# Patient Record
Sex: MALE | Race: WHITE | NOT HISPANIC OR LATINO | Employment: OTHER | URBAN - METROPOLITAN AREA
[De-identification: names, ages, dates, MRNs, and addresses within clinical notes are randomized per-mention and may not be internally consistent; named-entity substitution may affect disease eponyms.]

---

## 2017-01-13 ENCOUNTER — OFFICE VISIT (OUTPATIENT)
Dept: PULMONOLOGY | Facility: HOSPICE | Age: 72
End: 2017-01-13
Payer: MEDICARE

## 2017-01-13 VITALS
OXYGEN SATURATION: 93 % | SYSTOLIC BLOOD PRESSURE: 138 MMHG | BODY MASS INDEX: 37.77 KG/M2 | DIASTOLIC BLOOD PRESSURE: 72 MMHG | RESPIRATION RATE: 16 BRPM | TEMPERATURE: 97.3 F | WEIGHT: 235 LBS | HEIGHT: 66 IN | HEART RATE: 68 BPM

## 2017-01-13 DIAGNOSIS — R06.00 DYSPNEA, UNSPECIFIED TYPE: ICD-10-CM

## 2017-01-13 DIAGNOSIS — G47.33 OBSTRUCTIVE SLEEP APNEA: ICD-10-CM

## 2017-01-13 PROCEDURE — 99204 OFFICE O/P NEW MOD 45 MIN: CPT | Mod: 25 | Performed by: INTERNAL MEDICINE

## 2017-01-13 ASSESSMENT — PULMONARY FUNCTION TESTS
FVC_PERCENT_PREDICTED: 59
FVC: 2.33
FVC: 2.17
FEV1/FVC_PERCENT_PREDICTED: 73
FEV1/FVC_PERCENT_PREDICTED: 116
FEV1_PERCENT_CHANGE: -6
FVC_PERCENT_PREDICTED: 55
FEV1_PERCENT_PREDICTED: 64
FEV1: 1.95
FEV1/FVC: 84
FEV1_PREDICTED: 2.85
FEV1/FVC_PERCENT_CHANGE: 100
FEV1/FVC_PERCENT_PREDICTED: 115
FEV1/FVC: 84.33
FEV1: 1.83
FVC_PREDICTED: 3.92
FEV1_PERCENT_CHANGE: -6
FEV1_PERCENT_PREDICTED: 68

## 2017-01-13 NOTE — PROCEDURES
Technician: Kim Reyes, RRT/CPFT  Technician Comments:  Good patient effort & cooperation.  ATS/ERS standards for acceptability & reproducibility were not met for Spirometry  because of variable efforts / glottic noise. First FVC was patients best, every effort after that was variable expiratory flows. The results of this test meet the ATS/ERS standards for acceptability and repeatability.  Test was performed on the [employer] Body Plethysmograph- Elite DX system.  The DLCO was uncorrected for Hgb.  A bronchodilator of Ventolin HFA- 2puffs via spacer were administered.    Spirometry shows moderate lung restriction and there was no evidence for airflow obstruction. Lung volumes are consistent with mild lung restriction and there was evidence of mild air trapping. The diffusion capacity test was normal and the increased D/VA indicates that the lung restriction is due to non-lung parenchyma issues.

## 2017-01-13 NOTE — MR AVS SNAPSHOT
"        Shamar Morgan   2017 9:00 AM   Office Visit   MRN: [id number]    Department:  [facility]   Dept Phone:  [phone]    Description:  Male : 1945   Provider:  Santana Webb M.D.           Reason for Visit     Establish Care     Shortness of Breath           Allergies as of 2017     Allergen Noted Reactions    Lipidil [Fenofibrate] 2011       Myalgia    Penicillins 2008       Sulfa Drugs 2008         You were diagnosed with     Obstructive sleep apnea   [972323]         Vital Signs     Blood Pressure Pulse Temperature Respirations Height Weight    138/72 mmHg 68 36.3 °C (97.3 °F) 16 1.689 m (5' 6.5\") 106.595 kg (235 lb)    Body Mass Index Oxygen Saturation Smoking Status             37.37 kg/m2 93% Former Smoker         Basic Information     Date Of Birth Sex Race Ethnicity Preferred Language    1945 Male White Non- English      Your appointments     2017 11:20 AM   Established Patient with Aldo Lemus M.D.   [facility] [address]    [address]  [address]  [facility] [address]   [phone]           You will be receiving a confirmation call a few days before your appointment from our automated call confirmation system.              Problem List              ICD-10-CM Priority Class Noted - Resolved    Hyperlipemia (Chronic) E78.5 High  2012 - Present    HTN (hypertension) (Chronic) I10 High  2012 - Present    Vitamin d deficiency    2012 - Present    Obesity E66.9   6/10/2016 - Present    IGT (impaired glucose tolerance) R73.02   2016 - Present      Health Maintenance        Date Due Completion Dates    IMM DTaP/Tdap/Td Vaccine (1 - Tdap) 1963 ---    IMM PNEUMOCOCCAL 65+ (ADULT) LOW/MEDIUM RISK SERIES (2 of 2 - PPSV23) 2017    COLONOSCOPY 10/17/2019 10/17/2014, 2014 (Done)    Override on 2014: Done            Current Immunizations     13-VALENT PCV PREVNAR 2016   " Influenza TIV (IM) 11/5/2014    Influenza Vaccine Adult HD 10/16/2016, 10/26/2015  9:25 AM    SHINGLES VACCINE 6/10/2013      Below and/or attached are the medications your provider expects you to take. Review all of your home medications and newly ordered medications with your provider and/or pharmacist. Follow medication instructions as directed by your provider and/or pharmacist. Please keep your medication list with you and share with your provider. Update the information when medications are discontinued, doses are changed, or new medications (including over-the-counter products) are added; and carry medication information at all times in the event of emergency situations     Allergies:  LIPIDIL - (reactions not documented)     PENICILLINS - (reactions not documented)     SULFA DRUGS - (reactions not documented)               Medications  Valid as of: January 13, 2017 -  9:41 AM    Generic Name Brand Name Tablet Size Instructions for use    Cholecalciferol (Tab) cholecalciferol 1000 UNIT Take 1,000 Units by mouth every day.        Gabapentin (Cap) NEURONTIN 300 MG Take 1 Cap by mouth 3 times a day.        Losartan Potassium (Tab) COZAAR 100 MG Take 1 Tab by mouth every day.        Metoprolol Succinate (TABLET SR 24 HR) TOPROL XL 25 MG Take 1 Tab by mouth every day.        Naproxen (Tab) NAPROSYN 500 MG Take 1 Tab by mouth 2 times a day, with meals.        Omeprazole (CAPSULE DELAYED RELEASE) PRILOSEC 20 MG Take 1 Cap by mouth every day.        Pravastatin Sodium (Tab) PRAVACHOL 80 MG Take 1 Tab by mouth every bedtime.        .                 Medicines prescribed today were sent to:     [facility] [place] - [address] [facility] [address]     [facility] [facility] [address] [place] [address]    Phone: [phone] Fax: [phone]    Open 24 Hours?: No      Medication refill instructions:       If your prescription bottle indicates you have medication refills left, it is not necessary to call your provider’s office. Please  contact your pharmacy and they will refill your medication.    If your prescription bottle indicates you do not have any refills left, you may request refills at any time through one of the following ways: The online [employer] system (except Urgent Care), by calling your provider’s office, or by asking your pharmacy to contact your provider’s office with a refill request. Medication refills are processed only during regular business hours and may not be available until the next business day. Your provider may request additional information or to have a follow-up visit with you prior to refilling your medication.   *Please Note: Medication refills are assigned a new Rx number when refilled electronically. Your pharmacy may indicate that no refills were authorized even though a new prescription for the same medication is available at the pharmacy. Please request the medicine by name with the pharmacy before contacting your provider for a refill.        Your To Do List     Future Labs/Procedures Complete By Expires    POLYSOMNOGRAPHY, 4 OR MORE  As directed 1/13/2018    Comments:    Please do split study if meets diagnostic criteria      Instructions    1. We have scheduled a sleep study to evaluate for obstructive sleep apnea. We will also plan to do a CPAP titration during the sleep study  2. Recommend exercise and weight loss  3. Recommend avoiding alcohol and sedatives and to not operate a motor vehicle while drowsy  4. Recommend follow-up after the sleep study          [employer] Access Code: Activation code not generated  Current [employer] Status: Active

## 2017-01-13 NOTE — PATIENT INSTRUCTIONS
1. We have scheduled a sleep study to evaluate for obstructive sleep apnea. We will also plan to do a CPAP titration during the sleep study  2. Recommend exercise and weight loss  3. Recommend avoiding alcohol and sedatives and to not operate a motor vehicle while drowsy  4. Recommend follow-up after the sleep study

## 2017-01-13 NOTE — Clinical Note
Santana Webb M.D.  [facility]   [address] [place] [address] [place] [address]  Phone: [phone] - Fax: [phone]           Encounter Date: 1/13/2017  Provider: Santana Webb M.D.  Location of Care: [facility]      Patient:   Shamar Morgan   MR Number: [id number]   YOB: 1945     PROGRESS NOTE:  Shamar Morgan is a 71 y.o. male here for evaluation of shortness of breath.  Patient was referred by Dr. Aldo Lemus.    History of Present Illness:    Patient is a 71-year-old male comes in concerned about shortness of breath with exertion. He is a remote smoker quitting in 1982. He smoked fairly heavily up to 2-3 packs a day for a short period of time. He has no history of COPD or asthma. He denies a history of wheezing. He has a chronic dry cough that occurs mostly at nighttime. He also has loud snoring. He wakes up with episodes of sore throat and a dry throat. He denies nocturnal gasping or choking. He denies witnessed apneas. He's been fatigued and sleepy during the daytime. We did pulmonary function testing today and it shows evidence of lung restriction related to body habitus. There was no evidence for airflow obstruction or COPD. He also underwent a CT scan of the chest which showed clear lung fields without evidence of any interstitial disease or emphysema. There was no nodules or masses or adenopathy. He denies any history of heart problems. In fact he says he had a recent cardiac evaluation which was all unremarkable. He has no history of pulmonary hypertension. He does admit to having some leg edema. He attributed this to his obesity. He currently weighs 235 pounds with a body mass index of 36.    Constitutional:  Negative for fever, chills, sweats, and fatigue.  Eyes:  Negative for eye pain and visual changes.  HENT:  Negative for tinnitus and hoarse voice.  Cardiovascular:  Negative for chest pain, leg swelling,  syncope and orthopnea.  Respiratory:  See HPI for pertinent negatives  Sleep: Positive for somnolence, loud snoring, sleep disturbance due to breathing, insomnia.  Gastrointestinal:  Negative for dysphagia, nausea and abdominal pain.  Heme/lymph:  Denies easy bruising, blood clots.  Musculoskeletal:  Negative for arthralgias, sore muscles and back pain.  Skin:  Negative for rash and color change.  Neurological:  Negative for headaches, lightheadedness and weakness.  Psychiatric:  Denies depression.    Current Outpatient Prescriptions   Medication Sig Dispense Refill   • gabapentin (NEURONTIN) 300 MG Cap Take 1 Cap by mouth 3 times a day. 90 Cap 3   • omeprazole (PRILOSEC) 20 MG delayed-release capsule Take 1 Cap by mouth every day. 30 Cap 11   • losartan (COZAAR) 100 MG Tab Take 1 Tab by mouth every day. 90 Each 3   • metoprolol SR (TOPROL XL) 25 MG TABLET SR 24 HR Take 1 Tab by mouth every day. 90 Tab 3   • pravastatin (PRAVACHOL) 80 MG tablet Take 1 Tab by mouth every bedtime. 90 Tab 3   • vitamin D (CHOLECALCIFEROL) 1000 UNIT TABS Take 1,000 Units by mouth every day.     • naproxen (NAPROSYN) 500 MG Tab Take 1 Tab by mouth 2 times a day, with meals. 60 Tab 1     No current facility-administered medications for this visit.       Social History   Substance Use Topics   • Smoking status: Former Smoker -- 3.00 packs/day for 16 years     Types: Cigarettes     Quit date: 01/01/1980   • Smokeless tobacco: Never Used   • Alcohol Use: 0.0 oz/week     0 Standard drinks or equivalent per week      Comment: wine or whiskey 2-3 a day       Past Medical History   Diagnosis Date   • CAD (coronary artery disease) 4/16/2012   • Hyperglycemia 4/16/2012   • Hyperlipemia 4/16/2012   • HTN (hypertension) 4/16/2012   • Tachycardia 4/16/2012   • Tonsillitis    • Hypertension    • Obesity        Past Surgical History   Procedure Laterality Date   • Carpal tunnel release         Allergies:  Lipidil; Penicillins; and Sulfa drugs    Family  "History   Problem Relation Age of Onset   • Diabetes Mother    • Heart Disease Mother    • Kidney Disease Mother        Physical Examination    Filed Vitals:    01/13/17 0841   Height: 1.689 m (5' 6.5\")   Weight: 106.595 kg (235 lb)   Weight % change since last entry.: 0 %   BP: 138/72   Pulse: 68   BMI (Calculated): 37.37   Resp: 16   Temp: 36.3 °C (97.3 °F)       Physical Exam:  Constitutional:  Well developed and well nourished.  Head:  Normocephalic and atraumatic.  Nose:  Nose normal.  Mouth/Throat:  Oropharynx is clear and moist, no lesions.  Mallampati class IV  Eyes:  Conjunctivae and EOM are normal.  Pupils are equal, round, and reactive to light.  Neck:  Normal range of motion.  Supple.  No JVD. No tracheal deviation.  No thyromegally  Cardiovascular:  Normal rate, regular rhythm, normal heart sounds and intact distal pulses.  Pulmonary/Chest:  No accessory muscle use.  No wheezing, rales or rhonchi.  No dullness to percussion, tenderness or deformity.  Abdominal:  Soft.  No ascites.  No Hepatosplenomegally.  Non tender.  Musculoskeletal.  Normal range of motion.  No muscular atrophy.  Lymphadenopathy:  No cervical or supraclavicular adenopathy  Neurological:  Alert and oriented.  Cranial nerves intact.  No focal deficits  Skin:  No rashes or ulcers.  Psyciatric:  Normal mood and affect.      Assessment and Plan:  1. Obstructive sleep apnea  The patient has clinical symptoms are consistent with obstructive sleep apnea. He has loud snoring, morning dry throat and sore throat, daytime somnolence. He has a very crowded oropharynx and a thick neck. He has comorbid conditions include obesity, hypertension and hyperlipidemia as well as gastroesophageal reflux symptoms. He had an apnea link which indicated a strong likelihood of obstructive sleep apnea. I scheduled him for a sleep study and we will plan to do a CPAP titration during the second half of the night. I discussed with the patient the likelihood of " obstructive sleep apnea and he seems willing to try CPAP therapy at this time. In regards to his dyspnea on exertion I believe this is related to his obesity. There is no evidence for any significant pulmonary parenchymal disease. I have encouraged weight loss and exercise. I also believe that treating his sleep apnea will also improve his daytime breathing. He was advised to avoid alcohol and sedatives and to not operate a motor vehicle and drowsy. We also discussed the fact that untreated sleep apnea is associated with an increased risk for cardiovascular disease. Plan to see him back after the sleep study.  - POLYSOMNOGRAPHY, 4 OR MORE; Future          Followup Return for follow up visit with APRN.      Electronically signed by Santana Webb M.D.  on 01/13/2017    Aldo Lemus M.D.  [address] [place] [id number]  VIA In Basket

## 2017-01-13 NOTE — MR AVS SNAPSHOT
Shamar Morgan   2017 8:00 AM   Office Visit   MRN: [id number]    Department:  [facility]   Dept Phone:  [phone]    Description:  Male : 1945   Provider:  [id number]           Allergies as of 2017     Allergen Noted Reactions    Lipidil [Fenofibrate] 2011       Myalgia    Penicillins 2008       Sulfa Drugs 2008         You were diagnosed with     Dyspnea, unspecified type   [[id number]]         Vital Signs     Smoking Status                   Former Smoker           Basic Information     Date Of Birth Sex Race Ethnicity Preferred Language    1945 Male White Non- English      Your appointments     2017 11:20 AM   Established Patient with Aldo Lemus M.D.   [facility] [address]    [address]  [address]  [place] [id number] [phone]           You will be receiving a confirmation call a few days before your appointment from our automated call confirmation system.              Problem List              ICD-10-CM Priority Class Noted - Resolved    Hyperlipemia (Chronic) E78.5 High  2012 - Present    HTN (hypertension) (Chronic) I10 High  2012 - Present    Vitamin d deficiency    2012 - Present    Obesity E66.9   6/10/2016 - Present    IGT (impaired glucose tolerance) R73.02   2016 - Present      Health Maintenance        Date Due Completion Dates    IMM DTaP/Tdap/Td Vaccine (1 - Tdap) 1963 ---    IMM PNEUMOCOCCAL 65+ (ADULT) LOW/MEDIUM RISK SERIES (2 of 2 - PPSV23) 2017    COLONOSCOPY 10/17/2019 10/17/2014, 2014 (Done)    Override on 2014: Done            Current Immunizations     13-VALENT PCV PREVNAR 2016    Influenza TIV (IM) 2014    Influenza Vaccine Adult HD 10/16/2016, 10/26/2015  9:25 AM    SHINGLES VACCINE 6/10/2013      Below and/or attached are the medications your provider expects you to take. Review all of your home medications and newly ordered  medications with your provider and/or pharmacist. Follow medication instructions as directed by your provider and/or pharmacist. Please keep your medication list with you and share with your provider. Update the information when medications are discontinued, doses are changed, or new medications (including over-the-counter products) are added; and carry medication information at all times in the event of emergency situations     Allergies:  LIPIDIL - (reactions not documented)     PENICILLINS - (reactions not documented)     SULFA DRUGS - (reactions not documented)               Medications  Valid as of: January 13, 2017 -  9:01 AM    Generic Name Brand Name Tablet Size Instructions for use    Cholecalciferol (Tab) cholecalciferol 1000 UNIT Take 1,000 Units by mouth every day.        Gabapentin (Cap) NEURONTIN 300 MG Take 1 Cap by mouth 3 times a day.        Losartan Potassium (Tab) COZAAR 100 MG Take 1 Tab by mouth every day.        Metoprolol Succinate (TABLET SR 24 HR) TOPROL XL 25 MG Take 1 Tab by mouth every day.        Naproxen (Tab) NAPROSYN 500 MG Take 1 Tab by mouth 2 times a day, with meals.        Omeprazole (CAPSULE DELAYED RELEASE) PRILOSEC 20 MG Take 1 Cap by mouth every day.        Pravastatin Sodium (Tab) PRAVACHOL 80 MG Take 1 Tab by mouth every bedtime.        .                 Medicines prescribed today were sent to:     [facility] [place] - [address] [address] [facility] [place] [address]    Phone: [phone] Fax: [phone]    Open 24 Hours?: No      Medication refill instructions:       If your prescription bottle indicates you have medication refills left, it is not necessary to call your provider’s office. Please contact your pharmacy and they will refill your medication.    If your prescription bottle indicates you do not have any refills left, you may request refills at any time through one of the following ways: The online [employer] system (except Urgent Care), by  calling your provider’s office, or by asking your pharmacy to contact your provider’s office with a refill request. Medication refills are processed only during regular business hours and may not be available until the next business day. Your provider may request additional information or to have a follow-up visit with you prior to refilling your medication.   *Please Note: Medication refills are assigned a new Rx number when refilled electronically. Your pharmacy may indicate that no refills were authorized even though a new prescription for the same medication is available at the pharmacy. Please request the medicine by name with the pharmacy before contacting your provider for a refill.           [employer] Access Code: Activation code not generated  Current [employer] Status: Active

## 2017-01-13 NOTE — PROGRESS NOTES
Shamar Morgan is a 71 y.o. male here for evaluation of shortness of breath.  Patient was referred by Dr. Aldo Lemus.    History of Present Illness:    Patient is a 71-year-old male comes in concerned about shortness of breath with exertion. He is a remote smoker quitting in 1982. He smoked fairly heavily up to 2-3 packs a day for a short period of time. He has no history of COPD or asthma. He denies a history of wheezing. He has a chronic dry cough that occurs mostly at nighttime. He also has loud snoring. He wakes up with episodes of sore throat and a dry throat. He denies nocturnal gasping or choking. He denies witnessed apneas. He's been fatigued and sleepy during the daytime. We did pulmonary function testing today and it shows evidence of lung restriction related to body habitus. There was no evidence for airflow obstruction or COPD. He also underwent a CT scan of the chest which showed clear lung fields without evidence of any interstitial disease or emphysema. There was no nodules or masses or adenopathy. He denies any history of heart problems. In fact he says he had a recent cardiac evaluation which was all unremarkable. He has no history of pulmonary hypertension. He does admit to having some leg edema. He attributed this to his obesity. He currently weighs 235 pounds with a body mass index of 36.    Constitutional:  Negative for fever, chills, sweats, and fatigue.  Eyes:  Negative for eye pain and visual changes.  HENT:  Negative for tinnitus and hoarse voice.  Cardiovascular:  Negative for chest pain, leg swelling, syncope and orthopnea.  Respiratory:  See HPI for pertinent negatives  Sleep: Positive for somnolence, loud snoring, sleep disturbance due to breathing, insomnia.  Gastrointestinal:  Negative for dysphagia, nausea and abdominal pain.  Heme/lymph:  Denies easy bruising, blood clots.  Musculoskeletal:  Negative for arthralgias, sore muscles and back pain.  Skin:  Negative for rash and color  "change.  Neurological:  Negative for headaches, lightheadedness and weakness.  Psychiatric:  Denies depression.    Current Outpatient Prescriptions   Medication Sig Dispense Refill   • gabapentin (NEURONTIN) 300 MG Cap Take 1 Cap by mouth 3 times a day. 90 Cap 3   • omeprazole (PRILOSEC) 20 MG delayed-release capsule Take 1 Cap by mouth every day. 30 Cap 11   • losartan (COZAAR) 100 MG Tab Take 1 Tab by mouth every day. 90 Each 3   • metoprolol SR (TOPROL XL) 25 MG TABLET SR 24 HR Take 1 Tab by mouth every day. 90 Tab 3   • pravastatin (PRAVACHOL) 80 MG tablet Take 1 Tab by mouth every bedtime. 90 Tab 3   • vitamin D (CHOLECALCIFEROL) 1000 UNIT TABS Take 1,000 Units by mouth every day.     • naproxen (NAPROSYN) 500 MG Tab Take 1 Tab by mouth 2 times a day, with meals. 60 Tab 1     No current facility-administered medications for this visit.       Social History   Substance Use Topics   • Smoking status: Former Smoker -- 3.00 packs/day for 16 years     Types: Cigarettes     Quit date: 01/01/1980   • Smokeless tobacco: Never Used   • Alcohol Use: 0.0 oz/week     0 Standard drinks or equivalent per week      Comment: wine or whiskey 2-3 a day       Past Medical History   Diagnosis Date   • CAD (coronary artery disease) 4/16/2012   • Hyperglycemia 4/16/2012   • Hyperlipemia 4/16/2012   • HTN (hypertension) 4/16/2012   • Tachycardia 4/16/2012   • Tonsillitis    • Hypertension    • Obesity        Past Surgical History   Procedure Laterality Date   • Carpal tunnel release         Allergies:  Lipidil; Penicillins; and Sulfa drugs    Family History   Problem Relation Age of Onset   • Diabetes Mother    • Heart Disease Mother    • Kidney Disease Mother        Physical Examination    Filed Vitals:    01/13/17 0841   Height: 1.689 m (5' 6.5\")   Weight: 106.595 kg (235 lb)   Weight % change since last entry.: 0 %   BP: 138/72   Pulse: 68   BMI (Calculated): 37.37   Resp: 16   Temp: 36.3 °C (97.3 °F)       Physical " Exam:  Constitutional:  Well developed and well nourished.  Head:  Normocephalic and atraumatic.  Nose:  Nose normal.  Mouth/Throat:  Oropharynx is clear and moist, no lesions.  Mallampati class IV  Eyes:  Conjunctivae and EOM are normal.  Pupils are equal, round, and reactive to light.  Neck:  Normal range of motion.  Supple.  No JVD. No tracheal deviation.  No thyromegally  Cardiovascular:  Normal rate, regular rhythm, normal heart sounds and intact distal pulses.  Pulmonary/Chest:  No accessory muscle use.  No wheezing, rales or rhonchi.  No dullness to percussion, tenderness or deformity.  Abdominal:  Soft.  No ascites.  No Hepatosplenomegally.  Non tender.  Musculoskeletal.  Normal range of motion.  No muscular atrophy.  Lymphadenopathy:  No cervical or supraclavicular adenopathy  Neurological:  Alert and oriented.  Cranial nerves intact.  No focal deficits  Skin:  No rashes or ulcers.  Psyciatric:  Normal mood and affect.      Assessment and Plan:  1. Obstructive sleep apnea  The patient has clinical symptoms are consistent with obstructive sleep apnea. He has loud snoring, morning dry throat and sore throat, daytime somnolence. He has a very crowded oropharynx and a thick neck. He has comorbid conditions include obesity, hypertension and hyperlipidemia as well as gastroesophageal reflux symptoms. He had an apnea link which indicated a strong likelihood of obstructive sleep apnea. I scheduled him for a sleep study and we will plan to do a CPAP titration during the second half of the night. I discussed with the patient the likelihood of obstructive sleep apnea and he seems willing to try CPAP therapy at this time. In regards to his dyspnea on exertion I believe this is related to his obesity. There is no evidence for any significant pulmonary parenchymal disease. I have encouraged weight loss and exercise. I also believe that treating his sleep apnea will also improve his daytime breathing. He was advised to  avoid alcohol and sedatives and to not operate a motor vehicle and drowsy. We also discussed the fact that untreated sleep apnea is associated with an increased risk for cardiovascular disease. Plan to see him back after the sleep study.  - POLYSOMNOGRAPHY, 4 OR MORE; Future          Followup Return for follow up visit with ISELA.

## 2017-01-16 ENCOUNTER — SLEEP STUDY (OUTPATIENT)
Dept: SLEEP MEDICINE | Facility: MEDICAL CENTER | Age: 72
End: 2017-01-16
Attending: INTERNAL MEDICINE
Payer: MEDICARE

## 2017-01-16 DIAGNOSIS — G47.33 OBSTRUCTIVE SLEEP APNEA: ICD-10-CM

## 2017-01-16 PROCEDURE — 95811 POLYSOM 6/>YRS CPAP 4/> PARM: CPT | Performed by: INTERNAL MEDICINE

## 2017-01-17 NOTE — PROCEDURES
CLINICAL COMMENTS:  The patient underwent a split night polysomnogram with a CPAP titration using the standard montage for measurement of parameters of sleep, respiratory events, movement abnormalities, heart rate and rhythm. A microphone was used to monitor snoring.    INTERPRETATION: The diagnostic recording time was 168.0 minutes with a sleep period of 152.7 minutes.  Total sleep time was 135.5 minutes with a sleep efficiency of 80.6%.  The sleep latency was 15.3 minutes, and REM latency was 68.0 minutes.  The patient had 47 arousals in total, for an arousal index of 20.8.        RESPIRATORY: The patient had 74 apneas in total.  Of these, 62 were obstructive apneas, and 8 were central apneas.  This resulted in an apnea index (AI) of 32.8.  The patient had 91 hypopneas in total, which resulted in a hypopnea index of 40.3.  The overall AHI was 73.1, while the AHI during REM was 88.8.  The supine AHI = N/A.    OXIMETRY: Oxygen saturation monitoring showed a mean SpO2 of 87.8% for the diagnostic part of the study, with a minimum oxygen saturation of 67.0%.  Oxygen saturations were below 89% for 85.5% of sleep time.    CARDIAC: The highest heart rate for the first part of the study was 139.0 beats per minute.  The average heart rate during sleep was 79.8 bpm, while the highest heart rate was 139.0 bpm.    LIMB MOVEMENTS: There were a total of 0 periodic limb movements during sleep, of which 0 were PLMS arousals.  This resulted in a PLMS index of 0.0 and a PLMS arousal index of 0.0.    TREATMENT    Treatment recording time was 279.8 minutes with a total sleep time of 251.4min.  The patient had an arousal index of 11.0.      RESPIRATORY: The patient had 34 obstructive apneas, 89 central apneas, and 91 hypopneas for an overall AHI was 51.1.    OXIMETRY: The mean SpO2 during treatment was 90.5%, with a minimum oxygen saturation of 74.0%.      CPAP was tried from 5cm to [id number]. Bilevel was tried from 14/10cm H2O to  19/13cm H2O.     Sleep study was accomplished on January 16, 2017 with a diagnostic sleep time of 2.3 hours. No slow-wave sleep was observed. No limb movements were evident. Apnea-hypopnea index was very high at 73.1, REM index higher at 89. Patient was predominantly left side. Oxygen desaturation to 67% was evident, below 90% almost 80% of the time monitored with very severe sleep apnea and desaturation, CPAP titration was initiated. A complex titration of CPAP up to 15 cm and then bilevel up to 17/13 produced variable results. It appears that bilevel 16/12 made the greatest impact on reducing the apnea-hypopnea index, REM sleep was achieved and saturations improved initiation of treatment in that range with follow-up of downloading compliance data may help further adjust the settings. Alternatively the patient could return for dedicated titration study

## 2017-01-19 ENCOUNTER — OFFICE VISIT (OUTPATIENT)
Dept: MEDICAL GROUP | Facility: MEDICAL CENTER | Age: 72
End: 2017-01-19
Payer: MEDICARE

## 2017-01-19 VITALS
WEIGHT: 234 LBS | HEIGHT: 67 IN | SYSTOLIC BLOOD PRESSURE: 128 MMHG | BODY MASS INDEX: 36.73 KG/M2 | TEMPERATURE: 97.3 F | DIASTOLIC BLOOD PRESSURE: 66 MMHG | HEART RATE: 85 BPM | RESPIRATION RATE: 16 BRPM | OXYGEN SATURATION: 94 %

## 2017-01-19 DIAGNOSIS — E66.09 NON MORBID OBESITY DUE TO EXCESS CALORIES: ICD-10-CM

## 2017-01-19 DIAGNOSIS — I10 ESSENTIAL HYPERTENSION: ICD-10-CM

## 2017-01-19 DIAGNOSIS — G47.33 OSA (OBSTRUCTIVE SLEEP APNEA): ICD-10-CM

## 2017-01-19 DIAGNOSIS — R73.02 IGT (IMPAIRED GLUCOSE TOLERANCE): ICD-10-CM

## 2017-01-19 DIAGNOSIS — E78.5 DYSLIPIDEMIA: ICD-10-CM

## 2017-01-19 PROCEDURE — 99214 OFFICE O/P EST MOD 30 MIN: CPT | Performed by: INTERNAL MEDICINE

## 2017-01-19 ASSESSMENT — PATIENT HEALTH QUESTIONNAIRE - PHQ9: CLINICAL INTERPRETATION OF PHQ2 SCORE: 0

## 2017-01-19 NOTE — MR AVS SNAPSHOT
"        Shamar Morgan   2017 11:20 AM   Office Visit   MRN: [id number]    Department:  [address] [place] [address]   Dept Phone:  [phone]    Description:  Male : 1945   Provider:  Aldo Lemus M.D.           Reason for Visit     Follow-Up Labs      Allergies as of 2017     Allergen Noted Reactions    Lipidil [Fenofibrate] 2011       Myalgia    Penicillins 2008       Sulfa Drugs 2008         You were diagnosed with     Non morbid obesity due to excess calories   [3401872]       Dyslipidemia   [040020]       Essential hypertension   [3473127]       IGT (impaired glucose tolerance)   [111087]       RUPAL (obstructive sleep apnea)   [426907]         Vital Signs     Blood Pressure Pulse Temperature Respirations Height Weight    128/66 mmHg 85 36.3 °C (97.3 °F) 16 1.689 m (5' 6.5\") 106.142 kg (234 lb)    Body Mass Index Oxygen Saturation Smoking Status             37.21 kg/m2 94% Former Smoker         Basic Information     Date Of Birth Sex Race Ethnicity Preferred Language    1945 Male White Non- English      Your appointments     2017 12:40 PM   Follow UP with Julianna Escobedo M.D.   [facility] (--)    [address] [facility] [address]  [place] [id number] [phone]            2017 11:20 AM   Established Patient with Aldo Lemus M.D.   [facility] [address]    [address]  [place] [address] [phone]           You will be receiving a confirmation call a few days before your appointment from our automated call confirmation system.              Problem List              ICD-10-CM Priority Class Noted - Resolved    Dyslipidemia E78.5 High  2012 - Present    Essential hypertension I10 High  2012 - Present    Vitamin d deficiency    2012 - Present    IGT (impaired glucose tolerance) R73.02   2016 - Present    Non morbid obesity due to excess calories E66.09   2017 - " Present    RUPAL (obstructive sleep apnea) G47.33   1/19/2017 - Present      Health Maintenance        Date Due Completion Dates    IMM DTaP/Tdap/Td Vaccine (1 - Tdap) 7/2/1963 ---    IMM PNEUMOCOCCAL 65+ (ADULT) LOW/MEDIUM RISK SERIES (2 of 2 - PPSV23) 7/8/2017 7/8/2016    COLONOSCOPY 10/17/2019 10/17/2014, 5/28/2014 (Done)    Override on 5/28/2014: Done            Current Immunizations     13-VALENT PCV PREVNAR 7/8/2016    Influenza TIV (IM) 11/5/2014    Influenza Vaccine Adult HD 10/16/2016, 10/26/2015  9:25 AM    SHINGLES VACCINE 6/10/2013    Tdap Vaccine 1/19/2013      Below and/or attached are the medications your provider expects you to take. Review all of your home medications and newly ordered medications with your provider and/or pharmacist. Follow medication instructions as directed by your provider and/or pharmacist. Please keep your medication list with you and share with your provider. Update the information when medications are discontinued, doses are changed, or new medications (including over-the-counter products) are added; and carry medication information at all times in the event of emergency situations     Allergies:  LIPIDIL - (reactions not documented)     PENICILLINS - (reactions not documented)     SULFA DRUGS - (reactions not documented)               Medications  Valid as of: January 19, 2017 - 11:31 AM    Generic Name Brand Name Tablet Size Instructions for use    Cholecalciferol (Tab) cholecalciferol 1000 UNIT Take 1,000 Units by mouth every day.        Gabapentin (Cap) NEURONTIN 300 MG Take 1 Cap by mouth 3 times a day.        Losartan Potassium (Tab) COZAAR 100 MG Take 1 Tab by mouth every day.        Metoprolol Succinate (TABLET SR 24 HR) TOPROL XL 25 MG Take 1 Tab by mouth every day.        Naproxen (Tab) NAPROSYN 500 MG Take 1 Tab by mouth 2 times a day, with meals.        Omeprazole (CAPSULE DELAYED RELEASE) PRILOSEC 20 MG Take 1 Cap by mouth every day.        Pravastatin Sodium  (Tab) PRAVACHOL 80 MG Take 1 Tab by mouth every bedtime.        .                 Medicines prescribed today were sent to:     [facility] [place], [place] - [address]    [facility] [place] [address]    Phone: [phone] Fax: [phone]    Open 24 Hours?: No      Medication refill instructions:       If your prescription bottle indicates you have medication refills left, it is not necessary to call your provider’s office. Please contact your pharmacy and they will refill your medication.    If your prescription bottle indicates you do not have any refills left, you may request refills at any time through one of the following ways: The online [employer] system (except Urgent Care), by calling your provider’s office, or by asking your pharmacy to contact your provider’s office with a refill request. Medication refills are processed only during regular business hours and may not be available until the next business day. Your provider may request additional information or to have a follow-up visit with you prior to refilling your medication.   *Please Note: Medication refills are assigned a new Rx number when refilled electronically. Your pharmacy may indicate that no refills were authorized even though a new prescription for the same medication is available at the pharmacy. Please request the medicine by name with the pharmacy before contacting your provider for a refill.        Your To Do List     Future Labs/Procedures Complete By Expires    COMP METABOLIC PANEL  As directed 1/20/2018    HEMOGLOBIN A1C  As directed 1/20/2018    LIPID PROFILE  As directed 1/20/2018    MICROALBUMIN CREAT RATIO URINE  As directed 1/20/2018         [employer] Access Code: Activation code not generated  Current [employer]orPool Status: Active

## 2017-01-19 NOTE — PROGRESS NOTES
CC: Follow-up multiple issues    HPI:   Shamar presents today with the following.    1. Non morbid obesity due to excess calories  Presents not having lost weight in fact has gone up over the last several months. He is not currently focusing on diet.    2. Dyslipidemia  Cholesterol currently controlled but coming due for recheck. He is maintained on statin without myalgias.    3. Essential hypertension  Blood pressure adequately controlled on current medication denying any chest pain or shortness of breath.    4. IGT (impaired glucose tolerance)  Fasting blood sugars have been elevated with a slightly increased A1c. No true history of diabetes but reports family members with diabetes.    5. RUPAL (obstructive sleep apnea)  Recently seen by pulmonology testing positive for sleep apnea but lungs are normal otherwise.      Patient Active Problem List    Diagnosis Date Noted   • Dyslipidemia 04/16/2012     Priority: High   • Essential hypertension 04/16/2012     Priority: High   • Non morbid obesity due to excess calories 01/19/2017   • RUPAL (obstructive sleep apnea) 01/19/2017   • IGT (impaired glucose tolerance) 07/08/2016   • Vitamin d deficiency 11/16/2012       Current Outpatient Prescriptions   Medication Sig Dispense Refill   • gabapentin (NEURONTIN) 300 MG Cap Take 1 Cap by mouth 3 times a day. 90 Cap 3   • omeprazole (PRILOSEC) 20 MG delayed-release capsule Take 1 Cap by mouth every day. 30 Cap 11   • naproxen (NAPROSYN) 500 MG Tab Take 1 Tab by mouth 2 times a day, with meals. 60 Tab 1   • losartan (COZAAR) 100 MG Tab Take 1 Tab by mouth every day. 90 Each 3   • metoprolol SR (TOPROL XL) 25 MG TABLET SR 24 HR Take 1 Tab by mouth every day. 90 Tab 3   • pravastatin (PRAVACHOL) 80 MG tablet Take 1 Tab by mouth every bedtime. 90 Tab 3   • vitamin D (CHOLECALCIFEROL) 1000 UNIT TABS Take 1,000 Units by mouth every day.       No current facility-administered medications for this visit.         Allergies as of 01/19/2017  "- Ellis as Reviewed 01/19/2017   Allergen Reaction Noted   • Lipidil [fenofibrate]  07/29/2011   • Penicillins  07/31/2008   • Sulfa drugs  07/31/2008        ROS: As per HPI.    /66 mmHg  Pulse 85  Temp(Src) 36.3 °C (97.3 °F)  Resp 16  Ht 1.689 m (5' 6.5\")  Wt 106.142 kg (234 lb)  BMI 37.21 kg/m2  SpO2 94%    Physical Exam:  Gen:         Alert and oriented, No apparent distress.  Neck:        No Lymphadenopathy or Bruits.  Lungs:     Clear to auscultation bilaterally  CV:          Regular rate and rhythm. No murmurs, rubs or gallops.               Ext:          No clubbing, cyanosis, edema.      Assessment and Plan.   71 y.o. male with the following issues.    1. Non morbid obesity due to excess calories  Discussed diet exercise and weight loss strategies. Have set a goal for 15 pounds in 3 months and will followup at that time.  - Patient identified as having weight management issue.  Appropriate orders and counseling given.    2. Dyslipidemia  Again discussion about diet recheck 3 months.  - COMP METABOLIC PANEL; Future  - LIPID PROFILE; Future    3. Essential hypertension  Currently well controlled, Discuss diet, exercise and salt restriction.    4. IGT (impaired glucose tolerance)  Focus on diet recheck 3 months.  - HEMOGLOBIN A1C; Future  - MICROALBUMIN CREAT RATIO URINE; Future    5. RUPAL (obstructive sleep apnea)  Have discussed the importance of treating sleep apnea he has a fitting next week.        "

## 2017-01-25 ENCOUNTER — SLEEP CENTER VISIT (OUTPATIENT)
Dept: SLEEP MEDICINE | Facility: MEDICAL CENTER | Age: 72
End: 2017-01-25
Payer: MEDICARE

## 2017-01-25 VITALS
SYSTOLIC BLOOD PRESSURE: 108 MMHG | RESPIRATION RATE: 16 BRPM | HEART RATE: 69 BPM | HEIGHT: 67 IN | WEIGHT: 227 LBS | DIASTOLIC BLOOD PRESSURE: 68 MMHG | BODY MASS INDEX: 35.63 KG/M2

## 2017-01-25 DIAGNOSIS — G47.33 OSA (OBSTRUCTIVE SLEEP APNEA): ICD-10-CM

## 2017-01-25 DIAGNOSIS — E66.09 NON MORBID OBESITY DUE TO EXCESS CALORIES: ICD-10-CM

## 2017-01-25 PROCEDURE — 99213 OFFICE O/P EST LOW 20 MIN: CPT | Performed by: INTERNAL MEDICINE

## 2017-01-25 NOTE — MR AVS SNAPSHOT
"        Shamar Morgan   2017 12:40 PM   Sleep Center Visit   MRN: [id number]    Department:  [facility]   Dept Phone:  [phone]    Description:  Male : 1945   Provider:  Julianna Escobedo M.D.           Reason for Visit     Follow-Up Sleep study, PFT results      Allergies as of 2017     Allergen Noted Reactions    Lipidil [Fenofibrate] 2011       Myalgia    Penicillins 2008       Sulfa Drugs 2008         You were diagnosed with     RUPAL (obstructive sleep apnea)   [133274]       Non morbid obesity due to excess calories   [6487396]         Vital Signs     Blood Pressure Pulse Respirations Height Weight Body Mass Index    108/68 mmHg 69 16 1.689 m (5' 6.5\") 102.967 kg (227 lb) 36.09 kg/m2    Smoking Status                   Former Smoker           Basic Information     Date Of Birth Sex Race Ethnicity Preferred Language    1945 Male White Non- English      Your appointments     Mar 17, 2017  9:00 AM   Follow UP with Julianna Escobedo M.D.   [facility] (--)    [address] [facility]  [place] [address] [phone]            2017 11:20 AM   Established Patient with Aldo Lemus M.D.   [facility] [address]    [address]  [address]  [place] [id number] [phone]           You will be receiving a confirmation call a few days before your appointment from our automated call confirmation system.              Problem List              ICD-10-CM Priority Class Noted - Resolved    Dyslipidemia E78.5 High  2012 - Present    Essential hypertension I10 High  2012 - Present    Vitamin d deficiency    2012 - Present    IGT (impaired glucose tolerance) R73.02   2016 - Present    Non morbid obesity due to excess calories E66.09   2017 - Present    RUPAL (obstructive sleep apnea) G47.33   2017 - Present      Health Maintenance        Date Due Completion Dates    IMM " PNEUMOCOCCAL 65+ (ADULT) LOW/MEDIUM RISK SERIES (2 of 2 - PPSV23) 7/8/2017 7/8/2016    COLONOSCOPY 10/17/2019 10/17/2014, 5/28/2014 (Done)    Override on 5/28/2014: Done    IMM DTaP/Tdap/Td Vaccine (2 - Td) 1/19/2023 1/19/2013            Current Immunizations     13-VALENT PCV PREVNAR 7/8/2016    Influenza TIV (IM) 11/5/2014    Influenza Vaccine Adult HD 10/16/2016, 10/26/2015  9:25 AM    SHINGLES VACCINE 6/10/2013    Tdap Vaccine 1/19/2013      Below and/or attached are the medications your provider expects you to take. Review all of your home medications and newly ordered medications with your provider and/or pharmacist. Follow medication instructions as directed by your provider and/or pharmacist. Please keep your medication list with you and share with your provider. Update the information when medications are discontinued, doses are changed, or new medications (including over-the-counter products) are added; and carry medication information at all times in the event of emergency situations     Allergies:  LIPIDIL - (reactions not documented)     PENICILLINS - (reactions not documented)     SULFA DRUGS - (reactions not documented)               Medications  Valid as of: January 25, 2017 -  1:02 PM    Generic Name Brand Name Tablet Size Instructions for use    Cholecalciferol (Tab) cholecalciferol 1000 UNIT Take 1,000 Units by mouth every day.        Gabapentin (Cap) NEURONTIN 300 MG Take 1 Cap by mouth 3 times a day.        Losartan Potassium (Tab) COZAAR 100 MG Take 1 Tab by mouth every day.        Metoprolol Succinate (TABLET SR 24 HR) TOPROL XL 25 MG Take 1 Tab by mouth every day.        Naproxen (Tab) NAPROSYN 500 MG Take 1 Tab by mouth 2 times a day, with meals.        Omeprazole (CAPSULE DELAYED RELEASE) PRILOSEC 20 MG Take 1 Cap by mouth every day.        Pravastatin Sodium (Tab) PRAVACHOL 80 MG Take 1 Tab by mouth every bedtime.        .                 Medicines prescribed today were sent to:     [facility]  [address] [place], [place] - [address]    [facility] [place] [address]    Phone: [phone] Fax: [phone]    Open 24 Hours?: No      Medication refill instructions:       If your prescription bottle indicates you have medication refills left, it is not necessary to call your provider’s office. Please contact your pharmacy and they will refill your medication.    If your prescription bottle indicates you do not have any refills left, you may request refills at any time through one of the following ways: The online [employer] system (except Urgent Care), by calling your provider’s office, or by asking your pharmacy to contact your provider’s office with a refill request. Medication refills are processed only during regular business hours and may not be available until the next business day. Your provider may request additional information or to have a follow-up visit with you prior to refilling your medication.   *Please Note: Medication refills are assigned a new Rx number when refilled electronically. Your pharmacy may indicate that no refills were authorized even though a new prescription for the same medication is available at the pharmacy. Please request the medicine by name with the pharmacy before contacting your provider for a refill.           Vserv Access Code: Activation code not generated  Current Vserv Status: Active

## 2017-01-25 NOTE — PROGRESS NOTES
Chief Complaint   Patient presents with   • Follow-Up     Sleep study, PFT results       HPI: This patient is a 71 y.o. Male returns for follow-up of sleep study results. He has had loud snoring and nocturnal resuscitative snorts as well as nightmares. Polysomnogram confirmed severe obstructive sleep apnea syndrome with AHI 73 events per hour, associated with desaturations to 67%. He was titrated on CPAP to 15 cm of water followed by BiPAP therapy with a difficult titration, however optimal pressures seen at 16/12 cm of water with resultant RDI 13. He has a history of coronary artery disease and medically significant excessive weight.  The patient slept very well on BiPAP therapy during the night sleep study, and is eager to start treatment.      Past Medical History   Diagnosis Date   • CAD (coronary artery disease) 4/16/2012   • Hyperglycemia 4/16/2012   • Hyperlipemia 4/16/2012   • HTN (hypertension) 4/16/2012   • Tachycardia 4/16/2012   • Tonsillitis    • Hypertension    • Obesity        Social History     Social History   • Marital Status:      Spouse Name: N/A   • Number of Children: N/A   • Years of Education: N/A     Occupational History   • Not on file.     Social History Main Topics   • Smoking status: Former Smoker -- 3.00 packs/day for 16 years     Types: Cigarettes     Quit date: 01/01/1980   • Smokeless tobacco: Never Used   • Alcohol Use: 0.0 oz/week     0 Standard drinks or equivalent per week      Comment: wine or whiskey 2-3 a day   • Drug Use: No   • Sexual Activity:     Partners: Female     Other Topics Concern   • Not on file     Social History Narrative       Family History   Problem Relation Age of Onset   • Diabetes Mother    • Heart Disease Mother    • Kidney Disease Mother        Current Outpatient Prescriptions on File Prior to Visit   Medication Sig Dispense Refill   • gabapentin (NEURONTIN) 300 MG Cap Take 1 Cap by mouth 3 times a day. 90 Cap 3   • omeprazole (PRILOSEC) 20 MG  "delayed-release capsule Take 1 Cap by mouth every day. 30 Cap 11   • naproxen (NAPROSYN) 500 MG Tab Take 1 Tab by mouth 2 times a day, with meals. 60 Tab 1   • losartan (COZAAR) 100 MG Tab Take 1 Tab by mouth every day. 90 Each 3   • metoprolol SR (TOPROL XL) 25 MG TABLET SR 24 HR Take 1 Tab by mouth every day. 90 Tab 3   • pravastatin (PRAVACHOL) 80 MG tablet Take 1 Tab by mouth every bedtime. 90 Tab 3   • vitamin D (CHOLECALCIFEROL) 1000 UNIT TABS Take 1,000 Units by mouth every day.       No current facility-administered medications on file prior to visit.       Allergies: Lipidil; Penicillins; and Sulfa drugs    ROS:   Constitutional: Denies fevers, chills, night sweats, fatigue or weight loss  Eyes: Denies vision loss, pain, drainage, double vision  Ears, Nose, Throat: Denies earache, difficulty hearing, tinnitus, nasal congestion, hoarseness  Cardiovascular: Denies chest pain, tightness, palpitations, orthopnea or edema  Respiratory: Denies shortness of breath, cough, wheezing, hemoptysis  Sleep: As in history of present illness  GI: Denies heartburn, dysphagia, nausea, abdominal pain, diarrhea or constipation  : Denies frequent urination, hematuria, discharge or painful urination  Musculoskeletal: Denies back pain, painful joints, sore muscles  Neurological: Denies weakness or headaches  Skin: No rashes    Blood pressure 108/68, pulse 69, resp. rate 16, height 1.689 m (5' 6.5\"), weight 102.967 kg (227 lb).  Multi-Ox Readings  Multi Ox #1     O2 sat % at rest     O2 sat % on exertion     O2 sat average on exertion     Multi Ox #2     O2 sat % at rest     O2 sat % on exertion     O2 sat average on exertion       Oxygen Use     Oxygen Frequency     Duration of need     Is the patient mobile within the home?     CPAP Use?     BIPAP Use?     Servo Titration         Physical Exam:  Appearance: Well-nourished, well-developed, in no acute distress  HEENT: Normocephalic, atraumatic, white sclera, PERRLA, oropharynx " clear, Mallampati 4  Neck: No adenopathy or masses  Respiratory: no intercostal retractions or accessory muscle use  Lungs auscultation: Clear to auscultation bilaterally  Cardiovascular: Regular rate rhythm. No murmurs, rubs or gallops.  No LE edema  Abdomen: soft, nondistended  Gait: Normal  Digits: No clubbing, cyanosis  Motor: No focal deficits  Orientation: Oriented to time, person and place    Diagnosis:  1. RUPAL (obstructive sleep apnea)     2. Non morbid obesity due to excess calories         Plan:  The patient has severe obstructive sleep apnea, warranting airway pressurization. We discussed the benefits of BiPAP therapy including cardiovascular benefits, improving alertness and weight loss.   We will start BiPAP at 16/12 cm of water and allow acclimation for a period of 6 weeks prior to downloading his compliance card. Ultimately he may require a formal re-titration if he continues to manifest obstructive respiratory events..  Return in about 6 weeks (around 3/8/2017).

## 2017-03-31 ENCOUNTER — SLEEP CENTER VISIT (OUTPATIENT)
Dept: SLEEP MEDICINE | Facility: MEDICAL CENTER | Age: 72
End: 2017-03-31
Payer: MEDICARE

## 2017-03-31 VITALS
WEIGHT: 227 LBS | HEIGHT: 67 IN | BODY MASS INDEX: 35.63 KG/M2 | SYSTOLIC BLOOD PRESSURE: 104 MMHG | OXYGEN SATURATION: 93 % | HEART RATE: 77 BPM | DIASTOLIC BLOOD PRESSURE: 68 MMHG | RESPIRATION RATE: 16 BRPM

## 2017-03-31 DIAGNOSIS — E66.09 NON MORBID OBESITY DUE TO EXCESS CALORIES: ICD-10-CM

## 2017-03-31 DIAGNOSIS — G47.33 OSA (OBSTRUCTIVE SLEEP APNEA): ICD-10-CM

## 2017-03-31 PROCEDURE — 99213 OFFICE O/P EST LOW 20 MIN: CPT | Performed by: INTERNAL MEDICINE

## 2017-03-31 NOTE — MR AVS SNAPSHOT
"        Shamar Morgan   3/31/2017 11:20 AM   Sleep Center Visit   MRN: [id number]    Department:  [facility]   Dept Phone:  [phone]    Description:  Male : 1945   Provider:  Julianna Escobedo M.D.           Reason for Visit     Follow-Up Compliance      Allergies as of 3/31/2017     Allergen Noted Reactions    Lipidil [Fenofibrate] 2011       Myalgia    Penicillins 2008       Sulfa Drugs 2008         You were diagnosed with     RUPAL (obstructive sleep apnea)   [606209]       Non morbid obesity due to excess calories   [6524284]         Vital Signs     Blood Pressure Pulse Respirations Height Weight Body Mass Index    104/68 mmHg 77 16 1.689 m (5' 6.5\") 102.967 kg (227 lb) 36.09 kg/m2    Oxygen Saturation Smoking Status                93% Former Smoker          Basic Information     Date Of Birth Sex Race Ethnicity Preferred Language    1945 Male White Non- English      Your appointments     2017 11:20 AM   Established Patient with Aldo Lemus M.D.   [facility] [address]    [address]  [place] [address] [phone]           You will be receiving a confirmation call a few days before your appointment from our automated call confirmation system.            2017  9:40 AM   Follow UP with Julianna Escobedo M.D.   [facility] Sleep [facility] (--)    [address] [facility] [address]  [place] [id number] [phone]              Problem List              ICD-10-CM Priority Class Noted - Resolved    Dyslipidemia E78.5 High  2012 - Present    Essential hypertension I10 High  2012 - Present    Vitamin d deficiency    2012 - Present    IGT (impaired glucose tolerance) R73.02   2016 - Present    Non morbid obesity due to excess calories E66.09   2017 - Present    RUPAL (obstructive sleep apnea) G47.33   2017 - Present      Health Maintenance        Date Due Completion Dates    IMM " PNEUMOCOCCAL 65+ (ADULT) LOW/MEDIUM RISK SERIES (2 of 2 - PPSV23) 7/8/2017 7/8/2016    COLONOSCOPY 10/17/2019 10/17/2014, 5/28/2014 (Done)    Override on 5/28/2014: Done    IMM DTaP/Tdap/Td Vaccine (2 - Td) 1/19/2023 1/19/2013            Current Immunizations     13-VALENT PCV PREVNAR 7/8/2016    Influenza TIV (IM) 11/5/2014    Influenza Vaccine Adult HD 10/16/2016, 10/26/2015  9:25 AM    SHINGLES VACCINE 6/10/2013    Tdap Vaccine 1/19/2013      Below and/or attached are the medications your provider expects you to take. Review all of your home medications and newly ordered medications with your provider and/or pharmacist. Follow medication instructions as directed by your provider and/or pharmacist. Please keep your medication list with you and share with your provider. Update the information when medications are discontinued, doses are changed, or new medications (including over-the-counter products) are added; and carry medication information at all times in the event of emergency situations     Allergies:  LIPIDIL - (reactions not documented)     PENICILLINS - (reactions not documented)     SULFA DRUGS - (reactions not documented)               Medications  Valid as of: March 31, 2017 - 11:57 AM    Generic Name Brand Name Tablet Size Instructions for use    Cholecalciferol (Tab) cholecalciferol 1000 UNIT Take 1,000 Units by mouth every day.        Gabapentin (Cap) NEURONTIN 300 MG Take 1 Cap by mouth 3 times a day.        Losartan Potassium (Tab) COZAAR 100 MG Take 1 Tab by mouth every day.        Metoprolol Succinate (TABLET SR 24 HR) TOPROL XL 25 MG Take 1 Tab by mouth every day.        Naproxen (Tab) NAPROSYN 500 MG Take 1 Tab by mouth 2 times a day, with meals.        Omeprazole (CAPSULE DELAYED RELEASE) PRILOSEC 20 MG Take 1 Cap by mouth every day.        Pravastatin Sodium (Tab) PRAVACHOL 80 MG Take 1 Tab by mouth every bedtime.        .                 Medicines prescribed today were sent to:     [facility]  [address] [place], [place] [address]    [facility] [place] [address]    Phone: [phone] Fax: [phone]    Open 24 Hours?: No      Medication refill instructions:       If your prescription bottle indicates you have medication refills left, it is not necessary to call your provider’s office. Please contact your pharmacy and they will refill your medication.    If your prescription bottle indicates you do not have any refills left, you may request refills at any time through one of the following ways: The online Transactis system (except Urgent Care), by calling your provider’s office, or by asking your pharmacy to contact your provider’s office with a refill request. Medication refills are processed only during regular business hours and may not be available until the next business day. Your provider may request additional information or to have a follow-up visit with you prior to refilling your medication.   *Please Note: Medication refills are assigned a new Rx number when refilled electronically. Your pharmacy may indicate that no refills were authorized even though a new prescription for the same medication is available at the pharmacy. Please request the medicine by name with the pharmacy before contacting your provider for a refill.           Transactis Access Code: Activation code not generated  Current Transactis Status: Active

## 2017-03-31 NOTE — PROGRESS NOTES
Chief Complaint   Patient presents with   • Follow-Up     Compliance       HPI: This patient is a 71 y.o. Male returns for RUPAL compliance check. Polysomnogram confirmed severe obstructive sleep apnea syndrome with AHI 73 events per hour, associated with desaturations to 67%. He was started on BiPAP 16/12 cm of water. Compliance card shows 100% usage for 8 hours 40 minutes nightly, and AHI reduced to 8. His disturbing dreams and nocturia have resolved completely. He had difficulty with his headgear and mask leakage, and requests a mask refit. He notes significant improvement in sleep quality and is awakening feeling rested. He admits he does not think he could sleep without his BiPAP now.    Past Medical History   Diagnosis Date   • CAD (coronary artery disease) 4/16/2012   • Hyperglycemia 4/16/2012   • Hyperlipemia 4/16/2012   • HTN (hypertension) 4/16/2012   • Tachycardia 4/16/2012   • Tonsillitis    • Hypertension    • Obesity        Social History     Social History   • Marital Status:      Spouse Name: N/A   • Number of Children: N/A   • Years of Education: N/A     Occupational History   • Not on file.     Social History Main Topics   • Smoking status: Former Smoker -- 3.00 packs/day for 16 years     Types: Cigarettes     Quit date: 01/01/1980   • Smokeless tobacco: Never Used   • Alcohol Use: 0.0 oz/week     0 Standard drinks or equivalent per week      Comment: wine or whiskey 2-3 a day   • Drug Use: No   • Sexual Activity:     Partners: Female     Other Topics Concern   • Not on file     Social History Narrative       Family History   Problem Relation Age of Onset   • Diabetes Mother    • Heart Disease Mother    • Kidney Disease Mother        Current Outpatient Prescriptions on File Prior to Visit   Medication Sig Dispense Refill   • gabapentin (NEURONTIN) 300 MG Cap Take 1 Cap by mouth 3 times a day. 90 Cap 3   • omeprazole (PRILOSEC) 20 MG delayed-release capsule Take 1 Cap by mouth every day. 30 Cap  "11   • naproxen (NAPROSYN) 500 MG Tab Take 1 Tab by mouth 2 times a day, with meals. 60 Tab 1   • losartan (COZAAR) 100 MG Tab Take 1 Tab by mouth every day. 90 Each 3   • metoprolol SR (TOPROL XL) 25 MG TABLET SR 24 HR Take 1 Tab by mouth every day. 90 Tab 3   • pravastatin (PRAVACHOL) 80 MG tablet Take 1 Tab by mouth every bedtime. 90 Tab 3   • vitamin D (CHOLECALCIFEROL) 1000 UNIT TABS Take 1,000 Units by mouth every day.       No current facility-administered medications on file prior to visit.       Allergies: Lipidil; Penicillins; and Sulfa drugs    ROS:   Constitutional: Denies fevers, chills, night sweats, fatigue or weight loss  Eyes: Denies vision loss, pain, drainage, double vision  Ears, Nose, Throat: Denies earache, difficulty hearing, tinnitus, nasal congestion, hoarseness  Cardiovascular: Denies chest pain, tightness, palpitations, orthopnea or edema  Respiratory: Denies shortness of breath, cough, wheezing, hemoptysis  Sleep: Denies daytime sleepiness, snoring, apneas, insomnia, morning headaches  GI: Denies heartburn, dysphagia, nausea, abdominal pain, diarrhea or constipation  : Denies frequent urination, hematuria, discharge or painful urination  Musculoskeletal: Denies back pain, painful joints, sore muscles  Neurological: Denies weakness or headaches  Skin: No rashes    Blood pressure 104/68, pulse 77, resp. rate 16, height 1.689 m (5' 6.5\"), weight 102.967 kg (227 lb), SpO2 93 %.  Multi-Ox Readings  Multi Ox #1     O2 sat % at rest     O2 sat % on exertion     O2 sat average on exertion     Multi Ox #2     O2 sat % at rest     O2 sat % on exertion     O2 sat average on exertion       Oxygen Use     Oxygen Frequency     Duration of need     Is the patient mobile within the home?     CPAP Use?     BIPAP Use?     Servo Titration         Physical Exam:  Appearance: Well-nourished, well-developed, in no acute distress  HEENT: Normocephalic, atraumatic, white sclera, PERRLA, oropharynx clear  Neck: " No adenopathy or masses  Respiratory: no intercostal retractions or accessory muscle use  Lungs auscultation: Clear to auscultation bilaterally  Cardiovascular: Regular rate rhythm. No murmurs, rubs or gallops.  No LE edema  Abdomen: soft, nondistended  Gait: Normal  Digits: No clubbing, cyanosis  Motor: No focal deficits  Orientation: Oriented to time, person and place    Diagnosis:  1. RUPAL (obstructive sleep apnea)     2. Non morbid obesity due to excess calories         Plan:  The patient shows excellent compliance and response to BiPAP therapy. He will continue BiPAP 16/12 cm of water. We had his mask adjusted at our sleep center to optimize fit and minimize leakage.  Return in about 3 months (around 6/30/2017).

## 2017-04-04 ENCOUNTER — TELEPHONE (OUTPATIENT)
Dept: SLEEP MEDICINE | Facility: MEDICAL CENTER | Age: 72
End: 2017-04-04

## 2017-04-04 DIAGNOSIS — G47.33 OSA (OBSTRUCTIVE SLEEP APNEA): ICD-10-CM

## 2017-04-04 NOTE — TELEPHONE ENCOUNTER
Pt was fit with a nasal mask in office at last apt. An order was supposed to be sent to DME: [facility] P:[id number]/[phone] but there are no notes regarding the mask type or size, please sign generic mask order and we will have key re-fit him.

## 2017-04-28 ENCOUNTER — OFFICE VISIT (OUTPATIENT)
Dept: MEDICAL GROUP | Facility: MEDICAL CENTER | Age: 72
End: 2017-04-28
Payer: MEDICARE

## 2017-04-28 VITALS
HEART RATE: 60 BPM | SYSTOLIC BLOOD PRESSURE: 122 MMHG | WEIGHT: 228 LBS | HEIGHT: 66 IN | OXYGEN SATURATION: 95 % | DIASTOLIC BLOOD PRESSURE: 76 MMHG | TEMPERATURE: 97.2 F | RESPIRATION RATE: 16 BRPM | BODY MASS INDEX: 36.64 KG/M2

## 2017-04-28 DIAGNOSIS — F51.01 PRIMARY INSOMNIA: ICD-10-CM

## 2017-04-28 DIAGNOSIS — I10 ESSENTIAL HYPERTENSION: ICD-10-CM

## 2017-04-28 DIAGNOSIS — G47.33 OSA (OBSTRUCTIVE SLEEP APNEA): ICD-10-CM

## 2017-04-28 DIAGNOSIS — R73.02 IGT (IMPAIRED GLUCOSE TOLERANCE): ICD-10-CM

## 2017-04-28 LAB
HBA1C MFR BLD: 5.6 % (ref ?–5.8)
INT CON NEG: NORMAL
INT CON POS: NORMAL

## 2017-04-28 PROCEDURE — 1101F PT FALLS ASSESS-DOCD LE1/YR: CPT | Performed by: INTERNAL MEDICINE

## 2017-04-28 PROCEDURE — 4040F PNEUMOC VAC/ADMIN/RCVD: CPT | Performed by: INTERNAL MEDICINE

## 2017-04-28 PROCEDURE — G8432 DEP SCR NOT DOC, RNG: HCPCS | Performed by: INTERNAL MEDICINE

## 2017-04-28 PROCEDURE — 99214 OFFICE O/P EST MOD 30 MIN: CPT | Performed by: INTERNAL MEDICINE

## 2017-04-28 PROCEDURE — 1036F TOBACCO NON-USER: CPT | Performed by: INTERNAL MEDICINE

## 2017-04-28 PROCEDURE — G8419 CALC BMI OUT NRM PARAM NOF/U: HCPCS | Performed by: INTERNAL MEDICINE

## 2017-04-28 PROCEDURE — 83036 HEMOGLOBIN GLYCOSYLATED A1C: CPT | Performed by: INTERNAL MEDICINE

## 2017-04-28 PROCEDURE — 3017F COLORECTAL CA SCREEN DOC REV: CPT | Performed by: INTERNAL MEDICINE

## 2017-04-28 NOTE — MR AVS SNAPSHOT
"        Shamar Morgan   2017 11:20 AM   Office Visit   MRN: [id number]    Department:  [address] [place] [address]   Dept Phone:  [phone]    Description:  Male : 1945   Provider:  Aldo Lemus M.D.           Reason for Visit     Follow-Up forgot labs      Allergies as of 2017     Allergen Noted Reactions    Lipidil [Fenofibrate] 2011       Myalgia    Penicillins 2008       Sulfa Drugs 2008         You were diagnosed with     RUPAL (obstructive sleep apnea)   [256634]       IGT (impaired glucose tolerance)   [854336]       Essential hypertension   [0631377]       Primary insomnia   [894900]         Vital Signs     Blood Pressure Pulse Temperature Respirations Height Weight    122/76 mmHg 60 36.2 °C (97.2 °F) 16 1.676 m (5' 6\") 103.42 kg (228 lb)    Body Mass Index Oxygen Saturation Smoking Status             36.82 kg/m2 95% Former Smoker         Basic Information     Date Of Birth Sex Race Ethnicity Preferred Language    1945 Male White Non- English      Your appointments     2017  9:40 AM   Follow UP with Julianna Escobedo M.D.   [facility] Medicine (--)    [address]  [place] [id number] [phone]            Oct 30, 2017 11:20 AM   Established Patient with Aldo Lemus M.D.   [facility] [address]    [address]  [address]  [place] [address] [phone]           You will be receiving a confirmation call a few days before your appointment from our automated call confirmation system.              Problem List              ICD-10-CM Priority Class Noted - Resolved    Dyslipidemia E78.5 High  2012 - Present    Essential hypertension I10 High  2012 - Present    Vitamin d deficiency    2012 - Present    IGT (impaired glucose tolerance) R73.02   2016 - Present    Non morbid obesity due to excess calories E66.09   2017 - Present    RUPAL (obstructive sleep apnea) G47.33   " 1/19/2017 - Present      Health Maintenance        Date Due Completion Dates    IMM PNEUMOCOCCAL 65+ (ADULT) LOW/MEDIUM RISK SERIES (2 of 2 - PPSV23) 7/8/2017 7/8/2016    COLONOSCOPY 10/17/2019 10/17/2014, 5/28/2014 (Done)    Override on 5/28/2014: Done    IMM DTaP/Tdap/Td Vaccine (2 - Td) 1/19/2023 1/19/2013            Current Immunizations     13-VALENT PCV PREVNAR 7/8/2016    Influenza TIV (IM) 11/5/2014    Influenza Vaccine Adult HD 10/16/2016, 10/26/2015  9:25 AM    SHINGLES VACCINE 6/10/2013    Tdap Vaccine 1/19/2013      Below and/or attached are the medications your provider expects you to take. Review all of your home medications and newly ordered medications with your provider and/or pharmacist. Follow medication instructions as directed by your provider and/or pharmacist. Please keep your medication list with you and share with your provider. Update the information when medications are discontinued, doses are changed, or new medications (including over-the-counter products) are added; and carry medication information at all times in the event of emergency situations     Allergies:  LIPIDIL - (reactions not documented)     PENICILLINS - (reactions not documented)     SULFA DRUGS - (reactions not documented)               Medications  Valid as of: April 28, 2017 - 11:34 AM    Generic Name Brand Name Tablet Size Instructions for use    Cholecalciferol (Tab) cholecalciferol 1000 UNIT Take 1,000 Units by mouth every day.        Gabapentin (Cap) NEURONTIN 300 MG Take 1 Cap by mouth 3 times a day.        Losartan Potassium (Tab) COZAAR 100 MG Take 1 Tab by mouth every day.        Metoprolol Succinate (TABLET SR 24 HR) TOPROL XL 25 MG Take 1 Tab by mouth every day.        Naproxen (Tab) NAPROSYN 500 MG Take 1 Tab by mouth 2 times a day, with meals.        Omeprazole (CAPSULE DELAYED RELEASE) PRILOSEC 20 MG Take 1 Cap by mouth every day.        Pravastatin Sodium (Tab) PRAVACHOL 80 MG Take 1 Tab by mouth every  bedtime.        .                 Medicines prescribed today were sent to:     [facility]  [place], [place] - [address]    [facility] [place] [address]    Phone: [phone] Fax: [phone]    Open 24 Hours?: No      Medication refill instructions:       If your prescription bottle indicates you have medication refills left, it is not necessary to call your provider’s office. Please contact your pharmacy and they will refill your medication.    If your prescription bottle indicates you do not have any refills left, you may request refills at any time through one of the following ways: The online Henable system (except Urgent Care), by calling your provider’s office, or by asking your pharmacy to contact your provider’s office with a refill request. Medication refills are processed only during regular business hours and may not be available until the next business day. Your provider may request additional information or to have a follow-up visit with you prior to refilling your medication.   *Please Note: Medication refills are assigned a new Rx number when refilled electronically. Your pharmacy may indicate that no refills were authorized even though a new prescription for the same medication is available at the pharmacy. Please request the medicine by name with the pharmacy before contacting your provider for a refill.           Henable Access Code: Activation code not generated  Current Henable Status: Active

## 2017-04-28 NOTE — PROGRESS NOTES
CC: Follow-up multiple issues    HPI:   Shamar presents today with the following.    1. RUPAL (obstructive sleep apnea)  Patient now on CPAP reports he is having much more energy during the day and breathing better.    2. IGT (impaired glucose tolerance)  Forgot to get blood work done today but has lost significant weight and A1c is gone down to 5.6 after checking office.    3. Essential hypertension  Blood pressure well controlled on current medications denying any chest pain or shortness of breath no edema.    4. Primary insomnia  He is taking over-the-counter sleeping aid at 50 mg. He does report a.m. hangover and cannot sleep without it. He is much more rested during the day however.      Patient Active Problem List    Diagnosis Date Noted   • Dyslipidemia 04/16/2012     Priority: High   • Essential hypertension 04/16/2012     Priority: High   • Non morbid obesity due to excess calories 01/19/2017   • RUPAL (obstructive sleep apnea) 01/19/2017   • IGT (impaired glucose tolerance) 07/08/2016   • Vitamin d deficiency 11/16/2012       Current Outpatient Prescriptions   Medication Sig Dispense Refill   • gabapentin (NEURONTIN) 300 MG Cap Take 1 Cap by mouth 3 times a day. 90 Cap 3   • omeprazole (PRILOSEC) 20 MG delayed-release capsule Take 1 Cap by mouth every day. 30 Cap 11   • naproxen (NAPROSYN) 500 MG Tab Take 1 Tab by mouth 2 times a day, with meals. 60 Tab 1   • losartan (COZAAR) 100 MG Tab Take 1 Tab by mouth every day. 90 Each 3   • metoprolol SR (TOPROL XL) 25 MG TABLET SR 24 HR Take 1 Tab by mouth every day. 90 Tab 3   • pravastatin (PRAVACHOL) 80 MG tablet Take 1 Tab by mouth every bedtime. 90 Tab 3   • vitamin D (CHOLECALCIFEROL) 1000 UNIT TABS Take 1,000 Units by mouth every day.       No current facility-administered medications for this visit.         Allergies as of 04/28/2017 - [facility] as Reviewed 04/28/2017   Allergen Reaction Noted   • Lipidil [fenofibrate]  07/29/2011   • Penicillins  07/31/2008   •  "Sulfa drugs  07/31/2008        ROS: As per HPI.    /76 mmHg  Pulse 60  Temp(Src) 36.2 °C (97.2 °F)  Resp 16  Ht 1.676 m (5' 6\")  Wt 103.42 kg (228 lb)  BMI 36.82 kg/m2  SpO2 95%    Physical Exam:  Gen:         Alert and oriented, No apparent distress.  Neck:        No Lymphadenopathy or Bruits.  Lungs:     Clear to auscultation bilaterally  CV:          Regular rate and rhythm. No murmurs, rubs or gallops.               Ext:          No clubbing, cyanosis, edema.      Assessment and Plan.   71 y.o. male with the following issues.    1. RUPAL (obstructive sleep apnea)  Encouraged him to continue his weight loss continue CPAP.    2. IGT (impaired glucose tolerance)  Blood sugars improving with weight loss recheck 6 months.    3. Essential hypertension  Currently well controlled, Discuss diet, exercise and salt restriction.    4. Primary insomnia  Recommended type medication in half.        "

## 2017-05-02 ENCOUNTER — HOSPITAL ENCOUNTER (OUTPATIENT)
Dept: LAB | Facility: MEDICAL CENTER | Age: 72
End: 2017-05-02
Attending: INTERNAL MEDICINE
Payer: MEDICARE

## 2017-05-02 DIAGNOSIS — E78.5 DYSLIPIDEMIA: ICD-10-CM

## 2017-05-02 DIAGNOSIS — R73.02 IGT (IMPAIRED GLUCOSE TOLERANCE): ICD-10-CM

## 2017-05-02 LAB
ALBUMIN SERPL BCP-MCNC: 4.3 G/DL (ref 3.2–4.9)
ALBUMIN/GLOB SERPL: 1.3 G/DL
ALP SERPL-CCNC: 48 U/L (ref 30–99)
ALT SERPL-CCNC: 35 U/L (ref 2–50)
ANION GAP SERPL CALC-SCNC: 6 MMOL/L (ref 0–11.9)
AST SERPL-CCNC: 23 U/L (ref 12–45)
BILIRUB SERPL-MCNC: 0.8 MG/DL (ref 0.1–1.5)
BUN SERPL-MCNC: 19 MG/DL (ref 8–22)
CALCIUM SERPL-MCNC: 9.9 MG/DL (ref 8.5–10.5)
CHLORIDE SERPL-SCNC: 107 MMOL/L (ref 96–112)
CHOLEST SERPL-MCNC: 119 MG/DL (ref 100–199)
CO2 SERPL-SCNC: 28 MMOL/L (ref 20–33)
CREAT SERPL-MCNC: 0.93 MG/DL (ref 0.5–1.4)
CREAT UR-MCNC: 182.7 MG/DL
EST. AVERAGE GLUCOSE BLD GHB EST-MCNC: 117 MG/DL
GFR SERPL CREATININE-BSD FRML MDRD: >60 ML/MIN/1.73 M 2
GLOBULIN SER CALC-MCNC: 3.2 G/DL (ref 1.9–3.5)
GLUCOSE SERPL-MCNC: 104 MG/DL (ref 65–99)
HBA1C MFR BLD: 5.7 % (ref 0–5.6)
HDLC SERPL-MCNC: 43 MG/DL
LDLC SERPL CALC-MCNC: 64 MG/DL
MICROALBUMIN UR-MCNC: <0.7 MG/DL
MICROALBUMIN/CREAT UR: NORMAL MG/G (ref 0–30)
POTASSIUM SERPL-SCNC: 4.8 MMOL/L (ref 3.6–5.5)
PROT SERPL-MCNC: 7.5 G/DL (ref 6–8.2)
SODIUM SERPL-SCNC: 141 MMOL/L (ref 135–145)
TRIGL SERPL-MCNC: 58 MG/DL (ref 0–149)

## 2017-05-02 PROCEDURE — 36415 COLL VENOUS BLD VENIPUNCTURE: CPT

## 2017-05-02 PROCEDURE — 80061 LIPID PANEL: CPT

## 2017-05-02 PROCEDURE — 82570 ASSAY OF URINE CREATININE: CPT

## 2017-05-02 PROCEDURE — 82043 UR ALBUMIN QUANTITATIVE: CPT

## 2017-05-02 PROCEDURE — 80053 COMPREHEN METABOLIC PANEL: CPT

## 2017-05-02 PROCEDURE — 83036 HEMOGLOBIN GLYCOSYLATED A1C: CPT

## 2017-06-29 ENCOUNTER — PATIENT OUTREACH (OUTPATIENT)
Dept: HEALTH INFORMATION MANAGEMENT | Facility: OTHER | Age: 72
End: 2017-06-29

## 2017-06-29 NOTE — PROGRESS NOTES
Attempt #:1    WebIZ Checked & Epic Updated: no  HealthConnect Verified: yes  Verify PCP: yes    Communication Preference Obtained: yes     Review Care Team: yes    Annual Wellness Visit Scheduling  1. Scheduling Status:Scheduled           Care Gap Scheduling (Attempt to Schedule EACH Overdue Care Gap!)     There are no preventive care reminders to display for this patient.      BroadHop Activation: already active  BroadHop Lilo: no  Virtual Visits: no  Opt In to Text Messages: no

## 2017-07-05 ENCOUNTER — TELEPHONE (OUTPATIENT)
Dept: MEDICAL GROUP | Facility: MEDICAL CENTER | Age: 72
End: 2017-07-05

## 2017-07-05 NOTE — TELEPHONE ENCOUNTER
Future Appointments       Provider Department Center    7/7/2017 9:40 AM Julianna Escobedo M.D. [facility]     7/10/2017 8:40 AM Aldo Lemus M.D. [facility] [address]    10/30/2017 11:20 AM Aldo Lemus M.D. [facility] [address]        ANNUAL WELLNESS VISIT PRE-VISIT PLANNING     1.  Reviewed note from last office visit with PCP: YES    2.  If any orders were placed at last visit, do we have Results/Consult Notes?        •  Labs - Labs ordered, completed and results are in chart.       •  Imaging - Imaging was not ordered at last office visit.       •  Referrals - No referrals were ordered at last office visit.    3.  Immunizations were updated in [email or web address] using WebIZ?: Yes       •  WebIZ Recommendations: HEPATITIS A  and TD       •  Is patient due for Tdap? NO       •  Is patient due for Shingles? NO     4.  Patient is due for the following Health Maintenance Topics:   There are no preventive care reminders to display for this patient.    - Patient is up-to-date on all Health Maintenance topics. No records have been requested at this time.    5.  Reviewed/Updated the following with patient:       •   Preferred Pharmacy? YES       •   Preferred Lab? YES       •   Medications? YES. Was Abstract Encounter opened and chart updated? YES       •   Social History? YES. Was Abstract Encounter opened and chart updated? YES       •   Family History? YES. Was Abstract Encounter opened and chart updated? YES    6.  Care Team Updated:       •   DME Company (gait device, O2, CPAP, etc.): YES       •   Other Specialists (eye doctor, derm, GYN, cardiology, endo, etc): YES    7.  Patient has the following Care Path diagnoses on Problem List:  NONE    8.  Specialty Comments was updated with diagnosis information provided by [facility]: NO    9.  Patient was advised: “This is a free wellness visit. The provider will screen for medical conditions to help you stay healthy.  If you have other concerns to address you may be asked to discuss these at a separate visit or there may be an additional fee.”     10.  Patient was informed to arrive 15 min prior to their scheduled appointment and bring in their medication bottles?  YES

## 2017-07-07 ENCOUNTER — SLEEP CENTER VISIT (OUTPATIENT)
Dept: SLEEP MEDICINE | Facility: MEDICAL CENTER | Age: 72
End: 2017-07-07
Payer: MEDICARE

## 2017-07-07 ENCOUNTER — TELEPHONE (OUTPATIENT)
Dept: SLEEP MEDICINE | Facility: MEDICAL CENTER | Age: 72
End: 2017-07-07

## 2017-07-07 VITALS
DIASTOLIC BLOOD PRESSURE: 70 MMHG | OXYGEN SATURATION: 93 % | WEIGHT: 228 LBS | RESPIRATION RATE: 16 BRPM | HEART RATE: 62 BPM | BODY MASS INDEX: 36.64 KG/M2 | HEIGHT: 66 IN | SYSTOLIC BLOOD PRESSURE: 116 MMHG

## 2017-07-07 DIAGNOSIS — I10 ESSENTIAL HYPERTENSION: ICD-10-CM

## 2017-07-07 DIAGNOSIS — G47.33 OSA (OBSTRUCTIVE SLEEP APNEA): ICD-10-CM

## 2017-07-07 PROCEDURE — 99213 OFFICE O/P EST LOW 20 MIN: CPT | Performed by: INTERNAL MEDICINE

## 2017-07-07 NOTE — PROGRESS NOTES
Chief Complaint   Patient presents with   • Follow-Up     RUPAL     HPI: This patient is a 72 y.o. male who returns for RUPAL compliance check. Polysomnogram confirmed severe obstructive sleep apnea syndrome with AHI 73 events per hour, associated with desaturations to 67%. He was started on BiPAP 16/12 cm of water. Compliance card shows 100% usage for 8 hours 15 minutes nightly, and AHI normal at 3. His disturbing dreams and nocturia have resolved. He is using a nasal mask with no further leakage. He feels he has had improved sleep quality with BiPAP however continues to have some degree of morning fatigue. He denies sleep attacks. Weight has been stable.    Past Medical History   Diagnosis Date   • CAD (coronary artery disease) 4/16/2012   • Hyperglycemia 4/16/2012   • Hyperlipemia 4/16/2012   • HTN (hypertension) 4/16/2012   • Tachycardia 4/16/2012   • Tonsillitis    • Hypertension    • Obesity        Social History     Social History   • Marital Status:      Spouse Name: N/A   • Number of Children: N/A   • Years of Education: N/A     Occupational History   • Not on file.     Social History Main Topics   • Smoking status: Former Smoker -- 3.00 packs/day for 16 years     Types: Cigarettes     Quit date: 01/01/1980   • Smokeless tobacco: Never Used   • Alcohol Use: 0.0 oz/week     0 Standard drinks or equivalent per week      Comment: wine or whiskey 2-3 a day   • Drug Use: No   • Sexual Activity:     Partners: Female     Other Topics Concern   • Not on file     Social History Narrative       Family History   Problem Relation Age of Onset   • Diabetes Mother    • Heart Disease Mother    • Kidney Disease Mother    • Diabetes Sister        Current Outpatient Prescriptions on File Prior to Visit   Medication Sig Dispense Refill   • omeprazole (PRILOSEC) 20 MG delayed-release capsule Take 1 Cap by mouth every day. 30 Cap 11   • losartan (COZAAR) 100 MG Tab Take 1 Tab by mouth every day. 90 Each 3   • metoprolol SR  "(TOPROL XL) 25 MG TABLET SR 24 HR Take 1 Tab by mouth every day. 90 Tab 3   • pravastatin (PRAVACHOL) 80 MG tablet Take 1 Tab by mouth every bedtime. 90 Tab 3   • vitamin D (CHOLECALCIFEROL) 1000 UNIT TABS Take 1,000 Units by mouth every day.     • gabapentin (NEURONTIN) 300 MG Cap Take 1 Cap by mouth 3 times a day. 90 Cap 3   • naproxen (NAPROSYN) 500 MG Tab Take 1 Tab by mouth 2 times a day, with meals. 60 Tab 1     No current facility-administered medications on file prior to visit.       Allergies: Lipidil; Penicillins; and Sulfa drugs    ROS:   Constitutional: Denies fevers, chills, night sweats, +fatigue, denies weight loss  Eyes: Denies vision loss, pain, drainage, double vision  Ears, Nose, Throat: Denies earache, difficulty hearing, tinnitus, nasal congestion, hoarseness  Cardiovascular: Denies chest pain, tightness, palpitations, orthopnea or edema  Respiratory: Denies shortness of breath, cough, wheezing, hemoptysis  Sleep: Denies daytime sleepiness, snoring, apneas, insomnia, morning headaches  GI: Denies heartburn, dysphagia, nausea, abdominal pain, diarrhea or constipation  : Denies frequent urination, hematuria, discharge or painful urination  Musculoskeletal: Denies back pain, painful joints, sore muscles  Neurological: Denies weakness or headaches  Skin: No rashes    Blood pressure 116/70, pulse 62, resp. rate 16, height 1.676 m (5' 6\"), weight 103.42 kg (228 lb), SpO2 93 %.    Physical Exam:  Appearance: Well-nourished, well-developed, in no acute distress  HEENT: Normocephalic, atraumatic, white sclera, PERRLA, Mallampati 3  Neck: No adenopathy or masses  Respiratory: no intercostal retractions or accessory muscle use  Lungs auscultation: Clear to auscultation bilaterally  Cardiovascular: Regular rate rhythm. No murmurs, rubs or gallops.  No LE edema  Abdomen: soft, nondistended  Gait: Normal  Digits: No clubbing, cyanosis  Motor: No focal deficits  Orientation: Oriented to time, person and " place    Diagnosis:  1. RUPAL (obstructive sleep apnea)     2. Essential hypertension     3. BMI 36.0-36.9,adult         Plan:  The patient shows excellent compliance on BiPAP therapy with normal apnea index. His nocturia has resolved and he is sleeping over 8 hours nightly. We discussed the possibility of his beta blocker therapy contributing to fatigue. He will reduce to half dose of metoprolol SR and monitor his blood pressure daily. He has a pending follow-up with Dr. Lemus to assess his blood pressure.  Good sleep hygiene encouraged.  We also discussed that he could be a candidate for Provigil/Nuvigil therapy if he continues to feel fatigued.  RTC 6 months.  No Follow-up on file.

## 2017-07-07 NOTE — MR AVS SNAPSHOT
"        Shamar Morgan   2017 9:40 AM   Sleep Center Visit   MRN: [id number]    Department:  [facility]   Dept Phone:  [phone]    Description:  Male : 1945   Provider:  Julianna Escobedo M.D.           Reason for Visit     Follow-Up RUPAL      Allergies as of 2017     Allergen Noted Reactions    Lipidil [Fenofibrate] 2011       Myalgia    Penicillins 2008       Sulfa Drugs 2008         You were diagnosed with     RUPAL (obstructive sleep apnea)   [866586]       Essential hypertension   [6532762]       BMI 36.0-36.9,adult   [558383]         Vital Signs     Blood Pressure Pulse Respirations Height Weight Body Mass Index    116/70 mmHg 62 16 1.676 m (5' 6\") 103.42 kg (228 lb) 36.82 kg/m2    Oxygen Saturation Smoking Status                93% Former Smoker          Basic Information     Date Of Birth Sex Race Ethnicity Preferred Language    1945 Male White Non- English      Your appointments     Jul 10, 2017  8:40 AM   ANNUAL EXAM PREVENTATIVE with Aldo Lemus M.D.   [facility] [address]    [address]  [place] [address] [phone]            Oct 30, 2017 11:20 AM   Established Patient with Aldo Lemus M.D.   [facility] [address]    [address]  [place] [address] [phone]           You will be receiving a confirmation call a few days before your appointment from our automated call confirmation system.            2018 12:40 PM   Follow UP with Julianna Escobedo M.D.   [facility] (--)    [address]  [place] [address] [phone]              Problem List              ICD-10-CM Priority Class Noted - Resolved    Dyslipidemia E78.5 High  2012 - Present    Essential hypertension I10 High  2012 - Present    Vitamin d deficiency    2012 - Present    IGT (impaired glucose tolerance) R73.02   2016 - Present    Non " morbid obesity due to excess calories E66.09   1/19/2017 - Present    RUPAL (obstructive sleep apnea) G47.33   1/19/2017 - Present      Health Maintenance        Date Due Completion Dates    IMM PNEUMOCOCCAL 65+ (ADULT) LOW/MEDIUM RISK SERIES (2 of 2 - PPSV23) 7/8/2017 7/8/2016    IMM INFLUENZA (1) 9/1/2017 10/16/2016, 10/26/2015, 11/5/2014    COLONOSCOPY 10/17/2019 10/17/2014, 5/28/2014 (Done)    Override on 5/28/2014: Done    IMM DTaP/Tdap/Td Vaccine (2 - Td) 1/19/2023 1/19/2013            Current Immunizations     13-VALENT PCV PREVNAR 7/8/2016    Influenza TIV (IM) 11/5/2014    Influenza Vaccine Adult HD 10/16/2016, 10/26/2015  9:25 AM    SHINGLES VACCINE 6/10/2013    Tdap Vaccine 1/19/2013      Below and/or attached are the medications your provider expects you to take. Review all of your home medications and newly ordered medications with your provider and/or pharmacist. Follow medication instructions as directed by your provider and/or pharmacist. Please keep your medication list with you and share with your provider. Update the information when medications are discontinued, doses are changed, or new medications (including over-the-counter products) are added; and carry medication information at all times in the event of emergency situations     Allergies:  LIPIDIL - (reactions not documented)     PENICILLINS - (reactions not documented)     SULFA DRUGS - (reactions not documented)               Medications  Valid as of: July 07, 2017 - 10:00 AM    Generic Name Brand Name Tablet Size Instructions for use    Cholecalciferol (Tab) cholecalciferol 1000 UNIT Take 1,000 Units by mouth every day.        Gabapentin (Cap) NEURONTIN 300 MG Take 1 Cap by mouth 3 times a day.        Losartan Potassium (Tab) COZAAR 100 MG Take 1 Tab by mouth every day.        Metoprolol Succinate (TABLET SR 24 HR) TOPROL XL 25 MG Take 1 Tab by mouth every day.        Naproxen (Tab) NAPROSYN 500 MG Take 1 Tab by mouth 2 times a day, with  meals.        Omeprazole (CAPSULE DELAYED RELEASE) PRILOSEC 20 MG Take 1 Cap by mouth every day.        Pravastatin Sodium (Tab) PRAVACHOL 80 MG Take 1 Tab by mouth every bedtime.        .                 Medicines prescribed today were sent to:     [facility] [place]  [place], [place] - [address]    [facility] [place] [address]    Phone: [phone] Fax: [phone]    Open 24 Hours?: No      Medication refill instructions:       If your prescription bottle indicates you have medication refills left, it is not necessary to call your provider’s office. Please contact your pharmacy and they will refill your medication.    If your prescription bottle indicates you do not have any refills left, you may request refills at any time through one of the following ways: The online [employer] Places system (except Urgent Care), by calling your provider’s office, or by asking your pharmacy to contact your provider’s office with a refill request. Medication refills are processed only during regular business hours and may not be available until the next business day. Your provider may request additional information or to have a follow-up visit with you prior to refilling your medication.   *Please Note: Medication refills are assigned a new Rx number when refilled electronically. Your pharmacy may indicate that no refills were authorized even though a new prescription for the same medication is available at the pharmacy. Please request the medicine by name with the pharmacy before contacting your provider for a refill.           Open Places Access Code: Activation code not generated  Current Open Places Status: Active

## 2017-07-10 ENCOUNTER — OFFICE VISIT (OUTPATIENT)
Dept: MEDICAL GROUP | Facility: MEDICAL CENTER | Age: 72
End: 2017-07-10
Payer: MEDICARE

## 2017-07-10 VITALS
OXYGEN SATURATION: 96 % | RESPIRATION RATE: 16 BRPM | HEIGHT: 66 IN | SYSTOLIC BLOOD PRESSURE: 126 MMHG | TEMPERATURE: 98.7 F | BODY MASS INDEX: 36.42 KG/M2 | HEART RATE: 59 BPM | DIASTOLIC BLOOD PRESSURE: 62 MMHG | WEIGHT: 226.6 LBS

## 2017-07-10 DIAGNOSIS — R73.02 IGT (IMPAIRED GLUCOSE TOLERANCE): ICD-10-CM

## 2017-07-10 DIAGNOSIS — Z00.00 MEDICARE ANNUAL WELLNESS VISIT, SUBSEQUENT: ICD-10-CM

## 2017-07-10 DIAGNOSIS — E78.5 DYSLIPIDEMIA: ICD-10-CM

## 2017-07-10 DIAGNOSIS — Z23 NEED FOR VACCINATION FOR PNEUMOCOCCUS: ICD-10-CM

## 2017-07-10 DIAGNOSIS — E66.09 NON MORBID OBESITY DUE TO EXCESS CALORIES: ICD-10-CM

## 2017-07-10 DIAGNOSIS — G47.33 OSA (OBSTRUCTIVE SLEEP APNEA): ICD-10-CM

## 2017-07-10 DIAGNOSIS — I10 ESSENTIAL HYPERTENSION: ICD-10-CM

## 2017-07-10 PROCEDURE — G0439 PPPS, SUBSEQ VISIT: HCPCS | Mod: 25 | Performed by: INTERNAL MEDICINE

## 2017-07-10 PROCEDURE — 90732 PPSV23 VACC 2 YRS+ SUBQ/IM: CPT | Performed by: INTERNAL MEDICINE

## 2017-07-10 PROCEDURE — G0009 ADMIN PNEUMOCOCCAL VACCINE: HCPCS | Performed by: INTERNAL MEDICINE

## 2017-07-10 ASSESSMENT — PATIENT HEALTH QUESTIONNAIRE - PHQ9: CLINICAL INTERPRETATION OF PHQ2 SCORE: 0

## 2017-07-10 NOTE — MR AVS SNAPSHOT
"        Shamar Morgan   7/10/2017 8:40 AM   Office Visit   MRN: [id number]    Department:  [address] [place] [address]   Dept Phone:  [phone]    Description:  Male : 1945   Provider:  Aldo Lemus M.D.           Reason for Visit     Annual Exam           Allergies as of 7/10/2017     Allergen Noted Reactions    Lipidil [Fenofibrate] 2011       Myalgia    Penicillins 2008       Sulfa Drugs 2008         You were diagnosed with     Medicare annual wellness visit, subsequent   [678749]       Dyslipidemia   [238419]       Essential hypertension   [7893894]       IGT (impaired glucose tolerance)   [525620]       Non morbid obesity due to excess calories   [2693890]       RUPAL (obstructive sleep apnea)   [296930]       Need for vaccination for pneumococcus   [246751]         Vital Signs     Blood Pressure Pulse Temperature Respirations Height Weight    126/62 mmHg 59 37.1 °C (98.7 °F) 16 1.676 m (5' 5.98\") 102.785 kg (226 lb 9.6 oz)    Body Mass Index Oxygen Saturation Smoking Status             36.59 kg/m2 96% Former Smoker         Basic Information     Date Of Birth Sex Race Ethnicity Preferred Language    1945 Male White Non- English      Your appointments     Yobany 10, 2018  9:20 AM   Established Patient with Aldo Lemus M.D.   [facility] [address])    [address]  [address]  [place] [address] [phone]           You will be receiving a confirmation call a few days before your appointment from our automated call confirmation system.            2018 12:40 PM   Follow UP with Julianna Escobedo M.D.   [facility] (--)    [address]  [place] [id number] [phone]              Problem List              ICD-10-CM Priority Class Noted - Resolved    Dyslipidemia E78.5 High  2012 - Present    Essential hypertension I10 High  2012 - Present    Vitamin d deficiency    2012 - Present    IGT " (impaired glucose tolerance) R73.02   7/8/2016 - Present    Non morbid obesity due to excess calories E66.09   1/19/2017 - Present    RUPAL (obstructive sleep apnea) G47.33   1/19/2017 - Present      Health Maintenance        Date Due Completion Dates    IMM PNEUMOCOCCAL 65+ (ADULT) LOW/MEDIUM RISK SERIES (2 of 2 - PPSV23) 7/8/2017 7/8/2016    IMM INFLUENZA (1) 9/1/2017 10/16/2016, 10/26/2015, 11/5/2014    COLONOSCOPY 10/17/2019 10/17/2014, 5/28/2014 (Done)    Override on 5/28/2014: Done    IMM DTaP/Tdap/Td Vaccine (2 - Td) 1/19/2023 1/19/2013            Current Immunizations     13-VALENT PCV PREVNAR 7/8/2016    Influenza TIV (IM) 11/5/2014    Influenza Vaccine Adult HD 10/16/2016, 10/26/2015  9:25 AM    Pneumococcal polysaccharide vaccine (PPSV-23)  Incomplete    SHINGLES VACCINE 6/10/2013    Tdap Vaccine 1/19/2013      Below and/or attached are the medications your provider expects you to take. Review all of your home medications and newly ordered medications with your provider and/or pharmacist. Follow medication instructions as directed by your provider and/or pharmacist. Please keep your medication list with you and share with your provider. Update the information when medications are discontinued, doses are changed, or new medications (including over-the-counter products) are added; and carry medication information at all times in the event of emergency situations     Allergies:  LIPIDIL - (reactions not documented)     PENICILLINS - (reactions not documented)     SULFA DRUGS - (reactions not documented)               Medications  Valid as of: July 10, 2017 -  9:03 AM    Generic Name Brand Name Tablet Size Instructions for use    Cholecalciferol (Tab) cholecalciferol 1000 UNIT Take 1,000 Units by mouth every day.        Gabapentin (Cap) NEURONTIN 300 MG Take 1 Cap by mouth 3 times a day.        Losartan Potassium (Tab) COZAAR 100 MG Take 1 Tab by mouth every day.        Naproxen (Tab) NAPROSYN 500 MG Take 1 Tab  by mouth 2 times a day, with meals.        Omeprazole (CAPSULE DELAYED RELEASE) PRILOSEC 20 MG Take 1 Cap by mouth every day.        Pravastatin Sodium (Tab) PRAVACHOL 80 MG Take 1 Tab by mouth every bedtime.        .                 Medicines prescribed today were sent to:     [facility] [place], [place] - [address]    [facility] [address] [place] [address]    Phone: [phone] Fax: [phone]    Open 24 Hours?: No      Medication refill instructions:       If your prescription bottle indicates you have medication refills left, it is not necessary to call your provider’s office. Please contact your pharmacy and they will refill your medication.    If your prescription bottle indicates you do not have any refills left, you may request refills at any time through one of the following ways: The online [employer] system (except Urgent Care), by calling your provider’s office, or by asking your pharmacy to contact your provider’s office with a refill request. Medication refills are processed only during regular business hours and may not be available until the next business day. Your provider may request additional information or to have a follow-up visit with you prior to refilling your medication.   *Please Note: Medication refills are assigned a new Rx number when refilled electronically. Your pharmacy may indicate that no refills were authorized even though a new prescription for the same medication is available at the pharmacy. Please request the medicine by name with the pharmacy before contacting your provider for a refill.           [employer] Access Code: Activation code not generated  Current Pa-Go Mobile Status: Active

## 2017-07-10 NOTE — PROGRESS NOTES
CC: Wellness examination    HPI:   Shamar presents today with the following.    1. Medicare annual wellness visit, subsequent  Excretion performed below    2. Dyslipidemia  Maintain on statin without myalgias good control of cholesterol    3. Essential hypertension  Blood pressure well controlled no chest pain shortness of breath edema.    4. IGT (impaired glucose tolerance)  Slightly elevated blood sugars not actively trying to lose weight. He does drink 4-5 drinks per night with soda.    5. Non morbid obesity due to excess calories  Again not actively trying to lose.    6. RUPAL (obstructive sleep apnea)  He is now maintained on CPAP reporting improved breathing symptoms followed by pulmonology.    7. Need for vaccination for pneumococcus        Depression Screening    Little interest or pleasure in doing things?  0 - not at all  Feeling down, depressed , or hopeless? 0 - not at all  Trouble falling or staying asleep, or sleeping too much?     Feeling tired or having little energy?     Poor appetite or overeating?     Feeling bad about yourself - or that you are a failure or have let yourself or your family down?    Trouble concentrating on things, such as reading the newspaper or watching television?    Moving or speaking so slowly that other people could have noticed.  Or the opposite - being so fidgety or restless that you have been moving around a lot more than usual?     Thoughts that you would be better off dead, or of hurting yourself?     Patient Health Questionnaire Score:    If depressive symptoms identified deferred to follow up visit unless specifically addressed in assessment and plan.    Interpretation of PHQ-9 Total Score   Score Severity   1-4 Minimal Depression   5-9 Mild Depression   10-14 Moderate Depression   15-19 Moderately Severe Depression   20-27 Severe Depression    Screening for Cognitive Impairment    Three Minute Recall (banana, sunrise, fence)  3/3    Draw clock face with all 12 numbers  set to the hand to show 10 minures past 11 o'clock  1 5/5  If cognitive concerns identified deferred to follow up visit unless specifically addressed in assessment and plan.    Fall Risk Assessment    Has the patient had two or more falls in the last year or any fall with injury in the last year?  No  If Fall Risk identified deferred to follow up visit unless specifically addressed in assessment and plan.    Safety Assessment    Throw rugs on floor.  Yes  Cautioned about securing or removing.  Handrails on all stairs.  No  Good lighting in all hallways.  Yes  Difficulty hearing.  No  Patient counseled about all safety risks that were identified.    Functional Assessment ADLs    Are there any barriers preventing you from cooking for yourself or meeting nutritional needs?  No.    Are there any barriers preventing you from driving safely or obtaining transportation?  No.    Are there any barriers preventing you from using a telephone or calling for help?  No.    Are there any barriers preventing you from shopping?  No.    Are there any barriers preventing you from taking care of your own finances?  No.    Are there any barriers preventing you from managing your medications?  No.    Are currently engaing any exercise or physical activity?  Yes.  walking    Health Maintenance Summary                IMM PNEUMOCOCCAL 65+ (ADULT) LOW/MEDIUM RISK SERIES Overdue 7/8/2017      Done 7/8/2016 Imm Admin: Pneumococcal Conjugate Vaccine (Prevnar/PCV-13)    IMM INFLUENZA Next Due 9/1/2017      Done 10/16/2016 Imm Admin: Influenza Vaccine Adult HD     Patient has more history with this topic...    Annual Wellness Visit Next Due 7/11/2018      Done 7/10/2017 Visit Dx: Medicare annual wellness visit, subsequent     Patient has more history with this topic...    COLONOSCOPY Next Due 10/17/2019      Done 10/17/2014 REFERRAL TO GI FOR COLONOSCOPY     Patient has more history with this topic...    IMM DTaP/Tdap/Td Vaccine Next Due  "1/19/2023      Done 1/19/2013 Imm Admin: Tdap Vaccine          Patient Care Team:  Aldo Lemus M.D. as PCP - General (Internal Medicine)  Prosper Alcala M.D. as Consulting Physician (Gastroenterology)  Julianna Escobedo M.D. as Consulting Physician (Pulmonary Medicine)      Patient Active Problem List    Diagnosis Date Noted   • Dyslipidemia 04/16/2012     Priority: High   • Essential hypertension 04/16/2012     Priority: High   • Non morbid obesity due to excess calories 01/19/2017   • RUPAL (obstructive sleep apnea) 01/19/2017   • IGT (impaired glucose tolerance) 07/08/2016   • Vitamin d deficiency 11/16/2012       Current Outpatient Prescriptions   Medication Sig Dispense Refill   • omeprazole (PRILOSEC) 20 MG delayed-release capsule Take 1 Cap by mouth every day. 30 Cap 11   • losartan (COZAAR) 100 MG Tab Take 1 Tab by mouth every day. 90 Each 3   • pravastatin (PRAVACHOL) 80 MG tablet Take 1 Tab by mouth every bedtime. 90 Tab 3   • vitamin D (CHOLECALCIFEROL) 1000 UNIT TABS Take 1,000 Units by mouth every day.     • gabapentin (NEURONTIN) 300 MG Cap Take 1 Cap by mouth 3 times a day. (Patient not taking: Reported on 7/10/2017) 90 Cap 3   • naproxen (NAPROSYN) 500 MG Tab Take 1 Tab by mouth 2 times a day, with meals. (Patient not taking: Reported on 7/10/2017) 60 Tab 1     No current facility-administered medications for this visit.         Allergies as of 07/10/2017 - Ellis as Reviewed 07/10/2017   Allergen Reaction Noted   • Lipidil [fenofibrate]  07/29/2011   • Penicillins  07/31/2008   • Sulfa drugs  07/31/2008        ROS: As per HPI.    /62 mmHg  Pulse 59  Temp(Src) 37.1 °C (98.7 °F)  Resp 16  Ht 1.676 m (5' 5.98\")  Wt 102.785 kg (226 lb 9.6 oz)  BMI 36.59 kg/m2  SpO2 96%    Physical Exam:  Gen:         Alert and oriented, No apparent distress.  Neck:        No Lymphadenopathy or Bruits.  Lungs:     Clear to auscultation bilaterally  CV:          Regular rate and rhythm. No murmurs, rubs or " gallops.  Abd:         Soft non tender, non distended. Normal active bowel sounds.  No  Hepatosplenomegaly, No pulsatile masses.                   Ext:          No clubbing, cyanosis, edema.      Assessment and Plan.   72 y.o. male with the following issues.    1. Medicare annual wellness visit, subsequent  Discussed healthy lifestyle habits as well as screening regimens.  - Annual Wellness Visit - Includes PPPS Subsequent ()    2. Dyslipidemia  Lipids currently well controlled. Discussed continued diet and exercise recheck 6 months to 1 year.  - Annual Wellness Visit - Includes PPPS Subsequent ()    3. Essential hypertension  Currently well controlled, Discuss diet, exercise and salt restriction.  - Annual Wellness Visit - Includes PPPS Subsequent ()    4. IGT (impaired glucose tolerance)  Again discussed diet in terms of preventing diabetes.  - Annual Wellness Visit - Includes PPPS Subsequent ()    5. Non morbid obesity due to excess calories  Discussed finding motivation to lose weight including health benefits  - Annual Wellness Visit - Includes PPPS Subsequent ()  - Patient identified as having weight management issue.  Appropriate orders and counseling given.    6. RUPAL (obstructive sleep apnea)  Continue CPAP  - Annual Wellness Visit - Includes PPPS Subsequent ()    7. Need for vaccination for pneumococcus    - PNEUMOCOCCAL POLYSACCHARIDE VACCINE 23-VALENT =>1YO SQ/IM

## 2017-09-03 ENCOUNTER — HOSPITAL ENCOUNTER (INPATIENT)
Facility: MEDICAL CENTER | Age: 72
LOS: 3 days | DRG: 247 | End: 2017-09-06
Attending: EMERGENCY MEDICINE | Admitting: INTERNAL MEDICINE
Payer: MEDICARE

## 2017-09-03 ENCOUNTER — APPOINTMENT (OUTPATIENT)
Dept: RADIOLOGY | Facility: MEDICAL CENTER | Age: 72
DRG: 247 | End: 2017-09-03
Payer: MEDICARE

## 2017-09-03 ENCOUNTER — RESOLUTE PROFESSIONAL BILLING HOSPITAL PROF FEE (OUTPATIENT)
Dept: HOSPITALIST | Facility: MEDICAL CENTER | Age: 72
End: 2017-09-03
Payer: MEDICARE

## 2017-09-03 ENCOUNTER — APPOINTMENT (OUTPATIENT)
Dept: RADIOLOGY | Facility: MEDICAL CENTER | Age: 72
DRG: 247 | End: 2017-09-03
Attending: EMERGENCY MEDICINE
Payer: MEDICARE

## 2017-09-03 DIAGNOSIS — I21.19 ST ELEVATION MYOCARDIAL INFARCTION (STEMI) OF INFERIOR WALL (HCC): ICD-10-CM

## 2017-09-03 PROBLEM — I21.3 STEMI (ST ELEVATION MYOCARDIAL INFARCTION) (HCC): Status: ACTIVE | Noted: 2017-09-03

## 2017-09-03 PROBLEM — I21.9 ACUTE MI (HCC): Status: ACTIVE | Noted: 2017-09-03

## 2017-09-03 PROBLEM — I25.5 ISCHEMIC CARDIOMYOPATHY: Status: ACTIVE | Noted: 2017-09-03

## 2017-09-03 PROBLEM — I21.9 ACUTE MI (HCC): Status: RESOLVED | Noted: 2017-09-03 | Resolved: 2017-09-03

## 2017-09-03 LAB
ALBUMIN SERPL BCP-MCNC: 3.9 G/DL (ref 3.2–4.9)
ALBUMIN/GLOB SERPL: 1.3 G/DL
ALP SERPL-CCNC: 50 U/L (ref 30–99)
ALT SERPL-CCNC: 38 U/L (ref 2–50)
ANION GAP SERPL CALC-SCNC: 11 MMOL/L (ref 0–11.9)
APTT PPP: 25.6 SEC (ref 24.7–36)
AST SERPL-CCNC: 33 U/L (ref 12–45)
BASOPHILS # BLD AUTO: 0.5 % (ref 0–1.8)
BASOPHILS # BLD: 0.04 K/UL (ref 0–0.12)
BILIRUB SERPL-MCNC: 0.9 MG/DL (ref 0.1–1.5)
BNP SERPL-MCNC: 39 PG/ML (ref 0–100)
BUN SERPL-MCNC: 20 MG/DL (ref 8–22)
CALCIUM SERPL-MCNC: 9.1 MG/DL (ref 8.5–10.5)
CHLORIDE SERPL-SCNC: 109 MMOL/L (ref 96–112)
CO2 SERPL-SCNC: 18 MMOL/L (ref 20–33)
CREAT SERPL-MCNC: 1.13 MG/DL (ref 0.5–1.4)
EKG IMPRESSION: NORMAL
EOSINOPHIL # BLD AUTO: 0.17 K/UL (ref 0–0.51)
EOSINOPHIL NFR BLD: 2.1 % (ref 0–6.9)
ERYTHROCYTE [DISTWIDTH] IN BLOOD BY AUTOMATED COUNT: 44.3 FL (ref 35.9–50)
GFR SERPL CREATININE-BSD FRML MDRD: >60 ML/MIN/1.73 M 2
GLOBULIN SER CALC-MCNC: 2.9 G/DL (ref 1.9–3.5)
GLUCOSE SERPL-MCNC: 202 MG/DL (ref 65–99)
HCT VFR BLD AUTO: 42.3 % (ref 42–52)
HGB BLD-MCNC: 14.6 G/DL (ref 14–18)
IMM GRANULOCYTES # BLD AUTO: 0.02 K/UL (ref 0–0.11)
IMM GRANULOCYTES NFR BLD AUTO: 0.2 % (ref 0–0.9)
INR PPP: 1.09 (ref 0.87–1.13)
LIPASE SERPL-CCNC: 11 U/L (ref 11–82)
LYMPHOCYTES # BLD AUTO: 3.98 K/UL (ref 1–4.8)
LYMPHOCYTES NFR BLD: 48.7 % (ref 22–41)
MCH RBC QN AUTO: 31.2 PG (ref 27–33)
MCHC RBC AUTO-ENTMCNC: 34.5 G/DL (ref 33.7–35.3)
MCV RBC AUTO: 90.4 FL (ref 81.4–97.8)
MONOCYTES # BLD AUTO: 0.73 K/UL (ref 0–0.85)
MONOCYTES NFR BLD AUTO: 8.9 % (ref 0–13.4)
NEUTROPHILS # BLD AUTO: 3.23 K/UL (ref 1.82–7.42)
NEUTROPHILS NFR BLD: 39.6 % (ref 44–72)
NRBC # BLD AUTO: 0 K/UL
NRBC BLD AUTO-RTO: 0 /100 WBC
PLATELET # BLD AUTO: 261 K/UL (ref 164–446)
PMV BLD AUTO: 9.6 FL (ref 9–12.9)
POTASSIUM SERPL-SCNC: 3.4 MMOL/L (ref 3.6–5.5)
PROT SERPL-MCNC: 6.8 G/DL (ref 6–8.2)
PROTHROMBIN TIME: 14.5 SEC (ref 12–14.6)
RBC # BLD AUTO: 4.68 M/UL (ref 4.7–6.1)
SODIUM SERPL-SCNC: 138 MMOL/L (ref 135–145)
TROPONIN I SERPL-MCNC: <0.01 NG/ML (ref 0–0.04)
WBC # BLD AUTO: 8.2 K/UL (ref 4.8–10.8)

## 2017-09-03 PROCEDURE — 85610 PROTHROMBIN TIME: CPT

## 2017-09-03 PROCEDURE — C1769 GUIDE WIRE: HCPCS

## 2017-09-03 PROCEDURE — 93005 ELECTROCARDIOGRAM TRACING: CPT | Performed by: INTERNAL MEDICINE

## 2017-09-03 PROCEDURE — C1894 INTRO/SHEATH, NON-LASER: HCPCS

## 2017-09-03 PROCEDURE — 85730 THROMBOPLASTIN TIME PARTIAL: CPT

## 2017-09-03 PROCEDURE — B2111ZZ FLUOROSCOPY OF MULTIPLE CORONARY ARTERIES USING LOW OSMOLAR CONTRAST: ICD-10-PCS | Performed by: INTERNAL MEDICINE

## 2017-09-03 PROCEDURE — 99153 MOD SED SAME PHYS/QHP EA: CPT

## 2017-09-03 PROCEDURE — 71010 DX-CHEST-LIMITED (1 VIEW): CPT

## 2017-09-03 PROCEDURE — 700101 HCHG RX REV CODE 250

## 2017-09-03 PROCEDURE — 700105 HCHG RX REV CODE 258: Performed by: INTERNAL MEDICINE

## 2017-09-03 PROCEDURE — 307093 HCHG TR BAND RADIAL

## 2017-09-03 PROCEDURE — 99152 MOD SED SAME PHYS/QHP 5/>YRS: CPT

## 2017-09-03 PROCEDURE — 85025 COMPLETE CBC W/AUTO DIFF WBC: CPT

## 2017-09-03 PROCEDURE — 93458 L HRT ARTERY/VENTRICLE ANGIO: CPT

## 2017-09-03 PROCEDURE — 700111 HCHG RX REV CODE 636 W/ 250 OVERRIDE (IP)

## 2017-09-03 PROCEDURE — 770022 HCHG ROOM/CARE - ICU (200)

## 2017-09-03 PROCEDURE — B2151ZZ FLUOROSCOPY OF LEFT HEART USING LOW OSMOLAR CONTRAST: ICD-10-PCS | Performed by: INTERNAL MEDICINE

## 2017-09-03 PROCEDURE — 83880 ASSAY OF NATRIURETIC PEPTIDE: CPT

## 2017-09-03 PROCEDURE — 84484 ASSAY OF TROPONIN QUANT: CPT

## 2017-09-03 PROCEDURE — C9606 PERC D-E COR REVASC W AMI S: HCPCS | Mod: RC

## 2017-09-03 PROCEDURE — C1874 STENT, COATED/COV W/DEL SYS: HCPCS

## 2017-09-03 PROCEDURE — A9270 NON-COVERED ITEM OR SERVICE: HCPCS | Performed by: INTERNAL MEDICINE

## 2017-09-03 PROCEDURE — 700111 HCHG RX REV CODE 636 W/ 250 OVERRIDE (IP): Performed by: INTERNAL MEDICINE

## 2017-09-03 PROCEDURE — 93005 ELECTROCARDIOGRAM TRACING: CPT

## 2017-09-03 PROCEDURE — 80053 COMPREHEN METABOLIC PANEL: CPT

## 2017-09-03 PROCEDURE — 700105 HCHG RX REV CODE 258

## 2017-09-03 PROCEDURE — 027034Z DILATION OF CORONARY ARTERY, ONE ARTERY WITH DRUG-ELUTING INTRALUMINAL DEVICE, PERCUTANEOUS APPROACH: ICD-10-PCS | Performed by: INTERNAL MEDICINE

## 2017-09-03 PROCEDURE — 02C03ZZ EXTIRPATION OF MATTER FROM CORONARY ARTERY, ONE ARTERY, PERCUTANEOUS APPROACH: ICD-10-PCS | Performed by: INTERNAL MEDICINE

## 2017-09-03 PROCEDURE — 93010 ELECTROCARDIOGRAM REPORT: CPT | Performed by: INTERNAL MEDICINE

## 2017-09-03 PROCEDURE — 304952 HCHG R 2 PADS

## 2017-09-03 PROCEDURE — A9270 NON-COVERED ITEM OR SERVICE: HCPCS

## 2017-09-03 PROCEDURE — 83690 ASSAY OF LIPASE: CPT

## 2017-09-03 PROCEDURE — 700102 HCHG RX REV CODE 250 W/ 637 OVERRIDE(OP)

## 2017-09-03 PROCEDURE — 99291 CRITICAL CARE FIRST HOUR: CPT

## 2017-09-03 PROCEDURE — 700102 HCHG RX REV CODE 250 W/ 637 OVERRIDE(OP): Performed by: INTERNAL MEDICINE

## 2017-09-03 PROCEDURE — C1725 CATH, TRANSLUMIN NON-LASER: HCPCS

## 2017-09-03 PROCEDURE — C1887 CATHETER, GUIDING: HCPCS

## 2017-09-03 PROCEDURE — 99223 1ST HOSP IP/OBS HIGH 75: CPT | Performed by: HOSPITALIST

## 2017-09-03 PROCEDURE — C1757 CATH, THROMBECTOMY/EMBOLECT: HCPCS

## 2017-09-03 PROCEDURE — 4A023N7 MEASUREMENT OF CARDIAC SAMPLING AND PRESSURE, LEFT HEART, PERCUTANEOUS APPROACH: ICD-10-PCS | Performed by: INTERNAL MEDICINE

## 2017-09-03 PROCEDURE — 360979 HCHG DIAGNOSTIC CATH

## 2017-09-03 RX ORDER — MIDAZOLAM HYDROCHLORIDE 1 MG/ML
INJECTION INTRAMUSCULAR; INTRAVENOUS
Status: COMPLETED
Start: 2017-09-03 | End: 2017-09-03

## 2017-09-03 RX ORDER — HEPARIN SODIUM,PORCINE 1000/ML
VIAL (ML) INJECTION
Status: COMPLETED
Start: 2017-09-03 | End: 2017-09-03

## 2017-09-03 RX ORDER — ATORVASTATIN CALCIUM 40 MG/1
40 TABLET, FILM COATED ORAL
Status: DISCONTINUED | OUTPATIENT
Start: 2017-09-03 | End: 2017-09-06 | Stop reason: HOSPADM

## 2017-09-03 RX ORDER — BIVALIRUDIN 250 MG/5ML
INJECTION, POWDER, LYOPHILIZED, FOR SOLUTION INTRAVENOUS
Status: COMPLETED
Start: 2017-09-03 | End: 2017-09-03

## 2017-09-03 RX ORDER — DIPHENHYDRAMINE HYDROCHLORIDE 50 MG/ML
25 INJECTION INTRAMUSCULAR; INTRAVENOUS EVERY 6 HOURS PRN
Status: DISCONTINUED | OUTPATIENT
Start: 2017-09-03 | End: 2017-09-06 | Stop reason: HOSPADM

## 2017-09-03 RX ORDER — SPIRONOLACTONE 25 MG/1
12.5 TABLET ORAL
Status: DISCONTINUED | OUTPATIENT
Start: 2017-09-03 | End: 2017-09-04

## 2017-09-03 RX ORDER — ONDANSETRON 2 MG/ML
4 INJECTION INTRAMUSCULAR; INTRAVENOUS EVERY 4 HOURS PRN
Status: DISCONTINUED | OUTPATIENT
Start: 2017-09-03 | End: 2017-09-06 | Stop reason: HOSPADM

## 2017-09-03 RX ORDER — POLYETHYLENE GLYCOL 3350 17 G/17G
1 POWDER, FOR SOLUTION ORAL
Status: DISCONTINUED | OUTPATIENT
Start: 2017-09-03 | End: 2017-09-06 | Stop reason: HOSPADM

## 2017-09-03 RX ORDER — LIDOCAINE HYDROCHLORIDE 20 MG/ML
INJECTION, SOLUTION INFILTRATION; PERINEURAL
Status: COMPLETED
Start: 2017-09-03 | End: 2017-09-03

## 2017-09-03 RX ORDER — BISACODYL 10 MG
10 SUPPOSITORY, RECTAL RECTAL
Status: DISCONTINUED | OUTPATIENT
Start: 2017-09-03 | End: 2017-09-06 | Stop reason: HOSPADM

## 2017-09-03 RX ORDER — VERAPAMIL HYDROCHLORIDE 2.5 MG/ML
INJECTION, SOLUTION INTRAVENOUS
Status: COMPLETED
Start: 2017-09-03 | End: 2017-09-03

## 2017-09-03 RX ORDER — HALOPERIDOL 5 MG/ML
1 INJECTION INTRAMUSCULAR EVERY 6 HOURS PRN
Status: DISCONTINUED | OUTPATIENT
Start: 2017-09-03 | End: 2017-09-06 | Stop reason: HOSPADM

## 2017-09-03 RX ORDER — SODIUM CHLORIDE 9 MG/ML
INJECTION, SOLUTION INTRAVENOUS CONTINUOUS
Status: DISPENSED | OUTPATIENT
Start: 2017-09-03 | End: 2017-09-03

## 2017-09-03 RX ORDER — ONDANSETRON 4 MG/1
4 TABLET, ORALLY DISINTEGRATING ORAL EVERY 4 HOURS PRN
Status: DISCONTINUED | OUTPATIENT
Start: 2017-09-03 | End: 2017-09-06 | Stop reason: HOSPADM

## 2017-09-03 RX ORDER — ONDANSETRON 2 MG/ML
4 INJECTION INTRAMUSCULAR; INTRAVENOUS EVERY 4 HOURS PRN
Status: DISCONTINUED | OUTPATIENT
Start: 2017-09-03 | End: 2017-09-03

## 2017-09-03 RX ORDER — SCOLOPAMINE TRANSDERMAL SYSTEM 1 MG/1
1 PATCH, EXTENDED RELEASE TRANSDERMAL
Status: DISCONTINUED | OUTPATIENT
Start: 2017-09-03 | End: 2017-09-06 | Stop reason: HOSPADM

## 2017-09-03 RX ORDER — ACETAMINOPHEN 325 MG/1
650 TABLET ORAL EVERY 6 HOURS PRN
Status: DISCONTINUED | OUTPATIENT
Start: 2017-09-03 | End: 2017-09-06 | Stop reason: HOSPADM

## 2017-09-03 RX ORDER — DEXAMETHASONE SODIUM PHOSPHATE 4 MG/ML
4 INJECTION, SOLUTION INTRA-ARTICULAR; INTRALESIONAL; INTRAMUSCULAR; INTRAVENOUS; SOFT TISSUE
Status: DISCONTINUED | OUTPATIENT
Start: 2017-09-03 | End: 2017-09-06 | Stop reason: HOSPADM

## 2017-09-03 RX ORDER — AMOXICILLIN 250 MG
2 CAPSULE ORAL 2 TIMES DAILY
Status: DISCONTINUED | OUTPATIENT
Start: 2017-09-03 | End: 2017-09-06 | Stop reason: HOSPADM

## 2017-09-03 RX ORDER — ONDANSETRON 2 MG/ML
INJECTION INTRAMUSCULAR; INTRAVENOUS
Status: COMPLETED
Start: 2017-09-03 | End: 2017-09-03

## 2017-09-03 RX ADMIN — HEPARIN SODIUM: 1000 INJECTION, SOLUTION INTRAVENOUS; SUBCUTANEOUS at 18:50

## 2017-09-03 RX ADMIN — BIVALIRUDIN 0.2 MG/KG/HR: 250 INJECTION, POWDER, LYOPHILIZED, FOR SOLUTION INTRAVENOUS at 20:00

## 2017-09-03 RX ADMIN — VERAPAMIL HYDROCHLORIDE 5 MG: 2.5 INJECTION, SOLUTION INTRAVENOUS at 18:50

## 2017-09-03 RX ADMIN — ATORVASTATIN CALCIUM 40 MG: 40 TABLET, FILM COATED ORAL at 20:23

## 2017-09-03 RX ADMIN — NITROGLYCERIN 10 ML: 20 INJECTION INTRAVENOUS at 18:51

## 2017-09-03 RX ADMIN — BIVALIRUDIN 1.75 MG/KG/HR: 250 INJECTION, POWDER, LYOPHILIZED, FOR SOLUTION INTRAVENOUS at 18:45

## 2017-09-03 RX ADMIN — FENTANYL CITRATE 50 MCG: 50 INJECTION, SOLUTION INTRAMUSCULAR; INTRAVENOUS at 19:14

## 2017-09-03 RX ADMIN — ONDANSETRON 4 MG: 2 INJECTION INTRAMUSCULAR; INTRAVENOUS at 18:50

## 2017-09-03 RX ADMIN — ASPIRIN 81 MG: 81 TABLET ORAL at 20:24

## 2017-09-03 RX ADMIN — LIDOCAINE HYDROCHLORIDE: 20 INJECTION, SOLUTION INFILTRATION; PERINEURAL at 18:50

## 2017-09-03 RX ADMIN — SPIRONOLACTONE 12.5 MG: 25 TABLET, FILM COATED ORAL at 20:24

## 2017-09-03 RX ADMIN — SODIUM CHLORIDE: 9 INJECTION, SOLUTION INTRAVENOUS at 20:26

## 2017-09-03 RX ADMIN — HEPARIN SODIUM 2000 UNITS: 200 INJECTION, SOLUTION INTRAVENOUS at 18:51

## 2017-09-03 RX ADMIN — MIDAZOLAM 1.5 MG: 1 INJECTION INTRAMUSCULAR; INTRAVENOUS at 19:10

## 2017-09-03 RX ADMIN — TICAGRELOR 180 MG: 90 TABLET ORAL at 19:14

## 2017-09-03 RX ADMIN — BIVALIRUDIN: 250 INJECTION, POWDER, LYOPHILIZED, FOR SOLUTION INTRAVENOUS at 18:30

## 2017-09-03 ASSESSMENT — PAIN SCALES - GENERAL
PAINLEVEL_OUTOF10: 0
PAINLEVEL_OUTOF10: 0

## 2017-09-03 ASSESSMENT — LIFESTYLE VARIABLES
DO YOU DRINK ALCOHOL: YES
CONSUMPTION TOTAL: INCOMPLETE
HAVE YOU EVER FELT YOU SHOULD CUT DOWN ON YOUR DRINKING: NO
TOTAL SCORE: 0
EVER FELT BAD OR GUILTY ABOUT YOUR DRINKING: NO
TOTAL SCORE: 0
EVER HAD A DRINK FIRST THING IN THE MORNING TO STEADY YOUR NERVES TO GET RID OF A HANGOVER: NO
TOTAL SCORE: 0
HAVE PEOPLE ANNOYED YOU BY CRITICIZING YOUR DRINKING: NO

## 2017-09-03 ASSESSMENT — PATIENT HEALTH QUESTIONNAIRE - PHQ9
1. LITTLE INTEREST OR PLEASURE IN DOING THINGS: NOT AT ALL
SUM OF ALL RESPONSES TO PHQ9 QUESTIONS 1 AND 2: 0
2. FEELING DOWN, DEPRESSED, IRRITABLE, OR HOPELESS: NOT AT ALL
SUM OF ALL RESPONSES TO PHQ QUESTIONS 1-9: 0

## 2017-09-04 PROBLEM — I21.9 ACUTE MI (HCC): Status: ACTIVE | Noted: 2017-09-04

## 2017-09-04 LAB
ANION GAP SERPL CALC-SCNC: 7 MMOL/L (ref 0–11.9)
BUN SERPL-MCNC: 18 MG/DL (ref 8–22)
CALCIUM SERPL-MCNC: 8.7 MG/DL (ref 8.5–10.5)
CHLORIDE SERPL-SCNC: 109 MMOL/L (ref 96–112)
CHOLEST SERPL-MCNC: 107 MG/DL (ref 100–199)
CO2 SERPL-SCNC: 23 MMOL/L (ref 20–33)
CREAT SERPL-MCNC: 0.91 MG/DL (ref 0.5–1.4)
EKG IMPRESSION: NORMAL
EKG IMPRESSION: NORMAL
ERYTHROCYTE [DISTWIDTH] IN BLOOD BY AUTOMATED COUNT: 46.3 FL (ref 35.9–50)
GFR SERPL CREATININE-BSD FRML MDRD: >60 ML/MIN/1.73 M 2
GLUCOSE SERPL-MCNC: 121 MG/DL (ref 65–99)
HCT VFR BLD AUTO: 40.6 % (ref 42–52)
HDLC SERPL-MCNC: 40 MG/DL
HGB BLD-MCNC: 13.7 G/DL (ref 14–18)
LDLC SERPL CALC-MCNC: 49 MG/DL
LV EJECT FRACT MOD 2C 99903: 50.69
LV EJECT FRACT MOD 4C 99902: 46.36
LV EJECT FRACT MOD BP 99901: 55.48
MCH RBC QN AUTO: 30.7 PG (ref 27–33)
MCHC RBC AUTO-ENTMCNC: 33.7 G/DL (ref 33.7–35.3)
MCV RBC AUTO: 91 FL (ref 81.4–97.8)
PLATELET # BLD AUTO: 207 K/UL (ref 164–446)
PMV BLD AUTO: 9.8 FL (ref 9–12.9)
POTASSIUM SERPL-SCNC: 3.8 MMOL/L (ref 3.6–5.5)
RBC # BLD AUTO: 4.46 M/UL (ref 4.7–6.1)
SODIUM SERPL-SCNC: 139 MMOL/L (ref 135–145)
TRIGL SERPL-MCNC: 88 MG/DL (ref 0–149)
TROPONIN I SERPL-MCNC: 82.72 NG/ML (ref 0–0.04)
WBC # BLD AUTO: 8.1 K/UL (ref 4.8–10.8)

## 2017-09-04 PROCEDURE — 94660 CPAP INITIATION&MGMT: CPT

## 2017-09-04 PROCEDURE — 700111 HCHG RX REV CODE 636 W/ 250 OVERRIDE (IP): Performed by: INTERNAL MEDICINE

## 2017-09-04 PROCEDURE — 700102 HCHG RX REV CODE 250 W/ 637 OVERRIDE(OP): Performed by: INTERNAL MEDICINE

## 2017-09-04 PROCEDURE — 93005 ELECTROCARDIOGRAM TRACING: CPT | Performed by: INTERNAL MEDICINE

## 2017-09-04 PROCEDURE — 93306 TTE W/DOPPLER COMPLETE: CPT

## 2017-09-04 PROCEDURE — 700102 HCHG RX REV CODE 250 W/ 637 OVERRIDE(OP): Performed by: HOSPITALIST

## 2017-09-04 PROCEDURE — 80048 BASIC METABOLIC PNL TOTAL CA: CPT

## 2017-09-04 PROCEDURE — 84484 ASSAY OF TROPONIN QUANT: CPT

## 2017-09-04 PROCEDURE — A9270 NON-COVERED ITEM OR SERVICE: HCPCS | Performed by: INTERNAL MEDICINE

## 2017-09-04 PROCEDURE — 93306 TTE W/DOPPLER COMPLETE: CPT | Mod: 26 | Performed by: INTERNAL MEDICINE

## 2017-09-04 PROCEDURE — 99232 SBSQ HOSP IP/OBS MODERATE 35: CPT | Performed by: HOSPITALIST

## 2017-09-04 PROCEDURE — 93010 ELECTROCARDIOGRAM REPORT: CPT | Performed by: INTERNAL MEDICINE

## 2017-09-04 PROCEDURE — 85027 COMPLETE CBC AUTOMATED: CPT

## 2017-09-04 PROCEDURE — 770020 HCHG ROOM/CARE - TELE (206)

## 2017-09-04 PROCEDURE — A9270 NON-COVERED ITEM OR SERVICE: HCPCS | Performed by: HOSPITALIST

## 2017-09-04 PROCEDURE — 80061 LIPID PANEL: CPT

## 2017-09-04 RX ORDER — CARVEDILOL 3.12 MG/1
3.12 TABLET ORAL 2 TIMES DAILY WITH MEALS
Status: DISCONTINUED | OUTPATIENT
Start: 2017-09-04 | End: 2017-09-06

## 2017-09-04 RX ORDER — SPIRONOLACTONE 25 MG/1
25 TABLET ORAL
Status: DISCONTINUED | OUTPATIENT
Start: 2017-09-04 | End: 2017-09-06

## 2017-09-04 RX ORDER — FUROSEMIDE 10 MG/ML
20 INJECTION INTRAMUSCULAR; INTRAVENOUS ONCE
Status: COMPLETED | OUTPATIENT
Start: 2017-09-04 | End: 2017-09-04

## 2017-09-04 RX ADMIN — STANDARDIZED SENNA CONCENTRATE AND DOCUSATE SODIUM 2 TABLET: 8.6; 5 TABLET, FILM COATED ORAL at 08:31

## 2017-09-04 RX ADMIN — CARVEDILOL 3.12 MG: 3.12 TABLET, FILM COATED ORAL at 16:41

## 2017-09-04 RX ADMIN — CARVEDILOL 3.12 MG: 3.12 TABLET, FILM COATED ORAL at 08:30

## 2017-09-04 RX ADMIN — SPIRONOLACTONE 25 MG: 25 TABLET, FILM COATED ORAL at 08:29

## 2017-09-04 RX ADMIN — ATORVASTATIN CALCIUM 40 MG: 40 TABLET, FILM COATED ORAL at 21:24

## 2017-09-04 RX ADMIN — STANDARDIZED SENNA CONCENTRATE AND DOCUSATE SODIUM 2 TABLET: 8.6; 5 TABLET, FILM COATED ORAL at 21:24

## 2017-09-04 RX ADMIN — ASPIRIN 81 MG: 81 TABLET ORAL at 08:30

## 2017-09-04 RX ADMIN — TICAGRELOR 90 MG: 90 TABLET ORAL at 08:32

## 2017-09-04 RX ADMIN — FUROSEMIDE 20 MG: 10 INJECTION, SOLUTION INTRAMUSCULAR; INTRAVENOUS at 08:29

## 2017-09-04 RX ADMIN — TICAGRELOR 90 MG: 90 TABLET ORAL at 21:28

## 2017-09-04 ASSESSMENT — LIFESTYLE VARIABLES
TOTAL SCORE: 0
EVER HAD A DRINK FIRST THING IN THE MORNING TO STEADY YOUR NERVES TO GET RID OF A HANGOVER: NO
HAVE PEOPLE ANNOYED YOU BY CRITICIZING YOUR DRINKING: NO
TOTAL SCORE: 0
EVER FELT BAD OR GUILTY ABOUT YOUR DRINKING: NO
DO YOU DRINK ALCOHOL: YES
HAVE YOU EVER FELT YOU SHOULD CUT DOWN ON YOUR DRINKING: NO
TOTAL SCORE: 0
CONSUMPTION TOTAL: INCOMPLETE

## 2017-09-04 ASSESSMENT — ENCOUNTER SYMPTOMS
SHORTNESS OF BREATH: 1
HEADACHES: 0
ABDOMINAL PAIN: 0
NAUSEA: 0
LOSS OF CONSCIOUSNESS: 0
FEVER: 0
COUGH: 0
DIZZINESS: 0
CHILLS: 0
NEUROLOGICAL NEGATIVE: 1
VOMITING: 0
BRUISES/BLEEDS EASILY: 0
DIARRHEA: 0
BACK PAIN: 0

## 2017-09-04 ASSESSMENT — PAIN SCALES - GENERAL
PAINLEVEL_OUTOF10: 0
PAINLEVEL_OUTOF10: 0
PAINLEVEL_OUTOF10: 1
PAINLEVEL_OUTOF10: 0

## 2017-09-04 ASSESSMENT — PATIENT HEALTH QUESTIONNAIRE - PHQ9
2. FEELING DOWN, DEPRESSED, IRRITABLE, OR HOPELESS: NOT AT ALL
1. LITTLE INTEREST OR PLEASURE IN DOING THINGS: NOT AT ALL
SUM OF ALL RESPONSES TO PHQ QUESTIONS 1-9: 0
SUM OF ALL RESPONSES TO PHQ9 QUESTIONS 1 AND 2: 0

## 2017-09-04 NOTE — CARE PLAN
Problem: Safety  Goal: Will remain free from falls  Outcome: PROGRESSING AS EXPECTED  No falls since admission

## 2017-09-04 NOTE — CARE PLAN
Problem: Safety  Goal: Will remain free from injury  Outcome: PROGRESSING AS EXPECTED  No injuries since admission

## 2017-09-04 NOTE — PROGRESS NOTES
Cardiology Progress Note               Author: Rodrigue Mcallister Date & Time created: 2017  7:51 AM     Interval History:  Admitted yesterday with IMI.  RCA stented. EF severely reduced by angio, ehco is pending.  Feeling mildly dyspneic.  Non smoker prehospital    Review of Systems   Constitutional: Negative for fever.   Respiratory: Positive for shortness of breath.    Cardiovascular: Negative for chest pain.   Neurological: Negative.    Endo/Heme/Allergies: Does not bruise/bleed easily.       Physical Exam   Constitutional: He is oriented to person, place, and time. No distress.   HENT:   Head: Normocephalic and atraumatic.   Eyes: No scleral icterus.   Neck: No JVD present.   Cardiovascular: Normal rate and regular rhythm.  Exam reveals no friction rub.    No murmur heard.  Musculoskeletal: He exhibits no edema.   Neurological: He is alert and oriented to person, place, and time. No cranial nerve deficit.   Skin: Skin is warm and dry. No erythema.       Hemodynamics:  Temp (24hrs), Av.4 °C (97.6 °F), Min:36.1 °C (96.9 °F), Max:36.9 °C (98.5 °F)  Temperature: 36.9 °C (98.5 °F)  Pulse  Av.3  Min: 56  Max: 85Heart Rate (Monitored): (!) 58  NIBP: 122/64     Respiratory:    Respiration: (!) 22, Pulse Oximetry: 96 %           Fluids:     Weight: 102.1 kg (225 lb 1.4 oz)  GI/Nutrition:  Orders Placed This Encounter   Procedures   • Diet Order     Standing Status:   Standing     Number of Occurrences:   1     Order Specific Question:   Diet:     Answer:   Cardiac [6]     Lab Results:  Recent Labs      17   1805  17   0423   WBC  8.2  8.1   RBC  4.68*  4.46*   HEMOGLOBIN  14.6  13.7*   HEMATOCRIT  42.3  40.6*   MCV  90.4  91.0   MCH  31.2  30.7   MCHC  34.5  33.7   RDW  44.3  46.3   PLATELETCT  261  207   MPV  9.6  9.8     Recent Labs      17   18017   0423   SODIUM  138  139   POTASSIUM  3.4*  3.8   CHLORIDE  109  109   CO2  18*  23   GLUCOSE  202*  121*   BUN  20  18   CREATININE   1.13  0.91   CALCIUM  9.1  8.7     Recent Labs      09/03/17   1805   APTT  25.6   INR  1.09     Recent Labs      09/03/17   1805   BNPBTYPENAT  39     Recent Labs      09/03/17   1805  09/04/17   0423   TROPONINI  <0.01  82.72*   BNPBTYPENAT  39   --      Recent Labs      09/04/17   0423   TRIGLYCERIDE  88   HDL  40   LDL  49         Medical Decision Making, by Problem:  Active Hospital Problems    Diagnosis   • STEMI (ST elevation myocardial infarction) (CMS-HCC) [I21.3]   • Ischemic cardiomyopathy [I25.5]   • Essential hypertension [I10]   • Dyslipidemia [E78.5]   • Acute MI (CMS-HCC) [I21.3]   • RUPAL (obstructive sleep apnea) [G47.33]   • IGT (impaired glucose tolerance) [R73.02]       Plan:  S/P IMI. rales on exam. EF reduced by angio.  No VT overnight.  Add B blocker.  Diuretic today.  To tele, Anticipate discharge on Weds.    Quality-Core Measures

## 2017-09-04 NOTE — CARE PLAN
Problem: Infection  Goal: Will remain free from infection  Outcome: PROGRESSING AS EXPECTED  Foam in and foam out.

## 2017-09-04 NOTE — ED PROVIDER NOTES
CHIEF COMPLAINT  No chief complaint on file.  Chest pain    HPI  Shamar Morgan is a 72 y.o. male who presents with crushing chest pain starting approximately 25 minutes prior to arrival while he was in the garage. Denies any vomiting however had diaphoresis. No prior significant cardiac history. Reports negative stress tests in the past. Prior smoking history however quit many years ago. Denies prior known cardiac history. No shortness of breath. No diabetes. Has a history of hypertension and hyperlipidemia.    Patient received 324 mg aspirin prior to arrival.    REVIEW OF SYSTEMS  See HPI for further details. All other systems are negative.     PAST MEDICAL HISTORY   has a past medical history of CAD (coronary artery disease) (4/16/2012); HTN (hypertension) (4/16/2012); Hyperglycemia (4/16/2012); Hyperlipemia (4/16/2012); Hypertension; Obesity; Tachycardia (4/16/2012); and Tonsillitis.    SOCIAL HISTORY  Social History     Social History Main Topics   • Smoking status: Former Smoker     Packs/day: 3.00     Years: 16.00     Types: Cigarettes     Quit date: 1/1/1980   • Smokeless tobacco: Never Used   • Alcohol use 0.0 oz/week      Comment: wine or whiskey 2-3 a day   • Drug use: No   • Sexual activity: Yes     Partners: Female       SURGICAL HISTORY   has a past surgical history that includes carpal tunnel release.    CURRENT MEDICATIONS  Home Medications    **Home medications have not yet been reviewed for this encounter**         ALLERGIES  Allergies   Allergen Reactions   • Lipidil [Fenofibrate]      Myalgia   • Penicillins    • Sulfa Drugs        PHYSICAL EXAM  VITAL SIGNS: There were no vitals taken for this visit.  Pulse ox interpretation: I interpret this pulse ox as normal.  Constitutional: Alert, diaphoresis  HENT: No signs of trauma, Bilateral external ears normal, Nose normal.   Eyes: Pupils are equal and reactive, Conjunctiva normal, Non-icteric.   Neck: Normal range of motion, No tenderness, Supple,  No stridor.   Cardiovascular: Bradycardic rate and regular rhythm. Equal pulses bilaterally  Thorax & Lungs: Normal breath sounds, No respiratory distress, No wheezing, No chest tenderness.   Abdomen: Bowel sounds normal, Soft, No tenderness, No masses, No pulsatile masses. No peritoneal signs.   Skin: Diaphoretic  Extremities: Intact distal pulses, No edema, No tenderness, No cyanosis  Neurologic: Alert , Normal motor function and gait, Normal sensory function, No focal deficits noted.       DIAGNOSTIC STUDIES / PROCEDURES    EKG  Inferior ST elevations concerning for ST elevation MI.    LABS  Labs Reviewed   CBC WITH DIFFERENTIAL - Abnormal; Notable for the following:        Result Value    RBC 4.68 (*)     Neutrophils-Polys 39.60 (*)     Lymphocytes 48.70 (*)     All other components within normal limits    Narrative:     Indicate which anticoagulants the patient is on:->UNKNOWN   COMP METABOLIC PANEL - Abnormal; Notable for the following:     Potassium 3.4 (*)     Co2 18 (*)     Glucose 202 (*)     All other components within normal limits    Narrative:     Indicate which anticoagulants the patient is on:->UNKNOWN   TROPONIN    Narrative:     Indicate which anticoagulants the patient is on:->UNKNOWN   BTYPE NATRIURETIC PEPTIDE    Narrative:     Indicate which anticoagulants the patient is on:->UNKNOWN   PROTHROMBIN TIME    Narrative:     Indicate which anticoagulants the patient is on:->UNKNOWN   APTT    Narrative:     Indicate which anticoagulants the patient is on:->UNKNOWN   LIPASE    Narrative:     Indicate which anticoagulants the patient is on:->UNKNOWN   ESTIMATED GFR    Narrative:     Indicate which anticoagulants the patient is on:->UNKNOWN       RADIOLOGY  DX-CHEST-LIMITED (1 VIEW)   Final Result      1.  Multichamber cardiac enlargement, increased from prior exam.   2.  No pneumonia or pulmonary edema.          COURSE & MEDICAL DECISION MAKING  Pertinent Labs & Imaging studies reviewed. (See chart for  details)  72-year-old male presenting with chest pain and diaphoresis with STEMI pattern on prehospital EKG. Came in as a STEMI alert. Was given aspirin prior to arrival. Hemodynamically stable upon arrival with a blood pressure 120 systolic with a heart rate in the 50s. Patient is diaphoretic with a 7 out of 10 chest pain currently. Reports that it is improved compared to his onset chest pain. Does not appear to be in any obvious distress at this time there was rather diaphoretic. Metastases by cardiology upon arrival. Agreed with STEMI diagnosis and will transport immediately to the Cath Lab for further intervention.    Spoke with Dr. Arreaga from hospitalist service regarding admission. Agrees to the admission after Cath Lab intervention.      FINAL IMPRESSION  1. ST elevation myocardial infarction (STEMI) of inferior wall (CMS-[facility])            Electronically signed by: Luis E Rodriguez, 9/3/2017 6:12 PM

## 2017-09-04 NOTE — H&P
" Hospital Medicine History and Physical    Date of Service  9/3/2017    Chief Complaint  No chief complaint on file.      History of Presenting Illness  72 y.o. male who presented 9/3/2017 with chest pain. Mr. Morgan has a history of hypertension and dyslipidemia that is in his usual state of health until today while working in the garage started sweating profusely \"I thought I was having a heat stroke\" this is associated with severe jaw pain the point where he took out his lower dentures lay down and developed severe chest pain was constant and therefore 911 was called and he was brought to the emergency room where EKG reveals ST elevation. He went to the cardiac Cath Lab and had stenting to the right coronary artery will be admitted to the cardiac intensive care unit in guarded condition.   Primary Care Physician  Aldo Lemus M.D.    Consultants  Cardiology    Code Status  full    Review of Systems  Review of Systems   Constitutional:        Mr. Morgan in his usual state of health chest pain, shortness of breath, no dizziness prior to today's events.   All other systems reviewed and are negative.       Past Medical History  Past Medical History:   Diagnosis Date   • CAD (coronary artery disease) 4/16/2012   • Hyperglycemia 4/16/2012   • Hyperlipemia 4/16/2012   • HTN (hypertension) 4/16/2012   • Tachycardia 4/16/2012   • Hypertension    • Obesity    • Tonsillitis    Obstructive sleep apnea on CPAP at night    Surgical History  Past Surgical History:   Procedure Laterality Date   • CARPAL TUNNEL RELEASE         Medications  No current facility-administered medications on file prior to encounter.      Current Outpatient Prescriptions on File Prior to Encounter   Medication Sig Dispense Refill   • losartan (COZAAR) 100 MG Tab TAKE ONE TABLET BY MOUTH ONCE DAILY 90 Each 3   • pravastatin (PRAVACHOL) 80 MG tablet TAKE ONE TABLET BY MOUTH EVERY BEDTIME 90 Tab 3   • metoprolol SR (TOPROL XL) 25 MG TABLET SR 24 HR TAKE ONE " TABLET BY MOUTH ONCE DAILY 90 Tab 3   • gabapentin (NEURONTIN) 300 MG Cap Take 1 Cap by mouth 3 times a day. (Patient not taking: Reported on 7/10/2017) 90 Cap 3   • omeprazole (PRILOSEC) 20 MG delayed-release capsule Take 1 Cap by mouth every day. 30 Cap 11   • naproxen (NAPROSYN) 500 MG Tab Take 1 Tab by mouth 2 times a day, with meals. (Patient not taking: Reported on 7/10/2017) 60 Tab 1   • vitamin D (CHOLECALCIFEROL) 1000 UNIT TABS Take 1,000 Units by mouth every day.         Family History  Family History   Problem Relation Age of Onset   • Diabetes Mother    • Heart Disease Mother    • Kidney Disease Mother    • Diabetes Sister        Social History  Social History   Substance Use Topics   • Smoking status: Former Smoker     Packs/day: 3.00     Years: 16.00     Types: Cigarettes     Quit date: 1980   • Smokeless tobacco: Never Used   • Alcohol use 0.0 oz/week      Comment: wine or whiskey 2-3 a day   He lives with his wife he drinks 2 glasses of alcohol daily   Allergies  Allergies   Allergen Reactions   • Lipidil [Fenofibrate]      Myalgia   • Penicillins    • Sulfa Drugs         Physical Exam  Laboratory   Hemodynamics  Temp (24hrs), Av.4 °C (97.5 °F), Min:36.4 °C (97.5 °F), Max:36.4 °C (97.5 °F)   Temperature: 36.4 °C (97.5 °F)  Pulse  Av.5  Min: 56  Max: 69 Heart Rate (Monitored): (!) 56  NIBP: 112/80      Respiratory      Respiration: 20, Pulse Oximetry: 92 %             Physical Exam   Constitutional: He is oriented to person, place, and time. No distress.   HENT:   Head: Normocephalic and atraumatic.   Neck: Neck supple.   No bruits   Cardiovascular: Regular rhythm.    Bradycardia   Musculoskeletal: Normal range of motion. He exhibits no edema.   The right wrist catheter site is not having any bleeding and no hematoma   Neurological: He is alert and oriented to person, place, and time.   Skin: Skin is warm and dry. He is not diaphoretic.   Psychiatric: He has a normal mood and affect. His  behavior is normal.       Recent Labs      09/03/17   1805   WBC  8.2   RBC  4.68*   HEMOGLOBIN  14.6   HEMATOCRIT  42.3   MCV  90.4   MCH  31.2   MCHC  34.5   RDW  44.3   PLATELETCT  261   MPV  9.6     Recent Labs      09/03/17   1805   SODIUM  138   POTASSIUM  3.4*   CHLORIDE  109   CO2  18*   GLUCOSE  202*   BUN  20   CREATININE  1.13   CALCIUM  9.1     Recent Labs      09/03/17   1805   ALTSGPT  38   ASTSGOT  33   ALKPHOSPHAT  50   TBILIRUBIN  0.9   LIPASE  11   GLUCOSE  202*     Recent Labs      09/03/17   1805   APTT  25.6   INR  1.09     Recent Labs      09/03/17   1805   BNPBTYPENAT  39         Lab Results   Component Value Date    TROPONINI <0.01 09/03/2017     Urinalysis:    Lab Results  Component Value Date/Time   SPECGRAVITY 1.020 11/11/2016 1329   GLUCOSEUR Negative 11/11/2016 1329   KETONES Negative 11/11/2016 1329   NITRITE Negative 11/11/2016 1329        Imaging  DX-CHEST-LIMITED (1 VIEW)   Final Result      1.  Multichamber cardiac enlargement, increased from prior exam.   2.  No pneumonia or pulmonary edema.      Echocardiogram Comp W/O Cont    (Results Pending)     EKG reveals a sinus rate of 54 inferior ST elevation    Assessment/Plan     I anticipate this patient will require at least two midnights for appropriate medical management, necessitating inpatient admission.    Ischemic cardiomyopathy- (present on admission)   Assessment & Plan    Ejection fraction noted to be 30% by heart catheterization. He has been started on Aldactone and likely will be restarted back on his beta blocker since his bradycardia improves. An echocardiogram is ordered.        STEMI (ST elevation myocardial infarction) (CMS-[facility])- (present on admission)   Assessment & Plan    With stenting the right coronary artery by Dr. Garner on admission. He'll be treated with antiplatelet therapy, a statin, and restart metoprolol once his pulse and blood pressure can tolerate it        Essential hypertension- (present on admission)    Assessment & Plan    On cozaar 100 mg daily and Toprol XL 25 mg daily at home.         Dyslipidemia- (present on admission)   Assessment & Plan    He had been on Pravachol 80 mg outpatient. Lipid panel ordered. Lipitor 40 mg ordered inpatient.         RUPAL (obstructive sleep apnea)- (present on admission)   Assessment & Plan    Nocturnal CPAP ordered.         IGT (impaired glucose tolerance)- (present on admission)   Assessment & Plan    His last hemoglobin A1c is 5. 7 May 2017. His glucose on admission was 202.            VTE prophylaxis: Start Lovenox on 9/4/2017 after he is off Angiomax .

## 2017-09-04 NOTE — PROGRESS NOTES
[facility] Hospitalist Progress Note    Date of Service: 2017    Chief Complaint  72 y.o. male admitted 9/3/2017 with ***    Interval Problem Update  ***    Consultants/Specialty  ***    Disposition  ***        ROS   Physical Exam  Laboratory/Imaging   Hemodynamics  Temp (24hrs), Av.4 °C (97.6 °F), Min:36.1 °C (96.9 °F), Max:36.9 °C (98.5 °F)   Temperature: 36.9 °C (98.5 °F)  Pulse  Av.3  Min: 56  Max: 85 Heart Rate (Monitored): (!) 58  NIBP: 122/64      Respiratory      Respiration: (!) 22, Pulse Oximetry: 96 %             Fluids    Intake/Output Summary (Last 24 hours) at 17 0547  Last data filed at 17 2100   Gross per 24 hour   Intake                0 ml   Output              200 ml   Net             -200 ml       Nutrition  Orders Placed This Encounter   Procedures   • Diet Order     Standing Status:   Standing     Number of Occurrences:   1     Order Specific Question:   Diet:     Answer:   Cardiac [6]     Physical Exam    Recent Labs      17   1805  17   0423   WBC  8.2  8.1   RBC  4.68*  4.46*   HEMOGLOBIN  14.6  13.7*   HEMATOCRIT  42.3  40.6*   MCV  90.4  91.0   MCH  31.2  30.7   MCHC  34.5  33.7   RDW  44.3  46.3   PLATELETCT  261  207   MPV  9.6  9.8     Recent Labs      17   1805  17   0423   SODIUM  138  139   POTASSIUM  3.4*  3.8   CHLORIDE  109  109   CO2  18*  23   GLUCOSE  202*  121*   BUN  20  18   CREATININE  1.13  0.91   CALCIUM  9.1  8.7     Recent Labs      17   1805   APTT  25.6   INR  1.09     Recent Labs      17   1805   BNPBTYPENAT  39     Recent Labs      17   0423   TRIGLYCERIDE  88   HDL  40   LDL  49          Assessment/Plan     Ischemic cardiomyopathy- (present on admission)   Assessment & Plan    Ejection fraction noted to be 30% by heart catheterization. He has been started on Aldactone and likely will be restarted back on his beta blocker since his bradycardia improves. An echocardiogram is ordered.        STEMI (ST  elevation myocardial infarction) (CMS-HCC)- (present on admission)   Assessment & Plan    With stenting the right coronary artery by Dr. Garner on admission. He'll be treated with antiplatelet therapy, a statin, and restart metoprolol once his pulse and blood pressure can tolerate it        Essential hypertension- (present on admission)   Assessment & Plan    On cozaar 100 mg daily and Toprol XL 25 mg daily at home.         Dyslipidemia- (present on admission)   Assessment & Plan    He had been on Pravachol 80 mg outpatient. Lipid panel ordered. Lipitor 40 mg ordered inpatient.         RUPAL (obstructive sleep apnea)- (present on admission)   Assessment & Plan    Nocturnal CPAP ordered.         IGT (impaired glucose tolerance)- (present on admission)   Assessment & Plan    His last hemoglobin A1c is 5. 7 May 2017. His glucose on admission was 202.          Quality-Core Measures

## 2017-09-04 NOTE — ASSESSMENT & PLAN NOTE
S/p RCA stent 9/3  Cards following  ASA  Ticagrelor  Statin  BB  Diet education  9/5 will defer management to cardio.

## 2017-09-04 NOTE — CARE PLAN
Problem: Communication  Goal: The ability to communicate needs accurately and effectively will improve  Outcome: PROGRESSING AS EXPECTED  Encouraged to speak up.

## 2017-09-04 NOTE — DISCHARGE PLANNING
Pt arrived as STEMI from home. Pt is Shamar Morgan, : 1945. Pt provided phone number for spouse, Camille Morgan [phone]. SW [facility]. Spouse is aware that pt will likely be admitted to [facility] and that he is having a heart attack. Spouse has a daughter who lives locally. She denied any other family members.     Provided copy of insurance and drivers license to [facility] for registration.

## 2017-09-04 NOTE — CONSULTS
Reason for Consult:  Asked by Dr Rodriguez to see this patient with inferior STEMI  Patient's PCP: Aldo Lemus M.D.    CC: cP    HPI: 73 yo with HTN Dyslipidemia presents as an EMS activation for inferior STEMI CP started 25 minutes ago severe with nausea called 911 immediately and brought here received aspirin.  No prior heart hisotry had dyspnea work up in 2013 which was negative with stress echo        Medications / Drug list prior to admission:  No current facility-administered medications on file prior to encounter.      Current Outpatient Prescriptions on File Prior to Encounter   Medication Sig Dispense Refill   • losartan (COZAAR) 100 MG Tab TAKE ONE TABLET BY MOUTH ONCE DAILY 90 Each 3   • pravastatin (PRAVACHOL) 80 MG tablet TAKE ONE TABLET BY MOUTH EVERY BEDTIME 90 Tab 3   • metoprolol SR (TOPROL XL) 25 MG TABLET SR 24 HR TAKE ONE TABLET BY MOUTH ONCE DAILY 90 Tab 3   • gabapentin (NEURONTIN) 300 MG Cap Take 1 Cap by mouth 3 times a day. (Patient not taking: Reported on 7/10/2017) 90 Cap 3   • omeprazole (PRILOSEC) 20 MG delayed-release capsule Take 1 Cap by mouth every day. 30 Cap 11   • naproxen (NAPROSYN) 500 MG Tab Take 1 Tab by mouth 2 times a day, with meals. (Patient not taking: Reported on 7/10/2017) 60 Tab 1   • vitamin D (CHOLECALCIFEROL) 1000 UNIT TABS Take 1,000 Units by mouth every day.         Current list of administered Medications:  No current facility-administered medications for this encounter.     Current Outpatient Prescriptions:   •  losartan (COZAAR) 100 MG Tab, TAKE ONE TABLET BY MOUTH ONCE DAILY, Disp: 90 Each, Rfl: 3  •  pravastatin (PRAVACHOL) 80 MG tablet, TAKE ONE TABLET BY MOUTH EVERY BEDTIME, Disp: 90 Tab, Rfl: 3  •  metoprolol SR (TOPROL XL) 25 MG TABLET SR 24 HR, TAKE ONE TABLET BY MOUTH ONCE DAILY, Disp: 90 Tab, Rfl: 3  •  gabapentin (NEURONTIN) 300 MG Cap, Take 1 Cap by mouth 3 times a day. (Patient not taking: Reported on 7/10/2017), Disp: 90 Cap, Rfl: 3  •  omeprazole  (PRILOSEC) 20 MG delayed-release capsule, Take 1 Cap by mouth every day., Disp: 30 Cap, Rfl: 11  •  naproxen (NAPROSYN) 500 MG Tab, Take 1 Tab by mouth 2 times a day, with meals. (Patient not taking: Reported on 7/10/2017), Disp: 60 Tab, Rfl: 1  •  vitamin D (CHOLECALCIFEROL) 1000 UNIT TABS, Take 1,000 Units by mouth every day., Disp: , Rfl:     Past Medical History:   Diagnosis Date   • CAD (coronary artery disease) 4/16/2012   • Hyperglycemia 4/16/2012   • Hyperlipemia 4/16/2012   • HTN (hypertension) 4/16/2012   • Tachycardia 4/16/2012   • Hypertension    • Obesity    • Tonsillitis        Past Surgical History:   Procedure Laterality Date   • CARPAL TUNNEL RELEASE         Family History   Problem Relation Age of Onset   • Diabetes Mother    • Heart Disease Mother    • Kidney Disease Mother    • Diabetes Sister        Social History     Social History   • Marital status:      Spouse name: N/A   • Number of children: N/A   • Years of education: N/A     Occupational History   • Not on file.     Social History Main Topics   • Smoking status: Former Smoker     Packs/day: 3.00     Years: 16.00     Types: Cigarettes     Quit date: 1/1/1980   • Smokeless tobacco: Never Used   • Alcohol use 0.0 oz/week      Comment: wine or whiskey 2-3 a day   • Drug use: No   • Sexual activity: Yes     Partners: Female     Other Topics Concern   • Not on file     Social History Narrative   • No narrative on file       Allergies   Allergen Reactions   • Lipidil [Fenofibrate]      Myalgia   • Penicillins    • Sulfa Drugs        Review of systems:  A detailed review of symptoms was reviewed with patient. This is reviewed in H&P and PMH. Otherwise negative.     Physical exam:  No data found.    General: No acute distress.   HEENT: OP clear   Neck: No bruits No JVD.   CVS: RRR. S1 + S2. No M/R/G  Resp: CTAB. No wheezing or crackles/rhonchi.  Abdomen: Soft, NT, ND,  Skin: No rashes, erythema or wounds.   Neurological: grossly within  normal range   Extremities: Pulse 2+ in b/l LE. No edema. No cyanosis.     Data:  Laboratory studies:  Lab Results   Component Value Date/Time    WBC 6.4 11/20/2014 08:03 AM    RBC 5.33 11/20/2014 08:03 AM    HEMOGLOBIN 16.4 11/20/2014 08:03 AM    HEMATOCRIT 49.2 11/20/2014 08:03 AM    MCV 92.3 11/20/2014 08:03 AM    MCH 30.8 11/20/2014 08:03 AM    MCHC 33.3 (L) 11/20/2014 08:03 AM    MPV 9.9 11/20/2014 08:03 AM    NEUTSPOLYS 45.8 11/20/2014 08:03 AM    LYMPHOCYTES 39.5 11/20/2014 08:03 AM    MONOCYTES 11.2 11/20/2014 08:03 AM    EOSINOPHILS 2.5 11/20/2014 08:03 AM    BASOPHILS 0.8 11/20/2014 08:03 AM        Lab Results   Component Value Date/Time    SODIUM 141 05/02/2017 09:41 AM    POTASSIUM 4.8 05/02/2017 09:41 AM    CHLORIDE 107 05/02/2017 09:41 AM    CO2 28 05/02/2017 09:41 AM    GLUCOSE 104 (H) 05/02/2017 09:41 AM    BUN 19 05/02/2017 09:41 AM    CREATININE 0.93 05/02/2017 09:41 AM              No results found for: PROTHROMBTM, INR     Imaging:  DX-CHEST-LIMITED (1 VIEW)    (Results Pending)       EKG : NSR with Inferior STEMI    All pertinent features of laboratory and imaging reviewed including primary images where applicable    Assessment / Plan:  Inferior STEMI  Aspirin given by EMS  Emergent [facility]    It is my pleasure to participate in the care of Mr. Morgan.  Please do not hesitate to contact me with questions or concerns.    Trey Johns MD PhD FAC  Cardiologist [facility]    9/3/2017

## 2017-09-04 NOTE — CARE PLAN
Problem: Infection  Goal: Will remain free from infection  Outcome: PROGRESSING AS EXPECTED  No s/s of infection at this time.

## 2017-09-04 NOTE — PROCEDURES
DATE OF SERVICE:  09/03/2017    REFERRING PHYSICIAN:  Dr. Trey Johns.    PROCEDURES:  1.  Left heart catheterization.  2.  Coronary angiography.  3.  Thrombectomy/PTCA/stent placement of the ostial proximal right coronary artery.  4.  Left ventriculogram.    PREPROCEDURE DIAGNOSIS:  STEMI, acute inferior myocardial infarction.    POSTPROCEDURE DIAGNOSIS:  1. Coronary artery disease with occluded ostial/proximal right coronary artery     associated with thrombus and additional non obstructive coronary artery disease.  2. Successful thrombectomy. PTCA and stent placement of ostial proximal right   coronary artery with 4.0x12 mm Synergy drug-eluting stent.  3. Reduced left ventricular systolic function with ejection fraction of 30%.  4. Elevated left ventricular end diastolic pressure.    HISTORY OF PRESENT ILLNESS:  The patient is a 72-year-old male who was brought   to [facility] with chest pain and EKG consistent with inferior   myocardial infarction.  Patient was brought to cardiac catheterization   laboratory for emergent cardiac catheterization.    PROCEDURE:  The patient was brought to the cardiac catheterization laboratory.    He was prepped and draped in usual sterile manner.  The right wrist area was   anesthetized with 2% Xylocaine.  A 6-Irish sheath was inserted into the   right radial artery using the modified Seldinger technique.  Intra-arterial   verapamil and nitroglycerin were given.  IV heparin was given.  A 6-Irish   pigtail catheter was positioned into the left ventricle.  Left angiography was   performed.  This was removed and exchanged for JL4 catheter, which was   positioned into the left main coronary artery.  Coronary angiography was   performed.  This was exchanged for a JR4 guide catheter, which was positioned   into the right coronary artery.  Coronary angiography was performed.  IV   Angiomax was started.  A [employer] wire was used to cross the identified   occlusion.   Thrombectomy with priority one device was performed.  The priority   one device was advanced and also used to remove the thrombus that has   embolized to distal right coronary artery.  Priority one device was removed.    The identified occlusion at the ostial proximal right coronary artery was   predilated with 3.0x8 mm TREK balloon.  A 4.0x12 mm Synergy drug-eluting stent   was successfully positioned and deployed.  The stent was postdilated with   4.0x12 mm and 4.0x8 mm NC TREK second balloon to higher pressure.  At the end   of procedure, all wires, balloons, guide, and sheaths were removed.  He was   given oral Brilinta.  He was transferred to [facility] in stable condition.    HEMODYNAMIC DATA:  Hemodynamic data shows aortic pressures of 140/80 with mean   of 100 mmHg and /0 with LVEDP of 20 mmHg.    AORTIC VALVE:  There was no significant gradient noted.    LEFT VENTRICULOGRAM:  A 10 mL of contrast was delivered for 3 seconds.    Ejection fraction was estimated to be 30%.  There was inferior wall   hypokinesis noted.    ANGIOGRAM:  Left main coronary artery:  Left main coronary artery is a long large caliber   vessel with distal 20% stenosis.  Left anterior descending artery:  Left anterior descending artery is a long   moderate caliber vessel which wraps around the apex.  Proximal portion of the   vessel, there are concentric luminal irregularities of 30-40%.  There is a   moderate caliber first diagonal branch noted free of disease.  There is a   moderate caliber second diagonal branch with ostial concentric 60% stenosis.  Ramus intermedius:  Ramus intermedius is a small caliber vessel free of   disease.  Left circumflex artery:  Left circumflex artery is a nondominant moderate   caliber vessel with moderate caliber long tortuous first obtuse marginal   branch and small tortuous second obtuse marginal branch.  Left circumflex   artery and its branches are free of disease.  Right coronary artery:  Right  coronary artery is a dominant large caliber   vessel which is occluded at the ostial proximal portion associated with   thrombus.  There is a mid eccentric 30% and distal eccentric 40% stenosis   noted.  Distally, there are long moderate caliber posterior descending artery   with very distal occlusion associated with thrombus and a moderate caliber   long posterior lateral branch with distal thrombus occlusion of the distal   vessel.    PERCUTANEOUS INTERVENTION:  Ostial proximal right coronary artery occlusion   associated with thrombus with 0% residual.  Thrombectomy with priority one   device.  Predilatation with 3.0x8 mm TREK balloon.  Stent with 4.0x12 mm   Synergy drug-eluting stent.  Postdilatation with 4.0x12 mm and 4.0x8 mm NC   TREK balloon.    IMPRESSION:   1. Coronary artery disease with occluded ostial/proximal right coronary artery     associated with thrombus and additional non obstructive coronary artery disease.  2. Successful thrombectomy. PTCA and stent placement of ostial proximal right   coronary artery with 4.0x12 mm Synergy drug-eluting stent.  3. Reduced left ventricular systolic function with ejection fraction of 30%.  4. Elevated left ventricular end diastolic pressure.    RECOMMENDATIONS:  Recommend medical therapy with addition of Brilinta.       ____________________________________     MD PETRA HERNANDEZ / KALI    DD:  09/03/2017 19:20:29  DT:  09/03/2017 19:41:31    D#:  [id number]  Job#:  [id number]

## 2017-09-04 NOTE — PROGRESS NOTES
Received to unit from [facility]. Orientated to room and updated on plan of care. Patient uses BIPAP at night and wife will bring his own personal machine in to hospital today. Plan of care and education initiated.

## 2017-09-05 ENCOUNTER — PATIENT OUTREACH (OUTPATIENT)
Dept: HEALTH INFORMATION MANAGEMENT | Facility: OTHER | Age: 72
End: 2017-09-05

## 2017-09-05 PROBLEM — I48.0 PAROXYSMAL ATRIAL FIBRILLATION (HCC): Status: ACTIVE | Noted: 2017-09-05

## 2017-09-05 LAB
APTT PPP: 85.8 SEC (ref 24.7–36)
APTT PPP: >240 SEC (ref 24.7–36)
DEPRECATED D DIMER PPP IA-ACNC: 256 NG/ML(D-DU)
EKG IMPRESSION: NORMAL
EKG IMPRESSION: NORMAL
TROPONIN I SERPL-MCNC: 36.76 NG/ML (ref 0–0.04)

## 2017-09-05 PROCEDURE — 700101 HCHG RX REV CODE 250: Performed by: INTERNAL MEDICINE

## 2017-09-05 PROCEDURE — 700102 HCHG RX REV CODE 250 W/ 637 OVERRIDE(OP): Performed by: INTERNAL MEDICINE

## 2017-09-05 PROCEDURE — 700111 HCHG RX REV CODE 636 W/ 250 OVERRIDE (IP): Performed by: INTERNAL MEDICINE

## 2017-09-05 PROCEDURE — 93010 ELECTROCARDIOGRAM REPORT: CPT | Mod: 77 | Performed by: INTERNAL MEDICINE

## 2017-09-05 PROCEDURE — 93005 ELECTROCARDIOGRAM TRACING: CPT | Performed by: NURSE PRACTITIONER

## 2017-09-05 PROCEDURE — 36415 COLL VENOUS BLD VENIPUNCTURE: CPT

## 2017-09-05 PROCEDURE — 84484 ASSAY OF TROPONIN QUANT: CPT

## 2017-09-05 PROCEDURE — A9270 NON-COVERED ITEM OR SERVICE: HCPCS | Performed by: HOSPITALIST

## 2017-09-05 PROCEDURE — 700102 HCHG RX REV CODE 250 W/ 637 OVERRIDE(OP): Performed by: HOSPITALIST

## 2017-09-05 PROCEDURE — 700105 HCHG RX REV CODE 258: Performed by: INTERNAL MEDICINE

## 2017-09-05 PROCEDURE — 770020 HCHG ROOM/CARE - TELE (206)

## 2017-09-05 PROCEDURE — 93010 ELECTROCARDIOGRAM REPORT: CPT | Performed by: INTERNAL MEDICINE

## 2017-09-05 PROCEDURE — 94660 CPAP INITIATION&MGMT: CPT

## 2017-09-05 PROCEDURE — 99232 SBSQ HOSP IP/OBS MODERATE 35: CPT | Performed by: HOSPITALIST

## 2017-09-05 PROCEDURE — 85730 THROMBOPLASTIN TIME PARTIAL: CPT

## 2017-09-05 PROCEDURE — A9270 NON-COVERED ITEM OR SERVICE: HCPCS | Performed by: INTERNAL MEDICINE

## 2017-09-05 PROCEDURE — 93005 ELECTROCARDIOGRAM TRACING: CPT | Performed by: INTERNAL MEDICINE

## 2017-09-05 PROCEDURE — 85379 FIBRIN DEGRADATION QUANT: CPT

## 2017-09-05 PROCEDURE — 700101 HCHG RX REV CODE 250: Performed by: NURSE PRACTITIONER

## 2017-09-05 RX ORDER — DEXTROSE MONOHYDRATE 50 MG/ML
500 INJECTION, SOLUTION INTRAVENOUS CONTINUOUS
Status: DISCONTINUED | OUTPATIENT
Start: 2017-09-05 | End: 2017-09-05

## 2017-09-05 RX ORDER — METOPROLOL TARTRATE 1 MG/ML
5 INJECTION, SOLUTION INTRAVENOUS
Status: DISCONTINUED | OUTPATIENT
Start: 2017-09-05 | End: 2017-09-05

## 2017-09-05 RX ORDER — METOPROLOL TARTRATE 1 MG/ML
INJECTION, SOLUTION INTRAVENOUS
Status: ACTIVE
Start: 2017-09-05 | End: 2017-09-05

## 2017-09-05 RX ORDER — HEPARIN SODIUM 1000 [USP'U]/ML
6000 INJECTION, SOLUTION INTRAVENOUS; SUBCUTANEOUS ONCE
Status: COMPLETED | OUTPATIENT
Start: 2017-09-05 | End: 2017-09-05

## 2017-09-05 RX ORDER — AMIODARONE HYDROCHLORIDE 200 MG/1
200 TABLET ORAL TWICE DAILY
Status: DISCONTINUED | OUTPATIENT
Start: 2017-09-05 | End: 2017-09-06 | Stop reason: HOSPADM

## 2017-09-05 RX ORDER — METOPROLOL TARTRATE 1 MG/ML
5 INJECTION, SOLUTION INTRAVENOUS
Status: DISCONTINUED | OUTPATIENT
Start: 2017-09-05 | End: 2017-09-06 | Stop reason: HOSPADM

## 2017-09-05 RX ORDER — HEPARIN SODIUM 1000 [USP'U]/ML
3200 INJECTION, SOLUTION INTRAVENOUS; SUBCUTANEOUS PRN
Status: DISCONTINUED | OUTPATIENT
Start: 2017-09-05 | End: 2017-09-06

## 2017-09-05 RX ORDER — METOPROLOL TARTRATE 1 MG/ML
5 INJECTION, SOLUTION INTRAVENOUS
Status: COMPLETED | OUTPATIENT
Start: 2017-09-05 | End: 2017-09-05

## 2017-09-05 RX ADMIN — METOPROLOL TARTRATE 5 MG: 5 INJECTION INTRAVENOUS at 03:50

## 2017-09-05 RX ADMIN — ASPIRIN 81 MG: 81 TABLET ORAL at 07:53

## 2017-09-05 RX ADMIN — TICAGRELOR 90 MG: 90 TABLET ORAL at 07:53

## 2017-09-05 RX ADMIN — STANDARDIZED SENNA CONCENTRATE AND DOCUSATE SODIUM 2 TABLET: 8.6; 5 TABLET, FILM COATED ORAL at 07:54

## 2017-09-05 RX ADMIN — METOPROLOL TARTRATE 5 MG: 5 INJECTION INTRAVENOUS at 10:01

## 2017-09-05 RX ADMIN — HEPARIN SODIUM 25000 UNITS: 5000 INJECTION, SOLUTION INTRAVENOUS at 12:48

## 2017-09-05 RX ADMIN — CARVEDILOL 3.12 MG: 3.12 TABLET, FILM COATED ORAL at 07:53

## 2017-09-05 RX ADMIN — DEXTROSE MONOHYDRATE 500 ML: 50 INJECTION, SOLUTION INTRAVENOUS at 11:11

## 2017-09-05 RX ADMIN — AMIODARONE HYDROCHLORIDE 150 MG: 50 INJECTION, SOLUTION INTRAVENOUS at 10:04

## 2017-09-05 RX ADMIN — AMIODARONE HYDROCHLORIDE 200 MG: 200 TABLET ORAL at 17:38

## 2017-09-05 RX ADMIN — HEPARIN SODIUM 6000 UNITS: 1000 INJECTION, SOLUTION INTRAVENOUS; SUBCUTANEOUS at 10:39

## 2017-09-05 RX ADMIN — ATORVASTATIN CALCIUM 40 MG: 40 TABLET, FILM COATED ORAL at 20:13

## 2017-09-05 RX ADMIN — METOPROLOL TARTRATE 5 MG: 5 INJECTION INTRAVENOUS at 09:42

## 2017-09-05 RX ADMIN — TICAGRELOR 90 MG: 90 TABLET ORAL at 20:13

## 2017-09-05 RX ADMIN — CARVEDILOL 3.12 MG: 3.12 TABLET, FILM COATED ORAL at 17:38

## 2017-09-05 RX ADMIN — SPIRONOLACTONE 25 MG: 25 TABLET, FILM COATED ORAL at 07:53

## 2017-09-05 RX ADMIN — AMIODARONE HYDROCHLORIDE 1 MG/MIN: 50 INJECTION, SOLUTION INTRAVENOUS at 10:24

## 2017-09-05 ASSESSMENT — ENCOUNTER SYMPTOMS
PALPITATIONS: 0
HEADACHES: 0
LOSS OF CONSCIOUSNESS: 0
COUGH: 0
FEVER: 0
SHORTNESS OF BREATH: 0
ABDOMINAL PAIN: 0
NAUSEA: 0
ORTHOPNEA: 0
DIARRHEA: 0
BACK PAIN: 0
DIZZINESS: 0
VOMITING: 0

## 2017-09-05 ASSESSMENT — PAIN SCALES - GENERAL
PAINLEVEL_OUTOF10: 0

## 2017-09-05 NOTE — PROGRESS NOTES
Patient needs frequent reinforcement to follow plan of care. Family bringing in food and beverages that are not allowed on current hospital diet order. Family educated to not bring in caffeine beverages and fast food for patient. Verbalized understanding.

## 2017-09-05 NOTE — CARE PLAN
Problem: Nutritional:  Goal: Patient to verbalize or demonstrate understanding of diet  Outcome: MET Date Met: 09/05/17  Cardiac diet instruction consult received. Pt accepted handout; however, refused explanation at this time. Pt reports would prefer to review information on his own. Left RD contact information w/ pt. RD available PRN if pt presents w/ questions or concerns.

## 2017-09-05 NOTE — PROGRESS NOTES
PT HEART RATE JUMPS -200'S WHEN STRUGGLING TO GET OUT OF THE BED ALONE TO GO TO THE BATHROOM. PT CONVERTED TO A FIB/AFLUT AROUND 12AM. PG HOSPITALIST. PA LEVY RETURNED THE CALL. I UPDATED HER ON THE SITUATION AND SHE ORDERED METOPROLOL 5 MG FOR INCREASED HEART RATE WITH ONE REPEAT IF NEEDED.

## 2017-09-05 NOTE — PROGRESS NOTES
LAB CAME BY AND STATED PT WAS HAVING CHEST PAIN BUT DIDN'T WANT THEM TO TELL ME. I WENT TO ROOM AND PT ADMITTED THAT HE FELT SOB EVEN WITH THE BIPAP ON BUT WAS NOT HAVING PAIN. I PG SATNAM LEVY AND SHE ORDERED EKG, TROP AND D-DIMER.

## 2017-09-05 NOTE — THERAPY
Holding PT cardiac eval. Pt receiving further cardiac workup. WIll eval when medically appropriate as per nrsg, pt had bout of a-fib overnight with tachycardia this morning.

## 2017-09-05 NOTE — DISCHARGE PLANNING
Care Transition Team Assessment    IHD met with patient bedside. He lives with his wife and is able to drive himself. He does not use any DME, but does have a BiPap at home through [employer]. He does not have any services at home and does not expect to go home with anything new.     Information Source  Orientation : Oriented x 4  Information Given By: Patient  Informant's Name: Shamar  Who is responsible for making decisions for patient? : Patient    Readmission Evaluation  Is this a readmission?: No    Elopement Risk  Legal Hold: No  Ambulatory or Self Mobile in Wheelchair: No-Not an Elopement Risk  Elopement Risk: Not at Risk for Elopement    Interdisciplinary Discharge Planning  Does Admitting Nurse Feel This Could be a Complex Discharge?: No  Primary Care Physician: Dr. Aldo Lemus  Lives with - Patient's Self Care Capacity: Spouse  Patient or legal guardian wants to designate a caregiver (see row info): No  Support Systems: Spouse / Significant Other  Housing / Facility: 1 Story House  Able to Return to Previous ADL's: Yes  Mobility Issues: No  Prior Services: None  Patient Expects to be Discharged to:: Home  Durable Medical Equipment: Home Oxygen (Bipap)  DME Provider / Phone: [employer]    Discharge Preparedness  What is your plan after discharge?: Home with help  What are your discharge supports?: Spouse, Child  Prior Functional Level: Ambulatory, Drives Self, Independent with Activities of Daily Living, Independent with Medication Management  Difficulity with ADLs: None  Difficulity with IADLs: None    Functional Assesment  Prior Functional Level: Ambulatory, Drives Self, Independent with Activities of Daily Living, Independent with Medication Management    Finances  Financial Barriers to Discharge: No  Prescription Coverage: Yes ([employer])              Advance Directive  Advance Directive?: Living Will, DPOA for Health Care  Durable Power of  Name and Contact : Camille Morgan          Psychological Assessment  History of Substance Abuse: None  History of Psychiatric Problems: No  Non-compliant with Treatment: No  Newly Diagnosed Illness: No    Discharge Risks or Barriers  Discharge risks or barriers?: No    Anticipated Discharge Information  Anticipated discharge disposition: Discharge needs currently unknown  Discharge Address: [place] [id number]  Discharge Contact Phone Number: [phone]

## 2017-09-05 NOTE — DISCHARGE PLANNING
Medical Social Work    SW paged [employer] with cardiology to request Brilinta rx to check for insurance coverage.     If prior auth is needed, it could take 24-78 hrs for authorization to go through.

## 2017-09-05 NOTE — PROGRESS NOTES
BEDSIDE REPORT COMPLETED, PT RESTING IN BED WITH EYES CLOSED AND CPAP ON, UPDATED ON PLAN OF CARE. PT VERBALIZED UNDERSTANDING. NO NEEDS AT THIS TIME, NO COMPLAINTS OF PAIN, CALL LIGHT IN REACH, BED IN LOWEST POSITION, SIDE RAILS UP X2, NO DISTRESS NOTED.

## 2017-09-05 NOTE — PROGRESS NOTES
MONITOR SUMMARY:     SR [id number] THEN HAD A COUPLE RUNS OF SVT WHEN STRUGGLING TO GET OUT OF BED TO AMBULATE. THE PT AROUND 12AM CONVERTED TO AFIB/AFLUT. PT IS COMPLAINING OF SOB EVEN WITH HIS BIPAP ON. EKG, D-DIME, TROP HAS BEEN ORDERED ER SATNAM LEVY    14/12/40

## 2017-09-05 NOTE — PROGRESS NOTES
[facility]    Date of Service: 2017    Chief Complaint  72 y.o. male admitted 9/3/2017 with CP and found to be having an ant STEMI.  Went to cath lab emergently where an RCA lesion was stented.    Interval Problem Update  Pt states he's feeling much better.  Is now having no CP.  Has been up and walking and is having some exertional SOB but reports it's about what he feels is normal for him.      Consultants/Specialty  Cardiology    Disposition  Ok to floor  Possible DC in am if stable overnight and OK with Card's        Review of Systems   Constitutional: Negative for chills and fever.   Respiratory: Positive for shortness of breath. Negative for cough.    Cardiovascular: Negative for chest pain.   Gastrointestinal: Negative for abdominal pain, diarrhea, nausea and vomiting.   Musculoskeletal: Negative for back pain.   Skin: Negative for rash.   Neurological: Negative for dizziness, loss of consciousness and headaches.      Physical Exam  Laboratory/Imaging   Hemodynamics  Temp (24hrs), Av.7 °C (98.1 °F), Min:36.1 °C (96.9 °F), Max:37.4 °C (99.4 °F)   Temperature: 37.4 °C (99.4 °F)  Pulse  Av.3  Min: 56  Max: 92 Heart Rate (Monitored): (!) 104  Blood Pressure : 130/75, NIBP: 125/69      Respiratory      Respiration: 20, Pulse Oximetry: 91 %        RUL Breath Sounds: Clear, RML Breath Sounds: Clear, RLL Breath Sounds: Clear;Diminished, LASHON Breath Sounds: Clear, LLL Breath Sounds: Clear;Diminished    Fluids    Intake/Output Summary (Last 24 hours) at 17 1719  Last data filed at 17 1600   Gross per 24 hour   Intake              960 ml   Output             2910 ml   Net            -1950 ml       Nutrition  Orders Placed This Encounter   Procedures   • Diet Order     Standing Status:   Standing     Number of Occurrences:   1     Order Specific Question:   Diet:     Answer:   Cardiac [6]     Physical Exam   Constitutional: He is oriented to person, place, and time. He  appears well-developed and well-nourished. No distress.   HENT:   Head: Normocephalic and atraumatic.   Eyes: Conjunctivae are normal.   Neck: No JVD present.   Cardiovascular: Normal rate.  Exam reveals no gallop.    Murmur heard.  Pulmonary/Chest: Effort normal. No stridor. No respiratory distress. He has no wheezes. He has no rales.   Abdominal: Soft. There is no tenderness. There is no rebound and no guarding.   Musculoskeletal: He exhibits no edema.   Neurological: He is oriented to person, place, and time.   Skin: Skin is warm and dry. No rash noted. He is not diaphoretic.   Psychiatric: He has a normal mood and affect. Thought content normal.   Nursing note and vitals reviewed.      Recent Labs      09/03/17   1805 09/04/17   0423   WBC  8.2  8.1   RBC  4.68*  4.46*   HEMOGLOBIN  14.6  13.7*   HEMATOCRIT  42.3  40.6*   MCV  90.4  91.0   MCH  31.2  30.7   MCHC  34.5  33.7   RDW  44.3  46.3   PLATELETCT  261  207   MPV  9.6  9.8     Recent Labs      09/03/17   1805  09/04/17   0423   SODIUM  138  139   POTASSIUM  3.4*  3.8   CHLORIDE  109  109   CO2  18*  23   GLUCOSE  202*  121*   BUN  20  18   CREATININE  1.13  0.91   CALCIUM  9.1  8.7     Recent Labs      09/03/17   1805   APTT  25.6   INR  1.09     Recent Labs      09/03/17   1805   BNPBTYPENAT  39     Recent Labs      09/04/17   0423   TRIGLYCERIDE  88   HDL  40   LDL  49          Assessment/Plan     Ischemic cardiomyopathy- (present on admission)   Assessment & Plan    30% on cath  BB  ALdactone  Add ACEI/ARB as pressures allow        STEMI (ST elevation myocardial infarction) (CMS-[facility])- (present on admission)   Assessment & Plan    S/p RCA stent 9/3  Cards following  ASA  Ticagrelor  Statin  BB  Diet education          Essential hypertension- (present on admission)   Assessment & Plan    Coreg  Aldactone  Add ACEI/ARB as pressures allow        Dyslipidemia- (present on admission)   Assessment & Plan    Statin high dose        RUPAL (obstructive sleep  apnea)- (present on admission)   Assessment & Plan    Nocturnal CPAP ordered.         IGT (impaired glucose tolerance)- (present on admission)   Assessment & Plan    Diet education  Counseled in exercise program based around walking            Reviewed items::  Radiology images reviewed, EKG reviewed, Labs reviewed and Medications reviewed  Graff catheter::  No Graff  DVT prophylaxis pharmacological::  Not indicated at this time, ambulatory  DVT prophylaxis - mechanical:  Not indicated at this time, ambulatory  Ulcer Prophylaxis::  Not indicated

## 2017-09-05 NOTE — CARE PLAN
Problem: Safety  Goal: Will remain free from injury  Outcome: PROGRESSING SLOWER THAN EXPECTED  Encouraged to wear hospital slippers or personal slippers when ambulating around room. Patient insistent on walking in room bare foot.

## 2017-09-05 NOTE — PROGRESS NOTES
Patient noted by writer at this time to be in SR. Writer was not notified by [employer] tech on when patient converted at 1253.

## 2017-09-05 NOTE — PROGRESS NOTES
Monitor reports patient's heart rate in 170's. In room with patient and he is up using bathroom. Asymptomatic. Patient has been having tachycardia and Afib throughout the night. Charge nurse aware. Will discuss with physician during rounds.

## 2017-09-05 NOTE — PROGRESS NOTES
Left message with answering service for Int Cards to page Dr. Mcallister and update patient on converting to SR.

## 2017-09-06 VITALS
BODY MASS INDEX: 34.91 KG/M2 | RESPIRATION RATE: 18 BRPM | WEIGHT: 222.44 LBS | TEMPERATURE: 96.7 F | OXYGEN SATURATION: 95 % | HEIGHT: 67 IN | HEART RATE: 64 BPM | DIASTOLIC BLOOD PRESSURE: 69 MMHG | SYSTOLIC BLOOD PRESSURE: 117 MMHG

## 2017-09-06 PROBLEM — I21.3 STEMI (ST ELEVATION MYOCARDIAL INFARCTION) (HCC): Status: RESOLVED | Noted: 2017-09-03 | Resolved: 2017-09-06

## 2017-09-06 LAB
ANION GAP SERPL CALC-SCNC: 9 MMOL/L (ref 0–11.9)
APTT PPP: 102.5 SEC (ref 24.7–36)
APTT PPP: 95.5 SEC (ref 24.7–36)
BASOPHILS # BLD AUTO: 0.5 % (ref 0–1.8)
BASOPHILS # BLD: 0.05 K/UL (ref 0–0.12)
BUN SERPL-MCNC: 17 MG/DL (ref 8–22)
CALCIUM SERPL-MCNC: 9.1 MG/DL (ref 8.5–10.5)
CHLORIDE SERPL-SCNC: 104 MMOL/L (ref 96–112)
CO2 SERPL-SCNC: 24 MMOL/L (ref 20–33)
CREAT SERPL-MCNC: 1.01 MG/DL (ref 0.5–1.4)
EOSINOPHIL # BLD AUTO: 0.15 K/UL (ref 0–0.51)
EOSINOPHIL NFR BLD: 1.5 % (ref 0–6.9)
ERYTHROCYTE [DISTWIDTH] IN BLOOD BY AUTOMATED COUNT: 43.8 FL (ref 35.9–50)
GFR SERPL CREATININE-BSD FRML MDRD: >60 ML/MIN/1.73 M 2
GLUCOSE SERPL-MCNC: 108 MG/DL (ref 65–99)
HCT VFR BLD AUTO: 38.4 % (ref 42–52)
HGB BLD-MCNC: 13.2 G/DL (ref 14–18)
IMM GRANULOCYTES # BLD AUTO: 0.03 K/UL (ref 0–0.11)
IMM GRANULOCYTES NFR BLD AUTO: 0.3 % (ref 0–0.9)
LYMPHOCYTES # BLD AUTO: 1.89 K/UL (ref 1–4.8)
LYMPHOCYTES NFR BLD: 19.4 % (ref 22–41)
MCH RBC QN AUTO: 30.7 PG (ref 27–33)
MCHC RBC AUTO-ENTMCNC: 34.4 G/DL (ref 33.7–35.3)
MCV RBC AUTO: 89.3 FL (ref 81.4–97.8)
MONOCYTES # BLD AUTO: 1.02 K/UL (ref 0–0.85)
MONOCYTES NFR BLD AUTO: 10.5 % (ref 0–13.4)
NEUTROPHILS # BLD AUTO: 6.6 K/UL (ref 1.82–7.42)
NEUTROPHILS NFR BLD: 67.8 % (ref 44–72)
NRBC # BLD AUTO: 0 K/UL
NRBC BLD AUTO-RTO: 0 /100 WBC
PLATELET # BLD AUTO: 195 K/UL (ref 164–446)
PMV BLD AUTO: 9.9 FL (ref 9–12.9)
POTASSIUM SERPL-SCNC: 3.3 MMOL/L (ref 3.6–5.5)
RBC # BLD AUTO: 4.3 M/UL (ref 4.7–6.1)
SODIUM SERPL-SCNC: 137 MMOL/L (ref 135–145)
WBC # BLD AUTO: 9.7 K/UL (ref 4.8–10.8)

## 2017-09-06 PROCEDURE — 700102 HCHG RX REV CODE 250 W/ 637 OVERRIDE(OP): Performed by: INTERNAL MEDICINE

## 2017-09-06 PROCEDURE — 700102 HCHG RX REV CODE 250 W/ 637 OVERRIDE(OP): Performed by: HOSPITALIST

## 2017-09-06 PROCEDURE — 85730 THROMBOPLASTIN TIME PARTIAL: CPT

## 2017-09-06 PROCEDURE — 700111 HCHG RX REV CODE 636 W/ 250 OVERRIDE (IP): Performed by: HOSPITALIST

## 2017-09-06 PROCEDURE — 80048 BASIC METABOLIC PNL TOTAL CA: CPT

## 2017-09-06 PROCEDURE — 700111 HCHG RX REV CODE 636 W/ 250 OVERRIDE (IP): Performed by: INTERNAL MEDICINE

## 2017-09-06 PROCEDURE — 90471 IMMUNIZATION ADMIN: CPT

## 2017-09-06 PROCEDURE — 94660 CPAP INITIATION&MGMT: CPT

## 2017-09-06 PROCEDURE — 90662 IIV NO PRSV INCREASED AG IM: CPT | Performed by: HOSPITALIST

## 2017-09-06 PROCEDURE — A9270 NON-COVERED ITEM OR SERVICE: HCPCS | Performed by: INTERNAL MEDICINE

## 2017-09-06 PROCEDURE — 85025 COMPLETE CBC W/AUTO DIFF WBC: CPT

## 2017-09-06 PROCEDURE — 36415 COLL VENOUS BLD VENIPUNCTURE: CPT

## 2017-09-06 PROCEDURE — 3E0234Z INTRODUCTION OF SERUM, TOXOID AND VACCINE INTO MUSCLE, PERCUTANEOUS APPROACH: ICD-10-PCS | Performed by: HOSPITALIST

## 2017-09-06 PROCEDURE — A9270 NON-COVERED ITEM OR SERVICE: HCPCS | Performed by: HOSPITALIST

## 2017-09-06 PROCEDURE — 99239 HOSP IP/OBS DSCHRG MGMT >30: CPT | Performed by: HOSPITALIST

## 2017-09-06 RX ORDER — POTASSIUM CHLORIDE 20 MEQ/1
40 TABLET, EXTENDED RELEASE ORAL ONCE
Status: COMPLETED | OUTPATIENT
Start: 2017-09-06 | End: 2017-09-06

## 2017-09-06 RX ORDER — AMIODARONE HYDROCHLORIDE 200 MG/1
200 TABLET ORAL 2 TIMES DAILY
Qty: 60 TAB | Refills: 0 | Status: SHIPPED | OUTPATIENT
Start: 2017-09-06 | End: 2017-10-06 | Stop reason: SDUPTHER

## 2017-09-06 RX ADMIN — SPIRONOLACTONE 25 MG: 25 TABLET, FILM COATED ORAL at 07:53

## 2017-09-06 RX ADMIN — HEPARIN SODIUM 1000 UNITS/HR: 5000 INJECTION, SOLUTION INTRAVENOUS at 08:04

## 2017-09-06 RX ADMIN — METOPROLOL TARTRATE 25 MG: 25 TABLET ORAL at 12:23

## 2017-09-06 RX ADMIN — AMIODARONE HYDROCHLORIDE 200 MG: 200 TABLET ORAL at 07:53

## 2017-09-06 RX ADMIN — ASPIRIN 81 MG: 81 TABLET ORAL at 07:53

## 2017-09-06 RX ADMIN — POTASSIUM CHLORIDE 40 MEQ: 1500 TABLET, EXTENDED RELEASE ORAL at 12:23

## 2017-09-06 RX ADMIN — CARVEDILOL 3.12 MG: 3.12 TABLET, FILM COATED ORAL at 07:53

## 2017-09-06 RX ADMIN — TICAGRELOR 90 MG: 90 TABLET ORAL at 07:54

## 2017-09-06 RX ADMIN — TICAGRELOR 90 MG: 90 TABLET ORAL at 16:28

## 2017-09-06 RX ADMIN — INFLUENZA A VIRUSA/MICHIGAN/45/2015 X-275 (H1N1) ANTIGEN (FORMALDEHYDE INACTIVATED), INFLUENZA A VIRUS A/HONG KONG/4801/2014 X-263B (H3N2) ANTIGEN (FORMALDEHYDE INACTIVATED), AND INFLUENZA B VIRUS B/BRISBANE/60/2008 ANTIGEN (FORMALDEHYDE INACTIVATED) 0.5 ML: 60; 60; 60 INJECTION, SUSPENSION INTRAMUSCULAR at 15:51

## 2017-09-06 ASSESSMENT — LIFESTYLE VARIABLES
HAVE YOU EVER FELT YOU SHOULD CUT DOWN ON YOUR DRINKING: NO
HAVE YOU EVER FELT YOU SHOULD CUT DOWN ON YOUR DRINKING: NO
HAVE PEOPLE ANNOYED YOU BY CRITICIZING YOUR DRINKING: NO
EVER HAD A DRINK FIRST THING IN THE MORNING TO STEADY YOUR NERVES TO GET RID OF A HANGOVER: NO
CONSUMPTION TOTAL: INCOMPLETE
DO YOU DRINK ALCOHOL: YES
EVER HAD A DRINK FIRST THING IN THE MORNING TO STEADY YOUR NERVES TO GET RID OF A HANGOVER: NO
TOTAL SCORE: 0
CONSUMPTION TOTAL: INCOMPLETE
EVER FELT BAD OR GUILTY ABOUT YOUR DRINKING: NO
EVER_SMOKED: YES
TOTAL SCORE: 0
DO YOU DRINK ALCOHOL: YES
TOTAL SCORE: 0
EVER FELT BAD OR GUILTY ABOUT YOUR DRINKING: NO
HAVE PEOPLE ANNOYED YOU BY CRITICIZING YOUR DRINKING: NO
TOTAL SCORE: 0

## 2017-09-06 ASSESSMENT — PATIENT HEALTH QUESTIONNAIRE - PHQ9
2. FEELING DOWN, DEPRESSED, IRRITABLE, OR HOPELESS: NOT AT ALL
SUM OF ALL RESPONSES TO PHQ QUESTIONS 1-9: 0
SUM OF ALL RESPONSES TO PHQ9 QUESTIONS 1 AND 2: 0
1. LITTLE INTEREST OR PLEASURE IN DOING THINGS: NOT AT ALL

## 2017-09-06 ASSESSMENT — PAIN SCALES - GENERAL
PAINLEVEL_OUTOF10: 0

## 2017-09-06 ASSESSMENT — ENCOUNTER SYMPTOMS
RESPIRATORY NEGATIVE: 1
CARDIOVASCULAR NEGATIVE: 1

## 2017-09-06 NOTE — PROGRESS NOTES
Amiodarone gtt stopped as ordered. Oral Amiodarone initiated. Patient reports being short of breath with ambulation to bathroom. Encouraged to call for assistance when ambulating. Patient continues to be in SR this afternoon. IV Heparin infusing as ordered. Patient educated on high risk for bleeding. Verbalized understanding.

## 2017-09-06 NOTE — DISCHARGE INSTRUCTIONS
Micafungin injectioDischarge Instructions    Discharged to home by car with relative. Discharged via wheelchair, hospital escort: Yes.  Special equipment needed: Wheelchair    Be sure to schedule a follow-up appointment with your primary care doctor or any specialists as instructed.     Discharge Plan:   Diet Plan: Discussed  Activity Level: Discussed  Confirmed Follow up Appointment: Appointment Scheduled  Confirmed Symptoms Management: Discussed  Medication Reconciliation Updated: Yes  Influenza Vaccine Indication: Indicated: 65 years and older    I understand that a diet low in cholesterol, fat, and sodium is recommended for good health. Unless I have been given specific instructions below for another diet, I accept this instruction as my diet prescription.   Other diet: Cardiac    Special Instructions: Diagnosis:  Acute Coronary Syndrome (ACS) is a diagnosis that encompasses cardiac-related chest pain and heart attack. ACS occurs when the blood flow to the heart muscle is severely reduced or cut off completely due to a slow process called atherosclerosis.  Atherosclerosis is a disease in which the coronary arteries become narrow from a buildup of fat, cholesterol, and other substances that combine to form plaque. If the plaque breaks, a blood clot will form and block the blood flow to the heart muscle. This lack of blood flow can cause damage or death to the heart muscle which is called a heart attack or Myocardial Infarction (MI). There are two different types of MIs:  ST Elevation Myocardial Infarction or STEMI (the most severe type of heart attack) and Non-ST Elevation Myocardial Infarction or NSTEMI.    Treatment Plan:  · Cardiac Diet  - Low fat, low salt, low cholesterol   · Cardiac Rehab  - Your doctor has ordered you a referral to [facility].  Call [phone] to schedule an appointment.  · Attend my follow-up appointment with my Cardiologist.  · Take my medications as prescribed by my doctor  · Exercise  daily  · Quit Smoking, Lower my bad cholesterol and raise my good cholesterol, lower my blood pressure and Reduce stress    Medications:  Certain medications are used to treat ACS.  Remember to always take medications as prescribed and never stop talking medications unless told by your doctor.    You have been prescribed the following medicatons:    Anti-platelet/blood thinner - Your Anti-platelet/Blood thinning medication is called Brilinta, and is used in combination with aspirin to prevent clots from forming in your heart and/or around your stent.  Your doctor will determine how long you need to be on this medicine.  Beta-Blocker - Beta-Blocker Metoprolol is used to lower blood pressure and heart rate, and/or helps your heart heal after a heart attack.  Statin - Statin Pravastatin is used to lower cholesterol.    · Is patient discharged on Warfarin / Coumadin?   No     · Is patient Post Blood Transfusion?  No    Depression / Suicide Risk    As you are discharged from this [facility] facility, it is important to learn how to keep safe from harming yourself.    Recognize the warning signs:  · Abrupt changes in personality, positive or negative- including increase in energy   · Giving away possessions  · Change in eating patterns- significant weight changes-  positive or negative  · Change in sleeping patterns- unable to sleep or sleeping all the time   · Unwillingness or inability to communicate  · Depression  · Unusual sadness, discouragement and loneliness  · Talk of wanting to die  · Neglect of personal appearance   · Rebelliousness- reckless behavior  · Withdrawal from people/activities they love  · Confusion- inability to concentrate     If you or a loved one observes any of these behaviors or has concerns about self-harm, here's what you can do:  · Talk about it- your feelings and reasons for harming yourself  · Remove any means that you might use to hurt yourself (examples: pills, rope, extension cords,  firearm)  · Get professional help from the community (Mental Health, Substance Abuse, psychological counseling)  · Do not be alone:Call your Safe Contact- someone whom you trust who will be there for you.  · Call your local CRISIS HOTLINE 554-8921 or 201-291-3664  · Call your local Children's Mobile Crisis Response Team [place] (986) 598-3011 or www.Vdolg  · Call the toll free National Suicide Prevention Hotlines   · National Suicide Prevention Lifeline 734-342-LSBA (3931)  · [employer] Line Network 800-SUICIDE (153-3763)        Acute Coronary Syndrome  Acute coronary syndrome (ACS) is a serious problem in which there is suddenly not enough blood and oxygen supplied to the heart. ACS may mean that one or more of the blood vessels in your heart (coronary arteries) may be blocked. ACS can result in chest pain or a heart attack (myocardial infarction or MI).  CAUSES  This condition is caused by atherosclerosis, which is the buildup of fat and cholesterol (plaque) on the inside of the arteries. Over time, the plaque may narrow or block the artery, and this will lessen blood flow to the heart. Plaque can also become weak and break off within a coronary artery to form a clot and cause a sudden blockage.  RISK FACTORS  The risks factors of this condition include:  · High cholesterol levels.  · High blood pressure (hypertension).  · Smoking.  · Diabetes.  · Age.  · Family history of chest pain, heart disease, or stroke.  · Lack of exercise.  SYMPTOMS  The most common signs of this condition include:  · Chest pain, which can be:  ¨ A crushing or squeezing in the chest.  ¨ A tightness, pressure, fullness, or heaviness in the chest.  ¨ Present for more than a few minutes, or it can stop and recur.  · Pain in the arms, neck, jaw, or back.  · Unexplained heartburn or indigestion.  · Shortness of breath.  · Nausea.  · Sudden cold sweats.  · Feeling light-headed or dizzy.  Sometimes, this condition has no  symptoms.  DIAGNOSIS  ACS may be diagnosed through the following tests:  · Electrocardiogram (ECG).  · Blood tests.  · Coronary angiogram. This is a procedure to look at the coronary arteries to see if there is any blockage.  TREATMENT  Treatment for ACS may include:  · Healthy behavioral changes to reduce or control risk factors.  · Medicine.  · Coronary stenting. A stent helps to keep an artery open.  · Coronary angioplasty. This procedure widens a narrowed or blocked artery.  · Coronary artery bypass surgery. This will allow your blood to pass the blockage (bypass) to reach your heart.  HOME CARE INSTRUCTIONS  Eating and Drinking  · Follow a heart-healthy diet. A dietitian can you help to educate you about healthy food options and changes.  · Use healthy cooking methods such as roasting, grilling, broiling, baking, poaching, steaming, or stir-frying. Talk to a dietitian to learn more about healthy cooking methods.  Medicines  · Take medicines only as directed by your health care provider.  · Do not take the following medicines unless your health care provider approves:  ¨ Nonsteroidal anti-inflammatory drugs (NSAIDs), such as ibuprofen, naproxen, or celecoxib.  ¨ Vitamin supplements that contain vitamin A, vitamin E, or both.  ¨ Hormone replacement therapy that contains estrogen with or without progestin.  · Stop illegal drug use.  Activities  · Follow an exercise program that is approved by your health care provider.  · Plan rest periods when you are fatigued.  Lifestyle  · Do not use any tobacco products, including cigarettes, chewing tobacco, or electronic cigarettes. If you need help quitting, ask your health care provider.  · If you drink alcohol, and your health care provider approves, limit your alcohol intake to no more than 1 drink per day. One drink equals 12 ounces of beer, 5 ounces of wine, or 1½ ounces of hard liquor.  · Learn to manage stress.  · Maintain a healthy weight. Lose weight as approved by  your health care provider.  General Instructions  · Manage other health conditions, such as hypertension and diabetes, as directed by your health care provider.  · Keep all follow-up visits as directed by your health care provider. This is important.  · Your health care provider may ask you to monitor your blood pressure. A blood pressure reading consists of a higher number over a lower number, such as 110 over 72, written as 110/72. Ideally, your blood pressure should be:  ¨ Below 140/90 if you have no other medical conditions.  ¨ Below 130/80 if you have diabetes or kidney disease.  SEEK IMMEDIATE MEDICAL CARE IF:  · You have pain in your chest, neck, arm, jaw, stomach, or back that lasts more than a few minutes, is recurring, or is not relieved by taking medicine under your tongue (sublingual nitroglycerin).  · You have profuse sweating without cause.  · You have unexplained:  ¨ Heartburn or indigestion.  ¨ Shortness of breath or difficulty breathing.  ¨ Nausea or vomiting.  ¨ Fatigue.  ¨ Feelings of nervousness or anxiety.  ¨ Weakness.  ¨ Diarrhea.  · You have sudden light-headedness or dizziness.  · You faint.  These symptoms may represent a serious problem that is an emergency. Do not wait to see if the symptoms will go away. Get medical help right away. Call your local emergency services (911 in the [place] Do not drive yourself to the clinic or hospital.     This information is not intended to replace advice given to you by your health care provider. Make sure you discuss any questions you have with your health care provider.     Document Released: 12/18/2006 Document Revised: 01/08/2016 Document Reviewed: 04/21/2015  [employer] Interactive Patient Education ©2016 [employer]        Coronary Artery Disease, Male  Coronary artery disease (CAD) is a process in which the blood vessels of the heart (coronary arteries) become narrow or blocked. The narrowing or blockage can lead to decreased blood flow to the  heart muscle (angina). Prolonged reduced blood flow can cause a heart attack (myocardial infarction, MI). Because CAD is the leading cause of death in men, it is important to understand what causes this condition and how it is treated.  CAUSES  Atherosclerosis is the cause of CAD. This is the buildup of fat and cholesterol (plaque) on the inside of the arteries. Over time, the plaque may narrow or block the artery, and this will lessen blood flow to the heart. Plaque can also become weak and break off within a coronary artery to form a clot and cause a sudden blockage.  RISK FACTORS  Many risk factors increase your chances of getting CAD, including:  · High cholesterol levels.  · High blood pressure (hypertension).  · Tobacco use.  · Diabetes.  · Age. Men over age 45 are at a greater risk of CAD.  · Gender. Men often develop CAD earlier in life than women.  · Family history of CAD.  · Obesity.  · Lack of exercise.  · A diet high in saturated fats.  SYMPTOMS   Many people do not experience any symptoms during the early stages of CAD. As the condition progresses, symptoms may include:   · Chest pain.  ¨ The pain can be described as a crushing or squeezing in the chest, or a tightness, pressure, fullness, or heaviness in the chest.  ¨ The pain can last more than a few minutes or can stop and recur.   · Pain in the arms, neck, jaw, or back.  · Unexplained heartburn or indigestion.  · Shortness of breath.  · Nausea.  · Sudden cold sweats.   Less common symptoms of CAD in men can include:  · Fatigue.  · Unexplained feelings of nervousness or anxiety.  · Weakness.  · Diarrhea.  · Sudden light-headedness.  DIAGNOSIS   Tests to diagnose CAD may include:  · ECG (electrocardiogram).  · Exercise stress test. This looks for signs of blockage when the heart is being exercised.  · Pharmacologic stress test. This test looks for signs of blockage when the heart is being stressed with a medicine.  · Blood tests.  · Coronary  angiogram. This is a procedure to look at the coronary arteries to see if there is any blockage.  TREATMENT  The treatment of CAD may include the following:  · Healthy behavioral changes to reduce or control risk factors.  · Medicine.  · Coronary stenting. A stent helps to keep an artery open.  · Coronary angioplasty. This procedure widens a narrowed or blocked artery.  · Coronary artery bypass surgery. This will allow your blood to pass the blockage (bypass) to reach your heart.  HOME CARE INSTRUCTIONS  · Take medicines only as directed by your health care provider.  · Do not take the following medicines unless your health care provider approves:  ¨ Nonsteroidal anti-inflammatory drugs (NSAIDs), such as ibuprofen, naproxen, or celecoxib.  ¨ Vitamin supplements that contain vitamin A, vitamin E, or both.  · Manage other health conditions such as hypertension and diabetes as directed by your health care provider.  · Follow a heart-healthy diet. A dietitian can help to educate you about healthy food options and changes.  · Use healthy cooking methods such as roasting, grilling, broiling, baking, poaching, steaming, or stir-frying. Talk to a dietitian to learn more about healthy cooking methods.  · Follow an exercise program approved by your health care provider.  · Maintain a healthy weight. Lose weight as approved by your health care provider.  · Plan rest periods when fatigued.  · Learn to manage stress.  · Do not use any tobacco products, including cigarettes, chewing tobacco, or electronic cigarettes. If you need help quitting, ask your health care provider.  · If you drink alcohol, and your health care provider approves, limit your alcohol intake to no more than 1 drink per day. One drink equals 12 ounces of beer, 5 ounces of wine, or 1½ ounces of hard liquor.  · Stop illegal drug use.  · Your health care provider may ask you to monitor your blood pressure. A blood pressure reading consists of a higher number  over a lower number, such as 110 over 72, which is written as 110/72. Ideally, your blood pressure should be:  ¨ Below 140/90 if you have no other medical conditions.  ¨ Below 130/80 if you have diabetes or kidney disease.  · Keep all follow-up visits as directed by your health care provider. This is important.  SEEK IMMEDIATE MEDICAL CARE IF:  · You have pain in your chest, neck, arm, jaw, stomach, or back that lasts more than a few minutes, is recurring, or is unrelieved by taking medicine under your tongue (sublingual nitroglycerin).  · You have profuse sweating without cause.  · You have unexplained:  ¨ Heartburn or indigestion.  ¨ Shortness of breath or difficulty breathing.  ¨ Nausea or vomiting.  ¨ Fatigue.  ¨ Feelings of nervousness or anxiety.  ¨ Weakness.  ¨ Diarrhea.  · You have sudden light-headedness or dizziness.  · You faint.  These symptoms may represent a serious problem that is an emergency. Do not wait to see if the symptoms will go away. Get medical help right away. Call your local emergency services (911 in the [place] Do not drive yourself to the hospital.     This information is not intended to replace advice given to you by your health care provider. Make sure you discuss any questions you have with your health care provider.     Document Released: 07/15/2015 Document Reviewed: 07/15/2015  [employer] Interactive Patient Education ©2016 [employer]    Hypertension  Hypertension, commonly called high blood pressure, is when the force of blood pumping through your arteries is too strong. Your arteries are the blood vessels that carry blood from your heart throughout your body. A blood pressure reading consists of a higher number over a lower number, such as 110/72. The higher number (systolic) is the pressure inside your arteries when your heart pumps. The lower number (diastolic) is the pressure inside your arteries when your heart relaxes. Ideally you want your blood pressure below  120/80.  Hypertension forces your heart to work harder to pump blood. Your arteries may become narrow or stiff. Having untreated or uncontrolled hypertension can cause heart attack, stroke, kidney disease, and other problems.  RISK FACTORS  Some risk factors for high blood pressure are controllable. Others are not.   Risk factors you cannot control include:   · Race. You may be at higher risk if you are .  · Age. Risk increases with age.  · Gender. Men are at higher risk than women before age 45 years. After age 65, women are at higher risk than men.  Risk factors you can control include:  · Not getting enough exercise or physical activity.  · Being overweight.  · Getting too much fat, sugar, calories, or salt in your diet.  · Drinking too much alcohol.  SIGNS AND SYMPTOMS  Hypertension does not usually cause signs or symptoms. Extremely high blood pressure (hypertensive crisis) may cause headache, anxiety, shortness of breath, and nosebleed.  DIAGNOSIS  To check if you have hypertension, your health care provider will measure your blood pressure while you are seated, with your arm held at the level of your heart. It should be measured at least twice using the same arm. Certain conditions can cause a difference in blood pressure between your right and left arms. A blood pressure reading that is higher than normal on one occasion does not mean that you need treatment. If it is not clear whether you have high blood pressure, you may be asked to return on a different day to have your blood pressure checked again. Or, you may be asked to monitor your blood pressure at home for 1 or more weeks.  TREATMENT  Treating high blood pressure includes making lifestyle changes and possibly taking medicine. Living a healthy lifestyle can help lower high blood pressure. You may need to change some of your habits.  Lifestyle changes may include:  · Following the DASH diet. This diet is high in fruits, vegetables, and  whole grains. It is low in salt, red meat, and added sugars.  · Keep your sodium intake below 2,300 mg per day.  · Getting at least 30-45 minutes of aerobic exercise at least 4 times per week.  · Losing weight if necessary.  · Not smoking.  · Limiting alcoholic beverages.  · Learning ways to reduce stress.  Your health care provider may prescribe medicine if lifestyle changes are not enough to get your blood pressure under control, and if one of the following is true:  · You are 18-59 years of age and your systolic blood pressure is above 140.  · You are 60 years of age or older, and your systolic blood pressure is above 150.  · Your diastolic blood pressure is above 90.  · You have diabetes, and your systolic blood pressure is over 140 or your diastolic blood pressure is over 90.  · You have kidney disease and your blood pressure is above 140/90.  · You have heart disease and your blood pressure is above 140/90.  Your personal target blood pressure may vary depending on your medical conditions, your age, and other factors.  HOME CARE INSTRUCTIONS  · Have your blood pressure rechecked as directed by your health care provider.    · Take medicines only as directed by your health care provider. Follow the directions carefully. Blood pressure medicines must be taken as prescribed. The medicine does not work as well when you skip doses. Skipping doses also puts you at risk for problems.  · Do not smoke.    · Monitor your blood pressure at home as directed by your health care provider.   SEEK MEDICAL CARE IF:   · You think you are having a reaction to medicines taken.  · You have recurrent headaches or feel dizzy.  · You have swelling in your ankles.  · You have trouble with your vision.  SEEK IMMEDIATE MEDICAL CARE IF:  · You develop a severe headache or confusion.  · You have unusual weakness, numbness, or feel faint.  · You have severe chest or abdominal pain.  · You vomit repeatedly.  · You have trouble  breathing.  MAKE SURE YOU:   · Understand these instructions.  · Will watch your condition.  · Will get help right away if you are not doing well or get worse.     This information is not intended to replace advice given to you by your health care provider. Make sure you discuss any questions you have with your health care provider.     Document Released: 12/18/2006 Document Revised: 05/03/2016 Document Reviewed: 10/10/2014  [email or web address] Interactive Patient Education ©2016 [employer]

## 2017-09-06 NOTE — PROGRESS NOTES
[facility] Hospitalist Progress Note    Date of Service: 2017    Chief Complaint  72 y.o. male admitted 9/3/2017 with CP and found to be having an ant STEMI.  Went to cath lab emergently where an RCA lesion was stented.    Interval Problem Update  Doing much better now.  Went into afib today now back to SR, on po amiodarone, heparin drip.      Consultants/Specialty  Cardiology    Disposition  Ok to floor  Possible DC in am if stable overnight and OK with Card's        Review of Systems   Constitutional: Negative for fever.   Respiratory: Negative for cough and shortness of breath.    Cardiovascular: Negative for chest pain, palpitations and orthopnea.   Gastrointestinal: Negative for abdominal pain, diarrhea, nausea and vomiting.   Musculoskeletal: Negative for back pain.   Skin: Negative for rash.   Neurological: Negative for dizziness, loss of consciousness and headaches.      Physical Exam  Laboratory/Imaging   Hemodynamics  Temp (24hrs), Av.8 °C (98.2 °F), Min:35.8 °C (96.4 °F), Max:37.3 °C (99.1 °F)   Temperature: 36.7 °C (98 °F)  Pulse  Av.1  Min: 56  Max: 133 Heart Rate (Monitored): 63  Blood Pressure : 114/75      Respiratory      Respiration: 18, Pulse Oximetry: 95 %     Work Of Breathing / Effort: Mild (short of breath)  RUL Breath Sounds: Clear, RML Breath Sounds: Clear, RLL Breath Sounds: Diminished, LASHON Breath Sounds: Clear, LLL Breath Sounds: Diminished    Fluids    Intake/Output Summary (Last 24 hours) at 17 1841  Last data filed at 17 1548   Gross per 24 hour   Intake             1278 ml   Output             1300 ml   Net              -22 ml       Nutrition  Orders Placed This Encounter   Procedures   • Diet Order     Standing Status:   Standing     Number of Occurrences:   1     Order Specific Question:   Diet:     Answer:   Cardiac [6]     Physical Exam   Constitutional: He is oriented to person, place, and time. He appears well-developed and well-nourished. No distress.    HENT:   Head: Normocephalic and atraumatic.   Eyes: Conjunctivae are normal.   Neck: No JVD present.   Cardiovascular: Normal rate and regular rhythm.  Exam reveals no gallop.    Murmur heard.  Pulmonary/Chest: Effort normal. No stridor. No respiratory distress. He has no wheezes. He has no rales.   Abdominal: Soft. There is no tenderness. There is no rebound and no guarding.   Musculoskeletal: He exhibits no edema.   Neurological: He is oriented to person, place, and time.   Skin: Skin is warm and dry. No rash noted. He is not diaphoretic.   Psychiatric: He has a normal mood and affect. Thought content normal.   Nursing note and vitals reviewed.      Recent Labs      09/03/17 1805 09/04/17   0423   WBC  8.2  8.1   RBC  4.68*  4.46*   HEMOGLOBIN  14.6  13.7*   HEMATOCRIT  42.3  40.6*   MCV  90.4  91.0   MCH  31.2  30.7   MCHC  34.5  33.7   RDW  44.3  46.3   PLATELETCT  261  207   MPV  9.6  9.8     Recent Labs      09/03/17   1805  09/04/17   0423   SODIUM  138  139   POTASSIUM  3.4*  3.8   CHLORIDE  109  109   CO2  18*  23   GLUCOSE  202*  121*   BUN  20  18   CREATININE  1.13  0.91   CALCIUM  9.1  8.7     Recent Labs      09/03/17   1805  09/05/17   1120   APTT  25.6  >240.0*   INR  1.09   --      Recent Labs      09/03/17 1805   BNPBTYPENAT  39     Recent Labs      09/04/17   0423   TRIGLYCERIDE  88   HDL  40   LDL  49          Assessment/Plan     Ischemic cardiomyopathy- (present on admission)   Assessment & Plan    30% on cath  BB  ALdactone  Add ACEI/ARB as pressures allow  9/5 as per cardio         STEMI (ST elevation myocardial infarction) (CMS-[facility])- (present on admission)   Assessment & Plan    S/p RCA stent 9/3  Cards following  ASA  Ticagrelor  Statin  BB  Diet education  9/5 will defer management to cardio.           Essential hypertension- (present on admission)   Assessment & Plan    Coreg  Aldactone  Add ACEI/ARB as pressures allow  9/5 continue current management.        Dyslipidemia- (present  on admission)   Assessment & Plan    Statin high dose  9/5 stable.         Paroxysmal atrial fibrillation (CMS-HCC)   Assessment & Plan    Was on amiodarone drip now changed to po  Heparin drip           RUPAL (obstructive sleep apnea)- (present on admission)   Assessment & Plan    Nocturnal CPAP ordered.   9/5 continue CPAP.         IGT (impaired glucose tolerance)- (present on admission)   Assessment & Plan    Diet education  Counseled in exercise program based around walking  9/5 continue monitoring.             Reviewed items::  Radiology images reviewed, EKG reviewed, Labs reviewed and Medications reviewed  Graff catheter::  No Graff  DVT prophylaxis pharmacological::  Heparin  DVT prophylaxis - mechanical:  Not indicated at this time, ambulatory  Ulcer Prophylaxis::  Not indicated

## 2017-09-06 NOTE — PROGRESS NOTES
Assumed care of patient.  Patient is a/o x4.  Heparin gtt on and rate verified by two RN's.  No complaints of pain or distress.  Patient is independent with ambulation.  Bed in low/locked position.  Call light in reach.  Plan of care discussed.  Will monitor per protocol.

## 2017-09-06 NOTE — CARE PLAN
Problem: Safety  Goal: Will remain free from injury  Outcome: PROGRESSING AS EXPECTED   Call light in reach at all times.  Bed in low/locked position.  Hourly rounding in place.    Problem: Venous Thromboembolism (VTW)/Deep Vein Thrombosis (DVT) Prevention:  Goal: Patient will participate in Venous Thrombosis (VTE)/Deep Vein Thrombosis (DVT)Prevention Measures  Anticoagulated with heparin gtt and brilinta

## 2017-09-06 NOTE — PROGRESS NOTES
Cardiology Progress Note               Author: Rodrigue Mcallister Date & Time created: 2017  11:06 AM     Interval History:  Admitted  9/3 with inferior wall MI and RCA stented.    Stable of=vernight.  Brief SVT on monitor.  No dyspnea.  EF 50% by echo.    Review of Systems   Respiratory: Negative.    Cardiovascular: Negative.    Endo/Heme/Allergies: Negative.        Physical Exam   Constitutional: He is oriented to person, place, and time. No distress.   HENT:   Head: Atraumatic.   Cardiovascular: Normal rate.  Exam reveals no friction rub.    No murmur heard.  Pulmonary/Chest: No respiratory distress. He has no rales.   Abdominal: Soft.   Neurological: He is alert and oriented to person, place, and time.   Skin: Skin is warm and dry.       Hemodynamics:  Temp (24hrs), Av.7 °C (98 °F), Min:36.4 °C (97.6 °F), Max:36.9 °C (98.4 °F)  Temperature: 36.4 °C (97.6 °F)  Pulse  Av.6  Min: 56  Max: 133Heart Rate (Monitored): 63  Blood Pressure : 125/74     Respiratory:    Respiration: 18, Pulse Oximetry: 92 %     Work Of Breathing / Effort: Mild  RUL Breath Sounds: Clear, RML Breath Sounds: Clear, RLL Breath Sounds: Diminished, LASHON Breath Sounds: Clear, LLL Breath Sounds: Diminished  Fluids:     Weight: 100.9 kg (222 lb 7.1 oz)  GI/Nutrition:  Orders Placed This Encounter   Procedures   • Diet Order     Standing Status:   Standing     Number of Occurrences:   1     Order Specific Question:   Diet:     Answer:   Cardiac [6]     Lab Results:  Recent Labs      17   0103   WBC  8.2  8.1  9.7   RBC  4.68*  4.46*  4.30*   HEMOGLOBIN  14.6  13.7*  13.2*   HEMATOCRIT  42.3  40.6*  38.4*   MCV  90.4  91.0  89.3   MCH  31.2  30.7  30.7   MCHC  34.5  33.7  34.4   RDW  44.3  46.3  43.8   PLATELETCT  261  207  195   MPV  9.6  9.8  9.9     Recent Labs      17   0103   SODIUM  138  139  137   POTASSIUM  3.4*  3.8  3.3*   CHLORIDE  109  109  104   CO2   18*  23  24   GLUCOSE  202*  121*  108*   BUN  20  18  17   CREATININE  1.13  0.91  1.01   CALCIUM  9.1  8.7  9.1     Recent Labs      09/03/17   1805   09/05/17   1847  09/06/17   0103  09/06/17   0638   APTT  25.6   < >  85.8*  95.5*  102.5*   INR  1.09   --    --    --    --     < > = values in this interval not displayed.     Recent Labs      09/03/17   1805   BNPBTYPENAT  39     Recent Labs      09/03/17   1805  09/04/17   0423  09/05/17   0700   TROPONINI  <0.01  82.72*  36.76*   BNPBTYPENAT  39   --    --      Recent Labs      09/04/17   0423   TRIGLYCERIDE  88   HDL  40   LDL  49         Medical Decision Making, by Problem:  Active Hospital Problems    Diagnosis   • STEMI (ST elevation myocardial infarction) (CMS-HCC) [I21.3]   • Ischemic cardiomyopathy [I25.5]   • Essential hypertension [I10]   • Dyslipidemia [E78.5]   • Paroxysmal atrial fibrillation (CMS-HCC) [I48.0]   • RUPAL (obstructive sleep apnea) [G47.33]   • IGT (impaired glucose tolerance) [R73.02]       Plan:  Discharge today .  Change to metoprolol  Because of svt episode.  Meds reviewed.    Quality-Core Measures

## 2017-09-06 NOTE — PROGRESS NOTES
Monitor Summary  Aflutter-afib  up to 180's now SR  Converted to Sinus Rhythm at 1253  .12/.08/.40

## 2017-09-06 NOTE — PROGRESS NOTES
OK to start Heparin gtt at protocol weight based rate based on 9/3/2017 PTT. New PTT drawn after Heparin bolus given. OK to continue Heparin gtt initial dose per protocol per Dr. Mcallister.

## 2017-09-06 NOTE — PROGRESS NOTES
Cardiovascular Nurse Navigator ([id number]) Note:    Reviewed ACS/PCI medications:  Dual Antiplatelet Therapy (DAPT):  aspirin + ticagrelor  Beta-Blocker:  metoprolol  Statin:  atorvastatin  ACEI/ARB:  N/a--EF 50%  Consider for aldosterone blockade?   Na/--EF 50%     Intensive Cardiac Rehab (ICR) Referral:  Referred on 9/3; has current inpatient orders for nutrition consult & PT for Phase I ICR    Cardiology Follow-Up:  Caitlin WINN 9/18    Inpatient & Discharge Patient Education:  Bedside nursing to continually provide patient education on ACS meds, signs and symptoms to monitor for, and risk factor modification. Also at discharge please complete the “ACS” special instructions on the AVS.  Thank you and please call with questions.

## 2017-09-07 ENCOUNTER — PATIENT OUTREACH (OUTPATIENT)
Dept: HEALTH INFORMATION MANAGEMENT | Facility: OTHER | Age: 72
End: 2017-09-07

## 2017-09-07 NOTE — DISCHARGE SUMMARY
CHIEF COMPLAINT ON ADMISSION  STEMI    CODE STATUS  Full code    HPI & HOSPITAL COURSE  Please see original H&P and consult note for specific information, patient was admitted due to STEMI patient was taken for HC from ER got stent at RCA received antiplatelet statin and bb's, found to have EF 30% during cath, patient was then transferred to ICU for close monitoring pt was started on asa, ticagrelor, statin and aldactone, patient was then transferred to tele patient had a episode of afib patient was started on amiodarone by cardio he converted to NSR patient is feeling better now, no chest pain, no sob, cardio evaluated patient today and cleared him for d/c, patient will be d/c home today and he will f/u with pcp and cardio in 1 week, patient  Expressed understanding of his d/c plan and agreed with. All questions answered.   Had low potassium upon d/c that was replaced before d/c.     Therefore, he is discharged in good and stable condition with close outpatient follow-up.    SPECIFIC OUTPATIENT FOLLOW-UP  PCP   cardiologist    DISCHARGE PROBLEM LIST  Active Problems:    Ischemic cardiomyopathy POA: Yes    Dyslipidemia POA: Yes    Essential hypertension POA: Yes    IGT (impaired glucose tolerance) POA: Yes    RUPAL (obstructive sleep apnea) POA: Yes      Overview: cpap    Paroxysmal atrial fibrillation (CMS-HCC) POA: Unknown  Resolved Problems:    STEMI (ST elevation myocardial infarction) (CMS-HCC) POA: Yes    Acute MI (CMS-HCC) POA: Unknown      FOLLOW UP  Future Appointments  Date Time Provider Department Center   9/18/2017 10:20 AM SHELBIE Jordan [facility] None   1/10/2018 9:20 AM Aldo Lemus M.D. [address]   1/18/2018 12:40 PM Julianna Escobedo M.D. [facility] None         MEDICATIONS ON DISCHARGE   Shamar Morgan   Home Medication Instructions JOSEPH:[id number]    Printed on:09/06/17 0757   Medication Information                      amiodarone (CORDARONE) 200 MG Tab  Take 1 Tab by mouth 2 Times a Day.              gabapentin (NEURONTIN) 300 MG Cap  Take 1 Cap by mouth 3 times a day.             losartan (COZAAR) 100 MG Tab  TAKE ONE TABLET BY MOUTH ONCE DAILY             metoprolol (LOPRESSOR) 25 MG Tab  Take 1 Tab by mouth 2 Times a Day.             pravastatin (PRAVACHOL) 80 MG tablet  TAKE ONE TABLET BY MOUTH EVERY BEDTIME             ticagrelor (BRILINTA) 90 MG Tab tablet  Take 1 Tab by mouth 2 Times a Day.             vitamin D (CHOLECALCIFEROL) 1000 UNIT TABS  Take 1,000 Units by mouth every day.                 DIET  Cardiac diet    ACTIVITY  Activity and weigh bearing status as per post cath protocol.        CONSULTATIONS  cardio    PROCEDURES  HC.     LABORATORY  Lab Results   Component Value Date/Time    SODIUM 137 09/06/2017 01:03 AM    POTASSIUM 3.3 (L) 09/06/2017 01:03 AM    CHLORIDE 104 09/06/2017 01:03 AM    CO2 24 09/06/2017 01:03 AM    GLUCOSE 108 (H) 09/06/2017 01:03 AM    BUN 17 09/06/2017 01:03 AM    CREATININE 1.01 09/06/2017 01:03 AM        Lab Results   Component Value Date/Time    WBC 9.7 09/06/2017 01:03 AM    HEMOGLOBIN 13.2 (L) 09/06/2017 01:03 AM    HEMATOCRIT 38.4 (L) 09/06/2017 01:03 AM    PLATELETCT 195 09/06/2017 01:03 AM        Total time of the discharge process exceeds 38 minutes

## 2017-09-07 NOTE — PROGRESS NOTES
Rec'd pt lying in bed with HOB elevated. AAO X4. Resp even and nonlabored on room air. Pt denies any SOB or chest pain at this time. Skin w/d to touch. Tele box intact. Bilateral FA 18G saline locks WNL. Heaprin @1200 units/hr currently infusing. Lungs CTAB. Abdomen soft and nondistended. BS present x4. Nonskid socks in place to BLE. Pt denies any needs or complaints at this time. Side rails up x2, bed in lowest/locked position and call light within reach. Will continue to monitor this shift.    (0730) aPTT 102.5 per lab. Protocol followed. Drip reduced by 200ml/hr. New rate currently infusing at 1000 units/hr.    (1000) Heparin drip d/c'd as ordered.    (1630) Pt discharged home at this time. Pt and wife voiced understanding of discharge instructions. Medicated with one time dose of Brilinta prior to departure due to local pharmacy being unable to supply pt with PM dose. Medication will be available for pickup on tomorrow. Dr. Porter informed and notified.

## 2017-09-11 ENCOUNTER — TELEPHONE (OUTPATIENT)
Dept: CARDIOLOGY | Facility: MEDICAL CENTER | Age: 72
End: 2017-09-11

## 2017-09-11 RX ORDER — CLOPIDOGREL BISULFATE 75 MG/1
TABLET ORAL
Qty: 90 TAB | Refills: 3 | OUTPATIENT
Start: 2017-09-11 | End: 2019-06-27

## 2017-09-11 NOTE — TELEPHONE ENCOUNTER
Per Dr. Mcallister: Most likely the Brilinta is causing the rash. Take afternoon dose today. Tomorrow start Plavix 75mg 4 tablets on day 1, then 1 tablet daily.   Called pt. To advise. New RX called to pharmacy. Pt. Will call with update on Thurs. Or Fri. He will take Benadryl now.

## 2017-09-11 NOTE — TELEPHONE ENCOUNTER
----- Message from China Lutz sent at 9/11/2017 10:41 AM PDT -----  Regarding: extremely itchy rash  Contact: [phone]  MALGORZATA/dora    Pt calling to report he has broken out in a rash all over chest & stomach, extremely itchy, could be from a medication prescribed at d/c.  Please call pt at

## 2017-09-11 NOTE — TELEPHONE ENCOUNTER
"Pt. First noted a mild rash 2 days ago, but last night itching worsened. He awoke with raised \"speckles\" on chest and stomach this am. He was discharged on 2 new meds,  Amiodarone and Brilinta .  To Dr. Mcallister.  "

## 2017-09-18 ENCOUNTER — OFFICE VISIT (OUTPATIENT)
Dept: CARDIOLOGY | Facility: MEDICAL CENTER | Age: 72
End: 2017-09-18
Payer: MEDICARE

## 2017-09-18 VITALS
HEART RATE: 52 BPM | HEIGHT: 66 IN | OXYGEN SATURATION: 93 % | WEIGHT: 218 LBS | BODY MASS INDEX: 35.03 KG/M2 | DIASTOLIC BLOOD PRESSURE: 70 MMHG | SYSTOLIC BLOOD PRESSURE: 110 MMHG

## 2017-09-18 DIAGNOSIS — E78.5 DYSLIPIDEMIA: ICD-10-CM

## 2017-09-18 DIAGNOSIS — I10 ESSENTIAL HYPERTENSION: ICD-10-CM

## 2017-09-18 DIAGNOSIS — I25.5 ISCHEMIC CARDIOMYOPATHY: ICD-10-CM

## 2017-09-18 DIAGNOSIS — I48.91 ATRIAL FIBRILLATION, UNSPECIFIED TYPE (HCC): ICD-10-CM

## 2017-09-18 DIAGNOSIS — G47.33 OSA (OBSTRUCTIVE SLEEP APNEA): ICD-10-CM

## 2017-09-18 PROCEDURE — 99214 OFFICE O/P EST MOD 30 MIN: CPT | Performed by: NURSE PRACTITIONER

## 2017-09-18 PROCEDURE — 93000 ELECTROCARDIOGRAM COMPLETE: CPT | Performed by: NURSE PRACTITIONER

## 2017-09-18 RX ORDER — ATORVASTATIN CALCIUM 40 MG/1
40 TABLET, FILM COATED ORAL DAILY
Qty: 30 TAB | Refills: 6 | Status: SHIPPED | OUTPATIENT
Start: 2017-09-18 | End: 2018-01-04 | Stop reason: SDUPTHER

## 2017-09-18 RX ORDER — ASPIRIN 81 MG/1
81 TABLET, CHEWABLE ORAL DAILY
Qty: 100 TAB | Refills: 3 | Status: SHIPPED | OUTPATIENT
Start: 2017-09-18 | End: 2018-01-04

## 2017-09-18 ASSESSMENT — ENCOUNTER SYMPTOMS
LOSS OF CONSCIOUSNESS: 0
ORTHOPNEA: 0
COUGH: 0
BLURRED VISION: 0
FOCAL WEAKNESS: 0
WHEEZING: 0
DIZZINESS: 0
FALLS: 0
HEADACHES: 0
PALPITATIONS: 0
DOUBLE VISION: 0
WEAKNESS: 0
SHORTNESS OF BREATH: 0

## 2017-09-18 NOTE — PROGRESS NOTES
Subjective:   Shamar Morgan is a 72 y.o. male who presents today with his wife for hospital follow up after STEMI.    He is new to our clinic, He was seen by Dr. Mcallister in the hospital. HX: Hyperlipidemia, hypertension, and obstructive sleep apnea on CPAP at night.    Patient was hospital from 9/3- 9/6/2017 for chest pain. He was found to have STEMI inferior wall MI. Patient was taken to cardiac catheterization got successful thrombectomy and stent placement of ostial proximal right coronary artery with 4.0x12 mm Synergy drug-eluting stent. EF 30%. patient was then transferred to ICU for close monitoring pt was started on asa, ticagrelor, statin and aldactone, patient was then transferred to tele patient had a episode of afib patient was started on amiodarone by cardio he converted to NSR.     Patient is doing well, no episode of recurrent chest pain.     He has had no episodes of chest pain, palpitations, dizziness/lightheadedness, shortness of breath, orthopnea, or peripheral edema.    Past Medical History:   Diagnosis Date   • CAD (coronary artery disease) 4/16/2012   • Hyperglycemia 4/16/2012   • Hyperlipemia 4/16/2012   • HTN (hypertension) 4/16/2012   • Tachycardia 4/16/2012   • Hypertension    • Obesity    • Tonsillitis      Past Surgical History:   Procedure Laterality Date   • CARDIAC CATH, LEFT HEART  09/03/2017    successful thrombectomy  and CHELLE to ostial proximal RCA    • CARPAL TUNNEL RELEASE       Family History   Problem Relation Age of Onset   • Diabetes Mother    • Heart Disease Mother    • Kidney Disease Mother    • Diabetes Sister      History   Smoking Status   • Former Smoker   • Packs/day: 3.00   • Years: 16.00   • Types: Cigarettes   • Quit date: 1/1/1980   Smokeless Tobacco   • Never Used     Allergies   Allergen Reactions   • Lipidil [Fenofibrate]      Myalgia   • Penicillins    • Sulfa Drugs      Outpatient Encounter Prescriptions as of 9/18/2017   Medication Sig Dispense Refill   •  "aspirin (ASA) 81 MG Chew Tab chewable tablet Take 1 Tab by mouth every day. 100 Tab 3   • atorvastatin (LIPITOR) 40 MG Tab Take 1 Tab by mouth every day. 30 Tab 6   • clopidogrel (PLAVIX) 75 MG Tab Take 4 tablets on day 1, then 1 tablet daily 90 Tab 3   • amiodarone (CORDARONE) 200 MG Tab Take 1 Tab by mouth 2 Times a Day. 60 Tab 0   • metoprolol (LOPRESSOR) 25 MG Tab Take 1 Tab by mouth 2 Times a Day. 60 Tab 0   • losartan (COZAAR) 100 MG Tab TAKE ONE TABLET BY MOUTH ONCE DAILY 90 Each 3   • vitamin D (CHOLECALCIFEROL) 1000 UNIT TABS Take 1,000 Units by mouth every day.     • [DISCONTINUED] pravastatin (PRAVACHOL) 80 MG tablet TAKE ONE TABLET BY MOUTH EVERY BEDTIME 90 Tab 3   • [DISCONTINUED] gabapentin (NEURONTIN) 300 MG Cap Take 1 Cap by mouth 3 times a day. (Patient not taking: Reported on 7/10/2017) 90 Cap 3     No facility-administered encounter medications on file as of 9/18/2017.      Review of Systems   Constitutional: Negative for malaise/fatigue.   Eyes: Negative for blurred vision and double vision.   Respiratory: Negative for cough, shortness of breath and wheezing.    Cardiovascular: Negative for chest pain, palpitations, orthopnea and leg swelling.   Musculoskeletal: Negative for falls.   Neurological: Negative for dizziness, focal weakness, loss of consciousness, weakness and headaches.   All other systems reviewed and are negative.       Objective:   /70   Pulse (!) 52   Ht 1.676 m (5' 5.98\")   Wt 98.9 kg (218 lb)   SpO2 93%   BMI 35.20 kg/m²     Physical Exam   Constitutional: He is oriented to person, place, and time. He appears well-developed and well-nourished.   HENT:   Head: Normocephalic.   Neck: Normal range of motion. No JVD present.   Cardiovascular: Normal rate, regular rhythm and normal heart sounds.    No murmur heard.  Pulmonary/Chest: Effort normal and breath sounds normal. No respiratory distress. He has no wheezes.   Abdominal: Bowel sounds are normal.   Musculoskeletal: " He exhibits no edema or tenderness.   Neurological: He is alert and oriented to person, place, and time.   Skin: Skin is warm and dry.   Psychiatric: His behavior is normal. Judgment normal.   Nursing note and vitals reviewed.      Cardiac Catheterization:   9/3/2017  1. Coronary artery disease with occluded ostial/proximal right coronary artery     associated with thrombus and additional non obstructive coronary artery disease.  2. Successful thrombectomy. PTCA and stent placement of ostial proximal right   coronary artery with 4.0x12 mm Synergy drug-eluting stent.  3. Reduced left ventricular systolic function with ejection fraction of 30%.  4. Elevated left ventricular end diastolic pressure.    Echocardiography Laboratory  9/4/2017  Technically difficult study - adequate information is obtained.   Left ventricular ejection fraction is visually estimated to be 50%.  Severely dilated right ventricle.  Right ventricular function not well   visualized.  Right ventricular systolic pressure is estimated to be 40 mmHg.  Moderate mitral regurgitation.    Lab Results   Component Value Date/Time    CHOLSTRLTOT 107 09/04/2017 04:23 AM    LDL 49 09/04/2017 04:23 AM    HDL 40 09/04/2017 04:23 AM    TRIGLYCERIDE 88 09/04/2017 04:23 AM       Lab Results   Component Value Date/Time    SODIUM 137 09/06/2017 01:03 AM    POTASSIUM 3.3 (L) 09/06/2017 01:03 AM    CHLORIDE 104 09/06/2017 01:03 AM    CO2 24 09/06/2017 01:03 AM    GLUCOSE 108 (H) 09/06/2017 01:03 AM    BUN 17 09/06/2017 01:03 AM    CREATININE 1.01 09/06/2017 01:03 AM     Lab Results   Component Value Date/Time    ALKPHOSPHAT 50 09/03/2017 06:05 PM    ASTSGOT 33 09/03/2017 06:05 PM    ALTSGPT 38 09/03/2017 06:05 PM    TBILIRUBIN 0.9 09/03/2017 06:05 PM    ;    Assessment:     1. Ischemic cardiomyopathy  LIPID PROFILE    COMP METABOLIC PANEL    CANCELED: LIPID PROFILE   2. Dyslipidemia  LIPID PROFILE    COMP METABOLIC PANEL    CANCELED: LIPID PROFILE   3. Essential  hypertension     4. RUPAL (obstructive sleep apnea)     5. Atrial fibrillation, unspecified type (CMS-HCC)  EKG       Medical Decision Making:  Today's Assessment / Status / Plan:     1. CAD with ischemic cardiomyopathy:  - Pt recovering well post stent placement.  He has not had any recurrence of chest pain or angina.  - He has been compliant with he medications without missed doses.  I discussed mechanism of action and importance of cardiac medications, especially DAPT.    - We reviewed heart healthy, low sodium low cholesterol diet today.  We also reviewed recommended activity guidelines per AHA guidelines.  - patient is going to start Cardiac Rehab on 9/26/17.  - added asa 81mg qd for DAPT therapy and high dose of statin atorvastatin 40mg qd. continue to takePlavix 75 mg daily, losartan 100 mg daily, and metoprolol 25 mg twice a day.  - ECHO post PCI EF of 50%. Severely dilated right ventricular. The right ventricular systolic pressure 40. Moderate mitral regurgitation.    2. Dyslipidemia:  - LDL 49, goal less than 70  - start atorvastatin 40mg qd, and lipid profile in 3 months     3. Hypertension:  - stable   - continue current regiment     4. RUPAL:  - wears CPAP at night     5. Afib:  - EKG showed today SB  - in one week titrate amiodarone to 200mg qd  - CMP prior to next appt       Follow up in 3 months with Dr. Mcallister, sooner as needed. CMP and lipid profile prior to next appt.     Collaborating Provider: Dr. Sarkar

## 2017-09-19 LAB — EKG IMPRESSION: NORMAL

## 2017-09-20 ENCOUNTER — TELEPHONE (OUTPATIENT)
Dept: MEDICAL GROUP | Facility: MEDICAL CENTER | Age: 72
End: 2017-09-20

## 2017-09-20 ENCOUNTER — TELEPHONE (OUTPATIENT)
Dept: CARDIOLOGY | Facility: MEDICAL CENTER | Age: 72
End: 2017-09-20

## 2017-09-20 NOTE — TELEPHONE ENCOUNTER
1. Caller Name: Shamar                                         Call Back Number: [phone]      Patient approves a detailed voicemail message: N\A    Patient's hospital discharge paperwork told him to not take vitamin A and E. He was wondering if he still needed to not take the vitamins and if not, if he should stop his multi-vitamin because it has vitamin A and E. Please advise.

## 2017-09-20 NOTE — TELEPHONE ENCOUNTER
----- Message from Poonam Carreno, Med Ass't sent at 9/20/2017 12:56 PM PDT -----  Regarding: FW: [facility] calling about changing quantity      ----- Message -----  From: Cata Donis  Sent: 9/20/2017  11:04 AM  To: Poonam Carreno, Med Ass't  Subject: [facility] calling about changing q#    Courtney Levin @ [facility] is calling about Aspirin prescription and asking for a change in quantity. She can be reached at [phone].

## 2017-09-20 NOTE — TELEPHONE ENCOUNTER
Spoke with Alicia from [facility] they are requesting to change the quantity on pt's Rx for Aspirin to #108 instead of #100, notified Alicia that it is alright.

## 2017-09-26 ENCOUNTER — NON-PROVIDER VISIT (OUTPATIENT)
Dept: CARDIOLOGY | Facility: MEDICAL CENTER | Age: 72
End: 2017-09-26
Payer: MEDICARE

## 2017-09-26 VITALS — SYSTOLIC BLOOD PRESSURE: 128 MMHG | HEART RATE: 53 BPM | DIASTOLIC BLOOD PRESSURE: 75 MMHG

## 2017-09-26 DIAGNOSIS — I25.5 ISCHEMIC CARDIOMYOPATHY: ICD-10-CM

## 2017-09-26 DIAGNOSIS — I21.3 ACUTE ST ELEVATION MYOCARDIAL INFARCTION (STEMI), UNSPECIFIED ARTERY (HCC): ICD-10-CM

## 2017-09-26 DIAGNOSIS — I48.0 PAROXYSMAL ATRIAL FIBRILLATION (HCC): ICD-10-CM

## 2017-09-26 PROCEDURE — G0422 INTENS CARDIAC REHAB W/EXERC: HCPCS | Performed by: FAMILY MEDICINE

## 2017-09-26 PROCEDURE — G0423 INTENS CARDIAC REHAB NO EXER: HCPCS | Mod: 59 | Performed by: FAMILY MEDICINE

## 2017-09-26 ASSESSMENT — PATIENT HEALTH QUESTIONNAIRE - PHQ9
9. THOUGHTS THAT YOU WOULD BE BETTER OFF DEAD, OR OF HURTING YOURSELF: 0
3. TROUBLE FALLING OR STAYING ASLEEP OR SLEEPING TOO MUCH: 0
1. LITTLE INTEREST OR PLEASURE IN DOING THINGS: 1
2. FEELING DOWN, DEPRESSED, IRRITABLE, OR HOPELESS: 0
6. FEELING BAD ABOUT YOURSELF - OR THAT YOU ARE A FAILURE OR HAVE LET YOURSELF OR YOUR FAMILY DOWN: 0
7. TROUBLE CONCENTRATING ON THINGS, SUCH AS READING THE NEWSPAPER OR WATCHING TELEVISION: 0
SUM OF ALL RESPONSES TO PHQ QUESTIONS 1-9: 2
SUM OF ALL RESPONSES TO PHQ QUESTIONS 1-9: 2
5. POOR APPETITE OR OVEREATING: 0
SUM OF ALL RESPONSES TO PHQ9 QUESTIONS 1 AND 2: 1
8. MOVING OR SPEAKING SO SLOWLY THAT OTHER PEOPLE COULD HAVE NOTICED. OR THE OPPOSITE, BEING SO FIGETY OR RESTLESS THAT YOU HAVE BEEN MOVING AROUND A LOT MORE THAN USUAL: 0
4. FEELING TIRED OR HAVING LITTLE ENERGY: 1

## 2017-09-26 NOTE — PROGRESS NOTES
Cardiac Rehab Individual Treatment Plan Assessment: Initial 17 Session #     1   MRN: [id number]   Allergies: Lipidil [fenofibrate]; Penicillins; and Sulfa drugs   Patient Name: Shamar Morgan : 1945 Risk Stratification: High    Diagnoses:   1. Acute ST elevation myocardial infarction (STEMI), unspecified artery (CMS-HCC)    2. Ischemic cardiomyopathy    3. Paroxysmal atrial fibrillation (CMS-HCC)     Age: 72 y.o. Physician: Aldo Lemus M.D.    Date of Event: 17 Specialist: Pascual   Risk Factors:  Hypertension, Hyperlipidemia, Family History, Obesity, Stress, Age, Male > 45   Exercise Nutrition Education Psychosocial   Stages of change Stages of change Stages of change Stages of change   Preparation Preparation Preparation Preparation   Fitness Test Lipids Learning Barriers Outcome Survey Tools   DIST: 6077  Available: Yes Learning Barriers: None FP QOL Overall Score: 27.97   Max HR: 103 Date: 17 Family Support PHQ-9: 2   RPE: 11 Total: 107 mg/dL Yes Nutrition Screen: 40 %   SPO2: 97 % Tri mg/dL Lives: Spouse Intervention   MET: 3.18 HDL: 40 mg/dL Tobacco Use Behavioral Health Consult: No   EF= 55 (2017) LDL: 49 mg/dL History   Smoking Status   • Former Smoker   • Packs/day: 3.00   • Years: 16.00   • Types: Cigarettes   • Quit date: 1980   Smokeless Tobacco   • Never Used      Physician Referral: No   Ambulatory Status Diabetes Smoking Intervention Identifies Stressors: Yes   Fall Risk Assessed: Yes Diabetes: No Smoking Cessation Referral: N/A Drug Intervention: No   Exercise Ambulatory Status Assist Devices: None HbA1C: 5.7 % Date: 17 Ind. Education / Counseling: N/A Education   Exercise Prescription Monitors BS at Home: No Tobacco Adjunct: N/A Psychosocial Education: Coping Techniques, Positive Support System, S/S Depression   Mode: Treadmill, NuStep, UBE, Airdyne   Frequency: na Random BS: 108 (2017) Education Intervention Target Goal   Frequency:  3  "days/week Weight Management Healthy Heart Education: Class Schedule Given, Medications Reviewed, Patient Education Binder Provided, Risk Factors Discussed, Videos Viewed per Joseph Assess presence or absence of depression using a valid screening: Yes   Duration:  30 min Weight: 97.8 kg (215 lb 8 oz) Target Goal Use Stress Management: Yes   Intensity:  11-13 RPE Height: 167.6 cm (5' 5.98\") Complete Tobacco Cessation: Complete Tobacco Cessation: N/A Adverse Events: Yes   METS:  1.0-3.0 BMI (Calculated): 34.8 Educate / Review and have understanding of cardiac disease prevention: Educate / Review and have understanding of cardiac disease prevention: Yes Unexpected Events: Yes   Progression:  ^ increments of 1-5 to THR/RPE as tolerated Goal weight: 200lb Medication Compliance: Yes    THR: THR: 20-30 BPM ^Resting HR History   Alcohol Use   • 0.0 oz/week     Comment: wine or whiskey 2-3 a day         Angina with Exercise?  Angina with Exercise: No      Resistance Training?  Resistance Training: Yes      Hypertension      Diagnosed with HTN: Yes Intervention      Resting BP: 128/75 Dietician Consult/Class: Pending (pending)      Peak Ex BP: 160/62 Nurse/Patient Discussion: Yes     Intervention Diabetes Ed Referral: N/A     Home Exercise:  Yes Discuss Maintenance /Wt Loss: Yes     Mode: Walk (walks dogs) Attend [employer]: Pending (scheduled)     Duration: 60 min (patient states he walks his dog daily for an hour) Dietary Goal: >= 58 rate your plate, low sodium     Frequency: 7 days active  Education     Education Nutrition Education: Healthy Eating, Sodium Reduction     Equipment Orientation, Exercise Safety, S/S to Report, RPE Scale, Warm Up / Cool Down, Physically Active Target Goal     Target Goal LDL-C < 100 if trig. > 200:  N/A       Start Individual Exercise Rx Yes LDL-C < 70 for high risk patient:  Yes       BP < 140/90 or < 130/80 if DM or CKD Yes Non HDL-C Should be < 130:  Yes       Aerobic activity 30 + min " "/ day 5 days / wk Yes HbA1C < 7%: Yes         BMI < 25: No (modified to <32)       Notes: neuropathy in bilateral feet that bothers him when he walk from time to time.  History of paroxismal atrial fibrillation weaning from Amiodarone, will monitor first 3 sessions.  Jose Armando has T-wave depression.   Initial Assessment:  Heart Sounds: S1S2 WNL       Lung Sounds: clear bilaterally throughout       Edema: 1+ pitting edema in bilateral lower extremities       Right Peripheral Pulses:  Carotid: 2+  Radial: 2+  Dorsalis Pedis:  (not assessed)  Posterior Tibialis: 2+   Left Peripheral Pulses:  Carotid: 2+  Radial: 2+  Dorsalis Pedis:  (not assessed)  Posterior Tibialis: 2+    Incision: none present      Color: pink       Frame Size: Large        Cognitive Assessment: A&O X 4, no cognitive deficits reported by patient.       Fall Assessment:    Gait: steady  Balance: steady  Upper Body: no pain or limitation with ROM  Lower Body: no pain or limitations with ROM   Recent Falls: none reported   Current Medications and Vaccinations: Reviewed  Patient Stated Goals: \"I would like to be healthy, have a healthy heart and lose weight.\"  Other: Shamar comes in today for his intial evaluation.  He is currently walking his dog 7 days a week for about an hour, he says that he is able to do this without taking breaks to rest.  He mentions that at times he gets short of breath, has RUPAL and wears a bipap at night.  I let him know we could monitor his oxygen during exercise which was not a problem today.  He is looking forward to exercise to lose weight and feel healthy.  He says that he has already made dietary changes since filling out the Rate Your Plate questionairre.  He says that he does not add any salt to food, uses Mrs. Savage, eats fish and chicken and rarely red meat, eats lots of vegetables, doesn't eat butter anymore and doesn't jacob foods anymore.  He and his wife are looking forward to cooking school.  He says that stress is a " problem for him sometimes especially while driving.  He says that he used to have high blood pressure at times due to stress.  We talked about stress and cardiac risk and he is aware of how stress affects the heart and has a goal of being more mindful about it and wants to learn techniques in our program that can help him relax.

## 2017-09-27 ENCOUNTER — NON-PROVIDER VISIT (OUTPATIENT)
Dept: CARDIOLOGY | Facility: MEDICAL CENTER | Age: 72
End: 2017-09-27
Payer: MEDICARE

## 2017-09-27 VITALS
BODY MASS INDEX: 34.72 KG/M2 | HEART RATE: 56 BPM | DIASTOLIC BLOOD PRESSURE: 64 MMHG | SYSTOLIC BLOOD PRESSURE: 130 MMHG | HEIGHT: 66 IN | WEIGHT: 216 LBS

## 2017-09-27 DIAGNOSIS — I25.5 ISCHEMIC CARDIOMYOPATHY: ICD-10-CM

## 2017-09-27 DIAGNOSIS — I21.01 ACUTE ST ELEVATION MYOCARDIAL INFARCTION (STEMI) INVOLVING LEFT MAIN CORONARY ARTERY (HCC): ICD-10-CM

## 2017-09-27 PROCEDURE — G0422 INTENS CARDIAC REHAB W/EXERC: HCPCS | Performed by: FAMILY MEDICINE

## 2017-09-27 PROCEDURE — G0423 INTENS CARDIAC REHAB NO EXER: HCPCS | Mod: 59 | Performed by: FAMILY MEDICINE

## 2017-09-27 ASSESSMENT — PAIN SCALES - GENERAL: PAINLEVEL: 2=MINIMAL-SLIGHT

## 2017-09-28 NOTE — PROGRESS NOTES
Date: 9/27/2017    Initial Individual Treatment Plan review for the [facility] Intensive Cardiac Rehabilitation (ICR) Program.    Shamar Morgan, 72 y.o. male, with qualifying diagnosis/diagnoses of recent Myocardial Infarction.  Co-morbid conditions include Hypertension, Hyperlipidemia, Family History, Obesity, Stress, Age, Male > 45.    Primary Care Provider: Aldo Lemus M.D.    Heart Specialist (s): Dr. Garner    Patient has successfully completed 1 Exercise Sessions, and an appropriate number of corresponding Education Sessions.  Patient is (continues to be) an excellent candidate for the [facility] Int[facility] (ICR) Program.    I will review the Individual Treatment Plan again at 30 day post-initiation of ICR.    Hiram Haas MD, [facility]  , [facility] Int[facility] (ICR) Program

## 2017-09-29 ENCOUNTER — NON-PROVIDER VISIT (OUTPATIENT)
Dept: CARDIOLOGY | Facility: MEDICAL CENTER | Age: 72
End: 2017-09-29
Payer: MEDICARE

## 2017-09-29 VITALS
HEART RATE: 50 BPM | SYSTOLIC BLOOD PRESSURE: 119 MMHG | BODY MASS INDEX: 34.96 KG/M2 | WEIGHT: 217.5 LBS | HEIGHT: 66 IN | DIASTOLIC BLOOD PRESSURE: 64 MMHG

## 2017-09-29 DIAGNOSIS — I21.01 ACUTE ST ELEVATION MYOCARDIAL INFARCTION (STEMI) INVOLVING LEFT MAIN CORONARY ARTERY (HCC): ICD-10-CM

## 2017-09-29 PROCEDURE — G0422 INTENS CARDIAC REHAB W/EXERC: HCPCS | Performed by: FAMILY MEDICINE

## 2017-09-29 PROCEDURE — G0423 INTENS CARDIAC REHAB NO EXER: HCPCS | Mod: 59 | Performed by: FAMILY MEDICINE

## 2017-09-29 ASSESSMENT — PAIN SCALES - GENERAL: PAINLEVEL: NO PAIN

## 2017-09-29 NOTE — PROGRESS NOTES
Shamar Morgan attending cooking school.  Today  prepared oatmeal and yogurt parfait.    Patient received handouts and nutrition information regarding the specific recipes.

## 2017-10-02 ENCOUNTER — NON-PROVIDER VISIT (OUTPATIENT)
Dept: CARDIOLOGY | Facility: MEDICAL CENTER | Age: 72
End: 2017-10-02
Payer: MEDICARE

## 2017-10-02 VITALS
DIASTOLIC BLOOD PRESSURE: 74 MMHG | WEIGHT: 217.5 LBS | BODY MASS INDEX: 34.96 KG/M2 | SYSTOLIC BLOOD PRESSURE: 136 MMHG | HEART RATE: 55 BPM | HEIGHT: 66 IN

## 2017-10-02 DIAGNOSIS — I21.01 ACUTE ST ELEVATION MYOCARDIAL INFARCTION (STEMI) INVOLVING LEFT MAIN CORONARY ARTERY (HCC): ICD-10-CM

## 2017-10-02 DIAGNOSIS — I48.0 PAROXYSMAL ATRIAL FIBRILLATION (HCC): ICD-10-CM

## 2017-10-02 DIAGNOSIS — I25.5 ISCHEMIC CARDIOMYOPATHY: ICD-10-CM

## 2017-10-02 PROCEDURE — G0423 INTENS CARDIAC REHAB NO EXER: HCPCS | Performed by: FAMILY MEDICINE

## 2017-10-02 PROCEDURE — G0422 INTENS CARDIAC REHAB W/EXERC: HCPCS | Performed by: FAMILY MEDICINE

## 2017-10-02 ASSESSMENT — PAIN SCALES - GENERAL: PAINLEVEL: 2=MINIMAL-SLIGHT

## 2017-10-03 DIAGNOSIS — I10 ESSENTIAL HYPERTENSION: ICD-10-CM

## 2017-10-04 ENCOUNTER — NON-PROVIDER VISIT (OUTPATIENT)
Dept: CARDIOLOGY | Facility: MEDICAL CENTER | Age: 72
End: 2017-10-04
Payer: MEDICARE

## 2017-10-04 VITALS
BODY MASS INDEX: 34.79 KG/M2 | HEIGHT: 66 IN | WEIGHT: 216.5 LBS | HEART RATE: 50 BPM | DIASTOLIC BLOOD PRESSURE: 64 MMHG | SYSTOLIC BLOOD PRESSURE: 126 MMHG

## 2017-10-04 DIAGNOSIS — I21.01 ACUTE ST ELEVATION MYOCARDIAL INFARCTION (STEMI) INVOLVING LEFT MAIN CORONARY ARTERY (HCC): ICD-10-CM

## 2017-10-04 DIAGNOSIS — I25.5 ISCHEMIC CARDIOMYOPATHY: ICD-10-CM

## 2017-10-04 PROCEDURE — G0423 INTENS CARDIAC REHAB NO EXER: HCPCS | Mod: 59 | Performed by: FAMILY MEDICINE

## 2017-10-04 PROCEDURE — G0422 INTENS CARDIAC REHAB W/EXERC: HCPCS | Performed by: FAMILY MEDICINE

## 2017-10-04 ASSESSMENT — PAIN SCALES - GENERAL: PAINLEVEL: 1=MINIMAL PAIN

## 2017-10-06 ENCOUNTER — NON-PROVIDER VISIT (OUTPATIENT)
Dept: CARDIOLOGY | Facility: MEDICAL CENTER | Age: 72
End: 2017-10-06
Payer: MEDICARE

## 2017-10-06 VITALS
HEART RATE: 52 BPM | DIASTOLIC BLOOD PRESSURE: 58 MMHG | BODY MASS INDEX: 34.79 KG/M2 | WEIGHT: 216.5 LBS | HEIGHT: 66 IN | SYSTOLIC BLOOD PRESSURE: 113 MMHG

## 2017-10-06 DIAGNOSIS — I21.01 ACUTE ST ELEVATION MYOCARDIAL INFARCTION (STEMI) INVOLVING LEFT MAIN CORONARY ARTERY (HCC): ICD-10-CM

## 2017-10-06 DIAGNOSIS — I25.5 ISCHEMIC CARDIOMYOPATHY: ICD-10-CM

## 2017-10-06 DIAGNOSIS — I48.0 PAF (PAROXYSMAL ATRIAL FIBRILLATION) (HCC): ICD-10-CM

## 2017-10-06 PROCEDURE — G0422 INTENS CARDIAC REHAB W/EXERC: HCPCS | Performed by: FAMILY MEDICINE

## 2017-10-06 PROCEDURE — G0423 INTENS CARDIAC REHAB NO EXER: HCPCS | Mod: 59 | Performed by: FAMILY MEDICINE

## 2017-10-06 RX ORDER — AMIODARONE HYDROCHLORIDE 200 MG/1
200 TABLET ORAL DAILY
Qty: 30 TAB | Refills: 2 | Status: SHIPPED | OUTPATIENT
Start: 2017-10-06 | End: 2018-01-04 | Stop reason: SDUPTHER

## 2017-10-06 ASSESSMENT — PAIN SCALES - GENERAL: PAINLEVEL: 1=MINIMAL PAIN

## 2017-10-09 ENCOUNTER — NON-PROVIDER VISIT (OUTPATIENT)
Dept: CARDIOLOGY | Facility: MEDICAL CENTER | Age: 72
End: 2017-10-09
Payer: MEDICARE

## 2017-10-09 VITALS
WEIGHT: 216.5 LBS | DIASTOLIC BLOOD PRESSURE: 61 MMHG | SYSTOLIC BLOOD PRESSURE: 121 MMHG | HEART RATE: 52 BPM | BODY MASS INDEX: 34.79 KG/M2 | HEIGHT: 66 IN

## 2017-10-09 DIAGNOSIS — I21.01 ACUTE ST ELEVATION MYOCARDIAL INFARCTION (STEMI) INVOLVING LEFT MAIN CORONARY ARTERY (HCC): ICD-10-CM

## 2017-10-09 DIAGNOSIS — I48.0 PAROXYSMAL ATRIAL FIBRILLATION (HCC): ICD-10-CM

## 2017-10-09 PROCEDURE — G0423 INTENS CARDIAC REHAB NO EXER: HCPCS | Mod: 59 | Performed by: FAMILY MEDICINE

## 2017-10-09 PROCEDURE — G0422 INTENS CARDIAC REHAB W/EXERC: HCPCS | Performed by: FAMILY MEDICINE

## 2017-10-09 ASSESSMENT — PAIN SCALES - GENERAL: PAINLEVEL: 3=SLIGHT PAIN

## 2017-10-10 ENCOUNTER — PATIENT OUTREACH (OUTPATIENT)
Dept: HEALTH INFORMATION MANAGEMENT | Facility: OTHER | Age: 72
End: 2017-10-10

## 2017-10-10 NOTE — PROGRESS NOTES
Patient discharged on 9/6/2017. [facility] patient advocate assisted with discharge needs including 1 appointments, 1 Cardiology appointment. Of the 1 appointment the patient kept 1. Patient also has 3 future appointments with Primary care physician on 10/30/17, 1/10/18 and Pulmonary on 1/18/18.

## 2017-10-13 ENCOUNTER — NON-PROVIDER VISIT (OUTPATIENT)
Dept: CARDIOLOGY | Facility: MEDICAL CENTER | Age: 72
End: 2017-10-13
Payer: MEDICARE

## 2017-10-13 VITALS
HEART RATE: 44 BPM | DIASTOLIC BLOOD PRESSURE: 59 MMHG | BODY MASS INDEX: 35.03 KG/M2 | SYSTOLIC BLOOD PRESSURE: 116 MMHG | WEIGHT: 218 LBS | HEIGHT: 66 IN

## 2017-10-13 DIAGNOSIS — I21.01 ACUTE ST ELEVATION MYOCARDIAL INFARCTION (STEMI) INVOLVING LEFT MAIN CORONARY ARTERY (HCC): ICD-10-CM

## 2017-10-13 DIAGNOSIS — I48.0 PAROXYSMAL ATRIAL FIBRILLATION (HCC): ICD-10-CM

## 2017-10-13 PROCEDURE — G0423 INTENS CARDIAC REHAB NO EXER: HCPCS | Mod: 59 | Performed by: FAMILY MEDICINE

## 2017-10-13 PROCEDURE — G0422 INTENS CARDIAC REHAB W/EXERC: HCPCS | Performed by: FAMILY MEDICINE

## 2017-10-13 ASSESSMENT — PAIN SCALES - GENERAL: PAINLEVEL: 1=MINIMAL PAIN

## 2017-10-16 ENCOUNTER — NON-PROVIDER VISIT (OUTPATIENT)
Dept: CARDIOLOGY | Facility: MEDICAL CENTER | Age: 72
End: 2017-10-16
Payer: MEDICARE

## 2017-10-16 VITALS
SYSTOLIC BLOOD PRESSURE: 108 MMHG | BODY MASS INDEX: 34.72 KG/M2 | HEART RATE: 46 BPM | DIASTOLIC BLOOD PRESSURE: 63 MMHG | HEIGHT: 66 IN | WEIGHT: 216 LBS

## 2017-10-16 DIAGNOSIS — I21.01 ACUTE ST ELEVATION MYOCARDIAL INFARCTION (STEMI) INVOLVING LEFT MAIN CORONARY ARTERY (HCC): ICD-10-CM

## 2017-10-16 PROCEDURE — G0422 INTENS CARDIAC REHAB W/EXERC: HCPCS | Performed by: FAMILY MEDICINE

## 2017-10-16 PROCEDURE — G0423 INTENS CARDIAC REHAB NO EXER: HCPCS | Mod: 59 | Performed by: FAMILY MEDICINE

## 2017-10-16 ASSESSMENT — PAIN SCALES - GENERAL: PAINLEVEL: 1=MINIMAL PAIN

## 2017-10-18 ENCOUNTER — NON-PROVIDER VISIT (OUTPATIENT)
Dept: CARDIOLOGY | Facility: MEDICAL CENTER | Age: 72
End: 2017-10-18
Payer: MEDICARE

## 2017-10-18 VITALS
WEIGHT: 215.5 LBS | BODY MASS INDEX: 34.63 KG/M2 | SYSTOLIC BLOOD PRESSURE: 110 MMHG | DIASTOLIC BLOOD PRESSURE: 60 MMHG | HEIGHT: 66 IN | HEART RATE: 48 BPM

## 2017-10-18 DIAGNOSIS — I21.01 ACUTE ST ELEVATION MYOCARDIAL INFARCTION (STEMI) INVOLVING LEFT MAIN CORONARY ARTERY (HCC): ICD-10-CM

## 2017-10-18 DIAGNOSIS — I25.5 ISCHEMIC CARDIOMYOPATHY: ICD-10-CM

## 2017-10-18 PROCEDURE — G0423 INTENS CARDIAC REHAB NO EXER: HCPCS | Mod: 59 | Performed by: FAMILY MEDICINE

## 2017-10-18 PROCEDURE — G0422 INTENS CARDIAC REHAB W/EXERC: HCPCS | Performed by: FAMILY MEDICINE

## 2017-10-18 ASSESSMENT — PAIN SCALES - GENERAL: PAINLEVEL: NO PAIN

## 2017-10-20 ENCOUNTER — NON-PROVIDER VISIT (OUTPATIENT)
Dept: CARDIOLOGY | Facility: MEDICAL CENTER | Age: 72
End: 2017-10-20
Payer: MEDICARE

## 2017-10-20 VITALS
WEIGHT: 215 LBS | HEART RATE: 48 BPM | HEIGHT: 66 IN | SYSTOLIC BLOOD PRESSURE: 112 MMHG | DIASTOLIC BLOOD PRESSURE: 54 MMHG | BODY MASS INDEX: 34.55 KG/M2

## 2017-10-20 DIAGNOSIS — I25.5 ISCHEMIC CARDIOMYOPATHY: ICD-10-CM

## 2017-10-20 DIAGNOSIS — I21.01 ACUTE ST ELEVATION MYOCARDIAL INFARCTION (STEMI) INVOLVING LEFT MAIN CORONARY ARTERY (HCC): ICD-10-CM

## 2017-10-20 PROCEDURE — G0423 INTENS CARDIAC REHAB NO EXER: HCPCS | Mod: 59 | Performed by: FAMILY MEDICINE

## 2017-10-20 PROCEDURE — G0422 INTENS CARDIAC REHAB W/EXERC: HCPCS | Performed by: FAMILY MEDICINE

## 2017-10-20 ASSESSMENT — PAIN SCALES - GENERAL: PAINLEVEL: 1=MINIMAL PAIN

## 2017-10-23 ENCOUNTER — NON-PROVIDER VISIT (OUTPATIENT)
Dept: CARDIOLOGY | Facility: MEDICAL CENTER | Age: 72
End: 2017-10-23
Payer: MEDICARE

## 2017-10-23 VITALS
HEIGHT: 66 IN | HEART RATE: 45 BPM | SYSTOLIC BLOOD PRESSURE: 125 MMHG | WEIGHT: 214.5 LBS | BODY MASS INDEX: 34.47 KG/M2 | DIASTOLIC BLOOD PRESSURE: 62 MMHG

## 2017-10-23 DIAGNOSIS — I25.5 ISCHEMIC CARDIOMYOPATHY: ICD-10-CM

## 2017-10-23 DIAGNOSIS — I21.01 ACUTE ST ELEVATION MYOCARDIAL INFARCTION (STEMI) INVOLVING LEFT MAIN CORONARY ARTERY (HCC): ICD-10-CM

## 2017-10-23 PROCEDURE — G0423 INTENS CARDIAC REHAB NO EXER: HCPCS | Mod: 59 | Performed by: FAMILY MEDICINE

## 2017-10-23 PROCEDURE — G0422 INTENS CARDIAC REHAB W/EXERC: HCPCS | Performed by: FAMILY MEDICINE

## 2017-10-23 ASSESSMENT — PAIN SCALES - GENERAL: PAINLEVEL: NO PAIN

## 2017-10-25 ENCOUNTER — NON-PROVIDER VISIT (OUTPATIENT)
Dept: CARDIOLOGY | Facility: MEDICAL CENTER | Age: 72
End: 2017-10-25
Payer: MEDICARE

## 2017-10-25 VITALS
HEART RATE: 51 BPM | WEIGHT: 215 LBS | HEIGHT: 66 IN | SYSTOLIC BLOOD PRESSURE: 112 MMHG | BODY MASS INDEX: 34.55 KG/M2 | DIASTOLIC BLOOD PRESSURE: 62 MMHG

## 2017-10-25 DIAGNOSIS — I25.5 ISCHEMIC CARDIOMYOPATHY: ICD-10-CM

## 2017-10-25 DIAGNOSIS — I21.01 ACUTE ST ELEVATION MYOCARDIAL INFARCTION (STEMI) INVOLVING LEFT MAIN CORONARY ARTERY (HCC): ICD-10-CM

## 2017-10-25 DIAGNOSIS — I48.0 PAROXYSMAL ATRIAL FIBRILLATION (HCC): ICD-10-CM

## 2017-10-25 PROCEDURE — G0423 INTENS CARDIAC REHAB NO EXER: HCPCS | Mod: 59 | Performed by: FAMILY MEDICINE

## 2017-10-25 PROCEDURE — G0422 INTENS CARDIAC REHAB W/EXERC: HCPCS | Performed by: FAMILY MEDICINE

## 2017-10-25 ASSESSMENT — PAIN SCALES - GENERAL: PAINLEVEL: 1=MINIMAL PAIN

## 2017-10-25 NOTE — NON-PROVIDER
Nutrition Consult Note:    Jodi KRYSTIAN Cortez MS RDN LD  Date & Time note created:    10/25/2017   3:20 PM     Referring MD:   Andrew Garner M.D.      Patient ID:   Name:             Shamar Morgan   YOB: 1945  Age:                 72 y.o.  male   MRN:               [id number]        Shamar Morgan is here today for Intensive Cardiac Rehab.  This program includes Nutrition Education and Counseling following the Pritikin guidelines which includes at least 5 servings of vegetables, 4 servings of fruit, 2 or less servings of dairy, at least 5 servings of whole grains, animal fat from fish only and no added salt (goal 1200-1500mg sodium per day).        Current Diet:    Rate your plate score above 58? No    Current Weight:   215 lbs      IBWR:   128-156 lbs                            Current Living SItuation:  W/ spouse, who is extremely supportive.     Comprehensive Nutrition Education Instruction or training leading to in-depth nutrition related knowledge about:   Portion control  Discussed grocery shopping tips and label reading  Meal planning  Culinary instruction, including cooking without salt or added fat  The effect of sodium and saturated fats on heart disease risk factors   Identified ways to self monitor diet - food journaling  Reviewed benefits of exercise and helped pt set exercise goals      VIDEOS VIEWED:  6/16 core videos     Past Medical History:   Past Medical History:   Diagnosis Date   • CAD (coronary artery disease) 4/16/2012   • HTN (hypertension) 4/16/2012   • Hyperglycemia 4/16/2012   • Hyperlipemia 4/16/2012   • Hypertension    • Obesity    • Tachycardia 4/16/2012   • Tonsillitis        Past Surgical History:  Past Surgical History:   Procedure Laterality Date   • CARDIAC CATH, LEFT HEART  09/03/2017    successful thrombectomy  and CHELLE to ostial proximal RCA    • CARPAL TUNNEL RELEASE         Current Outpatient Medications:  Current Outpatient Prescriptions   Medication Sig  Dispense Refill   • amiodarone (CORDARONE) 200 MG Tab Take 1 Tab by mouth every day. 30 Tab 2   • metoprolol (LOPRESSOR) 25 MG Tab Take 1 Tab by mouth 2 Times a Day. 60 Tab 11   • aspirin (ASA) 81 MG Chew Tab chewable tablet Take 1 Tab by mouth every day. 100 Tab 3   • atorvastatin (LIPITOR) 40 MG Tab Take 1 Tab by mouth every day. 30 Tab 6   • clopidogrel (PLAVIX) 75 MG Tab Take 4 tablets on day 1, then 1 tablet daily 90 Tab 3   • losartan (COZAAR) 100 MG Tab TAKE ONE TABLET BY MOUTH ONCE DAILY 90 Each 3   • vitamin D (CHOLECALCIFEROL) 1000 UNIT TABS Take 1,000 Units by mouth every day.       No current facility-administered medications for this visit.          Allergies:  Allergies   Allergen Reactions   • Lipidil [Fenofibrate]      Myalgia   • Penicillins    • Sulfa Drugs        Family History:  Family History   Problem Relation Age of Onset   • Diabetes Mother    • Heart Disease Mother    • Kidney Disease Mother    • Diabetes Sister        Social History:  Social History     Social History   • Marital status:      Spouse name: N/A   • Number of children: N/A   • Years of education: N/A     Occupational History   • Not on file.     Social History Main Topics   • Smoking status: Former Smoker     Packs/day: 3.00     Years: 16.00     Types: Cigarettes     Quit date: 1/1/1980   • Smokeless tobacco: Never Used   • Alcohol use 0.0 oz/week      Comment: wine or whiskey 2-3 a day   • Drug use: No   • Sexual activity: Yes     Partners: Female     Other Topics Concern   • Not on file     Social History Narrative   • No narrative on file       Strengths:  Patient reports a high willingness to change and has already given up alcohol, purchased an air fryer and has radically changed his diet.     Barriers:   Great depression-era parents/raised with a clean your plate/finish food so it doesn't have to be thrown out mentality     Goals set at this visit:  Cut down/eliminate on nuts until he reaches a weight closer to  his IBW range     Follow up:    Follow up with RD on an as-needed basis     Patient would like more information on assessing portion sizes - this will be emailed to him.

## 2017-10-30 ENCOUNTER — NON-PROVIDER VISIT (OUTPATIENT)
Dept: CARDIOLOGY | Facility: MEDICAL CENTER | Age: 72
End: 2017-10-30
Payer: MEDICARE

## 2017-10-30 ENCOUNTER — NON-PROVIDER VISIT (OUTPATIENT)
Dept: CARDIOLOGY | Facility: MEDICAL CENTER | Age: 72
End: 2017-10-30

## 2017-10-30 VITALS
HEART RATE: 48 BPM | SYSTOLIC BLOOD PRESSURE: 116 MMHG | HEIGHT: 66 IN | WEIGHT: 212 LBS | DIASTOLIC BLOOD PRESSURE: 67 MMHG | BODY MASS INDEX: 34.07 KG/M2

## 2017-10-30 DIAGNOSIS — I21.01 ACUTE ST ELEVATION MYOCARDIAL INFARCTION (STEMI) INVOLVING LEFT MAIN CORONARY ARTERY (HCC): ICD-10-CM

## 2017-10-30 PROCEDURE — G0422 INTENS CARDIAC REHAB W/EXERC: HCPCS | Performed by: FAMILY MEDICINE

## 2017-10-30 ASSESSMENT — PAIN SCALES - GENERAL: PAINLEVEL: 1=MINIMAL PAIN

## 2017-10-31 NOTE — NON-PROVIDER
Shamar Morgan attended the following workshop:  Targeting Your Nutrition Priorities     Workshop included education on:  DM, Obesity, HTN and CAD    Lecture was attended and patient questions addressed. The patient will continue workshops and nutrition education.   I will follow-up with patient at one-on-one visit BRODIE

## 2017-11-01 ENCOUNTER — NON-PROVIDER VISIT (OUTPATIENT)
Dept: CARDIOLOGY | Facility: MEDICAL CENTER | Age: 72
End: 2017-11-01
Payer: MEDICARE

## 2017-11-01 VITALS
BODY MASS INDEX: 33.99 KG/M2 | DIASTOLIC BLOOD PRESSURE: 62 MMHG | HEIGHT: 66 IN | SYSTOLIC BLOOD PRESSURE: 113 MMHG | WEIGHT: 211.5 LBS | HEART RATE: 47 BPM

## 2017-11-01 DIAGNOSIS — Z95.1 PRESENCE OF CORONARY ARTERY BYPASS GRAFT STENT: ICD-10-CM

## 2017-11-01 DIAGNOSIS — I21.01 ACUTE ST ELEVATION MYOCARDIAL INFARCTION (STEMI) INVOLVING LEFT MAIN CORONARY ARTERY (HCC): ICD-10-CM

## 2017-11-01 DIAGNOSIS — Z95.5 PRESENCE OF CORONARY ARTERY BYPASS GRAFT STENT: ICD-10-CM

## 2017-11-01 PROCEDURE — G0423 INTENS CARDIAC REHAB NO EXER: HCPCS | Mod: 59 | Performed by: FAMILY MEDICINE

## 2017-11-01 PROCEDURE — G0422 INTENS CARDIAC REHAB W/EXERC: HCPCS | Performed by: FAMILY MEDICINE

## 2017-11-01 ASSESSMENT — PAIN SCALES - GENERAL: PAINLEVEL: 1=MINIMAL PAIN

## 2017-11-02 ENCOUNTER — TELEPHONE (OUTPATIENT)
Dept: PULMONOLOGY | Facility: HOSPICE | Age: 72
End: 2017-11-02

## 2017-11-03 ENCOUNTER — NON-PROVIDER VISIT (OUTPATIENT)
Dept: CARDIOLOGY | Facility: MEDICAL CENTER | Age: 72
End: 2017-11-03
Payer: MEDICARE

## 2017-11-03 VITALS
BODY MASS INDEX: 33.83 KG/M2 | HEART RATE: 49 BPM | SYSTOLIC BLOOD PRESSURE: 102 MMHG | DIASTOLIC BLOOD PRESSURE: 61 MMHG | WEIGHT: 210.5 LBS | HEIGHT: 66 IN

## 2017-11-03 DIAGNOSIS — Z95.828 PRESENCE OF BYPASS GRAFT STENT: ICD-10-CM

## 2017-11-03 PROCEDURE — G0423 INTENS CARDIAC REHAB NO EXER: HCPCS | Mod: 59 | Performed by: FAMILY MEDICINE

## 2017-11-03 PROCEDURE — G0422 INTENS CARDIAC REHAB W/EXERC: HCPCS | Mod: GZ | Performed by: FAMILY MEDICINE

## 2017-11-03 ASSESSMENT — PAIN SCALES - GENERAL: PAINLEVEL: 2=MINIMAL-SLIGHT

## 2017-11-06 ENCOUNTER — NON-PROVIDER VISIT (OUTPATIENT)
Dept: CARDIOLOGY | Facility: MEDICAL CENTER | Age: 72
End: 2017-11-06
Payer: MEDICARE

## 2017-11-06 ENCOUNTER — TELEPHONE (OUTPATIENT)
Dept: PULMONOLOGY | Facility: HOSPICE | Age: 72
End: 2017-11-06

## 2017-11-06 VITALS
SYSTOLIC BLOOD PRESSURE: 110 MMHG | DIASTOLIC BLOOD PRESSURE: 62 MMHG | BODY MASS INDEX: 33.91 KG/M2 | HEIGHT: 66 IN | WEIGHT: 211 LBS | HEART RATE: 51 BPM

## 2017-11-06 DIAGNOSIS — I21.01 ACUTE ST ELEVATION MYOCARDIAL INFARCTION (STEMI) INVOLVING LEFT MAIN CORONARY ARTERY (HCC): ICD-10-CM

## 2017-11-06 DIAGNOSIS — G47.33 OBSTRUCTIVE SLEEP APNEA: ICD-10-CM

## 2017-11-06 PROCEDURE — G0422 INTENS CARDIAC REHAB W/EXERC: HCPCS | Performed by: FAMILY MEDICINE

## 2017-11-06 PROCEDURE — G0423 INTENS CARDIAC REHAB NO EXER: HCPCS | Mod: 59 | Performed by: FAMILY MEDICINE

## 2017-11-06 ASSESSMENT — PAIN SCALES - GENERAL: PAINLEVEL: 2=MINIMAL-SLIGHT

## 2017-11-06 NOTE — TELEPHONE ENCOUNTER
1. Caller Name: Shamar                                         Call Back Number: [phone] (home)         Patient approves a detailed voicemail message: yes    Patient called requesting a new BiPAP machine. The reason for the request is that he is insured by [employer] [facility] and they have changed machine suppliers, he needs a new rx for a new machine sent to [facility]. Patient would also like to know if you would like all the readings from his current machine before he returns it to his current machine supplier and also stated that he has tried contacting other offices regarding this matter.    Please advise.

## 2017-11-07 NOTE — TELEPHONE ENCOUNTER
Julianna Escobedo M.D.   You 38 minutes ago (7:52 AM)      Praveen helton verify.   Thanks. (Routing comment)

## 2017-11-07 NOTE — TELEPHONE ENCOUNTER
Patient called in July and another MA took care of sending an order for mask and supplies and his records to [facility].  I show no record of him asking for the actual machine order.  Please sing for replacement machine

## 2017-11-08 ENCOUNTER — NON-PROVIDER VISIT (OUTPATIENT)
Dept: CARDIOLOGY | Facility: MEDICAL CENTER | Age: 72
End: 2017-11-08
Payer: MEDICARE

## 2017-11-08 VITALS
DIASTOLIC BLOOD PRESSURE: 47 MMHG | HEIGHT: 66 IN | HEART RATE: 50 BPM | BODY MASS INDEX: 33.91 KG/M2 | WEIGHT: 211 LBS | SYSTOLIC BLOOD PRESSURE: 108 MMHG

## 2017-11-08 DIAGNOSIS — Z95.5 HX OF HEART ARTERY STENT: ICD-10-CM

## 2017-11-08 DIAGNOSIS — I21.01 ACUTE ST ELEVATION MYOCARDIAL INFARCTION (STEMI) INVOLVING LEFT MAIN CORONARY ARTERY (HCC): ICD-10-CM

## 2017-11-08 PROCEDURE — G0422 INTENS CARDIAC REHAB W/EXERC: HCPCS | Performed by: FAMILY MEDICINE

## 2017-11-08 PROCEDURE — G0423 INTENS CARDIAC REHAB NO EXER: HCPCS | Mod: 59 | Performed by: FAMILY MEDICINE

## 2017-11-08 ASSESSMENT — PAIN SCALES - GENERAL: PAINLEVEL: 1=MINIMAL PAIN

## 2017-11-08 NOTE — PROGRESS NOTES
Cardiac Rehab Individual Treatment Plan Assessment: 30 day (this is being done late) 17 Session #     18   MRN: [id number]   Allergies: Lipidil [fenofibrate]; Penicillins; and Sulfa drugs   Patient Name: Shamar Morgan : 1945 Risk Stratification: High    Diagnoses:   1. Acute ST elevation myocardial infarction (STEMI) involving left main coronary artery (CMS-HCC)     Age: 72 y.o. Physician: Aldo Lemus M.D.    Date of Event: 17 Specialist: Pascual   Risk Factors:  Hypertension, Hyperlipidemia, Family History, Obesity, Stress, Age, Male > 45   Exercise Nutrition Education Psychosocial   Stages of change Stages of change Stages of change Stages of change   Preparation Preparation Preparation Preparation   Fitness Test Lipids Learning Barriers Outcome Survey Tools   DIST:  (na)  Available: Yes Learning Barriers: Vision FP QOL Overall Score:  (assessed pre and post program)   Max HR:  (na) Date: 17 Family Support PHQ-9:  (assessed pre and post program)   RPE:  (na) Total: 107 mg/dL Yes Nutrition Screen:  (assessed pre and post program)   SPO2:  (na) Tri mg/dL Lives: Spouse Intervention   MET:  (na) HDL: 40 mg/dL Tobacco Use Behavioral Health Consult: No   EF= 50 (2017) LDL: 49 mg/dL History   Smoking Status   • Former Smoker   • Packs/day: 3.00   • Years: 16.00   • Types: Cigarettes   • Quit date: 1980   Smokeless Tobacco   • Never Used      Physician Referral: No   Ambulatory Status Diabetes Smoking Intervention Identifies Stressors: Yes   Fall Risk Assessed: Yes Diabetes: No Smoking Cessation Referral: N/A Drug Intervention: No   Exercise Ambulatory Status Assist Devices: None HbA1C: 5.7 % Date: 17 Ind. Education / Counseling: N/A Education   Exercise Prescription Monitors BS at Home: No Tobacco Adjunct: N/A Psychosocial Education: Coping Techniques, Positive Support System, S/S Depression   Mode: Treadmill, NuStep, UBE, Airdyne   Frequency: na Random BS: 108  "(09/06/2017) Education Intervention Target Goal   Frequency:  3 days/week Weight Management Healthy Heart Education: Class Schedule Given, Cooking School Attended, Dietician One-on-One Meeting Attended, Medications Reviewed, Patient Education Binder Provided, Risk Factors Discussed, Videos Viewed per Joseph, Workshops Attended Assess presence or absence of depression using a valid screening: N/A (assessed pre and post program)   Duration:  30-45 min Weight: 95.7 kg (211 lb) Target Goal Use Stress Management: Yes   Intensity:  11-13 RPE Height: 167.6 cm (5' 5.98\") Complete Tobacco Cessation: Complete Tobacco Cessation: N/A Adverse Events: No   METS:  3.0-5.0 BMI (Calculated): 34.07 Educate / Review and have understanding of cardiac disease prevention: Educate / Review and have understanding of cardiac disease prevention: Yes Unexpected Events: No   Progression:  ^ increments of 1-5 to THR/RPE as tolerated Goal weight: 200lb Medication Compliance: Yes    THR: THR: 20-30 BPM ^Resting HR History   Alcohol Use   • 0.0 oz/week     Comment: wine or whiskey 2-3 a day         Angina with Exercise?  Angina with Exercise: No      Resistance Training?  Resistance Training: Yes      Hypertension      Diagnosed with HTN: Yes Intervention      Resting BP: 108/47 Dietician Consult/Class: Yes      Peak Ex BP:  (na) Nurse/Patient Discussion: Yes     Intervention Diabetes Ed Referral: No     Home Exercise:  Yes Discuss Maintenance /Wt Loss: Yes     Mode: Walk, Bike Attend Cooking School: Yes     Duration: 30-60 min Dietary Goal: >=58 rate your plate, low sodium     Frequency: 7 days active  Education     Education Nutrition Education: Healthy Eating, Sodium Reduction     Equipment Orientation, Exercise Safety, S/S to Report, RPE Scale, Warm Up / Cool Down, Physically Active Target Goal     Target Goal LDL-C < 100 if trig. > 200:  N/A       Start Individual Exercise Rx Yes LDL-C < 70 for high risk patient:  Yes       BP < 140/90 or < " 130/80 if DM or CKD Yes Non HDL-C Should be < 130:  Yes       Aerobic activity 30 + min / day 5 days / wk Yes HbA1C < 7%: Yes         BMI < 25: No (<32)       Notes: Shamar says that he is riding his bike every Tuesday and Thursday in addition to being here.  He also walks his dog for about an hour 7 days a week.  He says that since starting the program he is feeling more flexible, feels like he is in a better mood and overall just feels better.  He says that he is following the Pritikin diet very well, doesn't go out and eat, is making Pritikin recipes, and does not add any salt to foods.  He has also lost 5 lbs and is getting closer to his goal of 200 lbs.  He says that he has some stress because he has a rental and has to replace all the appliances, he has a goal of being able to verbalize and express his stress.

## 2017-11-10 ENCOUNTER — NON-PROVIDER VISIT (OUTPATIENT)
Dept: CARDIOLOGY | Facility: MEDICAL CENTER | Age: 72
End: 2017-11-10
Payer: MEDICARE

## 2017-11-10 VITALS
HEART RATE: 55 BPM | BODY MASS INDEX: 33.67 KG/M2 | WEIGHT: 209.5 LBS | DIASTOLIC BLOOD PRESSURE: 65 MMHG | HEIGHT: 66 IN | SYSTOLIC BLOOD PRESSURE: 117 MMHG

## 2017-11-10 DIAGNOSIS — Z95.5 HX OF HEART ARTERY STENT: ICD-10-CM

## 2017-11-10 DIAGNOSIS — Z95.5 H/O HEART ARTERY STENT: ICD-10-CM

## 2017-11-10 DIAGNOSIS — I21.01 ACUTE ST ELEVATION MYOCARDIAL INFARCTION (STEMI) INVOLVING LEFT MAIN CORONARY ARTERY (HCC): ICD-10-CM

## 2017-11-10 PROCEDURE — G0422 INTENS CARDIAC REHAB W/EXERC: HCPCS | Performed by: FAMILY MEDICINE

## 2017-11-10 PROCEDURE — G0423 INTENS CARDIAC REHAB NO EXER: HCPCS | Mod: 59 | Performed by: FAMILY MEDICINE

## 2017-11-10 ASSESSMENT — PAIN SCALES - GENERAL: PAINLEVEL: 1=MINIMAL PAIN

## 2017-11-10 NOTE — PROGRESS NOTES
Date: 11/9/2017    30 day Individual Treatment Plan review for the [facility] Intensive Cardiac Rehabilitation (ICR) Program.    Shamar Morgan, 72 y.o. male, with qualifying diagnosis/diagnoses of recent Myocardial Infarction.  Co-morbid conditions include Hypertension, Hyperlipidemia, Family History, Obesity, Stress, Age, Male > 45.     Primary Care Provider: Aldo Lemus M.D.     Heart Specialist (s): Dr. Garner    Patient has successfully completed 18 Exercise Sessions, and an appropriate number of corresponding Education Sessions.  Patient is (continues to be) an excellent candidate for the [facility] ([facility]) Program.    I will review the Individual Treatment Plan again at 60 day post-initiation of ICR.    Hiram Haas MD, [facility]  , [facility] ([facility]CR[facility]

## 2017-11-10 NOTE — NON-PROVIDER
Shamar Morgan attending cooking school.  Today  prepared Squash and Quinoa Salad    Patient received handouts and nutrition information regarding the specific recipes.

## 2017-11-13 ENCOUNTER — NON-PROVIDER VISIT (OUTPATIENT)
Dept: CARDIOLOGY | Facility: MEDICAL CENTER | Age: 72
End: 2017-11-13
Payer: MEDICARE

## 2017-11-13 ENCOUNTER — TELEPHONE (OUTPATIENT)
Dept: CARDIOLOGY | Facility: MEDICAL CENTER | Age: 72
End: 2017-11-13

## 2017-11-13 VITALS
HEIGHT: 66 IN | BODY MASS INDEX: 33.91 KG/M2 | WEIGHT: 211 LBS | SYSTOLIC BLOOD PRESSURE: 115 MMHG | DIASTOLIC BLOOD PRESSURE: 62 MMHG | HEART RATE: 53 BPM

## 2017-11-13 DIAGNOSIS — Z95.5 HX OF HEART ARTERY STENT: ICD-10-CM

## 2017-11-13 PROCEDURE — G0422 INTENS CARDIAC REHAB W/EXERC: HCPCS | Performed by: FAMILY MEDICINE

## 2017-11-13 PROCEDURE — G0423 INTENS CARDIAC REHAB NO EXER: HCPCS | Mod: 59 | Performed by: FAMILY MEDICINE

## 2017-11-13 ASSESSMENT — PAIN SCALES - GENERAL: PAINLEVEL: 2=MINIMAL-SLIGHT

## 2017-11-13 NOTE — TELEPHONE ENCOUNTER
"Pt's. BP has been running low at cardiac rehab. Rehab nurse asked him to check his BP's every am. Pt. Started to check BP's on Sat. And he notes the \"irregular\" icon has shown for the past 3 mornings on his home monitor. (IJ=350/53 this am, pulses=40's.) He does not get this icon when he checks his BP in the afternoons or evenings. He denies any sx.  Pt. Goes to [facility] Mon.-Wed.-Fri. He will discuss this with his rehab nurse today at his session.   To Redet.  "

## 2017-11-13 NOTE — TELEPHONE ENCOUNTER
Talked to the patient over the weekend. Patient is asymptomatic. If his heart rate is low at ICR, will reduce his metoprolol to 12.5mg BID.     Thanks,  RT

## 2017-11-13 NOTE — TELEPHONE ENCOUNTER
----- Message from Cata Donis sent at 11/13/2017  9:15 AM PST -----  Regarding: irregular heart beat in the morning   RT/Lala      Patient was recently hospitalized for a heart attack. He said he's have irregular heart beat in the am for the last 3 mornings. He can be reached at [phone].

## 2017-11-14 NOTE — NON-PROVIDER
Shamar Morgan attended the following workshop:  Menu    Workshop included education on:  Healthy options when dining out    Lecture was attended and patient questions addressed. The patient will continue workshops and nutrition education.   I will follow-up with patient at one-on-one visit: BRODIE

## 2017-11-15 ENCOUNTER — NON-PROVIDER VISIT (OUTPATIENT)
Dept: CARDIOLOGY | Facility: MEDICAL CENTER | Age: 72
End: 2017-11-15

## 2017-11-15 ENCOUNTER — TELEPHONE (OUTPATIENT)
Dept: CARDIOLOGY | Facility: MEDICAL CENTER | Age: 72
End: 2017-11-15

## 2017-11-15 DIAGNOSIS — I10 ESSENTIAL HYPERTENSION: ICD-10-CM

## 2017-11-15 NOTE — PROGRESS NOTES
Shamar went into atrial fibrillation during today's session, Dr. Mcallister's RN notified, strips sent for his review.  Patient sent home after he was hydrated with vital signs within normal limits, he was asymptomatic. Dr. Mcallister's office to follow up with patient.

## 2017-11-15 NOTE — TELEPHONE ENCOUNTER
Waleska at [facility] reports pt went into afib while exercising.   On amiodarone, metoprolol.  Pt BP WNL and HR 80's.   She will fax strips for Dr. Mcallister to review.

## 2017-11-16 ENCOUNTER — OFFICE VISIT (OUTPATIENT)
Dept: CARDIOLOGY | Facility: MEDICAL CENTER | Age: 72
End: 2017-11-16
Payer: MEDICARE

## 2017-11-16 VITALS
HEIGHT: 67 IN | OXYGEN SATURATION: 92 % | BODY MASS INDEX: 32.8 KG/M2 | HEART RATE: 48 BPM | WEIGHT: 209 LBS | DIASTOLIC BLOOD PRESSURE: 70 MMHG | SYSTOLIC BLOOD PRESSURE: 118 MMHG

## 2017-11-16 DIAGNOSIS — I21.19 ACUTE MI, INFERIOR WALL, SUBSEQUENT EPISODE OF CARE (HCC): ICD-10-CM

## 2017-11-16 DIAGNOSIS — I10 ESSENTIAL HYPERTENSION: ICD-10-CM

## 2017-11-16 DIAGNOSIS — I48.0 PAROXYSMAL ATRIAL FIBRILLATION (HCC): ICD-10-CM

## 2017-11-16 DIAGNOSIS — Z95.5 STENTED CORONARY ARTERY: ICD-10-CM

## 2017-11-16 PROCEDURE — 99214 OFFICE O/P EST MOD 30 MIN: CPT | Performed by: INTERNAL MEDICINE

## 2017-11-16 ASSESSMENT — ENCOUNTER SYMPTOMS
NEUROLOGICAL NEGATIVE: 1
CHILLS: 1
SHORTNESS OF BREATH: 0
WEAKNESS: 0
VOMITING: 0
PALPITATIONS: 0
BRUISES/BLEEDS EASILY: 1
NAUSEA: 0

## 2017-11-16 NOTE — LETTER
[facility]   [address], [facility] [address]  [place] [address]  Phone: [phone]  Fax: [phone]              Shamar Morgan  1945    Encounter Date: 11/16/2017    Rodrigue Mcallister M.D.          PROGRESS NOTE:  Subjective:   Shamar Morgan is a 72 y.o. male who presents today For evaluation of PAF.  He suffered an acute inferior wall MI on September 3, and had a 4.0×12 mm Synergy stent placed in the ostial RCA. He was also found to have nonobstructive disease in his left main, more distal RCA, and LAD.  In hospital, he had a bout of PAF and spontaneously reverted and he was started on amiodarone.  Yesterday in cardiac rehab, PAF was again noted on the monitor. He was asymptomatic for this. The patient has history of sleep apnea and is compliant with his mask.  He's noted some easy bruisability on his dual antiplatelet therapy.  Past Medical History:   Diagnosis Date   • CAD (coronary artery disease) 4/16/2012   • HTN (hypertension) 4/16/2012   • Hyperglycemia 4/16/2012   • Hyperlipemia 4/16/2012   • Hypertension    • Obesity    • Tachycardia 4/16/2012   • Tonsillitis      Past Surgical History:   Procedure Laterality Date   • CARDIAC CATH, LEFT HEART  09/03/2017    successful thrombectomy  and CHELLE to ostial proximal RCA    • CARPAL TUNNEL RELEASE       Family History   Problem Relation Age of Onset   • Diabetes Mother    • Heart Disease Mother    • Kidney Disease Mother    • Diabetes Sister      History   Smoking Status   • Former Smoker   • Packs/day: 3.00   • Years: 16.00   • Types: Cigarettes   • Quit date: 1/1/1980   Smokeless Tobacco   • Never Used     Allergies   Allergen Reactions   • Lipidil [Fenofibrate]      Myalgia   • Penicillins    • Sulfa Drugs      Outpatient Encounter Prescriptions as of 11/16/2017   Medication Sig Dispense Refill   • metoprolol (LOPRESSOR) 25 MG Tab Take 0.5 Tabs by mouth 2 Times a Day. 60 Tab 11   • amiodarone (CORDARONE) 200  "MG Tab Take 1 Tab by mouth every day. 30 Tab 2   • aspirin (ASA) 81 MG Chew Tab chewable tablet Take 1 Tab by mouth every day. 100 Tab 3   • atorvastatin (LIPITOR) 40 MG Tab Take 1 Tab by mouth every day. 30 Tab 6   • clopidogrel (PLAVIX) 75 MG Tab Take 4 tablets on day 1, then 1 tablet daily 90 Tab 3   • losartan (COZAAR) 100 MG Tab TAKE ONE TABLET BY MOUTH ONCE DAILY 90 Each 3   • vitamin D (CHOLECALCIFEROL) 1000 UNIT TABS Take 1,000 Units by mouth every day.       No facility-administered encounter medications on file as of 11/16/2017.      Review of Systems   Constitutional: Positive for chills. Negative for malaise/fatigue.   Respiratory: Negative for shortness of breath.    Cardiovascular: Negative for chest pain, palpitations and leg swelling.   Gastrointestinal: Negative for nausea and vomiting.   Neurological: Negative.  Negative for weakness.   Endo/Heme/Allergies: Bruises/bleeds easily.        Objective:   /70   Pulse (!) 48   Ht 1.702 m (5' 7\")   Wt 94.8 kg (209 lb)   SpO2 92%   BMI 32.73 kg/m²      Physical Exam   Constitutional: He is oriented to person, place, and time. He appears well-developed and well-nourished. No distress.   HENT:   Head: Atraumatic.   Eyes: Conjunctivae and EOM are normal. Pupils are equal, round, and reactive to light.   Neck: Neck supple. No JVD present.   Cardiovascular: Regular rhythm.  Bradycardia present.    No murmur heard.  Heart rate 48   Pulmonary/Chest: Effort normal and breath sounds normal. No respiratory distress. He has no wheezes. He has no rales.   Abdominal: Soft. There is no tenderness.   Obese   Musculoskeletal: He exhibits no edema.   Neurological: He is alert and oriented to person, place, and time.   Skin: Skin is warm and dry. He is not diaphoretic.       Assessment:     1. Stented coronary artery     2. Acute MI, inferior wall, subsequent episode of care (CMS-HCC)     3. Essential hypertension     4. Paroxysmal atrial fibrillation (CMS-HCC)    "       Medical Decision Making:  Today's Assessment / Status / Plan:   He is now 2 months post inferior MI and is participating in cardiac rehab. No angina. LDL the time of his MI was only 49.  The patient has had documented PAF twice. I suspect this is related to his sleep apnea. He is on amiodarone. A 4 week SEEQ cardiac monitor will be placed to determine the extent of his PAF and whether he needs to be on anticoagulation. Return in 3 months, sooner if problems arise. He will be notified of the results of the monitoring period otherwise continue current medications. He is bradycardic but asymptomatic for this.      Aldo Lemus M.D.  [address] [place] [id number]  VIA In Basket

## 2017-11-16 NOTE — PROGRESS NOTES
Subjective:   Shamar Morgan is a 72 y.o. male who presents today For evaluation of PAF.  He suffered an acute inferior wall MI on September 3, and had a 4.0×12 mm Synergy stent placed in the ostial RCA. He was also found to have nonobstructive disease in his left main, more distal RCA, and LAD.  In hospital, he had a bout of PAF and spontaneously reverted and he was started on amiodarone.  Yesterday in cardiac rehab, PAF was again noted on the monitor. He was asymptomatic for this. The patient has history of sleep apnea and is compliant with his mask.  He's noted some easy bruisability on his dual antiplatelet therapy.  Past Medical History:   Diagnosis Date   • CAD (coronary artery disease) 4/16/2012   • HTN (hypertension) 4/16/2012   • Hyperglycemia 4/16/2012   • Hyperlipemia 4/16/2012   • Hypertension    • Obesity    • Tachycardia 4/16/2012   • Tonsillitis      Past Surgical History:   Procedure Laterality Date   • CARDIAC CATH, LEFT HEART  09/03/2017    successful thrombectomy  and CHELLE to ostial proximal RCA    • CARPAL TUNNEL RELEASE       Family History   Problem Relation Age of Onset   • Diabetes Mother    • Heart Disease Mother    • Kidney Disease Mother    • Diabetes Sister      History   Smoking Status   • Former Smoker   • Packs/day: 3.00   • Years: 16.00   • Types: Cigarettes   • Quit date: 1/1/1980   Smokeless Tobacco   • Never Used     Allergies   Allergen Reactions   • Lipidil [Fenofibrate]      Myalgia   • Penicillins    • Sulfa Drugs      Outpatient Encounter Prescriptions as of 11/16/2017   Medication Sig Dispense Refill   • metoprolol (LOPRESSOR) 25 MG Tab Take 0.5 Tabs by mouth 2 Times a Day. 60 Tab 11   • amiodarone (CORDARONE) 200 MG Tab Take 1 Tab by mouth every day. 30 Tab 2   • aspirin (ASA) 81 MG Chew Tab chewable tablet Take 1 Tab by mouth every day. 100 Tab 3   • atorvastatin (LIPITOR) 40 MG Tab Take 1 Tab by mouth every day. 30 Tab 6   • clopidogrel (PLAVIX) 75 MG Tab Take 4 tablets  "on day 1, then 1 tablet daily 90 Tab 3   • losartan (COZAAR) 100 MG Tab TAKE ONE TABLET BY MOUTH ONCE DAILY 90 Each 3   • vitamin D (CHOLECALCIFEROL) 1000 UNIT TABS Take 1,000 Units by mouth every day.       No facility-administered encounter medications on file as of 11/16/2017.      Review of Systems   Constitutional: Positive for chills. Negative for malaise/fatigue.   Respiratory: Negative for shortness of breath.    Cardiovascular: Negative for chest pain, palpitations and leg swelling.   Gastrointestinal: Negative for nausea and vomiting.   Neurological: Negative.  Negative for weakness.   Endo/Heme/Allergies: Bruises/bleeds easily.        Objective:   /70   Pulse (!) 48   Ht 1.702 m (5' 7\")   Wt 94.8 kg (209 lb)   SpO2 92%   BMI 32.73 kg/m²     Physical Exam   Constitutional: He is oriented to person, place, and time. He appears well-developed and well-nourished. No distress.   HENT:   Head: Atraumatic.   Eyes: Conjunctivae and EOM are normal. Pupils are equal, round, and reactive to light.   Neck: Neck supple. No JVD present.   Cardiovascular: Regular rhythm.  Bradycardia present.    No murmur heard.  Heart rate 48   Pulmonary/Chest: Effort normal and breath sounds normal. No respiratory distress. He has no wheezes. He has no rales.   Abdominal: Soft. There is no tenderness.   Obese   Musculoskeletal: He exhibits no edema.   Neurological: He is alert and oriented to person, place, and time.   Skin: Skin is warm and dry. He is not diaphoretic.       Assessment:     1. Stented coronary artery     2. Acute MI, inferior wall, subsequent episode of care (CMS-HCC)     3. Essential hypertension     4. Paroxysmal atrial fibrillation (CMS-HCC)         Medical Decision Making:  Today's Assessment / Status / Plan:   He is now 2 months post inferior MI and is participating in cardiac rehab. No angina. LDL the time of his MI was only 49.  The patient has had documented PAF twice. I suspect this is related to " his sleep apnea. He is on amiodarone. A 4 week SEEQ cardiac monitor will be placed to determine the extent of his PAF and whether he needs to be on anticoagulation. Return in 3 months, sooner if problems arise. He will be notified of the results of the monitoring period otherwise continue current medications. He is bradycardic but asymptomatic for this.

## 2017-11-17 ENCOUNTER — NON-PROVIDER VISIT (OUTPATIENT)
Dept: CARDIOLOGY | Facility: MEDICAL CENTER | Age: 72
End: 2017-11-17
Payer: MEDICARE

## 2017-11-17 VITALS
DIASTOLIC BLOOD PRESSURE: 54 MMHG | HEIGHT: 67 IN | WEIGHT: 208.5 LBS | HEART RATE: 55 BPM | SYSTOLIC BLOOD PRESSURE: 111 MMHG | BODY MASS INDEX: 32.72 KG/M2

## 2017-11-17 DIAGNOSIS — Z95.5 STENTED CORONARY ARTERY: ICD-10-CM

## 2017-11-17 PROCEDURE — G0422 INTENS CARDIAC REHAB W/EXERC: HCPCS | Performed by: FAMILY MEDICINE

## 2017-11-17 PROCEDURE — G0423 INTENS CARDIAC REHAB NO EXER: HCPCS | Mod: 59 | Performed by: FAMILY MEDICINE

## 2017-11-17 ASSESSMENT — PAIN SCALES - GENERAL: PAINLEVEL: 1=MINIMAL PAIN

## 2017-11-18 NOTE — NON-PROVIDER
Shamar Morgan attending cooking school.  Today  prepared Vegetarian Chili     Patient received handouts and nutrition information regarding the specific recipes.

## 2017-11-20 ENCOUNTER — NON-PROVIDER VISIT (OUTPATIENT)
Dept: CARDIOLOGY | Facility: MEDICAL CENTER | Age: 72
End: 2017-11-20
Payer: MEDICARE

## 2017-11-20 VITALS
SYSTOLIC BLOOD PRESSURE: 111 MMHG | HEIGHT: 67 IN | WEIGHT: 207 LBS | DIASTOLIC BLOOD PRESSURE: 59 MMHG | HEART RATE: 47 BPM | BODY MASS INDEX: 32.49 KG/M2

## 2017-11-20 DIAGNOSIS — Z95.5 STENTED CORONARY ARTERY: ICD-10-CM

## 2017-11-20 DIAGNOSIS — I21.19 ACUTE MI, INFERIOR WALL, SUBSEQUENT EPISODE OF CARE (HCC): ICD-10-CM

## 2017-11-20 PROCEDURE — G0422 INTENS CARDIAC REHAB W/EXERC: HCPCS | Performed by: FAMILY MEDICINE

## 2017-11-20 PROCEDURE — G0423 INTENS CARDIAC REHAB NO EXER: HCPCS | Mod: 59 | Performed by: FAMILY MEDICINE

## 2017-11-20 ASSESSMENT — PAIN SCALES - GENERAL: PAINLEVEL: NO PAIN

## 2017-11-21 ENCOUNTER — TELEPHONE (OUTPATIENT)
Dept: CARDIOLOGY | Facility: MEDICAL CENTER | Age: 72
End: 2017-11-21

## 2017-11-21 NOTE — TELEPHONE ENCOUNTER
Message sent KAYLEY lucero to follow on Pt's monitor.  Stacie MALIK RN     ----- Message from China Lutz sent at 11/21/2017 11:13 AM PST -----  Regarding: no contact yet about monitor  Contact: [phone]  MALGORZATA/stacie    Pt calling to report he has not had any contact about the monitor RS talked to pt about on 11/16.   RS told pt to anticipate a phone call.    Please call Shamar at [phone].

## 2017-11-22 ENCOUNTER — NON-PROVIDER VISIT (OUTPATIENT)
Dept: CARDIOLOGY | Facility: MEDICAL CENTER | Age: 72
End: 2017-11-22
Payer: MEDICARE

## 2017-11-22 VITALS
WEIGHT: 206.5 LBS | SYSTOLIC BLOOD PRESSURE: 116 MMHG | HEART RATE: 51 BPM | BODY MASS INDEX: 32.41 KG/M2 | DIASTOLIC BLOOD PRESSURE: 64 MMHG | HEIGHT: 67 IN

## 2017-11-22 DIAGNOSIS — Z95.5 STENTED CORONARY ARTERY: ICD-10-CM

## 2017-11-22 PROCEDURE — G0423 INTENS CARDIAC REHAB NO EXER: HCPCS | Mod: 59 | Performed by: FAMILY MEDICINE

## 2017-11-22 PROCEDURE — G0422 INTENS CARDIAC REHAB W/EXERC: HCPCS | Performed by: FAMILY MEDICINE

## 2017-11-22 ASSESSMENT — PAIN SCALES - GENERAL: PAINLEVEL: 2=MINIMAL-SLIGHT

## 2017-11-22 NOTE — PROGRESS NOTES
"  Cardiac Rehab Individual Treatment Plan Assessment: 60 day 17 Session #     23   MRN: [id number]   Allergies: Lipidil [fenofibrate]; Penicillins; and Sulfa drugs   Patient Name: Shamar Morgan : 1945 Risk Stratification: High    Diagnoses:   1. Stented coronary artery     Age: 72 y.o. Physician: Aldo Lemus M.D.    Date of Event: 17 Specialist: Pascual   Risk Factors:  Hypertension, Hyperlipidemia, Family History, Obesity, Stress, Age, Male > 45   Exercise Nutrition Education Psychosocial   Stages of change Stages of change Stages of change Stages of change   Preparation Preparation Preparation Preparation   Fitness Test Lipids Learning Barriers Outcome Survey Tools   DIST:  (No Data)  Available: No new Learning Barriers: Vision FP QOL Overall Score:  (Assessed pre and post program)   Max HR:  (No Data) Date: 17 Family Support PHQ-9:  (Assessed pre and post program)   RPE:  (No Data) Total: 107 mg/dL Yes Nutrition Screen:  (Assessed pre and post program)   SPO2:  (No Data) Tri mg/dL Lives: Spouse Intervention   MET:  (No Data) HDL: 40 mg/dL Tobacco Use Behavioral Health Consult: No   EF= 50 LDL: 49 mg/dL History   Smoking Status   • Former Smoker   • Packs/day: 3.00   • Years: 16.00   • Types: Cigarettes   • Quit date: 1980   Smokeless Tobacco   • Never Used      Physician Referral: No   Ambulatory Status Diabetes Smoking Intervention Identifies Stressors: Yes (Attended \"Healthy Mindsets\" lecture)   Fall Risk Assessed: Yes Diabetes: No Smoking Cessation Referral: N/A Drug Intervention: No   Exercise Ambulatory Status Assist Devices: None HbA1C: 5.7 % Date: 17 Ind. Education / Counseling: N/A Education   Exercise Prescription Monitors BS at Home: No Tobacco Adjunct: N/A Psychosocial Education: Coping Techniques, Positive Support System, S/S Depression   Mode: Treadmill, NuStep, UBE, Airdyne   Frequency: na Random BS: 108 (2017) Education Intervention Target Goal " "  Frequency:  3 days/week Weight Management Healthy Heart Education: Class Schedule Given, Cooking School Attended, Dietician One-on-One Meeting Attended, Medications Reviewed, Patient Education Binder Provided, Risk Factors Discussed, Videos Viewed per Joseph, Workshops Attended Assess presence or absence of depression using a valid screening: N/A (Assessed pre and post program)   Duration:  30 minutes plus weights, warm-up, and cool-down Weight: 93.7 kg (206 lb 8 oz) Target Goal Use Stress Management: Yes (Watched Healthy Mindset videos and attended class.)   Intensity:  11-13 RPE Height: 170.2 cm (5' 7.01\") Complete Tobacco Cessation: Complete Tobacco Cessation: N/A Adverse Events: No   METS:  5.0-7.0 BMI (Calculated): 32.33 Educate / Review and have understanding of cardiac disease prevention: Educate / Review and have understanding of cardiac disease prevention: Yes Unexpected Events: No   Progression:  ^ increments of 1-5 to THR/RPE as tolerated Goal weight: 200lbs Medication Compliance: Yes    THR: THR: Other (see comment) (40-50 beats above resting HR) History   Alcohol Use   • 0.0 oz/week     Comment: wine or whiskey 2-3 a day         Angina with Exercise?  Angina with Exercise: No      Resistance Training?  Resistance Training: Yes      Hypertension      Diagnosed with HTN: Yes Intervention      Resting BP: 116/64 Dietician Consult/Class: Yes      Peak Ex BP:  (No Data) Nurse/Patient Discussion: Yes     Intervention Diabetes Ed Referral: No     Home Exercise:  Yes Discuss Maintenance /Wt Loss: Yes     Mode: Walk, Bike Attend Cooking School: Yes     Duration: 1hr on walks and 30 minutes on the stationary bike Dietary Goal: > 58 or = on \"Rate Your Plate\"     Frequency: 7 days active Education     Education Nutrition Education: Healthy Eating, Sodium Reduction     Equipment Orientation, Exercise Safety, S/S to Report, RPE Scale, Warm Up / Cool Down, Physically Active Target Goal     Target Goal LDL-C < 100 " "if trig. > 200:  N/A       Start Individual Exercise Rx Yes LDL-C < 70 for high risk patient:  Yes       BP < 140/90 or < 130/80 if DM or CKD Yes Non HDL-C Should be < 130:  Yes       Aerobic activity 30 + min / day 5 days / wk Yes HbA1C < 7%: Yes         BMI < 25: No       Notes: Shamar is having great progress at [facility]. Shamar is on his way to reaching his 200 lb goal weight. Shamar has been walking for an hour a day on the days he isn't at [facility] or he rides his bike for thirty minutes. When talking to Shamar about making some exercise goals he expressed that he didn't want to add any at this time. He is enjoying working out at home and to add, a flexibility, stretching or strength routine would be too much. Shamar is currently renovating his rental and that, \"is a lot of physical activity on its own!\" Shamar says he is interested in losing more weight before the next 30 day ITP and that is his fitness goal right now. Staff talked to Shamar about getting a watch that can track his physical activity, heartrate, and intensity so things don't get stagnate at home. Shamar says he is doing really well on the Pritikin nutrition plan. Shamar says for breakfast he has Pritikin friendly oatmeal, for lunch a salad with lemon/vinegar dressing and for dinner some potatoes, cauliflower and steamed veggies. Shamar says, \"I am really surprised at all the new foods I eat and I like, like cauliflower!\" Shamar admitted that he doesn't drink enough water throughout the day and so he made a goal for the next thirty days to start having a glass of water before each meal. Shamar had a stress management goal of being more vocal about the things that stress him and to be more self-aware. Shamar said he has been following through with that goal and has seen some improvement with his stress levels and his personal relationships. Overall, Shamar is loving the progress he has made, he has lost 9lbs, and he has more energy.                                "

## 2017-11-23 NOTE — PROGRESS NOTES
Date: 11/22/2017    60 day Individual Treatment Plan review for the [facility] Intensive Cardiac Rehabilitation (ICR) Program.    Shamar Morgan, 72 y.o. male, with qualifying diagnosis/diagnoses of recent Myocardial Infarction.  Co-morbid conditions include Hypertension, Hyperlipidemia, Family History, Obesity, Stress, Age, Male > 45.     Primary Care Provider: Aldo Lemus M.D.    Patient has successfully completed 23 Exercise Sessions, and an appropriate number of corresponding Education Sessions.  Patient is (continues to be) an excellent candidate for the [facility] Intensive Cardiac Rehabilitation (ICR) Program.    I will review the Individual Treatment Plan again at 90 day post-initiation of ICR.    Hiram Haas MD, [facility]  , [facility] ([facility]

## 2017-11-27 ENCOUNTER — NON-PROVIDER VISIT (OUTPATIENT)
Dept: CARDIOLOGY | Facility: MEDICAL CENTER | Age: 72
End: 2017-11-27
Payer: MEDICARE

## 2017-11-27 VITALS
BODY MASS INDEX: 32.65 KG/M2 | HEIGHT: 67 IN | HEART RATE: 69 BPM | WEIGHT: 208 LBS | SYSTOLIC BLOOD PRESSURE: 116 MMHG | DIASTOLIC BLOOD PRESSURE: 61 MMHG

## 2017-11-27 DIAGNOSIS — Z95.5 STENTED CORONARY ARTERY: ICD-10-CM

## 2017-11-27 PROCEDURE — G0422 INTENS CARDIAC REHAB W/EXERC: HCPCS | Performed by: FAMILY MEDICINE

## 2017-11-27 PROCEDURE — G0423 INTENS CARDIAC REHAB NO EXER: HCPCS | Mod: 59 | Performed by: FAMILY MEDICINE

## 2017-11-27 ASSESSMENT — PAIN SCALES - GENERAL: PAINLEVEL: 2=MINIMAL-SLIGHT

## 2017-11-29 ENCOUNTER — NON-PROVIDER VISIT (OUTPATIENT)
Dept: CARDIOLOGY | Facility: MEDICAL CENTER | Age: 72
End: 2017-11-29
Payer: MEDICARE

## 2017-11-29 VITALS
HEIGHT: 67 IN | HEART RATE: 49 BPM | BODY MASS INDEX: 32.8 KG/M2 | SYSTOLIC BLOOD PRESSURE: 107 MMHG | WEIGHT: 209 LBS | DIASTOLIC BLOOD PRESSURE: 59 MMHG

## 2017-11-29 DIAGNOSIS — Z95.5 STENTED CORONARY ARTERY: ICD-10-CM

## 2017-11-29 PROCEDURE — G0422 INTENS CARDIAC REHAB W/EXERC: HCPCS | Performed by: FAMILY MEDICINE

## 2017-11-29 PROCEDURE — G0423 INTENS CARDIAC REHAB NO EXER: HCPCS | Mod: 59 | Performed by: FAMILY MEDICINE

## 2017-11-29 ASSESSMENT — PAIN SCALES - GENERAL: PAINLEVEL: 2=MINIMAL-SLIGHT

## 2017-12-01 ENCOUNTER — NON-PROVIDER VISIT (OUTPATIENT)
Dept: CARDIOLOGY | Facility: MEDICAL CENTER | Age: 72
End: 2017-12-01
Payer: MEDICARE

## 2017-12-01 VITALS
DIASTOLIC BLOOD PRESSURE: 65 MMHG | BODY MASS INDEX: 32.65 KG/M2 | HEART RATE: 45 BPM | SYSTOLIC BLOOD PRESSURE: 116 MMHG | WEIGHT: 208 LBS | HEIGHT: 67 IN

## 2017-12-01 DIAGNOSIS — Z95.5 STENTED CORONARY ARTERY: ICD-10-CM

## 2017-12-01 PROCEDURE — G0423 INTENS CARDIAC REHAB NO EXER: HCPCS | Mod: 59 | Performed by: FAMILY MEDICINE

## 2017-12-01 PROCEDURE — G0422 INTENS CARDIAC REHAB W/EXERC: HCPCS | Performed by: FAMILY MEDICINE

## 2017-12-01 ASSESSMENT — PAIN SCALES - GENERAL: PAINLEVEL: 1=MINIMAL PAIN

## 2017-12-02 NOTE — NON-PROVIDER
Shamar Morgan attending cooking school from 2:30-3:30  Today  prepared Pumpkin Pancakes     Patient received handouts and nutrition information regarding the specific recipes.

## 2017-12-04 ENCOUNTER — TELEPHONE (OUTPATIENT)
Dept: CARDIOLOGY | Facility: MEDICAL CENTER | Age: 72
End: 2017-12-04

## 2017-12-04 ENCOUNTER — NON-PROVIDER VISIT (OUTPATIENT)
Dept: CARDIOLOGY | Facility: MEDICAL CENTER | Age: 72
End: 2017-12-04
Attending: INTERNAL MEDICINE
Payer: MEDICARE

## 2017-12-04 DIAGNOSIS — I42.9 CARDIOMYOPATHY, UNSPECIFIED TYPE (HCC): ICD-10-CM

## 2017-12-04 DIAGNOSIS — I48.0 PAROXYSMAL ATRIAL FIBRILLATION (HCC): ICD-10-CM

## 2017-12-06 ENCOUNTER — NON-PROVIDER VISIT (OUTPATIENT)
Dept: CARDIOLOGY | Facility: MEDICAL CENTER | Age: 72
End: 2017-12-06
Payer: MEDICARE

## 2017-12-06 VITALS
WEIGHT: 207 LBS | DIASTOLIC BLOOD PRESSURE: 64 MMHG | SYSTOLIC BLOOD PRESSURE: 122 MMHG | HEIGHT: 67 IN | HEART RATE: 47 BPM | BODY MASS INDEX: 32.49 KG/M2

## 2017-12-06 DIAGNOSIS — Z95.5 STENTED CORONARY ARTERY: ICD-10-CM

## 2017-12-06 PROCEDURE — G0423 INTENS CARDIAC REHAB NO EXER: HCPCS | Mod: 59 | Performed by: FAMILY MEDICINE

## 2017-12-06 PROCEDURE — G0422 INTENS CARDIAC REHAB W/EXERC: HCPCS | Performed by: FAMILY MEDICINE

## 2017-12-08 ENCOUNTER — NON-PROVIDER VISIT (OUTPATIENT)
Dept: CARDIOLOGY | Facility: MEDICAL CENTER | Age: 72
End: 2017-12-08
Payer: MEDICARE

## 2017-12-08 VITALS
WEIGHT: 208 LBS | HEART RATE: 51 BPM | DIASTOLIC BLOOD PRESSURE: 63 MMHG | BODY MASS INDEX: 32.65 KG/M2 | SYSTOLIC BLOOD PRESSURE: 129 MMHG | HEIGHT: 67 IN

## 2017-12-08 DIAGNOSIS — Z95.5 STENTED CORONARY ARTERY: ICD-10-CM

## 2017-12-08 PROCEDURE — G0423 INTENS CARDIAC REHAB NO EXER: HCPCS | Mod: 59 | Performed by: FAMILY MEDICINE

## 2017-12-08 PROCEDURE — G0422 INTENS CARDIAC REHAB W/EXERC: HCPCS | Performed by: FAMILY MEDICINE

## 2017-12-08 ASSESSMENT — PAIN SCALES - GENERAL: PAINLEVEL: 1=MINIMAL PAIN

## 2017-12-11 ENCOUNTER — NON-PROVIDER VISIT (OUTPATIENT)
Dept: CARDIOLOGY | Facility: MEDICAL CENTER | Age: 72
End: 2017-12-11
Payer: MEDICARE

## 2017-12-11 VITALS
HEIGHT: 67 IN | WEIGHT: 208.5 LBS | BODY MASS INDEX: 32.72 KG/M2 | HEART RATE: 47 BPM | SYSTOLIC BLOOD PRESSURE: 122 MMHG | DIASTOLIC BLOOD PRESSURE: 64 MMHG

## 2017-12-11 DIAGNOSIS — Z95.5 STENTED CORONARY ARTERY: ICD-10-CM

## 2017-12-11 PROCEDURE — G0422 INTENS CARDIAC REHAB W/EXERC: HCPCS | Performed by: FAMILY MEDICINE

## 2017-12-11 PROCEDURE — G0423 INTENS CARDIAC REHAB NO EXER: HCPCS | Mod: 59 | Performed by: FAMILY MEDICINE

## 2017-12-11 ASSESSMENT — PAIN SCALES - GENERAL: PAINLEVEL: 1=MINIMAL PAIN

## 2017-12-12 NOTE — PROGRESS NOTES
Cardiac Rehab Individual Treatment Plan Assessment: 90 day 12/11/17 Session #     29   MRN: [id number]   Allergies: Lipidil [fenofibrate]; Penicillins; and Sulfa drugs   Patient Name: Shamar Morgan : 1945 Risk Stratification: High    Diagnoses:   1. Stented coronary artery     Age: 72 y.o. Physician: Aldo Lemus M.D.    Date of Event: 17 Specialist: Pascual   Risk Factors:  Hypertension, Hyperlipidemia, Family History, Obesity, Stress, Age, Male > 45   Exercise Nutrition Education Psychosocial   Stages of change Stages of change Stages of change Stages of change   Preparation Preparation Preparation Preparation   Fitness Test Lipids Learning Barriers Outcome Survey Tools   DIST:  (na)  Available: No new Learning Barriers: Vision FP QOL Overall Score:  (assessed pre and post program)   Max HR:  (na) Date: 17 Family Support PHQ-9:  (assessed pre and post program)   RPE:  (na) Total: 107 mg/dL Yes Nutrition Screen:  (assessed pre and post program)   SPO2:  (na) Tri mg/dL Lives: Spouse Intervention   MET:  (na) HDL: 40 mg/dL Tobacco Use Behavioral Health Consult: No   EF= 50 LDL: 49 mg/dL History   Smoking Status   • Former Smoker   • Packs/day: 3.00   • Years: 16.00   • Types: Cigarettes   • Quit date: 1980   Smokeless Tobacco   • Never Used      Physician Referral: No   Ambulatory Status Diabetes Smoking Intervention Identifies Stressors: Yes   Fall Risk Assessed: Yes Diabetes: No Smoking Cessation Referral: N/A Drug Intervention: No   Exercise Ambulatory Status Assist Devices: None HbA1C: 5.7 % Date: 17 Ind. Education / Counseling: N/A Education   Exercise Prescription Monitors BS at Home: No Tobacco Adjunct: N/A Psychosocial Education: Coping Techniques, Positive Support System, S/S Depression   Mode: Treadmill, NuStep, UBE, Airdyne   Frequency: na Random BS: 108 Education Intervention Target Goal   Frequency:  3 days/week Weight Management Healthy Heart Education: Class  "Schedule Given, Cooking School Attended, Dietician One-on-One Meeting Attended, Medications Reviewed, Patient Education Binder Provided, Risk Factors Discussed, Videos Viewed per Joseph, Workshops Attended Assess presence or absence of depression using a valid screening:  (assessed pre and post program)   Duration:  30-45 Weight: 94.6 kg (208 lb 8 oz) Target Goal Use Stress Management: Yes   Intensity:  11-13 RPE Height: 170.2 cm (5' 7.01\") Complete Tobacco Cessation: Complete Tobacco Cessation: N/A Adverse Events: No   METS:  3.0-5.0 BMI (Calculated): 32.65 Educate / Review and have understanding of cardiac disease prevention: Educate / Review and have understanding of cardiac disease prevention: Yes Unexpected Events: No   Progression:  ^ increments of 1-5 to THR/RPE as tolerated (40-50 above rest) Goal weight: 200 Medication Compliance: Yes    THR: THR: Other (see comment) History   Alcohol Use   • 0.0 oz/week     Comment: wine or whiskey 2-3 a day         Angina with Exercise?  Angina with Exercise: No      Resistance Training?  Resistance Training: Yes      Hypertension      Diagnosed with HTN: Yes Intervention      Resting BP: 122/64 Dietician Consult/Class: Yes      Peak Ex BP:  (assessed pre and post program) Nurse/Patient Discussion: Yes     Intervention Diabetes Ed Referral: No     Home Exercise:  Yes Discuss Maintenance /Wt Loss: Yes     Mode: Walk, Bike, Gym, Other Attend Cooking School: Yes     Duration: 30-45 minutes  Dietary Goal: >=58 rate your plate, low sodium     Frequency: 7 days active Education     Education Nutrition Education: Healthy Eating, Sodium Reduction     Equipment Orientation, Exercise Safety, S/S to Report, RPE Scale, Warm Up / Cool Down, Physically Active Target Goal     Target Goal LDL-C < 100 if trig. > 200:  N/A       Start Individual Exercise Rx Yes LDL-C < 70 for high risk patient:  Yes       BP < 140/90 or < 130/80 if DM or CKD Yes Non HDL-C Should be < 130:  Yes     "   Aerobic activity 30 + min / day 5 days / wk Yes HbA1C < 7%: Yes         BMI < 25: No (<30)       Notes: Exercise current: 30 minutes of aerobic exercise in ICR, 30 minutes of biking or walking at home for a total of 5-6 days active.  Goal: maintain.  Progress: meeting goals.  Nutrition current: Following the pritikin program most days.  Goal: continue to learn more through the program.  Progress: meeting goals.  Stress current: says that this has not changed he still feels some stress.  Goal: to continue using stress management techniques he has learned here.  Progress: attending class and watching videos and doing better.

## 2017-12-13 NOTE — PROGRESS NOTES
Date: 12/12/2017    90 day Individual Treatment Plan review for the [facility] Intensive Cardiac Rehabilitation (ICR) Program.    Shamar Morgan, 72 y.o. male, with qualifying diagnosis/diagnoses of recent Myocardial Infarction.  Co-morbid conditions include Hypertension, Hyperlipidemia, Family History, Obesity, Stress, Age, Male > 45.     Primary Care Provider: Aldo Lemus M.D.    Heart Specialist (s): Dr. Garner    Patient has successfully completed 29 Exercise Sessions, and an appropriate number of corresponding Education Sessions.  Patient is (continues to be) an excellent candidate for the [facility].    I will review the Individual Treatment Plan again at 120 days post-initiation of ICR.    Hiram Haas MD, [facility]  , [facility]

## 2017-12-18 ENCOUNTER — NON-PROVIDER VISIT (OUTPATIENT)
Dept: CARDIOLOGY | Facility: MEDICAL CENTER | Age: 72
End: 2017-12-18
Payer: MEDICARE

## 2017-12-18 VITALS
HEIGHT: 67 IN | HEART RATE: 50 BPM | SYSTOLIC BLOOD PRESSURE: 118 MMHG | BODY MASS INDEX: 32.96 KG/M2 | WEIGHT: 210 LBS | DIASTOLIC BLOOD PRESSURE: 66 MMHG

## 2017-12-18 DIAGNOSIS — Z95.5 STENTED CORONARY ARTERY: ICD-10-CM

## 2017-12-18 PROCEDURE — G0423 INTENS CARDIAC REHAB NO EXER: HCPCS | Mod: 59 | Performed by: FAMILY MEDICINE

## 2017-12-18 PROCEDURE — G0422 INTENS CARDIAC REHAB W/EXERC: HCPCS | Performed by: FAMILY MEDICINE

## 2017-12-18 ASSESSMENT — PAIN SCALES - GENERAL: PAINLEVEL: 2=MINIMAL-SLIGHT

## 2017-12-18 NOTE — NON-PROVIDER
Shamar Morgan attended: Exercise Workshop from 2:30-3:30 pm     The topic was: Improving Performance      Patient received handouts regarding the specific exercise information.

## 2017-12-20 ENCOUNTER — NON-PROVIDER VISIT (OUTPATIENT)
Dept: CARDIOLOGY | Facility: MEDICAL CENTER | Age: 72
End: 2017-12-20
Payer: MEDICARE

## 2017-12-20 VITALS
HEART RATE: 45 BPM | DIASTOLIC BLOOD PRESSURE: 61 MMHG | BODY MASS INDEX: 32.96 KG/M2 | HEIGHT: 67 IN | SYSTOLIC BLOOD PRESSURE: 123 MMHG | WEIGHT: 210 LBS

## 2017-12-20 DIAGNOSIS — Z95.5 STENTED CORONARY ARTERY: ICD-10-CM

## 2017-12-20 PROCEDURE — G0422 INTENS CARDIAC REHAB W/EXERC: HCPCS | Performed by: FAMILY MEDICINE

## 2017-12-20 PROCEDURE — G0423 INTENS CARDIAC REHAB NO EXER: HCPCS | Mod: 59 | Performed by: FAMILY MEDICINE

## 2017-12-20 ASSESSMENT — PAIN SCALES - GENERAL: PAINLEVEL: NO PAIN

## 2017-12-22 ENCOUNTER — TELEPHONE (OUTPATIENT)
Dept: CARDIOLOGY | Facility: MEDICAL CENTER | Age: 72
End: 2017-12-22

## 2017-12-22 ENCOUNTER — NON-PROVIDER VISIT (OUTPATIENT)
Dept: CARDIOLOGY | Facility: MEDICAL CENTER | Age: 72
End: 2017-12-22

## 2017-12-22 NOTE — PROGRESS NOTES
Shamar came into today to exercise and was placed on the monitor as usual.  His BP was 127/68 HR 72.  Monitor revealed that he was in atrial fibrillation.  Dr. Mcallister's office called, spoke with RN who asked that the patient not exercise and go home and they would call him.  Shamar was updated, and agreeable to stop exercising at this time.  Patient pending call from 's office.

## 2017-12-27 ENCOUNTER — NON-PROVIDER VISIT (OUTPATIENT)
Dept: CARDIOLOGY | Facility: MEDICAL CENTER | Age: 72
End: 2017-12-27
Payer: MEDICARE

## 2017-12-27 ENCOUNTER — HOSPITAL ENCOUNTER (OUTPATIENT)
Dept: LAB | Facility: MEDICAL CENTER | Age: 72
End: 2017-12-27
Attending: NURSE PRACTITIONER
Payer: MEDICARE

## 2017-12-27 ENCOUNTER — TELEPHONE (OUTPATIENT)
Dept: CARDIOLOGY | Facility: MEDICAL CENTER | Age: 72
End: 2017-12-27

## 2017-12-27 VITALS
BODY MASS INDEX: 32.88 KG/M2 | WEIGHT: 209.5 LBS | HEIGHT: 67 IN | DIASTOLIC BLOOD PRESSURE: 62 MMHG | SYSTOLIC BLOOD PRESSURE: 109 MMHG | HEART RATE: 45 BPM

## 2017-12-27 DIAGNOSIS — E78.5 DYSLIPIDEMIA: ICD-10-CM

## 2017-12-27 DIAGNOSIS — Z95.5 STENTED CORONARY ARTERY: ICD-10-CM

## 2017-12-27 DIAGNOSIS — I25.5 ISCHEMIC CARDIOMYOPATHY: ICD-10-CM

## 2017-12-27 LAB
ALBUMIN SERPL BCP-MCNC: 4.1 G/DL (ref 3.2–4.9)
ALBUMIN/GLOB SERPL: 1.7 G/DL
ALP SERPL-CCNC: 48 U/L (ref 30–99)
ALT SERPL-CCNC: 43 U/L (ref 2–50)
ANION GAP SERPL CALC-SCNC: 7 MMOL/L (ref 0–11.9)
AST SERPL-CCNC: 25 U/L (ref 12–45)
BILIRUB SERPL-MCNC: 0.5 MG/DL (ref 0.1–1.5)
BUN SERPL-MCNC: 23 MG/DL (ref 8–22)
CALCIUM SERPL-MCNC: 10 MG/DL (ref 8.5–10.5)
CHLORIDE SERPL-SCNC: 106 MMOL/L (ref 96–112)
CHOLEST SERPL-MCNC: 77 MG/DL (ref 100–199)
CO2 SERPL-SCNC: 27 MMOL/L (ref 20–33)
CREAT SERPL-MCNC: 1.05 MG/DL (ref 0.5–1.4)
GFR SERPL CREATININE-BSD FRML MDRD: >60 ML/MIN/1.73 M 2
GLOBULIN SER CALC-MCNC: 2.4 G/DL (ref 1.9–3.5)
GLUCOSE SERPL-MCNC: 99 MG/DL (ref 65–99)
HDLC SERPL-MCNC: 38 MG/DL
LDLC SERPL CALC-MCNC: 28 MG/DL
POTASSIUM SERPL-SCNC: 4.3 MMOL/L (ref 3.6–5.5)
PROT SERPL-MCNC: 6.5 G/DL (ref 6–8.2)
SODIUM SERPL-SCNC: 140 MMOL/L (ref 135–145)
TRIGL SERPL-MCNC: 53 MG/DL (ref 0–149)

## 2017-12-27 PROCEDURE — 80061 LIPID PANEL: CPT

## 2017-12-27 PROCEDURE — G0423 INTENS CARDIAC REHAB NO EXER: HCPCS | Mod: 59 | Performed by: FAMILY MEDICINE

## 2017-12-27 PROCEDURE — 80053 COMPREHEN METABOLIC PANEL: CPT

## 2017-12-27 PROCEDURE — 36415 COLL VENOUS BLD VENIPUNCTURE: CPT

## 2017-12-27 PROCEDURE — G0422 INTENS CARDIAC REHAB W/EXERC: HCPCS | Performed by: FAMILY MEDICINE

## 2017-12-27 ASSESSMENT — PAIN SCALES - GENERAL: PAINLEVEL: 2=MINIMAL-SLIGHT

## 2017-12-27 NOTE — TELEPHONE ENCOUNTER
Dr. Mcallister, see Lala's 12/22 note. Waleska RN @ [facility] states he was back in SR today & feeling good, so he exercised. She's asking for formal clearance to continue with sessions & OK to exercise if in AF.

## 2017-12-27 NOTE — TELEPHONE ENCOUNTER
----- Message from Liya Nguyen sent at 12/27/2017  2:21 PM PST -----  Regarding: Waleska with Intensive Cardiac Rehab wants call back soon  RS/Guillermina    Waleska with Intensive Cardiac Rehab would please like a call back at [phone]. Pt is there now for his session and Waleska is requesting clearance for resume exercising reports he feel's fine.

## 2017-12-28 ENCOUNTER — TELEPHONE (OUTPATIENT)
Dept: CARDIOLOGY | Facility: MEDICAL CENTER | Age: 72
End: 2017-12-28

## 2017-12-28 NOTE — TELEPHONE ENCOUNTER
----- Message from China Lutz sent at 12/28/2017  1:26 PM PST -----  Regarding: zio patch results  Contact: [phone]  MALGORZATA/dora Stephens calling for zio patch results, please call Shamar at [phone]

## 2017-12-28 NOTE — TELEPHONE ENCOUNTER
----- Message from China Lutz sent at 12/28/2017  1:26 PM PST -----  Regarding: zio patch results  Contact: [phone]  MALGORZATA/dora Stephens calling for zio patch results, please call Shamar at [phone]

## 2017-12-28 NOTE — TELEPHONE ENCOUNTER
Spoke with pt. To give preliminary results (A fib burden of 18%). Pt. Is asymptomatic with A Fib. He takes Amiodarone 200mg QD, Lopressor 12.5mg BID, Plavix, ASA 81mg QD.   To Dr. Mcallister.

## 2017-12-29 ENCOUNTER — NON-PROVIDER VISIT (OUTPATIENT)
Dept: CARDIOLOGY | Facility: MEDICAL CENTER | Age: 72
End: 2017-12-29
Payer: MEDICARE

## 2017-12-29 VITALS
SYSTOLIC BLOOD PRESSURE: 116 MMHG | WEIGHT: 206 LBS | HEART RATE: 50 BPM | DIASTOLIC BLOOD PRESSURE: 59 MMHG | HEIGHT: 67 IN | BODY MASS INDEX: 32.33 KG/M2

## 2017-12-29 DIAGNOSIS — Z95.5 STENTED CORONARY ARTERY: ICD-10-CM

## 2017-12-29 PROCEDURE — G0423 INTENS CARDIAC REHAB NO EXER: HCPCS | Mod: 59 | Performed by: FAMILY MEDICINE

## 2017-12-29 PROCEDURE — G0422 INTENS CARDIAC REHAB W/EXERC: HCPCS | Performed by: FAMILY MEDICINE

## 2017-12-29 ASSESSMENT — PAIN SCALES - GENERAL: PAINLEVEL: NO PAIN

## 2018-01-02 PROCEDURE — 0298T PR EXT ECG > 48HR TO 21 DAY REVIEW AND INTERPRETATN: CPT | Performed by: INTERNAL MEDICINE

## 2018-01-02 PROCEDURE — 0296T PR EXT ECG > 48HR TO 21 DAY RCRD W/CONECT INTL RCRD: CPT | Performed by: INTERNAL MEDICINE

## 2018-01-03 NOTE — TELEPHONE ENCOUNTER
Per Dr. Mcallister: pt. To schedule consult with EP for medication changes. Report shows A Fib 18% burden.  Called pt. To advise. Scheduled appointment with Dr. Mayberry 1-4-18 at 8:40am.

## 2018-01-04 ENCOUNTER — NON-PROVIDER VISIT (OUTPATIENT)
Dept: CARDIOLOGY | Facility: MEDICAL CENTER | Age: 73
End: 2018-01-04
Payer: MEDICARE

## 2018-01-04 ENCOUNTER — TELEPHONE (OUTPATIENT)
Dept: CARDIOLOGY | Facility: MEDICAL CENTER | Age: 73
End: 2018-01-04

## 2018-01-04 ENCOUNTER — OFFICE VISIT (OUTPATIENT)
Dept: CARDIOLOGY | Facility: MEDICAL CENTER | Age: 73
End: 2018-01-04
Payer: MEDICARE

## 2018-01-04 VITALS
WEIGHT: 209 LBS | BODY MASS INDEX: 32.8 KG/M2 | DIASTOLIC BLOOD PRESSURE: 60 MMHG | HEIGHT: 67 IN | SYSTOLIC BLOOD PRESSURE: 113 MMHG | HEART RATE: 46 BPM

## 2018-01-04 VITALS
HEART RATE: 50 BPM | DIASTOLIC BLOOD PRESSURE: 60 MMHG | SYSTOLIC BLOOD PRESSURE: 120 MMHG | OXYGEN SATURATION: 95 % | HEIGHT: 67 IN | BODY MASS INDEX: 32.96 KG/M2 | WEIGHT: 210 LBS

## 2018-01-04 DIAGNOSIS — I48.0 PAF (PAROXYSMAL ATRIAL FIBRILLATION) (HCC): ICD-10-CM

## 2018-01-04 DIAGNOSIS — Z95.5 STENTED CORONARY ARTERY: ICD-10-CM

## 2018-01-04 DIAGNOSIS — I10 ESSENTIAL HYPERTENSION: ICD-10-CM

## 2018-01-04 DIAGNOSIS — E66.09 NON MORBID OBESITY DUE TO EXCESS CALORIES: ICD-10-CM

## 2018-01-04 DIAGNOSIS — I21.19 ACUTE MI, INFERIOR WALL, SUBSEQUENT EPISODE OF CARE (HCC): ICD-10-CM

## 2018-01-04 DIAGNOSIS — E78.5 DYSLIPIDEMIA: ICD-10-CM

## 2018-01-04 DIAGNOSIS — I48.0 PAROXYSMAL ATRIAL FIBRILLATION (HCC): ICD-10-CM

## 2018-01-04 DIAGNOSIS — I48.91 ATRIAL FIBRILLATION, UNSPECIFIED TYPE (HCC): ICD-10-CM

## 2018-01-04 LAB — EKG IMPRESSION: NORMAL

## 2018-01-04 PROCEDURE — G0422 INTENS CARDIAC REHAB W/EXERC: HCPCS | Performed by: FAMILY MEDICINE

## 2018-01-04 PROCEDURE — 93000 ELECTROCARDIOGRAM COMPLETE: CPT | Performed by: INTERNAL MEDICINE

## 2018-01-04 PROCEDURE — G0423 INTENS CARDIAC REHAB NO EXER: HCPCS | Mod: 59 | Performed by: FAMILY MEDICINE

## 2018-01-04 PROCEDURE — 99204 OFFICE O/P NEW MOD 45 MIN: CPT | Performed by: INTERNAL MEDICINE

## 2018-01-04 RX ORDER — ATORVASTATIN CALCIUM 20 MG/1
20 TABLET, FILM COATED ORAL DAILY
Qty: 90 TAB | Refills: 3 | OUTPATIENT
Start: 2018-01-04 | End: 2018-04-25 | Stop reason: SDUPTHER

## 2018-01-04 RX ORDER — LOSARTAN POTASSIUM 100 MG/1
50 TABLET ORAL DAILY
Qty: 90 EACH | Refills: 3
Start: 2018-01-04 | End: 2018-07-09

## 2018-01-04 RX ORDER — AMIODARONE HYDROCHLORIDE 200 MG/1
100 TABLET ORAL DAILY
Qty: 30 TAB | Refills: 2
Start: 2018-01-04 | End: 2018-01-16 | Stop reason: SDUPTHER

## 2018-01-04 ASSESSMENT — ENCOUNTER SYMPTOMS
NERVOUS/ANXIOUS: 0
CHILLS: 0
DEPRESSION: 0
VOMITING: 0
EYE DISCHARGE: 0
BRUISES/BLEEDS EASILY: 0
MYALGIAS: 0
NAUSEA: 0
COUGH: 0
FEVER: 0
ABDOMINAL PAIN: 0
LOSS OF CONSCIOUSNESS: 0
PALPITATIONS: 0
HEMOPTYSIS: 0
SPEECH CHANGE: 0
EYE PAIN: 0
BLURRED VISION: 0
WHEEZING: 0

## 2018-01-04 ASSESSMENT — PAIN SCALES - GENERAL: PAINLEVEL: 1=MINIMAL PAIN

## 2018-01-04 NOTE — TELEPHONE ENCOUNTER
----- Message from Rodrigue Mcallister M.D. sent at 1/3/2018 12:43 PM PST -----  LDL is only 38, cna reduce atorvastatin to 20 mg a day.

## 2018-01-04 NOTE — PROGRESS NOTES
Subjective:   Shamar Morgan is a 72 y.o. male who presents today being seen in consul;t on request of Dr Mcallister secondary to asymptomatic atrial fib. Seen 18 % on zio. No chest pain oir SOB. Occassional dizziness on standing. On Plavix and ASA. No syncope or presyncope.    Past Medical History:   Diagnosis Date   • CAD (coronary artery disease) 4/16/2012   • HTN (hypertension) 4/16/2012   • Hyperglycemia 4/16/2012   • Hyperlipemia 4/16/2012   • Hypertension    • Obesity    • Tachycardia 4/16/2012   • Tonsillitis      Past Surgical History:   Procedure Laterality Date   • CARDIAC CATH, LEFT HEART  09/03/2017    successful thrombectomy  and CHELLE to ostial proximal RCA    • CARPAL TUNNEL RELEASE       Family History   Problem Relation Age of Onset   • Diabetes Mother    • Heart Disease Mother    • Kidney Disease Mother    • Diabetes Sister      History   Smoking Status   • Former Smoker   • Packs/day: 3.00   • Years: 16.00   • Types: Cigarettes   • Quit date: 1/1/1980   Smokeless Tobacco   • Never Used     Allergies   Allergen Reactions   • Lipidil [Fenofibrate]      Myalgia   • Penicillins    • Sulfa Drugs      Outpatient Encounter Prescriptions as of 1/4/2018   Medication Sig Dispense Refill   • losartan (COZAAR) 100 MG Tab Take 0.5 Tabs by mouth every day. 90 Each 3   • amiodarone (CORDARONE) 200 MG Tab Take 0.5 Tabs by mouth every day. 30 Tab 2   • rivaroxaban (XARELTO) 20 MG Tab tablet Take 1 Tab by mouth with dinner. 90 Tab 3   • metoprolol (LOPRESSOR) 25 MG Tab Take 0.5 Tabs by mouth 2 Times a Day. 60 Tab 11   • atorvastatin (LIPITOR) 40 MG Tab Take 1 Tab by mouth every day. 30 Tab 6   • clopidogrel (PLAVIX) 75 MG Tab Take 4 tablets on day 1, then 1 tablet daily 90 Tab 3   • vitamin D (CHOLECALCIFEROL) 1000 UNIT TABS Take 1,000 Units by mouth every day.     • [DISCONTINUED] amiodarone (CORDARONE) 200 MG Tab Take 1 Tab by mouth every day. 30 Tab 2   • [DISCONTINUED] aspirin (ASA) 81 MG Chew Tab chewable tablet  "Take 1 Tab by mouth every day. 100 Tab 3   • [DISCONTINUED] losartan (COZAAR) 100 MG Tab TAKE ONE TABLET BY MOUTH ONCE DAILY 90 Each 3     No facility-administered encounter medications on file as of 1/4/2018.      Review of Systems   Constitutional: Negative for chills and fever.   HENT: Negative for congestion.    Eyes: Negative for blurred vision, pain and discharge.   Respiratory: Negative for cough, hemoptysis and wheezing.    Cardiovascular: Negative for chest pain and palpitations.   Gastrointestinal: Negative for abdominal pain, nausea and vomiting.   Musculoskeletal: Negative for joint pain and myalgias.   Skin: Negative for itching and rash.   Neurological: Negative for speech change and loss of consciousness.   Endo/Heme/Allergies: Does not bruise/bleed easily.   Psychiatric/Behavioral: Negative for depression. The patient is not nervous/anxious.    All other systems reviewed and are negative.       Objective:   /60   Pulse (!) 50   Ht 1.702 m (5' 7.01\")   Wt 95.3 kg (210 lb)   SpO2 95%   BMI 32.88 kg/m²     Physical Exam   Constitutional: He is oriented to person, place, and time. He appears well-developed. No distress.   HENT:   Mouth/Throat: Mucous membranes are normal.   Eyes: Conjunctivae and EOM are normal.   Neck: No JVD present. No tracheal deviation present. No thyroid mass and no thyromegaly present.   Cardiovascular: Normal rate, regular rhythm and intact distal pulses.    No murmur heard.  Pulmonary/Chest: Effort normal and breath sounds normal. No respiratory distress. He exhibits no tenderness.   Abdominal: Soft. There is no tenderness.   Musculoskeletal: Normal range of motion. He exhibits no edema.   Neurological: He is alert and oriented to person, place, and time. He has normal strength. He displays no tremor.   Skin: Skin is warm and dry. He is not diaphoretic.   Psychiatric: He has a normal mood and affect. His behavior is normal.   Vitals reviewed.      Assessment:     1. " Atrial fibrillation, unspecified type (CMS-HCC)  EKG    TSH+FREE T4   2. PAF (paroxysmal atrial fibrillation) (CMS-HCC)  amiodarone (CORDARONE) 200 MG Tab    TSH+FREE T4   3. Acute MI, inferior wall, subsequent episode of care (CMS-HCC)     4. Dyslipidemia     5. Paroxysmal atrial fibrillation (CMS-HCC)     6. Stented coronary artery     7. Non morbid obesity due to excess calories     8. Essential hypertension         Medical Decision Making:  Today's Assessment / Status / Plan:   1. Anticoagulation discussed staring NOAC and DC ASA. Continue with plavix. Discussed with Ary regarding his stent and he is ok with this change.  2. PAF asymptomatic check TFT's. Cut amio to 100 mg. No change in AA meds.  3. Orthostatic hypotension cut cozaar to 50 mg.  4. F/U with Dr Mcallister.

## 2018-01-04 NOTE — TELEPHONE ENCOUNTER
Called pt. To advise. New RX called to pharmacy.          Received: Yesterday   Message Contents   Rodrigue Mcallister M.D.  Lala Steele L.P.N.             LDL is only 38, cna reduce atorvastatin to 20 mg a day.

## 2018-01-05 ENCOUNTER — NON-PROVIDER VISIT (OUTPATIENT)
Dept: CARDIOLOGY | Facility: MEDICAL CENTER | Age: 73
End: 2018-01-05
Payer: MEDICARE

## 2018-01-05 VITALS
HEIGHT: 67 IN | BODY MASS INDEX: 32.41 KG/M2 | DIASTOLIC BLOOD PRESSURE: 66 MMHG | SYSTOLIC BLOOD PRESSURE: 127 MMHG | WEIGHT: 206.5 LBS | HEART RATE: 48 BPM

## 2018-01-05 DIAGNOSIS — Z95.5 STENTED CORONARY ARTERY: ICD-10-CM

## 2018-01-05 PROCEDURE — G0423 INTENS CARDIAC REHAB NO EXER: HCPCS | Mod: 59 | Performed by: FAMILY MEDICINE

## 2018-01-05 PROCEDURE — G0422 INTENS CARDIAC REHAB W/EXERC: HCPCS | Performed by: FAMILY MEDICINE

## 2018-01-05 ASSESSMENT — PAIN SCALES - GENERAL: PAINLEVEL: 2=MINIMAL-SLIGHT

## 2018-01-06 NOTE — NON-PROVIDER
Shamar Morgan attending cooking school from  2:30-3:30 pm.  Today  prepared Borscht.    Patient received handouts and nutrition information regarding the specific recipes.

## 2018-01-08 ENCOUNTER — NON-PROVIDER VISIT (OUTPATIENT)
Dept: CARDIOLOGY | Facility: MEDICAL CENTER | Age: 73
End: 2018-01-08
Payer: MEDICARE

## 2018-01-08 VITALS
HEART RATE: 45 BPM | WEIGHT: 204 LBS | HEIGHT: 67 IN | SYSTOLIC BLOOD PRESSURE: 129 MMHG | DIASTOLIC BLOOD PRESSURE: 59 MMHG | BODY MASS INDEX: 32.02 KG/M2

## 2018-01-08 DIAGNOSIS — Z95.5 STENTED CORONARY ARTERY: ICD-10-CM

## 2018-01-08 PROCEDURE — G0423 INTENS CARDIAC REHAB NO EXER: HCPCS | Mod: 59 | Performed by: FAMILY MEDICINE

## 2018-01-08 PROCEDURE — G0422 INTENS CARDIAC REHAB W/EXERC: HCPCS | Performed by: FAMILY MEDICINE

## 2018-01-08 ASSESSMENT — PATIENT HEALTH QUESTIONNAIRE - PHQ9
5. POOR APPETITE OR OVEREATING: 0
6. FEELING BAD ABOUT YOURSELF - OR THAT YOU ARE A FAILURE OR HAVE LET YOURSELF OR YOUR FAMILY DOWN: 0
SUM OF ALL RESPONSES TO PHQ QUESTIONS 1-9: 1
9. THOUGHTS THAT YOU WOULD BE BETTER OFF DEAD, OR OF HURTING YOURSELF: 0
SUM OF ALL RESPONSES TO PHQ QUESTIONS 1-9: 1
SUM OF ALL RESPONSES TO PHQ9 QUESTIONS 1 AND 2: 0
8. MOVING OR SPEAKING SO SLOWLY THAT OTHER PEOPLE COULD HAVE NOTICED. OR THE OPPOSITE, BEING SO FIGETY OR RESTLESS THAT YOU HAVE BEEN MOVING AROUND A LOT MORE THAN USUAL: 0
2. FEELING DOWN, DEPRESSED, IRRITABLE, OR HOPELESS: 0
4. FEELING TIRED OR HAVING LITTLE ENERGY: 1
1. LITTLE INTEREST OR PLEASURE IN DOING THINGS: 0
3. TROUBLE FALLING OR STAYING ASLEEP OR SLEEPING TOO MUCH: 0
7. TROUBLE CONCENTRATING ON THINGS, SUCH AS READING THE NEWSPAPER OR WATCHING TELEVISION: 0

## 2018-01-08 ASSESSMENT — PAIN SCALES - GENERAL: PAINLEVEL: 1=MINIMAL PAIN

## 2018-01-09 NOTE — PROGRESS NOTES
Cardiac Rehab Individual Treatment Plan Assessment: Discharge 18 Session #     29   MRN: [id number]   Allergies: Lipidil [fenofibrate]; Penicillins; and Sulfa drugs   Patient Name: Shamar Morgan : 1945 Risk Stratification: High    Diagnoses:   1. Stented coronary artery     Age: 72 y.o. Physician: Aldo Lemus M.D.    Date of Event: 17 Specialist: Pascual   Risk Factors:  Hypertension, Hyperlipidemia, Family History, Obesity, Stress, Age, Male > 45   Exercise Nutrition Education Psychosocial   Stages of change Stages of change Stages of change Stages of change   Action Action Action Action   Fitness Test Lipids Learning Barriers Outcome Survey Tools   DIST:  (na)  Available: Yes Learning Barriers: Vision FP QOL Overall Score: 28.78 (assessed pre and post program)   Max HR:  (na) Date: 17 Family Support PHQ-9: 1 (assessed pre and post program)   RPE:  (na) Total: 77 mg/dL Yes Nutrition Screen: 65 % (assessed pre and post program)   SPO2:  (na) Tri mg/dL Lives: Spouse Intervention   MET:  (na) HDL: 38 mg/dL Tobacco Use Behavioral Health Consult: No   EF= 50 LDL: 28 mg/dL History   Smoking Status   • Former Smoker   • Packs/day: 3.00   • Years: 16.00   • Types: Cigarettes   • Quit date: 1980   Smokeless Tobacco   • Never Used      Physician Referral: No   Ambulatory Status Diabetes Smoking Intervention Identifies Stressors: Yes   Fall Risk Assessed: Yes Diabetes: No Smoking Cessation Referral: N/A Drug Intervention: No   Exercise Ambulatory Status Assist Devices: None HbA1C: 5.7 % Date: 17 Ind. Education / Counseling: N/A Education   Exercise Prescription Monitors BS at Home: No Tobacco Adjunct: N/A Psychosocial Education: Coping Techniques, Positive Support System, S/S Depression   Mode: Treadmill, NuStep, UBE, Airdyne   Frequency: na Random BS: 99 (2017) Education Intervention Target Goal   Frequency:  3 days/week Weight Management Healthy Heart Education: Cooking  "School Attended, Dietician One-on-One Meeting Attended, Medications Reviewed, Patient Education Binder Provided, Risk Factors Discussed, Videos Viewed per Joseph, Workshops Attended Assess presence or absence of depression using a valid screening: Yes (assessed pre and post program)   Duration:  30-45 Weight: 92.5 kg (204 lb) Target Goal Use Stress Management: Yes   Intensity:  11-13 RPE Height: 170.2 cm (5' 7.01\") Complete Tobacco Cessation: Complete Tobacco Cessation: N/A Adverse Events: No   METS:  3.0-5.0 BMI (Calculated): 31.94 Educate / Review and have understanding of cardiac disease prevention: Educate / Review and have understanding of cardiac disease prevention: Yes Unexpected Events: No   Progression:  ^ increments of 1-5 to THR/RPE as tolerated (40-50 above rest) Goal weight: 200 Medication Compliance: Yes    THR: THR: Other (see comment) History   Alcohol Use   • 0.0 oz/week     Comment: wine or whiskey 2-3 a day         Angina with Exercise?  Angina with Exercise: No      Resistance Training?  Resistance Training: Yes      Hypertension      Diagnosed with HTN: Yes Intervention      Resting BP: 129/59 Dietician Consult/Class: Yes      Peak Ex BP:  (assessed pre and post program) Nurse/Patient Discussion: Yes     Intervention Diabetes Ed Referral: No     Home Exercise:  Yes Discuss Maintenance /Wt Loss: Yes     Mode: Walk, Bike, Gym, Other Attend Cooking School: Yes     Duration: 30-45 minutes  Dietary Goal: >=58 rate your plate, low sodium     Frequency: 7 days active Education     Education Nutrition Education: Healthy Eating, Sodium Reduction     Exercise Safety, S/S to Report, RPE Scale, Warm Up / Cool Down, Physically Active Target Goal     Target Goal LDL-C < 100 if trig. > 200:  N/A       Start Individual Exercise Rx Yes LDL-C < 70 for high risk patient:  Yes       BP < 140/90 or < 130/80 if DM or CKD Yes Non HDL-C Should be < 130:  Yes       Aerobic activity 30 + min / day 5 days / wk Yes HbA1C " "< 7%: Yes         BMI < 25: No (<30)       Notes: Exercise Current:  Shamar walks or does the [employer] Bike at home for 60 minutes, 7 days a week.  Goal:  Maintain 30 to 60 minutes of Aerobics, and add home weight routine.  Progress:  Graduated to home exercise program.  Nutrition Current:  Uses Air Fryer for foods and no oils.  Shamar is not adding salt or fats.  Cooks Pritikin Recipes.  Goals:  Utilize his binder for referral to Pritikin Program at home.  Progress:  Graduated to home exercise program.  Stress Current:  Shamar states he has less stress and scored a 1 on his PHQ-9.  Goal:  Continue to use Healthy mindset knowledge from videos and lectures.  Progress:  Shamar states that his stress is so much less since starting the ICR program.  \"I feel that the Pritikin Program has helped me to achieve a healthier lifestyle.                                                                      "

## 2018-01-09 NOTE — NON-PROVIDER
Shamar Morgan attended the following workshop from 2:30-3:30 pm.  Fueling a Healthy Body.       Lecture was attended and patient questions addressed. The patient will continue workshops and nutrition education.

## 2018-01-10 ENCOUNTER — OFFICE VISIT (OUTPATIENT)
Dept: MEDICAL GROUP | Facility: MEDICAL CENTER | Age: 73
End: 2018-01-10
Payer: MEDICARE

## 2018-01-10 VITALS
TEMPERATURE: 97.5 F | HEIGHT: 67 IN | DIASTOLIC BLOOD PRESSURE: 56 MMHG | HEART RATE: 50 BPM | BODY MASS INDEX: 32.49 KG/M2 | RESPIRATION RATE: 16 BRPM | SYSTOLIC BLOOD PRESSURE: 116 MMHG | OXYGEN SATURATION: 93 % | WEIGHT: 207 LBS

## 2018-01-10 DIAGNOSIS — I48.0 PAROXYSMAL ATRIAL FIBRILLATION (HCC): ICD-10-CM

## 2018-01-10 DIAGNOSIS — I10 ESSENTIAL HYPERTENSION: ICD-10-CM

## 2018-01-10 DIAGNOSIS — G47.33 OSA (OBSTRUCTIVE SLEEP APNEA): ICD-10-CM

## 2018-01-10 DIAGNOSIS — R73.02 IGT (IMPAIRED GLUCOSE TOLERANCE): ICD-10-CM

## 2018-01-10 PROBLEM — I21.19 ACUTE MI, INFERIOR WALL, SUBSEQUENT EPISODE OF CARE (HCC): Status: RESOLVED | Noted: 2017-09-04 | Resolved: 2018-01-10

## 2018-01-10 PROCEDURE — 99214 OFFICE O/P EST MOD 30 MIN: CPT | Performed by: INTERNAL MEDICINE

## 2018-01-10 RX ORDER — LOSARTAN POTASSIUM 50 MG/1
50 TABLET ORAL DAILY
COMMUNITY
End: 2018-04-04 | Stop reason: SDUPTHER

## 2018-01-10 NOTE — PROGRESS NOTES
Date: 1/9/2018    DIscharge Individual Treatment Plan review for the formerly [facility] Intensive Cardiac Rehabilitation (ICR) Program.    Shamar Morgan, 72 y.o. male, with qualifying diagnosis/diagnoses of recent Myocardial Infarction.  Co-morbid conditions include Hypertension, Hyperlipidemia, Family History, Obesity, Stress, Age, Male > 45.     Primary Care Provider: Aldo Lemus M.D.     Heart Specialist (s): Dr. Garner    Patient has successfully completed 29 Exercise Sessions, and an appropriate number of corresponding Education Sessions.  Patient has been an excellent candidate for the [facility] ([facility]) Program.    Hiram Haas MD, [facility]  , [facility] ([facility]CR) Program

## 2018-01-10 NOTE — PROGRESS NOTES
CC: Follow-up heart attack and blood sugars.     HPI:   Shamar presents today with the following.    1. Paroxysmal atrial fibrillation (CMS-HCC)  Presents status post MI with resultant atrial fibrillation. He reports his medications were recently reduced for low heart rate. Patient denies any current dizziness and no palpitations.    2. Essential hypertension  Blood pressure well controlled on current regimen denying any chest pain or shortness of breath. Denying any edema.    3. IGT (impaired glucose tolerance)  He has lost considerable weight since September he is coming due for recheck of A1c. Never been in the true diabetic range. Denies excessive thirst or urination.    4. RUPAL (obstructive sleep apnea)  He does follow up with pulmonology reporting weight loss thinks his pressures may be elevated.      Patient Active Problem List    Diagnosis Date Noted   • Ischemic cardiomyopathy 09/03/2017     Priority: High   • Dyslipidemia 04/16/2012     Priority: Medium   • Essential hypertension 04/16/2012     Priority: Medium   • Stented coronary artery 11/16/2017   • Paroxysmal atrial fibrillation (CMS-HCC) 09/05/2017   • Non morbid obesity due to excess calories 01/19/2017   • RUPAL (obstructive sleep apnea) 01/19/2017   • IGT (impaired glucose tolerance) 07/08/2016   • Vitamin d deficiency 11/16/2012       Current Outpatient Prescriptions   Medication Sig Dispense Refill   • losartan (COZAAR) 50 MG Tab Take 50 mg by mouth every day.     • amiodarone (CORDARONE) 200 MG Tab Take 0.5 Tabs by mouth every day. 30 Tab 2   • rivaroxaban (XARELTO) 20 MG Tab tablet Take 1 Tab by mouth with dinner. 90 Tab 3   • atorvastatin (LIPITOR) 20 MG Tab Take 1 Tab by mouth every day. 90 Tab 3   • metoprolol (LOPRESSOR) 25 MG Tab Take 25 mg by mouth 2 Times a Day. 60 Tab 11   • clopidogrel (PLAVIX) 75 MG Tab Take 4 tablets on day 1, then 1 tablet daily 90 Tab 3   • vitamin D (CHOLECALCIFEROL) 1000 UNIT TABS Take 1,000 Units by mouth every day.  "    • losartan (COZAAR) 100 MG Tab Take 0.5 Tabs by mouth every day. (Patient not taking: Reported on 1/10/2018) 90 Each 3     No current facility-administered medications for this visit.          Allergies as of 01/10/2018 - Reviewed 01/10/2018   Allergen Reaction Noted   • Lipidil [fenofibrate]  07/29/2011   • Penicillins  07/31/2008   • Sulfa drugs  07/31/2008        ROS: As per HPI.    /56   Pulse (!) 50   Temp 36.4 °C (97.5 °F)   Resp 16   Ht 1.702 m (5' 7\")   Wt 93.9 kg (207 lb)   SpO2 93%   BMI 32.42 kg/m²     Physical Exam:  Gen:         Alert and oriented, No apparent distress.  Neck:        No Lymphadenopathy or Bruits.  Lungs:     Clear to auscultation bilaterally  CV:          Regular rate and rhythm. No murmurs, rubs or gallops.               Ext:          No clubbing, cyanosis, edema.      Assessment and Plan.   72 y.o. male with the following issues.    1. Paroxysmal atrial fibrillation (CMS-HCC)  Sinus rhythm today asymptomatic follow with cardiology    2. Essential hypertension  Currently well controlled, Discuss diet, exercise and salt restriction.    3. IGT (impaired glucose tolerance)  Rechecking blood sugars  - HEMOGLOBIN A1C; Future    4. RUPAL (obstructive sleep apnea)  Compliant with BiPAP follow with pulmonology.      "

## 2018-01-16 DIAGNOSIS — I48.0 PAF (PAROXYSMAL ATRIAL FIBRILLATION) (HCC): ICD-10-CM

## 2018-01-16 RX ORDER — AMIODARONE HYDROCHLORIDE 100 MG/1
100 TABLET ORAL DAILY
Qty: 30 TAB | Refills: 3 | Status: SHIPPED | OUTPATIENT
Start: 2018-01-16 | End: 2018-05-16 | Stop reason: SDUPTHER

## 2018-01-17 ENCOUNTER — SLEEP CENTER VISIT (OUTPATIENT)
Dept: SLEEP MEDICINE | Facility: MEDICAL CENTER | Age: 73
End: 2018-01-17
Payer: MEDICARE

## 2018-01-17 VITALS
SYSTOLIC BLOOD PRESSURE: 100 MMHG | RESPIRATION RATE: 16 BRPM | HEART RATE: 49 BPM | HEIGHT: 67 IN | DIASTOLIC BLOOD PRESSURE: 52 MMHG | WEIGHT: 205 LBS | BODY MASS INDEX: 32.18 KG/M2 | OXYGEN SATURATION: 94 %

## 2018-01-17 DIAGNOSIS — I48.0 PAROXYSMAL ATRIAL FIBRILLATION (HCC): ICD-10-CM

## 2018-01-17 DIAGNOSIS — Z95.5 STENTED CORONARY ARTERY: ICD-10-CM

## 2018-01-17 DIAGNOSIS — G47.33 OSA TREATED WITH BIPAP: ICD-10-CM

## 2018-01-17 DIAGNOSIS — I10 ESSENTIAL HYPERTENSION: ICD-10-CM

## 2018-01-17 PROCEDURE — 99213 OFFICE O/P EST LOW 20 MIN: CPT | Performed by: INTERNAL MEDICINE

## 2018-01-17 NOTE — PROGRESS NOTES
Chief Complaint   Patient presents with   • Follow-Up     RUPAL       HPI: This patient is a 72 y.o. male who returns for RUPAL compliance check. Polysomnogram confirmed severe obstructive sleep apnea syndrome with AHI 73 events per hour, associated with desaturations to 67%. He has been compliant with BiPAP 16/12 cm of water. Compliance card shows 100% usage for 7 hrs 45 min nightly, and AHI normal at 4.7. He feels he has improved sleep quality with BiPAP, however feels pressures are too high now with weight loss. He awakens feeling rested. Since his last visit he suffered a myocardial infarction requiring coronary stenting. His fatigue has improved dramatically following treatment. Weight is down 25 pounds.    Past Medical History:   Diagnosis Date   • CAD (coronary artery disease) 4/16/2012   • HTN (hypertension) 4/16/2012   • Hyperglycemia 4/16/2012   • Hyperlipemia 4/16/2012   • Hypertension    • Obesity    • Tachycardia 4/16/2012   • Tonsillitis        Social History     Social History   • Marital status:      Spouse name: N/A   • Number of children: N/A   • Years of education: N/A     Occupational History   • Not on file.     Social History Main Topics   • Smoking status: Former Smoker     Packs/day: 3.00     Years: 16.00     Types: Cigarettes     Quit date: 1/1/1980   • Smokeless tobacco: Never Used   • Alcohol use 0.0 oz/week      Comment: wine or whiskey 2-3 a day   • Drug use: No   • Sexual activity: Yes     Partners: Female     Other Topics Concern   • Not on file     Social History Narrative   • No narrative on file       Family History   Problem Relation Age of Onset   • Diabetes Mother    • Heart Disease Mother    • Kidney Disease Mother    • Diabetes Sister        Current Outpatient Prescriptions on File Prior to Visit   Medication Sig Dispense Refill   • amiodarone (PACERONE) 100 MG tablet Take 1 Tab by mouth every day. 30 Tab 3   • losartan (COZAAR) 50 MG Tab Take 50 mg by mouth every day.     •  "rivaroxaban (XARELTO) 20 MG Tab tablet Take 1 Tab by mouth with dinner. 90 Tab 3   • atorvastatin (LIPITOR) 20 MG Tab Take 1 Tab by mouth every day. 90 Tab 3   • metoprolol (LOPRESSOR) 25 MG Tab Take 25 mg by mouth 2 Times a Day. 60 Tab 11   • clopidogrel (PLAVIX) 75 MG Tab Take 4 tablets on day 1, then 1 tablet daily 90 Tab 3   • vitamin D (CHOLECALCIFEROL) 1000 UNIT TABS Take 1,000 Units by mouth every day.     • losartan (COZAAR) 100 MG Tab Take 0.5 Tabs by mouth every day. 90 Each 3     No current facility-administered medications on file prior to visit.        Allergies: Lipidil [fenofibrate]; Penicillins; and Sulfa drugs    ROS:   Constitutional: Denies fevers, chills, night sweats, fatigue, +weight loss  Eyes: Denies vision loss, pain, drainage, double vision  Ears, Nose, Throat: Denies earache, difficulty hearing, tinnitus, nasal congestion, hoarseness  Cardiovascular: Denies chest pain, tightness, palpitations, orthopnea or edema  Respiratory: Denies shortness of breath, cough, wheezing, hemoptysis  Sleep: Denies daytime sleepiness, snoring, apneas, insomnia, morning headaches  GI: Denies heartburn, dysphagia, nausea, abdominal pain, diarrhea or constipation  : Denies frequent urination, hematuria, discharge or painful urination  Musculoskeletal: Denies back pain, painful joints, sore muscles  Neurological: Denies weakness or headaches  Skin: No rashes    Blood pressure 100/52, pulse (!) 49, resp. rate 16, height 1.702 m (5' 7\"), weight 93 kg (205 lb), SpO2 94 %.    Physical Exam:  Appearance: Well-nourished, well-developed, in no acute distress  HEENT: Normocephalic, atraumatic, white sclera, PERRLA, oropharynx clear  Neck: No adenopathy or masses  Respiratory: no intercostal retractions or accessory muscle use  Lungs auscultation: Clear to auscultation bilaterally  Cardiovascular: Regular rate rhythm. No murmurs, rubs or gallops.  No LE edema  Abdomen: soft, nondistended  Gait: Normal  Digits: No " clubbing, cyanosis  Motor: No focal deficits  Orientation: Oriented to time, person and place    Diagnosis:  1. RUPAL treated with BiPAP  DME MASK AND SUPPLIES   2. BMI 32.0-32.9,adult     3. Paroxysmal atrial fibrillation (CMS-HCC)     4. Essential hypertension     5. Stented coronary artery         Plan:  The patient's fatigue was likely related to his cardiovascular disease and has essentially resolved with treatment of CAD.  He has successfully dropped significant weight. He shows excellent compliance on BiPAP therapy. We will empirically reduced to BiPAP:14/10 cm of water for his comfort, and download compliance card on follow-up.  Return in about 6 months (around 7/17/2018).

## 2018-01-22 ENCOUNTER — HOSPITAL ENCOUNTER (OUTPATIENT)
Dept: LAB | Facility: MEDICAL CENTER | Age: 73
End: 2018-01-22
Attending: INTERNAL MEDICINE
Payer: MEDICARE

## 2018-01-22 DIAGNOSIS — R73.02 IGT (IMPAIRED GLUCOSE TOLERANCE): ICD-10-CM

## 2018-01-22 LAB
EST. AVERAGE GLUCOSE BLD GHB EST-MCNC: 117 MG/DL
HBA1C MFR BLD: 5.7 % (ref 0–5.6)

## 2018-01-22 PROCEDURE — 84443 ASSAY THYROID STIM HORMONE: CPT

## 2018-01-22 PROCEDURE — 83036 HEMOGLOBIN GLYCOSYLATED A1C: CPT

## 2018-01-22 PROCEDURE — 84439 ASSAY OF FREE THYROXINE: CPT

## 2018-01-22 PROCEDURE — 36415 COLL VENOUS BLD VENIPUNCTURE: CPT

## 2018-01-23 LAB
T4 FREE SERPL-MCNC: 1.25 NG/DL (ref 0.53–1.43)
TSH SERPL DL<=0.005 MIU/L-ACNC: 0.34 UIU/ML (ref 0.38–5.33)

## 2018-02-23 ENCOUNTER — OFFICE VISIT (OUTPATIENT)
Dept: CARDIOLOGY | Facility: MEDICAL CENTER | Age: 73
End: 2018-02-23
Payer: MEDICARE

## 2018-02-23 VITALS
HEIGHT: 67 IN | WEIGHT: 200 LBS | BODY MASS INDEX: 31.39 KG/M2 | OXYGEN SATURATION: 96 % | DIASTOLIC BLOOD PRESSURE: 50 MMHG | SYSTOLIC BLOOD PRESSURE: 118 MMHG | HEART RATE: 48 BPM

## 2018-02-23 DIAGNOSIS — Z95.5 STENTED CORONARY ARTERY: ICD-10-CM

## 2018-02-23 DIAGNOSIS — Z79.01 ANTICOAGULATED: ICD-10-CM

## 2018-02-23 DIAGNOSIS — E78.5 DYSLIPIDEMIA: ICD-10-CM

## 2018-02-23 DIAGNOSIS — I10 ESSENTIAL HYPERTENSION: ICD-10-CM

## 2018-02-23 DIAGNOSIS — I25.10 CORONARY ARTERY DISEASE INVOLVING NATIVE CORONARY ARTERY OF NATIVE HEART WITHOUT ANGINA PECTORIS: ICD-10-CM

## 2018-02-23 PROCEDURE — 99214 OFFICE O/P EST MOD 30 MIN: CPT | Performed by: INTERNAL MEDICINE

## 2018-02-23 ASSESSMENT — ENCOUNTER SYMPTOMS
NEUROLOGICAL NEGATIVE: 1
WEAKNESS: 0
CHILLS: 0
NAUSEA: 0
VOMITING: 0
PALPITATIONS: 0
BRUISES/BLEEDS EASILY: 1
SHORTNESS OF BREATH: 0

## 2018-02-23 NOTE — PROGRESS NOTES
Subjective:   Chief complaint: Prior inferior wall MI, PAF, stented RCA.  Shamar Morgan is a 72 y.o. male who presents today For evaluation of PAF.  He suffered an acute inferior wall MI on September 3, and had a 4.0×12 mm Synergy stent placed in the ostial RCA. The patient has had no angina at all and exercises regularly. He has documented PAF and was started on anticoagulation by her EP colleagues. The patient has had a difficult time with bruising and bleeding. No other problems.  Past Medical History:   Diagnosis Date   • CAD (coronary artery disease) 4/16/2012   • HTN (hypertension) 4/16/2012   • Hyperglycemia 4/16/2012   • Hyperlipemia 4/16/2012   • Hypertension    • Obesity    • Tachycardia 4/16/2012   • Tonsillitis      Past Surgical History:   Procedure Laterality Date   • CARDIAC CATH, LEFT HEART  09/03/2017    successful thrombectomy  and CHELLE to ostial proximal RCA    • CARPAL TUNNEL RELEASE       Family History   Problem Relation Age of Onset   • Diabetes Mother    • Heart Disease Mother    • Kidney Disease Mother    • Diabetes Sister      History   Smoking Status   • Former Smoker   • Packs/day: 3.00   • Years: 16.00   • Types: Cigarettes   • Quit date: 1/1/1980   Smokeless Tobacco   • Never Used     Allergies   Allergen Reactions   • Lipidil [Fenofibrate]      Myalgia   • Penicillins    • Sulfa Drugs      Outpatient Encounter Prescriptions as of 2/23/2018   Medication Sig Dispense Refill   • rivaroxaban (XARELTO) 15 MG Tab tablet Take 1 Tab by mouth with dinner. 90 Tab 3   • amiodarone (PACERONE) 100 MG tablet Take 1 Tab by mouth every day. 30 Tab 3   • losartan (COZAAR) 50 MG Tab Take 50 mg by mouth every day.     • losartan (COZAAR) 100 MG Tab Take 0.5 Tabs by mouth every day. 90 Each 3   • atorvastatin (LIPITOR) 20 MG Tab Take 1 Tab by mouth every day. 90 Tab 3   • metoprolol (LOPRESSOR) 25 MG Tab Take 25 mg by mouth 2 Times a Day. 60 Tab 11   • clopidogrel (PLAVIX) 75 MG Tab Take 4 tablets on  "day 1, then 1 tablet daily 90 Tab 3   • vitamin D (CHOLECALCIFEROL) 1000 UNIT TABS Take 1,000 Units by mouth every day.     • [DISCONTINUED] rivaroxaban (XARELTO) 20 MG Tab tablet Take 1 Tab by mouth with dinner. 90 Tab 3     No facility-administered encounter medications on file as of 2/23/2018.      Review of Systems   Constitutional: Negative for chills and malaise/fatigue.   Respiratory: Negative for shortness of breath.    Cardiovascular: Negative for chest pain, palpitations and leg swelling.   Gastrointestinal: Negative for nausea and vomiting.   Neurological: Negative.  Negative for weakness.   Endo/Heme/Allergies: Bruises/bleeds easily.      Objective:   /50   Pulse (!) 48   Ht 1.702 m (5' 7\")   Wt 90.7 kg (200 lb)   SpO2 96%   BMI 31.32 kg/m²     Physical Exam   Constitutional: He is oriented to person, place, and time. He appears well-developed and well-nourished. No distress.   HENT:   Head: Atraumatic.   Eyes: Conjunctivae and EOM are normal. Pupils are equal, round, and reactive to light.   Neck: Neck supple. No JVD present.   Cardiovascular: Regular rhythm.  Bradycardia present.    No murmur heard.  Heart rate 48   Pulmonary/Chest: Effort normal and breath sounds normal. No respiratory distress. He has no wheezes. He has no rales.   Abdominal: Soft. There is no tenderness.   Obese   Musculoskeletal: He exhibits no edema.   Neurological: He is alert and oriented to person, place, and time.   Skin: Skin is warm and dry. He is not diaphoretic.       Assessment:     1. Coronary artery disease involving native coronary artery of native heart without angina pectoris     2. Stented coronary artery     3. Essential hypertension     4. Dyslipidemia     5. Anticoagulated         Medical Decision Making:  Today's Assessment / Status / Plan:   Prior inferior wall MI: Stenting of the very proximal RCA. No angina at all.  PAF: 18% burden by monitoring. Anticoagulation started.  Easy bruising: The patient " is on clopidogrel plus Xeralto. We'll reduce the Xeralto dose to 15 mg. He will call in about a week or 2 to verify that this assault oblique bleeding or bruising problems.  Hypertension: Under great control.  Hyperlipidemia: LDL under 30.  Same medications except for his changes Xeralto dose. Return 6 months

## 2018-04-04 RX ORDER — LOSARTAN POTASSIUM 50 MG/1
50 TABLET ORAL DAILY
Qty: 90 TAB | Refills: 3 | Status: SHIPPED | OUTPATIENT
Start: 2018-04-04 | End: 2018-08-20

## 2018-05-16 DIAGNOSIS — I48.0 PAF (PAROXYSMAL ATRIAL FIBRILLATION) (HCC): ICD-10-CM

## 2018-05-16 RX ORDER — AMIODARONE HYDROCHLORIDE 100 MG/1
TABLET ORAL
Qty: 90 TAB | Refills: 0 | Status: SHIPPED | OUTPATIENT
Start: 2018-05-16 | End: 2018-08-09 | Stop reason: SDUPTHER

## 2018-06-01 ENCOUNTER — PATIENT OUTREACH (OUTPATIENT)
Dept: HEALTH INFORMATION MANAGEMENT | Facility: OTHER | Age: 73
End: 2018-06-01

## 2018-06-04 ENCOUNTER — TELEPHONE (OUTPATIENT)
Dept: CARDIOLOGY | Facility: MEDICAL CENTER | Age: 73
End: 2018-06-04

## 2018-06-04 NOTE — TELEPHONE ENCOUNTER
Pt states he does not have date of surgery and surgeon told him he does not really need to stop xarelto. Advised if surgeon does not need it stop then dont stop as risk for stroke goes up when stopped. Pt states he just wanted to check.

## 2018-06-04 NOTE — TELEPHONE ENCOUNTER
----- Message from Ericka Booth sent at 6/4/2018  2:05 PM PDT -----  Regarding: Surgical clearance  JOSE/Lou    Patient needs to have trigger finger surgery (date pending) and needs to find out about stopping his Xarelto. He can be reached at [phone].

## 2018-06-05 ENCOUNTER — APPOINTMENT (RX ONLY)
Dept: URBAN - METROPOLITAN AREA CLINIC 20 | Facility: CLINIC | Age: 73
Setting detail: DERMATOLOGY
End: 2018-06-05

## 2018-06-05 DIAGNOSIS — L82.1 OTHER SEBORRHEIC KERATOSIS: ICD-10-CM

## 2018-06-05 DIAGNOSIS — L81.4 OTHER MELANIN HYPERPIGMENTATION: ICD-10-CM

## 2018-06-05 DIAGNOSIS — D22 MELANOCYTIC NEVI: ICD-10-CM

## 2018-06-05 DIAGNOSIS — D18.0 HEMANGIOMA: ICD-10-CM

## 2018-06-05 DIAGNOSIS — L57.0 ACTINIC KERATOSIS: ICD-10-CM

## 2018-06-05 DIAGNOSIS — Z71.89 OTHER SPECIFIED COUNSELING: ICD-10-CM

## 2018-06-05 PROBLEM — D18.01 HEMANGIOMA OF SKIN AND SUBCUTANEOUS TISSUE: Status: ACTIVE | Noted: 2018-06-05

## 2018-06-05 PROBLEM — D22.62 MELANOCYTIC NEVI OF LEFT UPPER LIMB, INCLUDING SHOULDER: Status: ACTIVE | Noted: 2018-06-05

## 2018-06-05 PROBLEM — D22.61 MELANOCYTIC NEVI OF RIGHT UPPER LIMB, INCLUDING SHOULDER: Status: ACTIVE | Noted: 2018-06-05

## 2018-06-05 PROBLEM — D22.5 MELANOCYTIC NEVI OF TRUNK: Status: ACTIVE | Noted: 2018-06-05

## 2018-06-05 PROCEDURE — 17000 DESTRUCT PREMALG LESION: CPT

## 2018-06-05 PROCEDURE — 17003 DESTRUCT PREMALG LES 2-14: CPT

## 2018-06-05 PROCEDURE — 99214 OFFICE O/P EST MOD 30 MIN: CPT | Mod: 25

## 2018-06-05 PROCEDURE — ? ADDITIONAL NOTES

## 2018-06-05 PROCEDURE — ? MEDICATION COUNSELING

## 2018-06-05 PROCEDURE — ? SUNSCREEN RECOMMENDATIONS

## 2018-06-05 PROCEDURE — ? COUNSELING

## 2018-06-05 PROCEDURE — ? LIQUID NITROGEN

## 2018-06-05 ASSESSMENT — LOCATION DETAILED DESCRIPTION DERM
LOCATION DETAILED: RIGHT RADIAL DORSAL HAND
LOCATION DETAILED: LEFT LATERAL FOREHEAD
LOCATION DETAILED: LEFT SUPERIOR PARIETAL SCALP
LOCATION DETAILED: LEFT SUPERIOR CENTRAL MALAR CHEEK
LOCATION DETAILED: RIGHT SUPERIOR HELIX
LOCATION DETAILED: SUPERIOR THORACIC SPINE
LOCATION DETAILED: RIGHT INFERIOR MEDIAL FOREHEAD
LOCATION DETAILED: INFERIOR THORACIC SPINE
LOCATION DETAILED: RIGHT CENTRAL PARIETAL SCALP
LOCATION DETAILED: RIGHT MEDIAL UPPER BACK
LOCATION DETAILED: RIGHT SUPERIOR CENTRAL MALAR CHEEK
LOCATION DETAILED: RIGHT PROXIMAL DORSAL FOREARM
LOCATION DETAILED: LEFT CENTRAL LATERAL NECK
LOCATION DETAILED: LEFT MEDIAL ZYGOMA
LOCATION DETAILED: LEFT SUPERIOR FOREHEAD
LOCATION DETAILED: LEFT RADIAL DORSAL HAND
LOCATION DETAILED: LEFT FOREHEAD
LOCATION DETAILED: LEFT VENTRAL PROXIMAL FOREARM
LOCATION DETAILED: RIGHT VENTRAL DISTAL FOREARM
LOCATION DETAILED: LEFT PROXIMAL DORSAL FOREARM
LOCATION DETAILED: RIGHT INFERIOR UPPER BACK

## 2018-06-05 ASSESSMENT — LOCATION ZONE DERM
LOCATION ZONE: NECK
LOCATION ZONE: FACE
LOCATION ZONE: SCALP
LOCATION ZONE: ARM
LOCATION ZONE: EAR
LOCATION ZONE: HAND
LOCATION ZONE: TRUNK

## 2018-06-05 ASSESSMENT — LOCATION SIMPLE DESCRIPTION DERM
LOCATION SIMPLE: LEFT FOREARM
LOCATION SIMPLE: LEFT FOREHEAD
LOCATION SIMPLE: RIGHT CHEEK
LOCATION SIMPLE: RIGHT FOREARM
LOCATION SIMPLE: UPPER BACK
LOCATION SIMPLE: RIGHT UPPER BACK
LOCATION SIMPLE: LEFT CHEEK
LOCATION SIMPLE: RIGHT HAND
LOCATION SIMPLE: RIGHT FOREHEAD
LOCATION SIMPLE: LEFT HAND
LOCATION SIMPLE: NECK
LOCATION SIMPLE: LEFT ZYGOMA
LOCATION SIMPLE: RIGHT EAR
LOCATION SIMPLE: SCALP

## 2018-06-05 NOTE — PROCEDURE: MEDICATION COUNSELING
Simponi Pregnancy And Lactation Text: The risk during pregnancy and breastfeeding is uncertain with this medication.
Odomzo Counseling- I discussed with the patient the risks of Odomzo including but not limited to nausea, vomiting, diarrhea, constipation, weight loss, changes in the sense of taste, decreased appetite, muscle spasms, and hair loss.  The patient verbalized understanding of the proper use and possible adverse effects of Odomzo.  All of the patient's questions and concerns were addressed.
Oxybutynin Pregnancy And Lactation Text: This medication is Pregnancy Category B and is considered safe during pregnancy. It is unknown if it is excreted in breast milk.
Detail Level: Detailed
Nsaids Counseling: NSAID Counseling: I discussed with the patient that NSAIDs should be taken with food. Prolonged use of NSAIDs can result in the development of stomach ulcers.  Patient advised to stop taking NSAIDs if abdominal pain occurs.  The patient verbalized understanding of the proper use and possible adverse effects of NSAIDs.  All of the patient's questions and concerns were addressed.
Minocycline Pregnancy And Lactation Text: This medication is Pregnancy Category D and not consider safe during pregnancy. It is also excreted in breast milk.
Hydroquinone Pregnancy And Lactation Text: This medication has not been assigned a Pregnancy Risk Category but animal studies failed to show danger with the topical medication. It is unknown if the medication is excreted in breast milk.
Xeltomz Pregnancy And Lactation Text: This medication is Pregnancy Category D and is not considered safe during pregnancy.  The risk during breast feeding is also uncertain.
5-Fu Counseling: 5-Fluorouracil Counseling:  I discussed with the patient the risks of 5-fluorouracil including but not limited to erythema, scaling, itching, weeping, crusting, and pain.
Colchicine Pregnancy And Lactation Text: This medication is Pregnancy Category C and isn't considered safe during pregnancy. It is excreted in breast milk.
Ivermectin Counseling:  Patient instructed to take medication on an empty stomach with a full glass of water.  Patient informed of potential adverse effects including but not limited to nausea, diarrhea, dizziness, itching, and swelling of the extremities or lymph nodes.  The patient verbalized understanding of the proper use and possible adverse effects of ivermectin.  All of the patient's questions and concerns were addressed.
Acitretin Counseling:  I discussed with the patient the risks of acitretin including but not limited to hair loss, dry lips/skin/eyes, liver damage, hyperlipidemia, depression/suicidal ideation, photosensitivity.  Serious rare side effects can include but are not limited to pancreatitis, pseudotumor cerebri, bony changes, clot formation/stroke/heart attack.  Patient understands that alcohol is contraindicated since it can result in liver toxicity and significantly prolong the elimination of the drug by many years.
Stelara Pregnancy And Lactation Text: This medication is Pregnancy Category B and is considered safe during pregnancy. It is unknown if this medication is excreted in breast milk.
Humira Counseling:  I discussed with the patient the risks of adalimumab including but not limited to myelosuppression, immunosuppression, autoimmune hepatitis, demyelinating diseases, lymphoma, and serious infections.  The patient understands that monitoring is required including a PPD at baseline and must alert us or the primary physician if symptoms of infection or other concerning signs are noted.
Doxepin Pregnancy And Lactation Text: This medication is Pregnancy Category C and it isn't known if it is safe during pregnancy. It is also excreted in breast milk and breast feeding isn't recommended.
Thalidomide Pregnancy And Lactation Text: This medication is Pregnancy Category X and is absolutely contraindicated during pregnancy. It is unknown if it is excreted in breast milk.
Xolair Pregnancy And Lactation Text: This medication is Pregnancy Category B and is considered safe during pregnancy. This medication is excreted in breast milk.
Cellcept Pregnancy And Lactation Text: This medication is Pregnancy Category D and isn't considered safe during pregnancy. It is unknown if this medication is excreted in breast milk.
Glycopyrrolate Counseling:  I discussed with the patient the risks of glycopyrrolate including but not limited to skin rash, drowsiness, dry mouth, difficulty urinating, and blurred vision.
Dupixent Counseling: I discussed with the patient the risks of dupilumab including but not limited to eye infection and irritation, cold sores, injection site reactions, worsening of asthma, allergic reactions and increased risk of parasitic infection.  Live vaccines should be avoided while taking dupilumab. Dupilumab will also interact with certain medications such as warfarin and cyclosporine. The patient understands that monitoring is required and they must alert us or the primary physician if symptoms of infection or other concerning signs are noted.
Cephalexin Counseling: I counseled the patient regarding use of cephalexin as an antibiotic for prophylactic and/or therapeutic purposes. Cephalexin (commonly prescribed under brand name Keflex) is a cephalosporin antibiotic which is active against numerous classes of bacteria, including most skin bacteria. Side effects may include nausea, diarrhea, gastrointestinal upset, rash, hives, yeast infections, and in rare cases, hepatitis, kidney disease, seizures, fever, confusion, neurologic symptoms, and others. Patients with severe allergies to penicillin medications are cautioned that there is about a 10% incidence of cross-reactivity with cephalosporins. When possible, patients with penicillin allergies should use alternatives to cephalosporins for antibiotic therapy.
Itraconazole Pregnancy And Lactation Text: This medication is Pregnancy Category C and it isn't know if it is safe during pregnancy. It is also excreted in breast milk.
Hydroxychloroquine Counseling:  I discussed with the patient that a baseline ophthalmologic exam is needed at the start of therapy and every year thereafter while on therapy. A CBC may also be warranted for monitoring.  The side effects of this medication were discussed with the patient, including but not limited to agranulocytosis, aplastic anemia, seizures, rashes, retinopathy, and liver toxicity. Patient instructed to call the office should any adverse effect occur.  The patient verbalized understanding of the proper use and possible adverse effects of Plaquenil.  All the patient's questions and concerns were addressed.
Tetracycline Counseling: Patient counseled regarding possible photosensitivity and increased risk for sunburn.  Patient instructed to avoid sunlight, if possible.  When exposed to sunlight, patients should wear protective clothing, sunglasses, and sunscreen.  The patient was instructed to call the office immediately if the following severe adverse effects occur:  hearing changes, easy bruising/bleeding, severe headache, or vision changes.  The patient verbalized understanding of the proper use and possible adverse effects of tetracycline.  All of the patient's questions and concerns were addressed. Patient understands to avoid pregnancy while on therapy due to potential birth defects.
Oxybutynin Counseling:  I discussed with the patient the risks of oxybutynin including but not limited to skin rash, drowsiness, dry mouth, difficulty urinating, and blurred vision.
Arava Counseling:  Patient counseled regarding adverse effects of Arava including but not limited to nausea, vomiting, abnormalities in liver function tests. Patients may develop mouth sores, rash, diarrhea, and abnormalities in blood counts. The patient understands that monitoring is required including LFTs and blood counts.  There is a rare possibility of scarring of the liver and lung problems that can occur when taking methotrexate. Persistent nausea, loss of appetite, pale stools, dark urine, cough, and shortness of breath should be reported immediately. Patient advised to discontinue Arava treatment and consult with a physician prior to attempting conception. The patient will have to undergo a treatment to eliminate Arava from the body prior to conception.
Solaraze Pregnancy And Lactation Text: This medication is Pregnancy Category B and is considered safe. There is some data to suggest avoiding during the third trimester. It is unknown if this medication is excreted in breast milk.
Topical Sulfur Applications Pregnancy And Lactation Text: This medication is Pregnancy Category C and has an unknown safety profile during pregnancy. It is unknown if this topical medication is excreted in breast milk.
Solaraze Counseling:  I discussed with the patient the risks of Solaraze including but not limited to erythema, scaling, itching, weeping, crusting, and pain.
Doxycycline Pregnancy And Lactation Text: This medication is Pregnancy Category D and not consider safe during pregnancy. It is also excreted in breast milk but is considered safe for shorter treatment courses.
Enbrel Counseling:  I discussed with the patient the risks of etanercept including but not limited to myelosuppression, immunosuppression, autoimmune hepatitis, demyelinating diseases, lymphoma, and infections.  The patient understands that monitoring is required including a PPD at baseline and must alert us or the primary physician if symptoms of infection or other concerning signs are noted.
Cellcept Counseling:  I discussed with the patient the risks of mycophenolate mofetil including but not limited to infection/immunosuppression, GI upset, hypokalemia, hypercholesterolemia, bone marrow suppression, lymphoproliferative disorders, malignancy, GI ulceration/bleed/perforation, colitis, interstitial lung disease, kidney failure, progressive multifocal leukoencephalopathy, and birth defects.  The patient understands that monitoring is required including a baseline creatinine and regular CBC testing. In addition, patient must alert us immediately if symptoms of infection or other concerning signs are noted.
Elidel Counseling: Patient may experience a mild burning sensation during topical application. Elidel is not approved in children less than 2 years of age. There have been case reports of hematologic and skin malignancies in patients using topical calcineurin inhibitors although causality is questionable.
Xeljanz Counseling: I discussed with the patient the risks of Xeljanz therapy including increased risk of infection, liver issues, headache, diarrhea, or cold symptoms. Live vaccines should be avoided. They were instructed to call if they have any problems.
Topical Sulfur Applications Counseling: Topical Sulfur Counseling: Patient counseled that this medication may cause skin irritation or allergic reactions.  In the event of skin irritation, the patient was advised to reduce the amount of the drug applied or use it less frequently.   The patient verbalized understanding of the proper use and possible adverse effects of topical sulfur application.  All of the patient's questions and concerns were addressed.
Nsaids Pregnancy And Lactation Text: These medications are considered safe up to 30 weeks gestation. It is excreted in breast milk.
Benzoyl Peroxide Counseling: Patient counseled that medicine may cause skin irritation and bleach clothing.  In the event of skin irritation, the patient was advised to reduce the amount of the drug applied or use it less frequently.   The patient verbalized understanding of the proper use and possible adverse effects of benzoyl peroxide.  All of the patient's questions and concerns were addressed.
Ivermectin Pregnancy And Lactation Text: This medication is Pregnancy Category C and it isn't known if it is safe during pregnancy. It is also excreted in breast milk.
Albendazole Counseling:  I discussed with the patient the risks of albendazole including but not limited to cytopenia, kidney damage, nausea/vomiting and severe allergy.  The patient understands that this medication is being used in an off-label manner.
Bactrim Pregnancy And Lactation Text: This medication is Pregnancy Category D and is known to cause fetal risk.  It is also excreted in breast milk.
Tremfya Counseling: I discussed with the patient the risks of guselkumab including but not limited to immunosuppression, serious infections, worsening of inflammatory bowel disease and drug reactions.  The patient understands that monitoring is required including a PPD at baseline and must alert us or the primary physician if symptoms of infection or other concerning signs are noted.
Zyclara Counseling:  I discussed with the patient the risks of imiquimod including but not limited to erythema, scaling, itching, weeping, crusting, and pain.  Patient understands that the inflammatory response to imiquimod is variable from person to person and was educated regarded proper titration schedule.  If flu-like symptoms develop, patient knows to discontinue the medication and contact us.
SSKI Counseling:  I discussed with the patient the risks of SSKI including but not limited to thyroid abnormalities, metallic taste, GI upset, fever, headache, acne, arthralgias, paraesthesias, lymphadenopathy, easy bleeding, arrhythmias, and allergic reaction.
Ketoconazole Counseling:   Patient counseled regarding improving absorption with orange juice.  Adverse effects include but are not limited to breast enlargement, headache, diarrhea, nausea, upset stomach, liver function test abnormalities, taste disturbance, and stomach pain.  There is a rare possibility of liver failure that can occur when taking ketoconazole. The patient understands that monitoring of LFTs may be required, especially at baseline. The patient verbalized understanding of the proper use and possible adverse effects of ketoconazole.  All of the patient's questions and concerns were addressed.
Carac Counseling:  I discussed with the patient the risks of Carac including but not limited to erythema, scaling, itching, weeping, crusting, and pain.
Quinolones Counseling:  I discussed with the patient the risks of fluoroquinolones including but not limited to GI upset, allergic reaction, drug rash, diarrhea, dizziness, photosensitivity, yeast infections, liver function test abnormalities, tendonitis/tendon rupture.
Minocycline Counseling: Patient advised regarding possible photosensitivity and discoloration of the teeth, skin, lips, tongue and gums.  Patient instructed to avoid sunlight, if possible.  When exposed to sunlight, patients should wear protective clothing, sunglasses, and sunscreen.  The patient was instructed to call the office immediately if the following severe adverse effects occur:  hearing changes, easy bruising/bleeding, severe headache, or vision changes.  The patient verbalized understanding of the proper use and possible adverse effects of minocycline.  All of the patient's questions and concerns were addressed.
Terbinafine Counseling: Patient counseling regarding adverse effects of terbinafine including but not limited to headache, diarrhea, rash, upset stomach, liver function test abnormalities, itching, taste/smell disturbance, nausea, abdominal pain, and flatulence.  There is a rare possibility of liver failure that can occur when taking terbinafine.  The patient understands that a baseline LFT and kidney function test may be required. The patient verbalized understanding of the proper use and possible adverse effects of terbinafine.  All of the patient's questions and concerns were addressed.
Xolair Counseling:  Patient informed of potential adverse effects including but not limited to fever, muscle aches, rash and allergic reactions.  The patient verbalized understanding of the proper use and possible adverse effects of Xolair.  All of the patient's questions and concerns were addressed.
Spironolactone Counseling: Patient advised regarding risks of diarrhea, abdominal pain, hyperkalemia, birth defects (for female patients), liver toxicity and renal toxicity. The patient may need blood work to monitor liver and kidney function and potassium levels while on therapy. The patient verbalized understanding of the proper use and possible adverse effects of spironolactone.  All of the patient's questions and concerns were addressed.
Birth Control Pills Pregnancy And Lactation Text: This medication should be avoided if pregnant and for the first 30 days post-partum.
Griseofulvin Counseling:  I discussed with the patient the risks of griseofulvin including but not limited to photosensitivity, cytopenia, liver damage, nausea/vomiting and severe allergy.  The patient understands that this medication is best absorbed when taken with a fatty meal (e.g., ice cream or french fries).
Rifampin Counseling: I discussed with the patient the risks of rifampin including but not limited to liver damage, kidney damage, red-orange body fluids, nausea/vomiting and severe allergy.
Tazorac Counseling:  Patient advised that medication is irritating and drying.  Patient may need to apply sparingly and wash off after an hour before eventually leaving it on overnight.  The patient verbalized understanding of the proper use and possible adverse effects of tazorac.  All of the patient's questions and concerns were addressed.
Erythromycin Counseling:  I discussed with the patient the risks of erythromycin including but not limited to GI upset, allergic reaction, drug rash, diarrhea, increase in liver enzymes, and yeast infections.
Otezla Pregnancy And Lactation Text: This medication is Pregnancy Category C and it isn't known if it is safe during pregnancy. It is unknown if it is excreted in breast milk.
Doxepin Counseling:  Patient advised that the medication is sedating and not to drive a car after taking this medication. Patient informed of potential adverse effects including but not limited to dry mouth, urinary retention, and blurry vision.  The patient verbalized understanding of the proper use and possible adverse effects of doxepin.  All of the patient's questions and concerns were addressed.
Colchicine Counseling:  Patient counseled regarding adverse effects including but not limited to stomach upset (nausea, vomiting, stomach pain, or diarrhea).  Patient instructed to limit alcohol consumption while taking this medication.  Colchicine may reduce blood counts especially with prolonged use.  The patient understands that monitoring of kidney function and blood counts may be required, especially at baseline. The patient verbalized understanding of the proper use and possible adverse effects of colchicine.  All of the patient's questions and concerns were addressed.
Isotretinoin Counseling: Patient should get monthly blood tests, not donate blood, not drive at night if vision affected, not share medication, and not undergo elective surgery for 6 months after tx completed. Side effects reviewed, pt to contact office should one occur.
Thalidomide Counseling: I discussed with the patient the risks of thalidomide including but not limited to birth defects, anxiety, weakness, chest pain, dizziness, cough and severe allergy.
Cephalexin Pregnancy And Lactation Text: This medication is Pregnancy Category B and considered safe during pregnancy.  It is also excreted in breast milk but can be used safely for shorter doses.
Erivedge Counseling- I discussed with the patient the risks of Erivedge including but not limited to nausea, vomiting, diarrhea, constipation, weight loss, changes in the sense of taste, decreased appetite, muscle spasms, and hair loss.  The patient verbalized understanding of the proper use and possible adverse effects of Erivedge.  All of the patient's questions and concerns were addressed.
Itraconazole Counseling:  I discussed with the patient the risks of itraconazole including but not limited to liver damage, nausea/vomiting, neuropathy, and severe allergy.  The patient understands that this medication is best absorbed when taken with acidic beverages such as non-diet cola or ginger ale.  The patient understands that monitoring is required including baseline LFTs and repeat LFTs at intervals.  The patient understands that they are to contact us or the primary physician if concerning signs are noted.
Use Enhanced Medication Counseling?: No
Protopic Counseling: Patient may experience a mild burning sensation during topical application. Protopic is not approved in children less than 2 years of age. There have been case reports of hematologic and skin malignancies in patients using topical calcineurin inhibitors although causality is questionable.
Metronidazole Pregnancy And Lactation Text: This medication is Pregnancy Category B and considered safe during pregnancy.  It is also excreted in breast milk.
Rituxan Pregnancy And Lactation Text: This medication is Pregnancy Category C and it isn't know if it is safe during pregnancy. It is unknown if this medication is excreted in breast milk but similar antibodies are known to be excreted.
Methotrexate Counseling:  Patient counseled regarding adverse effects of methotrexate including but not limited to nausea, vomiting, abnormalities in liver function tests. Patients may develop mouth sores, rash, diarrhea, and abnormalities in blood counts. The patient understands that monitoring is required including LFT's and blood counts.  There is a rare possibility of scarring of the liver and lung problems that can occur when taking methotrexate. Persistent nausea, loss of appetite, pale stools, dark urine, cough, and shortness of breath should be reported immediately. Patient advised to discontinue methotrexate treatment at least three months before attempting to become pregnant.  I discussed the need for folate supplements while taking methotrexate.  These supplements can decrease side effects during methotrexate treatment. The patient verbalized understanding of the proper use and possible adverse effects of methotrexate.  All of the patient's questions and concerns were addressed.
Gabapentin Counseling: I discussed with the patient the risks of gabapentin including but not limited to dizziness, somnolence, fatigue and ataxia.
Valtrex Counseling: I discussed with the patient the risks of valacyclovir including but not limited to kidney damage, nausea, vomiting and severe allergy.  The patient understands that if the infection seems to be worsening or is not improving, they are to call.
Topical Retinoid counseling:  Patient advised to apply a pea-sized amount only at bedtime and wait 30 minutes after washing their face before applying.  If too drying, patient may add a non-comedogenic moisturizer. The patient verbalized understanding of the proper use and possible adverse effects of retinoids.  All of the patient's questions and concerns were addressed.
Drysol Pregnancy And Lactation Text: This medication is considered safe during pregnancy and breast feeding.
Dapsone Counseling: I discussed with the patient the risks of dapsone including but not limited to hemolytic anemia, agranulocytosis, rashes, methemoglobinemia, kidney failure, peripheral neuropathy, headaches, GI upset, and liver toxicity.  Patients who start dapsone require monitoring including baseline LFTs and weekly CBCs for the first month, then every month thereafter.  The patient verbalized understanding of the proper use and possible adverse effects of dapsone.  All of the patient's questions and concerns were addressed.
Stelara Counseling:  I discussed with the patient the risks of ustekinumab including but not limited to immunosuppression, malignancy, posterior leukoencephalopathy syndrome, and serious infections.  The patient understands that monitoring is required including a PPD at baseline and must alert us or the primary physician if symptoms of infection or other concerning signs are noted.
Methotrexate Pregnancy And Lactation Text: This medication is Pregnancy Category X and is known to cause fetal harm. This medication is excreted in breast milk.
Bexarotene Counseling:  I discussed with the patient the risks of bexarotene including but not limited to hair loss, dry lips/skin/eyes, liver abnormalities, hyperlipidemia, pancreatitis, depression/suicidal ideation, photosensitivity, drug rash/allergic reactions, hypothyroidism, anemia, leukopenia, infection, cataracts, and teratogenicity.  Patient understands that they will need regular blood tests to check lipid profile, liver function tests, white blood cell count, thyroid function tests and pregnancy test if applicable.
Bactrim Counseling:  I discussed with the patient the risks of sulfa antibiotics including but not limited to GI upset, allergic reaction, drug rash, diarrhea, dizziness, photosensitivity, and yeast infections.  Rarely, more serious reactions can occur including but not limited to aplastic anemia, agranulocytosis, methemoglobinemia, blood dyscrasias, liver or kidney failure, lung infiltrates or desquamative/blistering drug rashes.
Hydroxychloroquine Pregnancy And Lactation Text: This medication has been shown to cause fetal harm but it isn't assigned a Pregnancy Risk Category. There are small amounts excreted in breast milk.
Imiquimod Pregnancy And Lactation Text: This medication is Pregnancy Category C. It is unknown if this medication is excreted in breast milk.
5-Fu Pregnancy And Lactation Text: This medication is Pregnancy Category X and contraindicated in pregnancy and in women who may become pregnant. It is unknown if this medication is excreted in breast milk.
Griseofulvin Pregnancy And Lactation Text: This medication is Pregnancy Category X and is known to cause serious birth defects. It is unknown if this medication is excreted in breast milk but breast feeding should be avoided.
Sski Pregnancy And Lactation Text: This medication is Pregnancy Category D and isn't considered safe during pregnancy. It is excreted in breast milk.
Prednisone Pregnancy And Lactation Text: This medication is Pregnancy Category C and it isn't know if it is safe during pregnancy. This medication is excreted in breast milk.
Azathioprine Counseling:  I discussed with the patient the risks of azathioprine including but not limited to myelosuppression, immunosuppression, hepatotoxicity, lymphoma, and infections.  The patient understands that monitoring is required including baseline LFTs, Creatinine, possible TPMP genotyping and weekly CBCs for the first month and then every 2 weeks thereafter.  The patient verbalized understanding of the proper use and possible adverse effects of azathioprine.  All of the patient's questions and concerns were addressed.
Rifampin Pregnancy And Lactation Text: This medication is Pregnancy Category C and it isn't know if it is safe during pregnancy. It is also excreted in breast milk and should not be used if you are breast feeding.
Cimetidine Counseling:  I discussed with the patient the risks of Cimetidine including but not limited to gynecomastia, headache, diarrhea, nausea, drowsiness, arrhythmias, pancreatitis, skin rashes, psychosis, bone marrow suppression and kidney toxicity.
Hydroxyzine Counseling: Patient advised that the medication is sedating and not to drive a car after taking this medication.  Patient informed of potential adverse effects including but not limited to dry mouth, urinary retention, and blurry vision.  The patient verbalized understanding of the proper use and possible adverse effects of hydroxyzine.  All of the patient's questions and concerns were addressed.
Azithromycin Pregnancy And Lactation Text: This medication is considered safe during pregnancy and is also secreted in breast milk.
Fluconazole Counseling:  Patient counseled regarding adverse effects of fluconazole including but not limited to headache, diarrhea, nausea, upset stomach, liver function test abnormalities, taste disturbance, and stomach pain.  There is a rare possibility of liver failure that can occur when taking fluconazole.  The patient understands that monitoring of LFTs and kidney function test may be required, especially at baseline. The patient verbalized understanding of the proper use and possible adverse effects of fluconazole.  All of the patient's questions and concerns were addressed.
Metronidazole Counseling:  I discussed with the patient the risks of metronidazole including but not limited to seizures, nausea/vomiting, a metallic taste in the mouth, nausea/vomiting and severe allergy.
Terbinafine Pregnancy And Lactation Text: This medication is Pregnancy Category B and is considered safe during pregnancy. It is also excreted in breast milk and breast feeding isn't recommended.
Taltz Counseling: I discussed with the patient the risks of ixekizumab including but not limited to immunosuppression, serious infections, worsening of inflammatory bowel disease and drug reactions.  The patient understands that monitoring is required including a PPD at baseline and must alert us or the primary physician if symptoms of infection or other concerning signs are noted.
Protopic Pregnancy And Lactation Text: This medication is Pregnancy Category C. It is unknown if this medication is excreted in breast milk when applied topically.
High Dose Vitamin A Pregnancy And Lactation Text: High dose vitamin A therapy is contraindicated during pregnancy and breast feeding.
Hydroxyzine Pregnancy And Lactation Text: This medication is not safe during pregnancy and should not be taken. It is also excreted in breast milk and breast feeding isn't recommended.
Doxycycline Counseling:  Patient counseled regarding possible photosensitivity and increased risk for sunburn.  Patient instructed to avoid sunlight, if possible.  When exposed to sunlight, patients should wear protective clothing, sunglasses, and sunscreen.  The patient was instructed to call the office immediately if the following severe adverse effects occur:  hearing changes, easy bruising/bleeding, severe headache, or vision changes.  The patient verbalized understanding of the proper use and possible adverse effects of doxycycline.  All of the patient's questions and concerns were addressed.
Glycopyrrolate Pregnancy And Lactation Text: This medication is Pregnancy Category B and is considered safe during pregnancy. It is unknown if it is excreted breast milk.
Ketoconazole Pregnancy And Lactation Text: This medication is Pregnancy Category C and it isn't know if it is safe during pregnancy. It is also excreted in breast milk and breast feeding isn't recommended.
Cosentyx Counseling:  I discussed with the patient the risks of Cosentyx including but not limited to worsening of Crohn's disease, immunosuppression, allergic reactions and infections.  The patient understands that monitoring is required including a PPD at baseline and must alert us or the primary physician if symptoms of infection or other concerning signs are noted.
Otezla Counseling: The side effects of Otezla were discussed with the patient, including but not limited to worsening or new depression, weight loss, diarrhea, nausea, upper respiratory tract infection, and headache. Patient instructed to call the office should any adverse effect occur.  The patient verbalized understanding of the proper use and possible adverse effects of Otezla.  All the patient's questions and concerns were addressed.
Minoxidil Counseling: Minoxidil is a topical medication which can increase blood flow where it is applied. It is uncertain how this medication increases hair growth. Side effects are uncommon and include stinging and allergic reactions.
Isotretinoin Pregnancy And Lactation Text: This medication is Pregnancy Category X and is considered extremely dangerous during pregnancy. It is unknown if it is excreted in breast milk.
Clofazimine Counseling:  I discussed with the patient the risks of clofazimine including but not limited to skin and eye pigmentation, liver damage, nausea/vomiting, gastrointestinal bleeding and allergy.
Spironolactone Pregnancy And Lactation Text: This medication can cause feminization of the male fetus and should be avoided during pregnancy. The active metabolite is also found in breast milk.
Eucrisa Counseling: Patient may experience a mild burning sensation during topical application. Eucrisa is not approved in children less than 2 years of age.
Imiquimod Counseling:  I discussed with the patient the risks of imiquimod including but not limited to erythema, scaling, itching, weeping, crusting, and pain.  Patient understands that the inflammatory response to imiquimod is variable from person to person and was educated regarded proper titration schedule.  If flu-like symptoms develop, patient knows to discontinue the medication and contact us.
Benzoyl Peroxide Pregnancy And Lactation Text: This medication is Pregnancy Category C. It is unknown if benzoyl peroxide is excreted in breast milk.
Rituxan Counseling:  I discussed with the patient the risks of Rituxan infusions. Side effects can include infusion reactions, severe drug rashes including mucocutaneous reactions, reactivation of latent hepatitis and other infections and rarely progressive multifocal leukoencephalopathy.  All of the patient's questions and concerns were addressed.
Bexarotene Pregnancy And Lactation Text: This medication is Pregnancy Category X and should not be given to women who are pregnant or may become pregnant. This medication should not be used if you are breast feeding.
Erythromycin Pregnancy And Lactation Text: This medication is Pregnancy Category B and is considered safe during pregnancy. It is also excreted in breast milk.
Acitretin Pregnancy And Lactation Text: This medication is Pregnancy Category X and should not be given to women who are pregnant or may become pregnant in the future. This medication is excreted in breast milk.
Azithromycin Counseling:  I discussed with the patient the risks of azithromycin including but not limited to GI upset, allergic reaction, drug rash, diarrhea, and yeast infections.
Birth Control Pills Counseling: Birth Control Pill Counseling: I discussed with the patient the potential side effects of OCPs including but not limited to increased risk of stroke, heart attack, thrombophlebitis, deep venous thrombosis, hepatic adenomas, breast changes, GI upset, headaches, and depression.  The patient verbalized understanding of the proper use and possible adverse effects of OCPs. All of the patient's questions and concerns were addressed.
High Dose Vitamin A Counseling: Side effects reviewed, pt to contact office should one occur.
Picato Counseling:  I discussed with the patient the risks of Picato including but not limited to erythema, scaling, itching, weeping, crusting, and pain.
Prednisone Counseling:  I discussed with the patient the risks of prolonged use of prednisone including but not limited to weight gain, insomnia, osteoporosis, mood changes, diabetes, susceptibility to infection, glaucoma and high blood pressure.  In cases where prednisone use is prolonged, patients should be monitored with blood pressure checks, serum glucose levels and an eye exam.  Additionally, the patient may need to be placed on GI prophylaxis, PCP prophylaxis, and calcium and vitamin D supplementation and/or a bisphosphonate.  The patient verbalized understanding of the proper use and the possible adverse effects of prednisone.  All of the patient's questions and concerns were addressed.
Hydroquinone Counseling:  Patient advised that medication may result in skin irritation, lightening (hypopigmentation), dryness, and burning.  In the event of skin irritation, the patient was advised to reduce the amount of the drug applied or use it less frequently.  Rarely, spots that are treated with hydroquinone can become darker (pseudoochronosis).  Should this occur, patient instructed to stop medication and call the office. The patient verbalized understanding of the proper use and possible adverse effects of hydroquinone.  All of the patient's questions and concerns were addressed.
Valtrex Pregnancy And Lactation Text: this medication is Pregnancy Category B and is considered safe during pregnancy. This medication is not directly found in breast milk but it's metabolite acyclovir is present.
Cyclosporine Counseling:  I discussed with the patient the risks of cyclosporine including but not limited to hypertension, gingival hyperplasia,myelosuppression, immunosuppression, liver damage, kidney damage, neurotoxicity, lymphoma, and serious infections. The patient understands that monitoring is required including baseline blood pressure, CBC, CMP, lipid panel and uric acid, and then 1-2 times monthly CMP and blood pressure.
Dupixent Pregnancy And Lactation Text: This medication likely crosses the placenta but the risk for the fetus is uncertain. This medication is excreted in breast milk.
Drysol Counseling:  I discussed with the patient the risks of drysol/aluminum chloride including but not limited to skin rash, itching, irritation, burning.
Topical Clindamycin Counseling: Patient counseled that this medication may cause skin irritation or allergic reactions.  In the event of skin irritation, the patient was advised to reduce the amount of the drug applied or use it less frequently.   The patient verbalized understanding of the proper use and possible adverse effects of clindamycin.  All of the patient's questions and concerns were addressed.
Tazorac Pregnancy And Lactation Text: This medication is not safe during pregnancy. It is unknown if this medication is excreted in breast milk.
Infliximab Counseling:  I discussed with the patient the risks of infliximab including but not limited to myelosuppression, immunosuppression, autoimmune hepatitis, demyelinating diseases, lymphoma, and serious infections.  The patient understands that monitoring is required including a PPD at baseline and must alert us or the primary physician if symptoms of infection or other concerning signs are noted.
Dapsone Pregnancy And Lactation Text: This medication is Pregnancy Category C and is not considered safe during pregnancy or breast feeding.
Simponi Counseling:  I discussed with the patient the risks of golimumab including but not limited to myelosuppression, immunosuppression, autoimmune hepatitis, demyelinating diseases, lymphoma, and serious infections.  The patient understands that monitoring is required including a PPD at baseline and must alert us or the primary physician if symptoms of infection or other concerning signs are noted.

## 2018-06-05 NOTE — PROCEDURE: LIQUID NITROGEN
Post-Care Instructions: I reviewed with the patient in detail post-care instructions. Patient is to wear sunprotection, and avoid picking at any of the treated lesions. Pt may apply Vaseline to crusted or scabbing areas.
Detail Level: Detailed
Render Post-Care Instructions In Note?: yes
Number Of Freeze-Thaw Cycles: 2 freeze-thaw cycles
Duration Of Freeze Thaw-Cycle (Seconds): 10
Consent: The patient's consent was obtained including but not limited to risks of crusting, scabbing, blistering, scarring, darker or lighter pigmentary change, recurrence, incomplete removal and infection. RTC in 2 months if lesion(s) persistent.

## 2018-06-25 ENCOUNTER — TELEPHONE (OUTPATIENT)
Dept: CARDIOLOGY | Facility: MEDICAL CENTER | Age: 73
End: 2018-06-25

## 2018-06-25 NOTE — TELEPHONE ENCOUNTER
----- Message from China Lutz sent at 6/25/2018 10:17 AM PDT -----  Regarding: bruising & bleeding from xarelto  Contact: [phone]  MALGORZATA/dora Stephens calling to report bruising & bleeding from xarelto.  Please call Shamar at

## 2018-06-25 NOTE — TELEPHONE ENCOUNTER
Pt. Takes Xarelto and Plavix. He saw Dr. Mcallister in Feb. And Dr. Mcallister reduced Xarelto to 15mg QD because of increased bleeding/bruising. Pt. Calls now to report eay bleeding pwrsists and wonders if he cab reduce Xarelto to 10mg QD.  To Dr. Mcallister.

## 2018-06-26 NOTE — TELEPHONE ENCOUNTER
Called pt. To advise. New RX sent to pharmacy.    Message   Received: Today   Message Contents   Rodrigue Mcallister M.D.  SORAIDA Richardson.PGILLIAN   Caller: Unspecified (Yesterday, 10:26 AM)             OK to redudce to 10 mg a day.

## 2018-07-09 DIAGNOSIS — Z01.810 PRE-OPERATIVE CARDIOVASCULAR EXAMINATION: ICD-10-CM

## 2018-07-09 DIAGNOSIS — Z01.812 PRE-OPERATIVE LABORATORY EXAMINATION: ICD-10-CM

## 2018-07-09 LAB
ERYTHROCYTE [DISTWIDTH] IN BLOOD BY AUTOMATED COUNT: 45 FL (ref 35.9–50)
HCT VFR BLD AUTO: 44.4 % (ref 42–52)
HGB BLD-MCNC: 14.8 G/DL (ref 14–18)
MCH RBC QN AUTO: 31.2 PG (ref 27–33)
MCHC RBC AUTO-ENTMCNC: 33.3 G/DL (ref 33.7–35.3)
MCV RBC AUTO: 93.7 FL (ref 81.4–97.8)
PLATELET # BLD AUTO: 186 K/UL (ref 164–446)
PMV BLD AUTO: 10.2 FL (ref 9–12.9)
RBC # BLD AUTO: 4.74 M/UL (ref 4.7–6.1)
WBC # BLD AUTO: 6.3 K/UL (ref 4.8–10.8)

## 2018-07-09 PROCEDURE — 36415 COLL VENOUS BLD VENIPUNCTURE: CPT

## 2018-07-09 PROCEDURE — 93005 ELECTROCARDIOGRAM TRACING: CPT

## 2018-07-09 PROCEDURE — 85027 COMPLETE CBC AUTOMATED: CPT

## 2018-07-09 PROCEDURE — 80053 COMPREHEN METABOLIC PANEL: CPT

## 2018-07-09 PROCEDURE — 93010 ELECTROCARDIOGRAM REPORT: CPT | Performed by: INTERNAL MEDICINE

## 2018-07-09 NOTE — OR NURSING
Low risk surgery in an ASA 3 patient.  Seems to  be optimal on his medical therapy.  Ok to proceed.  Thank you.   Jose Gallardo M.D.  [facility]

## 2018-07-09 NOTE — OR NURSING
"Preadmit appointment: \" Preparing for your Procedure information\" sheet given to patient with verbal and written instructions. Patient instructed to continue prescribed medications through the day before surgery, instructed to take the following medications the day of surgery per anesthesia protocol: Metoprolol.  Pt instructed to bring BiPAP day of surgery.  Pt given RUPAL education sheet. Pt states he spoke to Dr. Kay regarding both his blood thinners and Dr. Kay told him he does not need to hold blood thinners prior to surgery,  Pt states he will call and confirm again with Dr. Kay regarding blood thinners. Dr Gallardo notified of admit, health summary  "

## 2018-07-10 ENCOUNTER — OFFICE VISIT (OUTPATIENT)
Dept: MEDICAL GROUP | Facility: MEDICAL CENTER | Age: 73
End: 2018-07-10
Payer: MEDICARE

## 2018-07-10 VITALS
SYSTOLIC BLOOD PRESSURE: 124 MMHG | OXYGEN SATURATION: 97 % | HEART RATE: 49 BPM | WEIGHT: 210 LBS | RESPIRATION RATE: 16 BRPM | HEIGHT: 67 IN | TEMPERATURE: 97.6 F | DIASTOLIC BLOOD PRESSURE: 70 MMHG | BODY MASS INDEX: 32.96 KG/M2

## 2018-07-10 DIAGNOSIS — I10 ESSENTIAL HYPERTENSION: ICD-10-CM

## 2018-07-10 DIAGNOSIS — I48.0 PAROXYSMAL ATRIAL FIBRILLATION (HCC): ICD-10-CM

## 2018-07-10 DIAGNOSIS — I25.5 ISCHEMIC CARDIOMYOPATHY: ICD-10-CM

## 2018-07-10 DIAGNOSIS — G47.33 OSA (OBSTRUCTIVE SLEEP APNEA): ICD-10-CM

## 2018-07-10 DIAGNOSIS — R53.83 OTHER FATIGUE: ICD-10-CM

## 2018-07-10 DIAGNOSIS — R73.02 IGT (IMPAIRED GLUCOSE TOLERANCE): ICD-10-CM

## 2018-07-10 DIAGNOSIS — Z00.00 MEDICARE ANNUAL WELLNESS VISIT, SUBSEQUENT: ICD-10-CM

## 2018-07-10 DIAGNOSIS — E78.5 DYSLIPIDEMIA: ICD-10-CM

## 2018-07-10 LAB
ALBUMIN SERPL BCP-MCNC: 3.9 G/DL (ref 3.2–4.9)
ALBUMIN/GLOB SERPL: 1.3 G/DL
ALP SERPL-CCNC: 41 U/L (ref 30–99)
ALT SERPL-CCNC: 41 U/L (ref 2–50)
ANION GAP SERPL CALC-SCNC: 7 MMOL/L (ref 0–11.9)
AST SERPL-CCNC: 21 U/L (ref 12–45)
BILIRUB SERPL-MCNC: 0.6 MG/DL (ref 0.1–1.5)
BUN SERPL-MCNC: 14 MG/DL (ref 8–22)
CALCIUM SERPL-MCNC: 9.5 MG/DL (ref 8.5–10.5)
CHLORIDE SERPL-SCNC: 106 MMOL/L (ref 96–112)
CO2 SERPL-SCNC: 28 MMOL/L (ref 20–33)
CREAT SERPL-MCNC: 1 MG/DL (ref 0.5–1.4)
EKG IMPRESSION: NORMAL
GLOBULIN SER CALC-MCNC: 2.9 G/DL (ref 1.9–3.5)
GLUCOSE SERPL-MCNC: 90 MG/DL (ref 65–99)
HBA1C MFR BLD: 5.6 % (ref ?–5.8)
INT CON NEG: NEGATIVE
INT CON POS: POSITIVE
POTASSIUM SERPL-SCNC: 4.3 MMOL/L (ref 3.6–5.5)
PROT SERPL-MCNC: 6.8 G/DL (ref 6–8.2)
SODIUM SERPL-SCNC: 141 MMOL/L (ref 135–145)

## 2018-07-10 PROCEDURE — 83036 HEMOGLOBIN GLYCOSYLATED A1C: CPT | Performed by: INTERNAL MEDICINE

## 2018-07-10 PROCEDURE — G0439 PPPS, SUBSEQ VISIT: HCPCS | Performed by: INTERNAL MEDICINE

## 2018-07-10 PROCEDURE — 99214 OFFICE O/P EST MOD 30 MIN: CPT | Mod: 25 | Performed by: INTERNAL MEDICINE

## 2018-07-10 ASSESSMENT — ACTIVITIES OF DAILY LIVING (ADL): BATHING_REQUIRES_ASSISTANCE: 0

## 2018-07-10 ASSESSMENT — PATIENT HEALTH QUESTIONNAIRE - PHQ9: CLINICAL INTERPRETATION OF PHQ2 SCORE: 0

## 2018-07-10 ASSESSMENT — ENCOUNTER SYMPTOMS: GENERAL WELL-BEING: GOOD

## 2018-07-10 NOTE — PROGRESS NOTES
CC: Follow-up blood sugars, hypertension, weight.    HPI:   Shamar presents today with the following.      1. Medicare annual wellness visit, subsequent  Screenings performed below advanced directives already on file.    2. IGT (impaired glucose tolerance)  Presents with a family history of diabetes due for recheck of A1c.  Value checked in office today found to be improved slightly at 5.6.  Since his heart attack he is trying to work with diet and exercise.    3. Essential hypertension  Currently well controlled compliant with medications he is denying any dizziness.    4. BMI 32.0-32.9,adult  Weight has gone up 10 pounds since beginning the year he reports the Fourth of July weekend he had too much food left over and he saw fit to take care of it.  He knows his errors and will get back on his dietary regimen.          Information for advance directives given to patient or instructed to bring in advance directives into to office to put in chart.      Depression Screening    Little interest or pleasure in doing things?  0 - not at all  Feeling down, depressed , or hopeless? 0 - not at all  Patient Health Questionnaire Score: 0     If depressive symptoms identified deferred to follow up visit unless specifically addressed in assessment and plan.    Interpretation of PHQ-9 Total Score   Score Severity   1-4 No Depression   5-9 Mild Depression   10-14 Moderate Depression   15-19 Moderately Severe Depression   20-27 Severe Depression    Screening for Cognitive Impairment    Three Minute Recall (leader, season, table) 3/3    Sravan clock face with all 12 numbers and set the hands to show 10 past 11.  Yes 5/5  Cognitive concerns identified deferred for follow up unless specifically addressed in assessment and plan.    Fall Risk Assessment    Has the patient had two or more falls in the last year or any fall with injury in the last year?  No    Safety Assessment    Throw rugs on floor.  Yes  Handrails on all stairs.  Yes  Good  lighting in all hallways.  Yes  Difficulty hearing.  No  Patient counseled about all safety risks that were identified.    Functional Assessment ADLs    Are there any barriers preventing you from cooking for yourself or meeting nutritional needs?  No.    Are there any barriers preventing you from driving safely or obtaining transportation?  No.    Are there any barriers preventing you from using a telephone or calling for help?  No.    Are there any barriers preventing you from shopping?  No.    Are there any barriers preventing you from taking care of your own finances?  No.    Are there any barriers preventing you from managing your medications?  No.    Are there any barriers preventing you from showering, bathing or dressing yourself?  No.    Are you currently engaging in any exercise or physical activity?  Yes.     What is your perception of your health?  Good.      Health Maintenance Summary                Annual Wellness Visit Next Due 7/11/2018      Done 7/10/2017 Visit Dx: Medicare annual wellness visit, subsequent     Patient has more history with this topic...    IMM INFLUENZA Next Due 9/1/2018      Done 9/6/2017 Imm Admin: Influenza Vaccine Adult HD     Patient has more history with this topic...    COLONOSCOPY Next Due 10/17/2019      Done 10/17/2014 REFERRAL TO GI FOR COLONOSCOPY     Patient has more history with this topic...    IMM DTaP/Tdap/Td Vaccine Next Due 9/29/2027      Done 9/29/2017 Imm Admin: Tdap Vaccine     Patient has more history with this topic...          Patient Care Team:  Aldo Lemus M.D. as PCP - General (Internal Medicine)  Prosper Alcala M.D. as Consulting Physician (Gastroenterology)  Julianna Escobedo M.D. as Consulting Physician (Pulmonary Medicine)  Rodrigue Mcallister M.D. as Consulting Physician (Interventional Cardiology)            Health Care Screening: Age-appropriate preventive services that Medicare covers were discussed today and ordered as indicated and agreed upon by  the patient, and as recommended by USPTF and ACIP.     Patient Active Problem List    Diagnosis Date Noted   • Anticoagulated 02/23/2018     Priority: High   • Stented coronary artery 11/16/2017     Priority: High   • Ischemic cardiomyopathy 09/03/2017     Priority: High   • Dyslipidemia 04/16/2012     Priority: Medium   • Essential hypertension 04/16/2012     Priority: Medium   • Paroxysmal atrial fibrillation (HCC) 09/05/2017   • Non morbid obesity due to excess calories 01/19/2017   • RUPAL (obstructive sleep apnea) 01/19/2017   • IGT (impaired glucose tolerance) 07/08/2016   • Vitamin d deficiency 11/16/2012       Current Outpatient Prescriptions   Medication Sig Dispense Refill   • rivaroxaban (XARELTO) 10 MG Tab tablet Take 1 Tab by mouth with dinner. 90 Tab 3   • amiodarone (PACERONE) 100 MG tablet TAKE ONE TABLET BY MOUTH ONCE DAILY (Patient taking differently: TAKE ONE TABLET BY MOUTH ONCE DAILY EVERY EVENING) 90 Tab 0   • atorvastatin (LIPITOR) 20 MG Tab TAKE ONE TABLET BY MOUTH ONCE DAILY**REPLACES 40MG** (Patient taking differently: TAKE ONE TABLET BY MOUTH ONCE DAILY**REPLACES 40MG**EVERY EVENING) 90 Tab 1   • losartan (COZAAR) 50 MG Tab Take 1 Tab by mouth every day. (Patient taking differently: Take 50 mg by mouth every evening.) 90 Tab 3   • metoprolol (LOPRESSOR) 25 MG Tab Take 25 mg by mouth 2 Times a Day. 60 Tab 11   • clopidogrel (PLAVIX) 75 MG Tab Take 4 tablets on day 1, then 1 tablet daily 90 Tab 3   • vitamin D (CHOLECALCIFEROL) 1000 UNIT TABS Take 1,000 Units by mouth every day.       No current facility-administered medications for this visit.        Family History   Problem Relation Age of Onset   • Diabetes Mother    • Heart Disease Mother    • Kidney Disease Mother    • Diabetes Sister        Social History     Social History   • Marital status:      Spouse name: N/A   • Number of children: N/A   • Years of education: N/A     Occupational History   • Not on file.     Social History  "Main Topics   • Smoking status: Former Smoker     Packs/day: 3.00     Years: 16.00     Types: Cigarettes     Quit date: 1/1/1980   • Smokeless tobacco: Never Used   • Alcohol use 0.0 oz/week      Comment: 3 per week   • Drug use: No   • Sexual activity: Yes     Partners: Female     Other Topics Concern   • Not on file     Social History Narrative   • No narrative on file       Past Surgical History:   Procedure Laterality Date   • CARDIAC CATH, LEFT HEART  09/03/2017    successful thrombectomy  and CHELLE to ostial proximal RCA    • STENT PLACEMENT  09/03/2017    cardiac   • CARPAL TUNNEL RELEASE Right 1980's   • TRIGGER FINGER RELEASE Right 1980's       Allergies as of 07/10/2018 - Reviewed 07/10/2018   Allergen Reaction Noted   • Penicillins Swelling 07/31/2008   • Sulfa drugs Swelling 07/31/2008   • Brilinta [ticagrelor] Rash and Itching 07/09/2018   • Lipidil [fenofibrate]  07/29/2011        ROS: Denies Chest pain, SOB, LE edema.    /70   Pulse (!) 49   Temp 36.4 °C (97.6 °F)   Resp 16   Ht 1.702 m (5' 7\")   Wt 95.3 kg (210 lb)   SpO2 97%   BMI 32.89 kg/m²     Physical Exam:  Gen:         Alert and oriented, No apparent distress.  Neck:        No Lymphadenopathy or Bruits.  Lungs:     Clear to auscultation bilaterally  CV:          Regular rate and rhythm. No murmurs, rubs or gallops.  Abd:         Soft non tender, non distended. Normal active bowel sounds.  No  Hepatosplenomegaly, No pulsatile masses.                   Ext:          No clubbing, cyanosis, edema.      Assessment and Plan.   73 y.o. male with the following issues.    1. Medicare annual wellness visit, subsequent  Discussed healthy lifestyle habits as well as screening regimens.    2. IGT (impaired glucose tolerance)  Discussion about diet continue to monitor every 6 months.  - POCT  A1C  - HEMOGLOBIN A1C; Future    3. Essential hypertension  Currently well controlled, Discuss diet, exercise and salt restriction.  No change to medication " therapy.    4. BMI 32.0-32.9,adult  Discussion about diet exercise weight loss goal of 15 pounds in 6 months.  - Patient identified as having weight management issue.  Appropriate orders and counseling given.    5. Dyslipidemia  Recheck cholesterol  - COMP METABOLIC PANEL; Future  - LIPID PROFILE; Future    6. Ischemic cardiomyopathy  Denying any symptomology stable follow with cardiology      7. Paroxysmal atrial fibrillation (HCC)  Stable asymptomatic followed by cardiology checking TSH.    8. RUPAL (obstructive sleep apnea)  Compliant with BiPAP.        Referrals offered: Community-based lifestyle interventions to reduce health risks and promote self-management and wellness, fall prevention, nutrition, physical activity, tobacco-use cessation, weight loss, and mental health services as per orders if indicated.    Discussion today about general wellness and lifestyle habits:    · Prevent falls and reduce trip hazards; Cautioned about securing or removing rugs.  · Have a working fire alarm and carbon monoxide detector;   · Engage in regular physical activity and social activities

## 2018-07-16 ENCOUNTER — TELEPHONE (OUTPATIENT)
Dept: CARDIOLOGY | Facility: MEDICAL CENTER | Age: 73
End: 2018-07-16

## 2018-07-16 NOTE — TELEPHONE ENCOUNTER
"Spoke with pt. He was walking outside with his wife and started to \"stagger\". \"I couldn't walk straight.\" He went inside and sat down. He stated, \"I've been having more problems with vertigo in the past week.\" He does the Epple maneuvers. Now when he turns his head sharply to one side he feels \"vertigo\". He denied weakness, facial droop, slurred speech, disorientation.   He takes Xarelto 20mg.   He is scheduled for trigger finger release surgery tomorrow am but was not required to hold Xarelto prior. He will do his exercises now. If sx. Recur he will go to ER. He will call Dr. Lemus's office. He will alert pre-op nurse tomorrow am.  "

## 2018-07-16 NOTE — TELEPHONE ENCOUNTER
----- Message from Cata Donis sent at 7/16/2018  4:31 PM PDT -----  Regarding: patient thinks he just had a stroke  RS/Lala      Patient needs to speak to you. He thinks he just had a stroke. He can be reached at [phone].

## 2018-07-16 NOTE — TELEPHONE ENCOUNTER
----- Message from Cata Donis sent at 7/16/2018  4:31 PM PDT -----  Regarding: patient thinks he just had a stroke  RS/Lala      Patient needs to speak to you. He thinks he just had a stroke. He can be reached at [phone].

## 2018-07-17 ENCOUNTER — HOSPITAL ENCOUNTER (OUTPATIENT)
Facility: MEDICAL CENTER | Age: 73
End: 2018-07-17
Attending: ORTHOPAEDIC SURGERY | Admitting: ORTHOPAEDIC SURGERY
Payer: MEDICARE

## 2018-07-17 VITALS
TEMPERATURE: 96.8 F | SYSTOLIC BLOOD PRESSURE: 121 MMHG | RESPIRATION RATE: 16 BRPM | OXYGEN SATURATION: 94 % | HEART RATE: 45 BPM | BODY MASS INDEX: 33.56 KG/M2 | DIASTOLIC BLOOD PRESSURE: 64 MMHG | WEIGHT: 213.85 LBS | HEIGHT: 67 IN

## 2018-07-17 PROBLEM — M65.342 TRIGGER RING FINGER OF LEFT HAND: Status: ACTIVE | Noted: 2018-07-17

## 2018-07-17 LAB — GLUCOSE BLD-MCNC: 96 MG/DL (ref 65–99)

## 2018-07-17 PROCEDURE — 160009 HCHG ANES TIME/MIN: Performed by: ORTHOPAEDIC SURGERY

## 2018-07-17 PROCEDURE — 700111 HCHG RX REV CODE 636 W/ 250 OVERRIDE (IP)

## 2018-07-17 PROCEDURE — A6454 SELF-ADHER BAND W>=3" <5"/YD: HCPCS | Performed by: ORTHOPAEDIC SURGERY

## 2018-07-17 PROCEDURE — A6222 GAUZE <=16 IN NO W/SAL W/O B: HCPCS | Performed by: ORTHOPAEDIC SURGERY

## 2018-07-17 PROCEDURE — 502576 HCHG PACK, HAND: Performed by: ORTHOPAEDIC SURGERY

## 2018-07-17 PROCEDURE — 700105 HCHG RX REV CODE 258: Performed by: ORTHOPAEDIC SURGERY

## 2018-07-17 PROCEDURE — 160035 HCHG PACU - 1ST 60 MINS PHASE I: Performed by: ORTHOPAEDIC SURGERY

## 2018-07-17 PROCEDURE — 160046 HCHG PACU - 1ST 60 MINS PHASE II: Performed by: ORTHOPAEDIC SURGERY

## 2018-07-17 PROCEDURE — 700101 HCHG RX REV CODE 250

## 2018-07-17 PROCEDURE — 700102 HCHG RX REV CODE 250 W/ 637 OVERRIDE(OP): Performed by: ORTHOPAEDIC SURGERY

## 2018-07-17 PROCEDURE — 501838 HCHG SUTURE GENERAL: Performed by: ORTHOPAEDIC SURGERY

## 2018-07-17 PROCEDURE — A9270 NON-COVERED ITEM OR SERVICE: HCPCS | Performed by: ORTHOPAEDIC SURGERY

## 2018-07-17 PROCEDURE — 82962 GLUCOSE BLOOD TEST: CPT

## 2018-07-17 PROCEDURE — A4565 SLINGS: HCPCS | Performed by: ORTHOPAEDIC SURGERY

## 2018-07-17 PROCEDURE — 160002 HCHG RECOVERY MINUTES (STAT): Performed by: ORTHOPAEDIC SURGERY

## 2018-07-17 PROCEDURE — 160036 HCHG PACU - EA ADDL 30 MINS PHASE I: Performed by: ORTHOPAEDIC SURGERY

## 2018-07-17 PROCEDURE — 160028 HCHG SURGERY MINUTES - 1ST 30 MINS LEVEL 3: Performed by: ORTHOPAEDIC SURGERY

## 2018-07-17 PROCEDURE — 160025 RECOVERY II MINUTES (STATS): Performed by: ORTHOPAEDIC SURGERY

## 2018-07-17 PROCEDURE — 160048 HCHG OR STATISTICAL LEVEL 1-5: Performed by: ORTHOPAEDIC SURGERY

## 2018-07-17 RX ORDER — SODIUM CHLORIDE, SODIUM LACTATE, POTASSIUM CHLORIDE, CALCIUM CHLORIDE 600; 310; 30; 20 MG/100ML; MG/100ML; MG/100ML; MG/100ML
INJECTION, SOLUTION INTRAVENOUS CONTINUOUS
Status: DISCONTINUED | OUTPATIENT
Start: 2018-07-17 | End: 2018-07-17 | Stop reason: HOSPADM

## 2018-07-17 RX ORDER — LIDOCAINE HYDROCHLORIDE 10 MG/ML
INJECTION, SOLUTION INFILTRATION; PERINEURAL
Status: DISCONTINUED | OUTPATIENT
Start: 2018-07-17 | End: 2018-07-17 | Stop reason: HOSPADM

## 2018-07-17 RX ORDER — BUPIVACAINE HYDROCHLORIDE 5 MG/ML
INJECTION, SOLUTION EPIDURAL; INTRACAUDAL
Status: DISCONTINUED | OUTPATIENT
Start: 2018-07-17 | End: 2018-07-17 | Stop reason: HOSPADM

## 2018-07-17 RX ORDER — DEXAMETHASONE SODIUM PHOSPHATE 4 MG/ML
4 INJECTION, SOLUTION INTRA-ARTICULAR; INTRALESIONAL; INTRAMUSCULAR; INTRAVENOUS; SOFT TISSUE
Status: DISCONTINUED | OUTPATIENT
Start: 2018-07-17 | End: 2018-07-17 | Stop reason: HOSPADM

## 2018-07-17 RX ORDER — LIDOCAINE HYDROCHLORIDE 10 MG/ML
INJECTION, SOLUTION EPIDURAL; INFILTRATION; INTRACAUDAL; PERINEURAL
Status: COMPLETED
Start: 2018-07-17 | End: 2018-07-17

## 2018-07-17 RX ORDER — SCOLOPAMINE TRANSDERMAL SYSTEM 1 MG/1
1 PATCH, EXTENDED RELEASE TRANSDERMAL
Status: DISCONTINUED | OUTPATIENT
Start: 2018-07-17 | End: 2018-07-17 | Stop reason: HOSPADM

## 2018-07-17 RX ORDER — HYDROCODONE BITARTRATE AND ACETAMINOPHEN 7.5; 325 MG/1; MG/1
1 TABLET ORAL EVERY 4 HOURS PRN
Status: DISCONTINUED | OUTPATIENT
Start: 2018-07-17 | End: 2018-07-17 | Stop reason: HOSPADM

## 2018-07-17 RX ADMIN — LIDOCAINE HYDROCHLORIDE 0.2 ML: 10 INJECTION, SOLUTION EPIDURAL; INFILTRATION; INTRACAUDAL; PERINEURAL at 06:30

## 2018-07-17 RX ADMIN — SODIUM CHLORIDE, POTASSIUM CHLORIDE, SODIUM LACTATE AND CALCIUM CHLORIDE 1000 ML: 600; 310; 30; 20 INJECTION, SOLUTION INTRAVENOUS at 06:30

## 2018-07-17 ASSESSMENT — PAIN SCALES - GENERAL
PAINLEVEL_OUTOF10: 0

## 2018-07-17 NOTE — DISCHARGE INSTRUCTIONS
ACTIVITY: Rest and take it easy for the first 24 hours.  A responsible adult is recommended to remain with you during that time.  It is normal to feel sleepy.  We encourage you to not do anything that requires balance, judgment or coordination.    MILD FLU-LIKE SYMPTOMS ARE NORMAL. YOU MAY EXPERIENCE GENERALIZED MUSCLE ACHES, THROAT IRRITATION, HEADACHE AND/OR SOME NAUSEA.    FOR 24 HOURS DO NOT:  Drive, operate machinery or run household appliances.  Drink beer or alcoholic beverages.   Make important decisions or sign legal documents.    SPECIAL INSTRUCTIONS: Weight bear as tolerated to surgical extremity. Encourage to move all fingers frequently.    DIET: To avoid nausea, slowly advance diet as tolerated, avoiding spicy or greasy foods for the first day.  Add more substantial food to your diet according to your physician's instructions.  Babies can be fed formula or breast milk as soon as they are hungry.  INCREASE FLUIDS AND FIBER TO AVOID CONSTIPATION.    SURGICAL DRESSING/BATHING: Keep dressing clean and dry. May remove dressing and shower with wound uncovered in 5-6 days.     FOLLOW-UP APPOINTMENT:  A follow-up appointment should be arranged with your doctor; call to schedule.    You should CALL YOUR PHYSICIAN if you develop:  Fever greater than 101 degrees F.  Pain not relieved by medication, or persistent nausea or vomiting.  Excessive bleeding (blood soaking through dressing) or unexpected drainage from the wound.  Extreme redness or swelling around the incision site, drainage of pus or foul smelling drainage.  Inability to urinate or empty your bladder within 8 hours.  Problems with breathing or chest pain.    You should call 911 if you develop problems with breathing or chest pain.  If you are unable to contact your doctor or surgical center, you should go to the nearest emergency room or urgent care center.  Physician's telephone #: [phone]    If any questions arise, call your doctor.  If your doctor  is not available, please feel free to call the Surgical Center at [phone].  The Center is open Monday through Friday from 7AM to 7PM.  You can also call the HEALTH HOTLINE open 24 hours/day, 7 days/week and speak to a nurse at [phone], or toll free at [phone].    A registered nurse may call you a few days after your surgery to see how you are doing after your procedure.    MEDICATIONS: Resume taking daily medication.  Take prescribed pain medication with food.  If no medication is prescribed, you may take non-aspirin pain medication if needed.  PAIN MEDICATION CAN BE VERY CONSTIPATING.  Take a stool softener or laxative such as senokot, pericolace, or milk of magnesia if needed.    Prescription given for Ultram.  Last pain medication given at ___________.    If your physician has prescribed pain medication that includes Acetaminophen (Tylenol), do not take additional Acetaminophen (Tylenol) while taking the prescribed medication.    Depression / Suicide Risk    As you are discharged from this [facility] facility, it is important to learn how to keep safe from harming yourself.    Recognize the warning signs:  · Abrupt changes in personality, positive or negative- including increase in energy   · Giving away possessions  · Change in eating patterns- significant weight changes-  positive or negative  · Change in sleeping patterns- unable to sleep or sleeping all the time   · Unwillingness or inability to communicate  · Depression  · Unusual sadness, discouragement and loneliness  · Talk of wanting to die  · Neglect of personal appearance   · Rebelliousness- reckless behavior  · Withdrawal from people/activities they love  · Confusion- inability to concentrate     If you or a loved one observes any of these behaviors or has concerns about self-harm, here's what you can do:  · Talk about it- your feelings and reasons for harming yourself  · Remove any means that you might use to hurt yourself  (examples: pills, rope, extension cords, firearm)  · Get professional help from the community (Mental Health, Substance Abuse, psychological counseling)  · Do not be alone:Call your Safe Contact- someone whom you trust who will be there for you.  · Call your local CRISIS HOTLINE 039-2399 or 334-769-4567  · Call your local Children's Mobile Crisis Response Team [place] (854) 589-8145 or www.Spacedeck  · Call the toll free National Suicide Prevention Hotlines   · National Suicide Prevention Lifeline 848-072-EVZO (1739)  · National Hope Line Network 800-SUICIDE (736-6874)

## 2018-07-17 NOTE — OR NURSING
0724 Patient to recovery via cart. Patient awakens to verbal stimuli. Denies any pain at this time. CMS in tact. Surgical extremity propped up with a pillow.  0743 Patient continues to sleep.   0746 Patients wife notified of disposition.  0805 Patient awakens to verbal stimuli. Continues to be sleepy.  0818 Patient sitting up in bed. Taking sips of water.  0830 Report to Caitlyn REYES to assume care of patient in stage 2.

## 2018-07-17 NOTE — OP REPORT
DATE OF SERVICE:  07/17/2018    PREOPERATIVE DIAGNOSES:  1.  Left ring trigger finger.  2.  Left hand Dupuytren's nodule in the palm.    POSTOPERATIVE DIAGNOSES:  1.  Left ring trigger finger.  2.  Left hand Dupuytren's nodule in the palm.    PROCEDURES:  1.  Left ring finger trigger release.  2.  Excision of Dupuytren's nodule in the palm.    SURGEON:  Jose Miguel Hendrix MD    ANESTHESIA:  IV sedation with local.    ESTIMATED BLOOD LOSS:  Minimal.    DRAINS:  None.    COMPLICATIONS:  None.    INDICATIONS:  Patient is a gentleman who has had a trigger finger.  He also   has Dupuytren's nodule is trying to create slight contracture of his MCP   joint, felt to benefit from trigger release and excision of the Dupuytren's   nodule.  All questions were answered.  No guarantees were given.  Signed   consent was obtained.    DESCRIPTION OF PROCEDURE:  Patient was taken to operating room and laid supine   on the table.  All bony prominences were well padded.  IV sedation was given.    Left upper extremity was prepped and draped in usual sterile fashion using a   ChloraPrep.  Limb was exsanguinated with elevation, tourniquet raised, total   tourniquet time was under 15 minutes.  The area was infiltrated with Marcaine   and lidocaine, both without epinephrine.  A V-incision was made.  A   full-thickness flap was elevated off the Dupuytren's nodule.  I excised the   Dupuytren's nodule cord fascia, which helped release the palm.  I extended the   incision distally.  I identified the A1 pulley extended proximally and   distally, found to be a nice release along its entirety.  The tendon tracked   nicely with no catching.  Wound irrigated, closed with nylon in simple   fashion.  Sterile dressing of Xeroform, 4x4, Sof-Rol, bias was applied.  All   needle and sponge counts correct.  Patient tolerated procedure well and was   transferred to recovery in stable condition.       ____________________________________     JOSE MIGUEL HENDRIX  MD [place] / [facility]    DD:  07/17/2018 07:35:04  DT:  07/17/2018 07:47:21    D#:  [id number]  Job#:  [id number]

## 2018-07-17 NOTE — OR SURGEON
Immediate Post OP Note    PreOp Diagnosis: L rf trigger and dupuytrens nodule    PostOp Diagnosis: same    Procedure(s):  TRIGGER FINGER RELEASE - RING  MASS EXCISION ORTHO - Dupuytrens  NODULE EXCISION    Surgeon(s):  Jose Miguel Kay M.D.    Anesthesiologist/Type of Anesthesia:  Anesthesiologist: Lala Frost M.D./* No anesthesia type entered *    Surgical Staff:  Circulator: Lydia Rashid R.N.  Scrub Person: Stephon Eastman    Specimens removed if any:  * No specimens in log *    Estimated Blood Loss: min    Findings: adequate release    Complications: none        7/17/2018 6:47 AM Jose Miguel Kay M.D.

## 2018-07-17 NOTE — OR NURSING
0836 Pt to stage two via jammie. Pt denies pain and nausea at this time. Pt getting dressed with help of CNA.   0839 Pt up to chair with help of CNA. VSS.  Small amount of sanguinous drainage noted to dressing. Instructed pt to call Dr. Kay if drainage became worse.   0846 Explained discharge instructions to pt and pts wife. Both express understanding   0853 Pt meets criteria to be discharged after uneventful stay in stage two

## 2018-07-20 ENCOUNTER — SLEEP CENTER VISIT (OUTPATIENT)
Dept: SLEEP MEDICINE | Facility: MEDICAL CENTER | Age: 73
End: 2018-07-20
Payer: MEDICARE

## 2018-07-20 VITALS
OXYGEN SATURATION: 93 % | SYSTOLIC BLOOD PRESSURE: 108 MMHG | BODY MASS INDEX: 33.12 KG/M2 | HEART RATE: 51 BPM | HEIGHT: 67 IN | DIASTOLIC BLOOD PRESSURE: 68 MMHG | WEIGHT: 211 LBS | RESPIRATION RATE: 16 BRPM

## 2018-07-20 DIAGNOSIS — G47.33 OSA (OBSTRUCTIVE SLEEP APNEA): ICD-10-CM

## 2018-07-20 PROCEDURE — 99213 OFFICE O/P EST LOW 20 MIN: CPT | Performed by: INTERNAL MEDICINE

## 2018-07-20 NOTE — PROGRESS NOTES
Chief Complaint   Patient presents with   • Follow-Up     RUPAL, 6 month follow up     HPI: This patient is a 73 y.o. male who returns for RUPAL. Polysomnography confirmed severe obstructive sleep apnea syndrome with AHI 73 events per hour, associated with desaturations to 67%. He has been compliant with BiPAP with pressures empirically reduced last visit following weight loss.  Compliance card shows 100% usage on BiPAP: 14/10 cm of water, for over 8 hours nightly, and AHI normal at 3.9. He denies any sleep issues and awakens feeling rested. His fatigue has overall improved dramatically following coronary artery stenting last year.  He gained some weight over July 4 holiday however has been working on losing weight again.      Past Medical History:   Diagnosis Date   • Arrhythmia 09/02/2017    Atrial Fib   • Blood clotting disorder (HCC) 09/2017    heart   • CAD (coronary artery disease) 4/16/2012   • Dental disorder     upper and lower dentures, full   • Hemorrhagic disorder (HCC)     on Plavix and Xarelto   • High cholesterol    • HTN (hypertension) 4/16/2012   • Hyperglycemia 4/16/2012   • Hyperlipemia 4/16/2012   • Hypertension    • Liver enzyme elevation     history of   • Myocardial infarct (HCC) 09/02/2017    Dr. Mcallister, cardiologist   • Obesity    • Sleep apnea     BIPAP   • Tachycardia 4/16/2012   • Tonsillitis        Social History     Social History   • Marital status:      Spouse name: N/A   • Number of children: N/A   • Years of education: N/A     Occupational History   • Not on file.     Social History Main Topics   • Smoking status: Former Smoker     Packs/day: 3.00     Years: 16.00     Types: Cigarettes     Quit date: 1/1/1980   • Smokeless tobacco: Never Used   • Alcohol use 0.0 oz/week      Comment: 3 per week   • Drug use: No   • Sexual activity: Yes     Partners: Female     Other Topics Concern   • Not on file     Social History Narrative   • No narrative on file       Family History   Problem  Relation Age of Onset   • Diabetes Mother    • Heart Disease Mother    • Kidney Disease Mother    • Diabetes Sister        Current Outpatient Prescriptions on File Prior to Visit   Medication Sig Dispense Refill   • rivaroxaban (XARELTO) 10 MG Tab tablet Take 1 Tab by mouth with dinner. 90 Tab 3   • amiodarone (PACERONE) 100 MG tablet TAKE ONE TABLET BY MOUTH ONCE DAILY (Patient taking differently: TAKE ONE TABLET BY MOUTH ONCE DAILY EVERY EVENING) 90 Tab 0   • atorvastatin (LIPITOR) 20 MG Tab TAKE ONE TABLET BY MOUTH ONCE DAILY**REPLACES 40MG** (Patient taking differently: TAKE ONE TABLET BY MOUTH ONCE DAILY**REPLACES 40MG**EVERY EVENING) 90 Tab 1   • losartan (COZAAR) 50 MG Tab Take 1 Tab by mouth every day. (Patient taking differently: Take 50 mg by mouth every evening.) 90 Tab 3   • metoprolol (LOPRESSOR) 25 MG Tab Take 25 mg by mouth 2 Times a Day. 60 Tab 11   • clopidogrel (PLAVIX) 75 MG Tab Take 4 tablets on day 1, then 1 tablet daily 90 Tab 3   • vitamin D (CHOLECALCIFEROL) 1000 UNIT TABS Take 1,000 Units by mouth every day.       No current facility-administered medications on file prior to visit.        Allergies: Penicillins; Sulfa drugs; Brilinta [ticagrelor]; and Lipidil [fenofibrate]    ROS:   Constitutional: Denies fevers, chills, night sweats, fatigue or weight loss  Eyes: Denies vision loss, pain, drainage, double vision  Ears, Nose, Throat: Denies earache, difficulty hearing, tinnitus, nasal congestion, hoarseness  Cardiovascular: Denies chest pain, tightness, palpitations, orthopnea or edema  Respiratory: Denies shortness of breath, cough, wheezing, hemoptysis  Sleep: Denies daytime sleepiness, snoring, apneas, insomnia, morning headaches  GI: Denies heartburn, dysphagia, nausea, abdominal pain, diarrhea or constipation  : Denies frequent urination, hematuria, discharge or painful urination  Musculoskeletal: Denies back pain, painful joints, sore muscles  Neurological: Denies weakness or  "headaches  Skin: No rashes    Blood pressure 108/68, pulse (!) 51, resp. rate 16, height 1.702 m (5' 7\"), weight 95.7 kg (211 lb), SpO2 93 %.    Physical Exam:  Appearance: Well-nourished, well-developed, in no acute distress  HEENT: Normocephalic, atraumatic, white sclera, PERRLA, Mallampati 3  Neck: No adenopathy or masses  Respiratory: no intercostal retractions or accessory muscle use  Lungs auscultation: Clear to auscultation bilaterally  Cardiovascular: Regular rate rhythm. No murmurs, rubs or gallops.  No LE edema  Abdomen: soft, nondistended  Gait: Normal  Digits: No clubbing, cyanosis  Motor: No focal deficits  Orientation: Oriented to time, person and place    Diagnosis:  1. RUPAL (obstructive sleep apnea)     2. BMI 33.0-33.9,adult         Plan:  The patient shows excellent compliance and response to treatment.  Continue BiPAP: 14/10 cm of water.  Patient's body mass index is 33.05 kg/m². Exercise and nutrition counseling were performed at this visit.  Return in about 6 months (around 1/20/2019).      "

## 2018-08-09 DIAGNOSIS — I48.0 PAF (PAROXYSMAL ATRIAL FIBRILLATION) (HCC): ICD-10-CM

## 2018-08-09 RX ORDER — AMIODARONE HYDROCHLORIDE 100 MG/1
TABLET ORAL
Qty: 90 TAB | Refills: 1 | Status: SHIPPED | OUTPATIENT
Start: 2018-08-09 | End: 2019-02-05 | Stop reason: SDUPTHER

## 2018-08-20 ENCOUNTER — OFFICE VISIT (OUTPATIENT)
Dept: CARDIOLOGY | Facility: MEDICAL CENTER | Age: 73
End: 2018-08-20
Payer: MEDICARE

## 2018-08-20 VITALS
SYSTOLIC BLOOD PRESSURE: 128 MMHG | HEART RATE: 46 BPM | HEIGHT: 67 IN | OXYGEN SATURATION: 96 % | DIASTOLIC BLOOD PRESSURE: 68 MMHG | WEIGHT: 209 LBS | BODY MASS INDEX: 32.8 KG/M2 | RESPIRATION RATE: 14 BRPM

## 2018-08-20 DIAGNOSIS — I10 ESSENTIAL HYPERTENSION: ICD-10-CM

## 2018-08-20 DIAGNOSIS — Z79.01 ANTICOAGULATED: ICD-10-CM

## 2018-08-20 DIAGNOSIS — Z95.5 STENTED CORONARY ARTERY: ICD-10-CM

## 2018-08-20 DIAGNOSIS — I25.5 ISCHEMIC CARDIOMYOPATHY: ICD-10-CM

## 2018-08-20 DIAGNOSIS — I48.0 PAROXYSMAL ATRIAL FIBRILLATION (HCC): ICD-10-CM

## 2018-08-20 PROCEDURE — 99213 OFFICE O/P EST LOW 20 MIN: CPT | Performed by: INTERNAL MEDICINE

## 2018-08-20 RX ORDER — TRAMADOL HYDROCHLORIDE 50 MG/1
TABLET ORAL
COMMUNITY
Start: 2018-07-18 | End: 2019-01-10

## 2018-08-20 ASSESSMENT — ENCOUNTER SYMPTOMS
CHILLS: 0
NAUSEA: 0
BRUISES/BLEEDS EASILY: 0
SHORTNESS OF BREATH: 0
PALPITATIONS: 0
NEUROLOGICAL NEGATIVE: 1
WEAKNESS: 0
VOMITING: 0

## 2018-08-20 NOTE — PROGRESS NOTES
Chief Complaint   Patient presents with   • Coronary Artery Disease       Subjective:   Shamar Morgan is a 73 y.o. male who presents today for follow-up of coronary disease.  He suffered an inferior wall MI in September, 2017 and had a 4 mm Synergy stent placed in his RCA.  He has been on chronic anticoagulation because of PAF.  On exam patch monitor he had an 18% burden of atrial fibrillation for which is symptomatic.  No bleeding problems.  He has not been as physically active as previously.  No other interval problems except for surgery for left trigger finger.    Past Medical History:   Diagnosis Date   • Arrhythmia 09/02/2017    Atrial Fib   • Blood clotting disorder (HCC) 09/2017    heart   • CAD (coronary artery disease) 4/16/2012   • Dental disorder     upper and lower dentures, full   • Hemorrhagic disorder (HCC)     on Plavix and Xarelto   • High cholesterol    • HTN (hypertension) 4/16/2012   • Hyperglycemia 4/16/2012   • Hyperlipemia 4/16/2012   • Hypertension    • Liver enzyme elevation     history of   • Myocardial infarct (HCC) 09/02/2017    Dr. Mcallister, cardiologist   • Obesity    • Sleep apnea     BIPAP   • Tachycardia 4/16/2012   • Tonsillitis      Past Surgical History:   Procedure Laterality Date   • TRIGGER FINGER RELEASE Left 7/17/2018    Procedure: TRIGGER FINGER RELEASE - RING;  Surgeon: Jose Miguel Kay M.D.;  Location: SURGERY [facility] [place];  Service: Orthopedics   • MASS EXCISION ORTHO Left 7/17/2018    Procedure: MASS EXCISION ORTHO - DISTAL INTERPHALANGEAL JOINT NODULE EXCISION;  Surgeon: Jose Miguel Kay M.D.;  Location: SURGERY [facility] [place];  Service: Orthopedics   • CARDIAC CATH, LEFT HEART  09/03/2017    successful thrombectomy  and CHELLE to ostial proximal RCA    • STENT PLACEMENT  09/03/2017    cardiac   • CARPAL TUNNEL RELEASE Right 1980's   • TRIGGER FINGER RELEASE Right 1980's     Family History   Problem Relation Age of Onset   • Diabetes Mother    • Heart Disease Mother     • Kidney Disease Mother    • Diabetes Sister      Social History     Social History   • Marital status:      Spouse name: N/A   • Number of children: N/A   • Years of education: N/A     Occupational History   • Not on file.     Social History Main Topics   • Smoking status: Former Smoker     Packs/day: 3.00     Years: 16.00     Types: Cigarettes     Quit date: 1/1/1980   • Smokeless tobacco: Never Used   • Alcohol use 0.0 oz/week      Comment: 3 per week   • Drug use: No   • Sexual activity: Yes     Partners: Female     Other Topics Concern   • Not on file     Social History Narrative   • No narrative on file     Allergies   Allergen Reactions   • Penicillins Swelling   • Sulfa Drugs Swelling   • Brilinta [Ticagrelor] Rash and Itching     .   • Lipidil [Fenofibrate]      Myalgia     Outpatient Encounter Prescriptions as of 8/20/2018   Medication Sig Dispense Refill   • amiodarone (PACERONE) 100 MG tablet TAKE 1 TABLET BY MOUTH ONCE DAILY 90 Tab 1   • rivaroxaban (XARELTO) 10 MG Tab tablet Take 1 Tab by mouth with dinner. 90 Tab 3   • atorvastatin (LIPITOR) 20 MG Tab TAKE ONE TABLET BY MOUTH ONCE DAILY**REPLACES 40MG** (Patient taking differently: TAKE ONE TABLET BY MOUTH ONCE DAILY**REPLACES 40MG**EVERY EVENING) 90 Tab 1   • metoprolol (LOPRESSOR) 25 MG Tab Take 25 mg by mouth 2 Times a Day. 60 Tab 11   • clopidogrel (PLAVIX) 75 MG Tab Take 4 tablets on day 1, then 1 tablet daily (Patient taking differently: Take 75 mg by mouth every day.) 90 Tab 3   • vitamin D (CHOLECALCIFEROL) 1000 UNIT TABS Take 1,000 Units by mouth every day.     • tramadol (ULTRAM) 50 MG Tab      • [DISCONTINUED] losartan (COZAAR) 50 MG Tab Take 1 Tab by mouth every day. (Patient taking differently: Take 50 mg by mouth every evening.) 90 Tab 3     No facility-administered encounter medications on file as of 8/20/2018.      Review of Systems   Constitutional: Negative for chills and malaise/fatigue.   Respiratory: Negative for  "shortness of breath.    Cardiovascular: Negative for chest pain, palpitations and leg swelling.   Gastrointestinal: Negative for nausea and vomiting.   Neurological: Negative.  Negative for weakness.   Endo/Heme/Allergies: Does not bruise/bleed easily.        Objective:   /68   Pulse (!) 46   Resp 14   Ht 1.702 m (5' 7\")   Wt 94.8 kg (209 lb)   SpO2 96%   BMI 32.73 kg/m²     Physical Exam   Constitutional: He is oriented to person, place, and time. No distress.   HENT:   Head: Normocephalic and atraumatic.   Eyes: Pupils are equal, round, and reactive to light. No scleral icterus.   Neck: No JVD present.   Cardiovascular: Normal rate and regular rhythm.    No murmur heard.  Pulmonary/Chest: Breath sounds normal. No respiratory distress. He has no wheezes.   Abdominal: Soft. Bowel sounds are normal. He exhibits no distension.   Obese   Musculoskeletal: He exhibits no edema.   Neurological: He is alert and oriented to person, place, and time.   Skin: Skin is warm and dry.   Psychiatric: He has a normal mood and affect.       Assessment:     1. Anticoagulated     2. Ischemic cardiomyopathy     3. Stented coronary artery     4. Essential hypertension     5. Paroxysmal atrial fibrillation (HCC)         Medical Decision Making:  Today's Assessment / Status / Plan:   Coronary disease: Stenting of RCA one year ago.  He can stop his Plavix for now continue his anticoagulant.    PAF: The patient is asymptomatic.  By prior so I will patch is an 80% burden of atrial fibrillation.  Continue anticoagulation indefinitely on low-dose amiodarone.  Recent can panel showed no liver abnormalities.  He should have a chest x-ray periodically and will order at next visit.    LDL the time of his heart attack was only 49.  Repeat lab 28.  Continue same medications.  Return in 6 months.  "

## 2018-10-01 DIAGNOSIS — I10 ESSENTIAL HYPERTENSION: ICD-10-CM

## 2018-12-20 ENCOUNTER — HOSPITAL ENCOUNTER (OUTPATIENT)
Dept: LAB | Facility: MEDICAL CENTER | Age: 73
End: 2018-12-20
Attending: INTERNAL MEDICINE
Payer: MEDICARE

## 2018-12-20 DIAGNOSIS — E78.5 DYSLIPIDEMIA: ICD-10-CM

## 2018-12-20 DIAGNOSIS — R73.02 IGT (IMPAIRED GLUCOSE TOLERANCE): ICD-10-CM

## 2018-12-20 DIAGNOSIS — R53.83 OTHER FATIGUE: ICD-10-CM

## 2018-12-20 LAB
ALBUMIN SERPL BCP-MCNC: 4.2 G/DL (ref 3.2–4.9)
ALBUMIN/GLOB SERPL: 1.7 G/DL
ALP SERPL-CCNC: 43 U/L (ref 30–99)
ALT SERPL-CCNC: 35 U/L (ref 2–50)
ANION GAP SERPL CALC-SCNC: 7 MMOL/L (ref 0–11.9)
AST SERPL-CCNC: 26 U/L (ref 12–45)
BILIRUB SERPL-MCNC: 1 MG/DL (ref 0.1–1.5)
BUN SERPL-MCNC: 20 MG/DL (ref 8–22)
CALCIUM SERPL-MCNC: 9.5 MG/DL (ref 8.5–10.5)
CHLORIDE SERPL-SCNC: 108 MMOL/L (ref 96–112)
CHOLEST SERPL-MCNC: 106 MG/DL (ref 100–199)
CO2 SERPL-SCNC: 28 MMOL/L (ref 20–33)
CREAT SERPL-MCNC: 1.04 MG/DL (ref 0.5–1.4)
EST. AVERAGE GLUCOSE BLD GHB EST-MCNC: 114 MG/DL
FASTING STATUS PATIENT QL REPORTED: NORMAL
GLOBULIN SER CALC-MCNC: 2.5 G/DL (ref 1.9–3.5)
GLUCOSE SERPL-MCNC: 102 MG/DL (ref 65–99)
HBA1C MFR BLD: 5.6 % (ref 0–5.6)
HDLC SERPL-MCNC: 44 MG/DL
LDLC SERPL CALC-MCNC: 47 MG/DL
POTASSIUM SERPL-SCNC: 4.1 MMOL/L (ref 3.6–5.5)
PROT SERPL-MCNC: 6.7 G/DL (ref 6–8.2)
SODIUM SERPL-SCNC: 143 MMOL/L (ref 135–145)
TRIGL SERPL-MCNC: 76 MG/DL (ref 0–149)
TSH SERPL DL<=0.005 MIU/L-ACNC: 0.81 UIU/ML (ref 0.38–5.33)

## 2018-12-20 PROCEDURE — 80053 COMPREHEN METABOLIC PANEL: CPT

## 2018-12-20 PROCEDURE — 83036 HEMOGLOBIN GLYCOSYLATED A1C: CPT

## 2018-12-20 PROCEDURE — 36415 COLL VENOUS BLD VENIPUNCTURE: CPT

## 2018-12-20 PROCEDURE — 84443 ASSAY THYROID STIM HORMONE: CPT

## 2018-12-20 PROCEDURE — 80061 LIPID PANEL: CPT

## 2019-01-10 ENCOUNTER — OFFICE VISIT (OUTPATIENT)
Dept: MEDICAL GROUP | Facility: MEDICAL CENTER | Age: 74
End: 2019-01-10
Payer: MEDICARE

## 2019-01-10 VITALS
HEART RATE: 53 BPM | RESPIRATION RATE: 16 BRPM | HEIGHT: 67 IN | TEMPERATURE: 97.1 F | DIASTOLIC BLOOD PRESSURE: 70 MMHG | OXYGEN SATURATION: 93 % | SYSTOLIC BLOOD PRESSURE: 120 MMHG | WEIGHT: 217 LBS | BODY MASS INDEX: 34.06 KG/M2

## 2019-01-10 DIAGNOSIS — I25.5 ISCHEMIC CARDIOMYOPATHY: ICD-10-CM

## 2019-01-10 DIAGNOSIS — I48.0 PAROXYSMAL ATRIAL FIBRILLATION (HCC): ICD-10-CM

## 2019-01-10 DIAGNOSIS — E66.09 NON MORBID OBESITY DUE TO EXCESS CALORIES: ICD-10-CM

## 2019-01-10 DIAGNOSIS — I25.10 CORONARY ARTERY DISEASE INVOLVING NATIVE CORONARY ARTERY OF NATIVE HEART WITHOUT ANGINA PECTORIS: ICD-10-CM

## 2019-01-10 DIAGNOSIS — I10 ESSENTIAL HYPERTENSION: ICD-10-CM

## 2019-01-10 DIAGNOSIS — R73.02 IGT (IMPAIRED GLUCOSE TOLERANCE): ICD-10-CM

## 2019-01-10 PROCEDURE — 8041 PR SCP AHA: Performed by: INTERNAL MEDICINE

## 2019-01-10 PROCEDURE — 99214 OFFICE O/P EST MOD 30 MIN: CPT | Performed by: INTERNAL MEDICINE

## 2019-01-10 NOTE — PROGRESS NOTES
"Subjective:      Shamar Morgan is a 73 y.o. male who presents with Follow-Up          error  HPI    ROS       Objective:     /70   Pulse (!) 53   Temp 36.2 °C (97.1 °F)   Resp 16   Ht 1.702 m (5' 7\")   Wt 98.4 kg (217 lb)   SpO2 93%   BMI 33.99 kg/m²      Physical Exam            Assessment/Plan:     1. Ischemic cardiomyopathy      2. Paroxysmal atrial fibrillation (HCC)      "

## 2019-01-10 NOTE — PROGRESS NOTES
Annual Health Assessment Questions:     1.  Are you currently engaging in any exercise or physical activity? Yes    2.  How would you describe your mood or emotional well-being today? good    3.  Have you had any falls in the last year? No    4.  Have you noticed any problems with your balance or had difficulty walking? Yes    5.  In the last six months have you experienced any leakage of urine? Yes  Discussed life style changes.    6. DPA/Advanced Directive: Patient has Advanced Directive, but it is not on file. Instructed to bring in a copy to scan into their chart.    CC: Follow-up blood sugars, hypertension, cardiac issues.    HPI:   Shamar presents today with the following.    1. Ischemic cardiomyopathy  Presents doing well does have some cardiac enlargement diagnosed with ischemic cardiomyopathy however ejection fraction is well preserved he is not on diuretics.    2. Paroxysmal atrial fibrillation (HCC)  Maintain on anticoagulation denying any blood in stool or dark stool no signs of anemia.    3. Coronary artery disease involving native coronary artery of native heart without angina pectoris  Denying any activity related chest pains again on risk reduction.    4. IGT (impaired glucose tolerance)  Blood sugar slightly elevated A1c of he does report he gained weight over the holidays from dietary indiscretions.    5. Essential hypertension  Well-controlled on current regimen.    6. Non morbid obesity due to excess calories  He does report awareness of needing to change his diet.      Patient Active Problem List    Diagnosis Date Noted   • Anticoagulated 02/23/2018     Priority: High   • Stented coronary artery 11/16/2017     Priority: High   • Ischemic cardiomyopathy 09/03/2017     Priority: High   • Coronary artery disease involving native coronary artery of native heart without angina pectoris 04/16/2012     Priority: High   • Dyslipidemia 04/16/2012     Priority: Medium   • Essential hypertension 04/16/2012  "    Priority: Medium   • Trigger ring finger of left hand 07/17/2018   • Paroxysmal atrial fibrillation (HCC) 09/05/2017   • Non morbid obesity due to excess calories 01/19/2017   • RUPAL (obstructive sleep apnea) 01/19/2017   • IGT (impaired glucose tolerance) 07/08/2016   • Vitamin d deficiency 11/16/2012       Current Outpatient Prescriptions   Medication Sig Dispense Refill   • metoprolol (LOPRESSOR) 25 MG Tab Take 1 Tab by mouth 2 Times a Day. 180 Tab 3   • amiodarone (PACERONE) 100 MG tablet TAKE 1 TABLET BY MOUTH ONCE DAILY 90 Tab 1   • rivaroxaban (XARELTO) 10 MG Tab tablet Take 1 Tab by mouth with dinner. 90 Tab 3   • atorvastatin (LIPITOR) 20 MG Tab TAKE ONE TABLET BY MOUTH ONCE DAILY**REPLACES 40MG** (Patient taking differently: TAKE ONE TABLET BY MOUTH ONCE DAILY**REPLACES 40MG**EVERY EVENING) 90 Tab 1   • clopidogrel (PLAVIX) 75 MG Tab Take 4 tablets on day 1, then 1 tablet daily (Patient taking differently: Take 75 mg by mouth every day.) 90 Tab 3   • vitamin D (CHOLECALCIFEROL) 1000 UNIT TABS Take 1,000 Units by mouth every day.       No current facility-administered medications for this visit.          Allergies as of 01/10/2019 - Reviewed 01/10/2019   Allergen Reaction Noted   • Penicillins Swelling 07/31/2008   • Sulfa drugs Swelling 07/31/2008   • Brilinta [ticagrelor] Rash and Itching 07/09/2018   • Lipidil [fenofibrate]  07/29/2011        ROS: Denies Chest pain, SOB, LE edema.    /70   Pulse (!) 53   Temp 36.2 °C (97.1 °F)   Resp 16   Ht 1.702 m (5' 7\")   Wt 98.4 kg (217 lb)   SpO2 93%   BMI 33.99 kg/m²     Physical Exam:  Gen:         Alert and oriented, No apparent distress.  Neck:        No Lymphadenopathy or Bruits.  Lungs:     Clear to auscultation bilaterally  CV:          Regular rate and rhythm. No murmurs, rubs or gallops.               Ext:          No clubbing, cyanosis, edema.      Assessment and Plan.   73 y.o. male with the following issues.    1. Ischemic " cardiomyopathy  Stable follow with cardiology    2. Paroxysmal atrial fibrillation (HCC)  Rate controlled asymptomatic    3. Coronary artery disease involving native coronary artery of native heart without angina pectoris  Again no chest pain continue risk reduction    4. IGT (impaired glucose tolerance)  Discussion about diet exercise recheck in 6 months    5. Essential hypertension  Currently well controlled, Discuss diet, exercise and salt restriction.  No change to medication therapy.    6. Non morbid obesity due to excess calories  Again weight reduction.

## 2019-01-10 NOTE — PATIENT INSTRUCTIONS
Today, your Healthcare Provider may have discussed the following recommendations:    1. Exercise and Physical Activity  According to the American Heart Association, it is recommended to engage in physical activity regularly and to aim for 150 minutes of moderate-intensity aerobic activity per week.  Your Healthcare Provider may have recommended taking the stairs instead of the elevator, starting or maintaining a walking program or strength-training program.    2. Emotional Well-being  Mental and emotional well-being is essential to overall health.  Your Healthcare Provider may have encouraged you to build strong, positive relationships with family and friends, become more involved in your community (by volunteering or joining a spiritual community), or focus on self-care.    3. Fall and Injury Prevention  To prevent falls and injuries and also improve your balance, your Healthcare Provider may have suggested that you use a cane or walker, start an exercise of physical therapy program, or have your vision and/or hearing tested.    4. Urinary Leakage (Urinary Incontinence)  To control or manage the leakage of urine, your Healthcare Provider may have recommended you start bladder training exercises (such as Kegel exercises), a trial of a medication or a referral to see a specialist to discuss surgical options.

## 2019-01-18 DIAGNOSIS — E78.5 DYSLIPIDEMIA: ICD-10-CM

## 2019-01-18 RX ORDER — ATORVASTATIN CALCIUM 20 MG/1
TABLET, FILM COATED ORAL
Qty: 90 TAB | Refills: 1 | Status: SHIPPED | OUTPATIENT
Start: 2019-01-18 | End: 2019-04-17 | Stop reason: SDUPTHER

## 2019-01-25 ENCOUNTER — SLEEP CENTER VISIT (OUTPATIENT)
Dept: SLEEP MEDICINE | Facility: MEDICAL CENTER | Age: 74
End: 2019-01-25
Payer: MEDICARE

## 2019-01-25 VITALS
HEART RATE: 68 BPM | OXYGEN SATURATION: 98 % | RESPIRATION RATE: 16 BRPM | SYSTOLIC BLOOD PRESSURE: 122 MMHG | DIASTOLIC BLOOD PRESSURE: 76 MMHG | BODY MASS INDEX: 34.03 KG/M2 | HEIGHT: 67 IN | TEMPERATURE: 97.5 F | WEIGHT: 216.8 LBS

## 2019-01-25 DIAGNOSIS — I25.10 CORONARY ARTERY DISEASE INVOLVING NATIVE CORONARY ARTERY OF NATIVE HEART WITHOUT ANGINA PECTORIS: ICD-10-CM

## 2019-01-25 DIAGNOSIS — I48.0 PAROXYSMAL ATRIAL FIBRILLATION (HCC): ICD-10-CM

## 2019-01-25 DIAGNOSIS — G47.33 OSA (OBSTRUCTIVE SLEEP APNEA): ICD-10-CM

## 2019-01-25 PROCEDURE — 99213 OFFICE O/P EST LOW 20 MIN: CPT | Performed by: INTERNAL MEDICINE

## 2019-01-25 NOTE — PROGRESS NOTES
Chief Complaint   Patient presents with   • Follow-Up     6 month follow up       HPI: This patient is a 73 y.o. male who returns for RUPLA. Polysomnography confirmed severe obstructive sleep apnea syndrome with AHI 73 events per hour, associated with desaturations to 67%. Compliance card shows 100% usage on BiPAP: 14/10 cm of water, for over 8 hours nightly, and AHI normal at 3.6. He is sleeping well overall although having recurring work-related dreams which annoy him.  He has taken to meditation which has been beneficial.  He denies fatigue or daytime somnolence.  Denies mask leaks.  His weight has increased with BMI: 33.    Past Medical History:   Diagnosis Date   • Arrhythmia 09/02/2017    Atrial Fib   • Blood clotting disorder (HCC) 09/2017    heart   • CAD (coronary artery disease) 4/16/2012   • Dental disorder     upper and lower dentures, full   • Hemorrhagic disorder (HCC)     on Plavix and Xarelto   • High cholesterol    • HTN (hypertension) 4/16/2012   • Hyperglycemia 4/16/2012   • Hyperlipemia 4/16/2012   • Hypertension    • Liver enzyme elevation     history of   • Myocardial infarct (HCC) 09/02/2017    Dr. Mcallister, cardiologist   • Obesity    • Sleep apnea     BIPAP   • Tachycardia 4/16/2012   • Tonsillitis        Social History     Social History   • Marital status:      Spouse name: N/A   • Number of children: N/A   • Years of education: N/A     Occupational History   • Not on file.     Social History Main Topics   • Smoking status: Former Smoker     Packs/day: 3.00     Years: 16.00     Types: Cigarettes     Quit date: 1/1/1980   • Smokeless tobacco: Never Used   • Alcohol use 0.0 oz/week      Comment: 3 per week   • Drug use: No   • Sexual activity: Yes     Partners: Female     Other Topics Concern   • Not on file     Social History Narrative   • No narrative on file       Family History   Problem Relation Age of Onset   • Diabetes Mother    • Heart Disease Mother    • Kidney Disease Mother    •  "Diabetes Sister        Current Outpatient Prescriptions on File Prior to Visit   Medication Sig Dispense Refill   • atorvastatin (LIPITOR) 20 MG Tab TAKE 1 TABLET BY MOUTH ONCE DAILY (REPLACES  40MG) 90 Tab 1   • metoprolol (LOPRESSOR) 25 MG Tab Take 1 Tab by mouth 2 Times a Day. 180 Tab 3   • amiodarone (PACERONE) 100 MG tablet TAKE 1 TABLET BY MOUTH ONCE DAILY 90 Tab 1   • rivaroxaban (XARELTO) 10 MG Tab tablet Take 1 Tab by mouth with dinner. 90 Tab 3   • clopidogrel (PLAVIX) 75 MG Tab Take 4 tablets on day 1, then 1 tablet daily (Patient taking differently: Take 75 mg by mouth every day.) 90 Tab 3   • vitamin D (CHOLECALCIFEROL) 1000 UNIT TABS Take 1,000 Units by mouth every day.       No current facility-administered medications on file prior to visit.        Allergies: Penicillins; Sulfa drugs; Brilinta [ticagrelor]; and Lipidil [fenofibrate]    ROS:   Constitutional: Denies fevers, chills, night sweats, fatigue or weight loss  Eyes: Denies vision loss, pain, drainage, double vision  Ears, Nose, Throat: Denies earache, difficulty hearing, tinnitus, nasal congestion, hoarseness  Cardiovascular: Denies chest pain, tightness, palpitations, orthopnea or edema  Respiratory: Denies shortness of breath, cough, wheezing, hemoptysis  Sleep: Denies daytime sleepiness, snoring, apneas, insomnia, morning headaches  GI: Denies heartburn, dysphagia, nausea, abdominal pain, diarrhea or constipation  : Denies frequent urination, hematuria, discharge or painful urination  Musculoskeletal: Denies back pain, painful joints, sore muscles  Neurological: Denies weakness or headaches  Skin: No rashes    Blood pressure 122/76, pulse 68, temperature 36.4 °C (97.5 °F), temperature source Temporal, resp. rate 16, height 1.702 m (5' 7\"), weight 98.3 kg (216 lb 12.8 oz), SpO2 98 %.    Physical Exam:  Appearance: Well-nourished, well-developed, in no acute distress  HEENT: Normocephalic, atraumatic, white sclera, PERRLA, Mallampati " 3  Neck: No adenopathy or masses  Respiratory: no intercostal retractions or accessory muscle use  Lungs auscultation: Clear to auscultation bilaterally  Cardiovascular: Regular rate rhythm. No murmurs, rubs or gallops.  No LE edema  Abdomen: soft, nondistended  Gait: Normal  Digits: No clubbing, cyanosis  Motor: No focal deficits  Orientation: Oriented to time, person and place    Diagnosis:  1. RUPAL (obstructive sleep apnea)     2. BMI 33.0-33.9,adult  OBESITY COUNSELING (No Charge): Patient identified as having weight management issue.  Appropriate orders and counseling given.   3. Paroxysmal atrial fibrillation (HCC)     4. Coronary artery disease involving native coronary artery of native heart without angina pectoris         Plan:  The patient shows excellent compliance and response to BiPAP therapy and is benefiting from treatment.  Continue BiPAP: 14/10 cm of water.  Replace CPAP mask every 3 months.  Patient's body mass index is 33.96 kg/m². Exercise and nutrition counseling were performed at this visit.  Return in about 6 months (around 7/25/2019).

## 2019-02-05 DIAGNOSIS — I48.0 PAF (PAROXYSMAL ATRIAL FIBRILLATION) (HCC): ICD-10-CM

## 2019-02-06 ENCOUNTER — TELEPHONE (OUTPATIENT)
Dept: PULMONOLOGY | Facility: HOSPICE | Age: 74
End: 2019-02-06

## 2019-02-06 DIAGNOSIS — G47.33 OSA (OBSTRUCTIVE SLEEP APNEA): ICD-10-CM

## 2019-02-06 RX ORDER — AMIODARONE HYDROCHLORIDE 100 MG/1
100 TABLET ORAL DAILY
Qty: 90 TAB | Refills: 1 | Status: SHIPPED | OUTPATIENT
Start: 2019-02-06 | End: 2019-08-05 | Stop reason: SDUPTHER

## 2019-02-08 NOTE — TELEPHONE ENCOUNTER
Pt called regarding order informed [facility] pt confirmed has not received call and will call [facility]. Encouraged if any issues ca call us back

## 2019-03-04 ENCOUNTER — TELEPHONE (OUTPATIENT)
Dept: CARDIOLOGY | Facility: MEDICAL CENTER | Age: 74
End: 2019-03-04

## 2019-03-04 NOTE — TELEPHONE ENCOUNTER
Dr. Murdock has scheduled pt. For colonoscopy on 4-17-19. Dr. Obregon is requesting cardiac clearance and permission to hold Xarelto 10mg for 2 days prior.   To Dr. Mcallister.    Dr. Obregon   [phone] fax.   [facility] form on nurse's desk.

## 2019-03-11 NOTE — TELEPHONE ENCOUNTER
Faxed [facility] clearance form.      Message   Received: Today   Message Contents   PATRICK Avila L.P.NMarkus   Caller: Unspecified (1 week ago,  3:59 PM)             Xeralto may be head for two days

## 2019-04-08 RX ORDER — LOSARTAN POTASSIUM 50 MG/1
TABLET ORAL
Qty: 90 TAB | Refills: 3 | Status: SHIPPED | OUTPATIENT
Start: 2019-04-08 | End: 2019-07-05 | Stop reason: SDUPTHER

## 2019-04-17 DIAGNOSIS — E78.5 DYSLIPIDEMIA: ICD-10-CM

## 2019-04-18 RX ORDER — ATORVASTATIN CALCIUM 20 MG/1
20 TABLET, FILM COATED ORAL DAILY
Qty: 100 TAB | Refills: 0 | Status: SHIPPED | OUTPATIENT
Start: 2019-04-18 | End: 2019-08-13 | Stop reason: SDUPTHER

## 2019-06-05 ENCOUNTER — APPOINTMENT (RX ONLY)
Dept: URBAN - METROPOLITAN AREA CLINIC 20 | Facility: CLINIC | Age: 74
Setting detail: DERMATOLOGY
End: 2019-06-05

## 2019-06-05 DIAGNOSIS — L81.4 OTHER MELANIN HYPERPIGMENTATION: ICD-10-CM

## 2019-06-05 DIAGNOSIS — Z71.89 OTHER SPECIFIED COUNSELING: ICD-10-CM

## 2019-06-05 DIAGNOSIS — D22 MELANOCYTIC NEVI: ICD-10-CM

## 2019-06-05 DIAGNOSIS — L71.8 OTHER ROSACEA: ICD-10-CM

## 2019-06-05 DIAGNOSIS — L57.0 ACTINIC KERATOSIS: ICD-10-CM

## 2019-06-05 DIAGNOSIS — D18.0 HEMANGIOMA: ICD-10-CM

## 2019-06-05 DIAGNOSIS — L82.1 OTHER SEBORRHEIC KERATOSIS: ICD-10-CM

## 2019-06-05 PROBLEM — D48.5 NEOPLASM OF UNCERTAIN BEHAVIOR OF SKIN: Status: ACTIVE | Noted: 2019-06-05

## 2019-06-05 PROBLEM — D22.61 MELANOCYTIC NEVI OF RIGHT UPPER LIMB, INCLUDING SHOULDER: Status: ACTIVE | Noted: 2019-06-05

## 2019-06-05 PROBLEM — D18.01 HEMANGIOMA OF SKIN AND SUBCUTANEOUS TISSUE: Status: ACTIVE | Noted: 2019-06-05

## 2019-06-05 PROBLEM — D22.62 MELANOCYTIC NEVI OF LEFT UPPER LIMB, INCLUDING SHOULDER: Status: ACTIVE | Noted: 2019-06-05

## 2019-06-05 PROBLEM — D22.5 MELANOCYTIC NEVI OF TRUNK: Status: ACTIVE | Noted: 2019-06-05

## 2019-06-05 PROCEDURE — ? ADDITIONAL NOTES

## 2019-06-05 PROCEDURE — ? LIQUID NITROGEN

## 2019-06-05 PROCEDURE — 11102 TANGNTL BX SKIN SINGLE LES: CPT

## 2019-06-05 PROCEDURE — 99214 OFFICE O/P EST MOD 30 MIN: CPT | Mod: 25

## 2019-06-05 PROCEDURE — ? COUNSELING

## 2019-06-05 PROCEDURE — ? SUNSCREEN RECOMMENDATIONS

## 2019-06-05 PROCEDURE — ? PRESCRIPTION

## 2019-06-05 PROCEDURE — 17000 DESTRUCT PREMALG LESION: CPT | Mod: 59

## 2019-06-05 PROCEDURE — 17003 DESTRUCT PREMALG LES 2-14: CPT

## 2019-06-05 PROCEDURE — ? BIOPSY BY SHAVE METHOD

## 2019-06-05 RX ORDER — SODIUM SULFACETAMIDE AND SULFUR 80; 40 MG/ML; MG/ML
LOTION TOPICAL
Qty: 1 | Refills: 6 | Status: ERX

## 2019-06-05 ASSESSMENT — LOCATION ZONE DERM
LOCATION ZONE: NOSE
LOCATION ZONE: TRUNK
LOCATION ZONE: FACE
LOCATION ZONE: ARM
LOCATION ZONE: HAND
LOCATION ZONE: SCALP

## 2019-06-05 ASSESSMENT — LOCATION DETAILED DESCRIPTION DERM
LOCATION DETAILED: RIGHT CENTRAL FRONTAL SCALP
LOCATION DETAILED: NASAL SUPRATIP
LOCATION DETAILED: RIGHT PROXIMAL DORSAL FOREARM
LOCATION DETAILED: RIGHT RADIAL DORSAL HAND
LOCATION DETAILED: LEFT RADIAL DORSAL HAND
LOCATION DETAILED: RIGHT CENTRAL MALAR CHEEK
LOCATION DETAILED: LEFT MEDIAL MALAR CHEEK
LOCATION DETAILED: LEFT CENTRAL MALAR CHEEK
LOCATION DETAILED: SUPERIOR THORACIC SPINE
LOCATION DETAILED: RIGHT INFERIOR UPPER BACK
LOCATION DETAILED: LEFT VENTRAL LATERAL DISTAL FOREARM
LOCATION DETAILED: RIGHT INFERIOR CENTRAL MALAR CHEEK
LOCATION DETAILED: RIGHT VENTRAL DISTAL FOREARM
LOCATION DETAILED: RIGHT INFERIOR MEDIAL FOREHEAD
LOCATION DETAILED: RIGHT MEDIAL UPPER BACK
LOCATION DETAILED: LEFT VENTRAL PROXIMAL FOREARM
LOCATION DETAILED: INFERIOR THORACIC SPINE
LOCATION DETAILED: POSTERIOR MID-PARIETAL SCALP
LOCATION DETAILED: LEFT PROXIMAL DORSAL FOREARM

## 2019-06-05 ASSESSMENT — LOCATION SIMPLE DESCRIPTION DERM
LOCATION SIMPLE: NOSE
LOCATION SIMPLE: RIGHT HAND
LOCATION SIMPLE: RIGHT CHEEK
LOCATION SIMPLE: LEFT HAND
LOCATION SIMPLE: UPPER BACK
LOCATION SIMPLE: LEFT CHEEK
LOCATION SIMPLE: RIGHT UPPER BACK
LOCATION SIMPLE: LEFT FOREARM
LOCATION SIMPLE: RIGHT FOREHEAD
LOCATION SIMPLE: RIGHT FOREARM
LOCATION SIMPLE: RIGHT SCALP
LOCATION SIMPLE: POSTERIOR SCALP

## 2019-06-05 NOTE — PROCEDURE: LIQUID NITROGEN
Duration Of Freeze Thaw-Cycle (Seconds): 10
Render Note In Bullet Format When Appropriate: No
Detail Level: Detailed
Post-Care Instructions: I reviewed with the patient in detail post-care instructions. Patient is to wear sunprotection, and avoid picking at any of the treated lesions. Pt may apply Vaseline to crusted or scabbing areas.
Number Of Freeze-Thaw Cycles: 2 freeze-thaw cycles
Render Post-Care Instructions In Note?: yes
Consent: The patient's consent was obtained including but not limited to risks of crusting, scabbing, blistering, scarring, darker or lighter pigmentary change, recurrence, incomplete removal and infection. RTC in 2 months if lesion(s) persistent.

## 2019-06-05 NOTE — PROCEDURE: BIOPSY BY SHAVE METHOD
Render Post-Care Instructions In Note?: yes
Lab Facility: 
Bill [id number] For Specimen Handling/Conveyance To Laboratory?: no
Wound Care: Bacitracin
Size Of Lesion In Cm: 0
Anesthesia Type: 1% lidocaine with 1:100,000 epinephrine and a 1:12 solution of 8.4% sodium bicarbonate
Billing Type: Third-Party Bill
Anesthesia Volume In Cc: 1
Type Of Destruction Used: Curettage
Biopsy Type: H and E
Depth Of Biopsy: dermis
Dressing: Band-Aid
Post-Care Instructions: I reviewed with the patient in detail post-care instructions. Patient is to keep the biopsy site clean once daily and then apply petroleum and bandaid  until healed.
Lab: [id number]
Detail Level: Detailed
Consent: Written consent was obtained and risks were reviewed including but not limited to scarring, infection, bleeding, scabbing, incomplete removal, nerve damage and allergy to anesthesia.
Notification Instructions: Patient will be notified of biopsy results. However, patient instructed to call the office if not contacted within 2 weeks.
Biopsy Method: Personna blade
Hemostasis: Drysol and Electrocautery

## 2019-06-17 ENCOUNTER — TELEPHONE (OUTPATIENT)
Dept: CARDIOLOGY | Facility: MEDICAL CENTER | Age: 74
End: 2019-06-17

## 2019-06-17 NOTE — TELEPHONE ENCOUNTER
Shamar Mcallister M.D. 1 hour ago (3:33 PM)         I am scheduled to have a cancerous spot removed from my scalp on June 28th. I take xarelto and am worried about bleeding. Should I stop the xarelto and when if necessary?        To RS, please advice, Thank You!

## 2019-06-21 NOTE — TELEPHONE ENCOUNTER
Rodrigue Mcallister M.D.   You 31 minutes ago (8:04 AM)      Hold Xeralto for two days prior to procedure and can resume one day after procedure (Routing comment)      Called pt and notified, pt verbalizes understanding

## 2019-06-27 ENCOUNTER — OFFICE VISIT (OUTPATIENT)
Dept: CARDIOLOGY | Facility: MEDICAL CENTER | Age: 74
End: 2019-06-27
Payer: MEDICARE

## 2019-06-27 VITALS
HEIGHT: 67 IN | DIASTOLIC BLOOD PRESSURE: 62 MMHG | BODY MASS INDEX: 35.22 KG/M2 | HEART RATE: 50 BPM | SYSTOLIC BLOOD PRESSURE: 120 MMHG | OXYGEN SATURATION: 96 % | WEIGHT: 224.4 LBS

## 2019-06-27 DIAGNOSIS — I48.0 PAROXYSMAL ATRIAL FIBRILLATION (HCC): ICD-10-CM

## 2019-06-27 DIAGNOSIS — Z79.01 ANTICOAGULATED: ICD-10-CM

## 2019-06-27 DIAGNOSIS — I10 ESSENTIAL HYPERTENSION: ICD-10-CM

## 2019-06-27 DIAGNOSIS — Z95.5 STENTED CORONARY ARTERY: ICD-10-CM

## 2019-06-27 PROCEDURE — 99214 OFFICE O/P EST MOD 30 MIN: CPT | Performed by: INTERNAL MEDICINE

## 2019-06-27 ASSESSMENT — ENCOUNTER SYMPTOMS
VOMITING: 0
PALPITATIONS: 0
BRUISES/BLEEDS EASILY: 1
NAUSEA: 0
CHILLS: 0
DEPRESSION: 0
WEAKNESS: 0
SHORTNESS OF BREATH: 0
NEUROLOGICAL NEGATIVE: 1

## 2019-06-28 ENCOUNTER — APPOINTMENT (RX ONLY)
Dept: URBAN - METROPOLITAN AREA CLINIC 36 | Facility: CLINIC | Age: 74
Setting detail: DERMATOLOGY
End: 2019-06-28

## 2019-06-28 VITALS — DIASTOLIC BLOOD PRESSURE: 70 MMHG | SYSTOLIC BLOOD PRESSURE: 126 MMHG

## 2019-06-28 PROBLEM — D03.4 MELANOMA IN SITU OF SCALP AND NECK: Status: ACTIVE | Noted: 2019-06-28

## 2019-06-28 PROCEDURE — 13121 CMPLX RPR S/A/L 2.6-7.5 CM: CPT

## 2019-06-28 PROCEDURE — 13122 CMPLX RPR S/A/L ADDL 5 CM/>: CPT

## 2019-06-28 PROCEDURE — ? EXCISION

## 2019-06-28 PROCEDURE — 11623 EXC S/N/H/F/G MAL+MRG 2.1-3: CPT

## 2019-06-28 NOTE — PROCEDURE: EXCISION
Show Pathology Comment Variable: Yes
V-Y Flap Text: The defect edges were debeveled with a #15 scalpel blade.  Given the location of the defect, shape of the defect and the proximity to free margins a V-Y flap was deemed most appropriate.  Using a sterile surgical marker, an appropriate advancement flap was drawn incorporating the defect and placing the expected incisions within the relaxed skin tension lines where possible.    The area thus outlined was incised deep to adipose tissue with a #15 scalpel blade.  The skin margins were undermined to an appropriate distance in all directions utilizing iris scissors.
Fusiform Excision Additional Text (Leave Blank If You Do Not Want): The margin was drawn around the clinically apparent lesion.  A fusiform shape was then drawn on the skin incorporating the lesion and margins.  Incisions were then made along these lines to the appropriate tissue plane and the lesion was extirpated.
V-Y Plasty Text: The defect edges were debeveled with a #15 scalpel blade.  Given the location of the defect, shape of the defect and the proximity to free margins an V-Y advancement flap was deemed most appropriate.  Using a sterile surgical marker, an appropriate advancement flap was drawn incorporating the defect and placing the expected incisions within the relaxed skin tension lines where possible.    The area thus outlined was incised deep to adipose tissue with a #15 scalpel blade.  The skin margins were undermined to an appropriate distance in all directions utilizing iris scissors.
Bi-Rhombic Flap Text: The defect edges were debeveled with a #15 scalpel blade.  Given the location of the defect and the proximity to free margins a bi-rhombic flap was deemed most appropriate.  Using a sterile surgical marker, an appropriate rhombic flap was drawn incorporating the defect. The area thus outlined was incised deep to adipose tissue with a #15 scalpel blade.  The skin margins were undermined to an appropriate distance in all directions utilizing iris scissors.
Complex Repair And Double M Plasty Text: The defect edges were debeveled with a #15 scalpel blade.  The primary defect was closed partially with a complex linear closure.  Given the location of the remaining defect, shape of the defect and the proximity to free margins a double M plasty was deemed most appropriate for complete closure of the defect.  Using a sterile surgical marker, an appropriate advancement flap was drawn incorporating the defect and placing the expected incisions within the relaxed skin tension lines where possible.    The area thus outlined was incised deep to adipose tissue with a #15 scalpel blade.  The skin margins were undermined to an appropriate distance in all directions utilizing iris scissors.
Curvilinear Excision Additional Text (Leave Blank If You Do Not Want): The margin was drawn around the clinically apparent lesion.  A curvilinear shape was then drawn on the skin incorporating the lesion and margins.  Incisions were then made along these lines to the appropriate tissue plane and the lesion was extirpated.
Skin Substitute Text: The defect edges were debeveled with a #15 scalpel blade.  Given the location of the defect, shape of the defect and the proximity to free margins a skin substitute graft was deemed most appropriate.  The graft material was trimmed to fit the size of the defect. The graft was then placed in the primary defect and oriented appropriately.
Wound Care: Petrolatum
S Plasty Text: Given the location and shape of the defect, and the orientation of relaxed skin tension lines, an S-plasty was deemed most appropriate for repair.  Using a sterile surgical marker, the appropriate outline of the S-plasty was drawn, incorporating the defect and placing the expected incisions within the relaxed skin tension lines where possible.  The area thus outlined was incised deep to adipose tissue with a #15 scalpel blade.  The skin margins were undermined to an appropriate distance in all directions utilizing iris scissors. The skin flaps were advanced over the defect.  The opposing margins were then approximated with interrupted buried subcutaneous sutures.
Additional Anesthesia Volume In Cc: 6
Secondary Defect Width (In Cm): 0
Helical Rim Advancement Flap Text: The defect edges were debeveled with a #15 blade scalpel.  Given the location of the defect and the proximity to free margins (helical rim) a double helical rim advancement flap was deemed most appropriate.  Using a sterile surgical marker, the appropriate advancement flaps were drawn incorporating the defect and placing the expected incisions between the helical rim and antihelix where possible.  The area thus outlined was incised through and through with a #15 scalpel blade.  With a skin hook and iris scissors, the flaps were gently and sharply undermined and freed up.
Complex Repair And W Plasty Text: The defect edges were debeveled with a #15 scalpel blade.  The primary defect was closed partially with a complex linear closure.  Given the location of the remaining defect, shape of the defect and the proximity to free margins a W plasty was deemed most appropriate for complete closure of the defect.  Using a sterile surgical marker, an appropriate advancement flap was drawn incorporating the defect and placing the expected incisions within the relaxed skin tension lines where possible.    The area thus outlined was incised deep to adipose tissue with a #15 scalpel blade.  The skin margins were undermined to an appropriate distance in all directions utilizing iris scissors.
Dressing: pressure dressing with telfa
X Size Of Lesion In Cm (Optional): 1.4
Advancement-Rotation Flap Text: The defect edges were debeveled with a #15 scalpel blade.  Given the location of the defect, shape of the defect and the proximity to free margins an advancement-rotation flap was deemed most appropriate.  Using a sterile surgical marker, an appropriate flap was drawn incorporating the defect and placing the expected incisions within the relaxed skin tension lines where possible. The area thus outlined was incised deep to adipose tissue with a #15 scalpel blade.  The skin margins were undermined to an appropriate distance in all directions utilizing iris scissors.
Xenograft Text: The defect edges were debeveled with a #15 scalpel blade.  Given the location of the defect, shape of the defect and the proximity to free margins a xenograft was deemed most appropriate.  The graft was then trimmed to fit the size of the defect.  The graft was then placed in the primary defect and oriented appropriately.
Elliptical Excision Additional Text (Leave Blank If You Do Not Want): The margin was drawn around the clinically apparent lesion.  An elliptical shape was then drawn on the skin incorporating the lesion and margins.  Incisions were then made along these lines to the appropriate tissue plane and the lesion was extirpated.
H Plasty Text: Given the location of the defect, shape of the defect and the proximity to free margins a H-plasty was deemed most appropriate for repair.  Using a sterile surgical marker, the appropriate advancement arms of the H-plasty were drawn incorporating the defect and placing the expected incisions within the relaxed skin tension lines where possible. The area thus outlined was incised deep to adipose tissue with a #15 scalpel blade. The skin margins were undermined to an appropriate distance in all directions utilizing iris scissors.  The opposing advancement arms were then advanced into place in opposite direction and anchored with interrupted buried subcutaneous sutures.
Deep Sutures: 3-0 Polysorb
Curettage Prior To Excision?: No
Tissue Cultured Epidermal Autograft Text: The defect edges were debeveled with a #15 scalpel blade.  Given the location of the defect, shape of the defect and the proximity to free margins a tissue cultured epidermal autograft was deemed most appropriate.  The graft was then trimmed to fit the size of the defect.  The graft was then placed in the primary defect and oriented appropriately.
Size Of Lesion In Cm: 1.6
Billing Type: Third-Party Bill
Purse String (Intermediate) Text: Given the location of the defect and the characteristics of the surrounding skin a purse string intermediate closure was deemed most appropriate.  Undermining was performed circumfirentially around the surgical defect.  A purse string suture was then placed and tightened.
Mercedes Flap Text: The defect edges were debeveled with a #15 scalpel blade.  Given the location of the defect, shape of the defect and the proximity to free margins a Mercedes flap was deemed most appropriate.  Using a sterile surgical marker, an appropriate advancement flap was drawn incorporating the defect and placing the expected incisions within the relaxed skin tension lines where possible. The area thus outlined was incised deep to adipose tissue with a #15 scalpel blade.  The skin margins were undermined to an appropriate distance in all directions utilizing iris scissors.
W Plasty Text: The lesion was extirpated to the level of the fat with a #15 scalpel blade.  Given the location of the defect, shape of the defect and the proximity to free margins a W-plasty was deemed most appropriate for repair.  Using a sterile surgical marker, the appropriate transposition arms of the W-plasty were drawn incorporating the defect and placing the expected incisions within the relaxed skin tension lines where possible.    The area thus outlined was incised deep to adipose tissue with a #15 scalpel blade.  The skin margins were undermined to an appropriate distance in all directions utilizing iris scissors.  The opposing transposition arms were then transposed into place in opposite direction and anchored with interrupted buried subcutaneous sutures.
Hemostasis: Electricator
Saucerization Excision Additional Text (Leave Blank If You Do Not Want): The margin was drawn around the clinically apparent lesion.  Incisions were then made along these lines, in a tangential fashion, to the appropriate tissue plane and the lesion was extirpated.
Size Of Margin In Cm: 0.5
Complex Repair And Z Plasty Text: The defect edges were debeveled with a #15 scalpel blade.  The primary defect was closed partially with a complex linear closure.  Given the location of the remaining defect, shape of the defect and the proximity to free margins a Z plasty was deemed most appropriate for complete closure of the defect.  Using a sterile surgical marker, an appropriate advancement flap was drawn incorporating the defect and placing the expected incisions within the relaxed skin tension lines where possible.    The area thus outlined was incised deep to adipose tissue with a #15 scalpel blade.  The skin margins were undermined to an appropriate distance in all directions utilizing iris scissors.
Bilateral Helical Rim Advancement Flap Text: The defect edges were debeveled with a #15 blade scalpel.  Given the location of the defect and the proximity to free margins (helical rim) a bilateral helical rim advancement flap was deemed most appropriate.  Using a sterile surgical marker, the appropriate advancement flaps were drawn incorporating the defect and placing the expected incisions between the helical rim and antihelix where possible.  The area thus outlined was incised through and through with a #15 scalpel blade.  With a skin hook and iris scissors, the flaps were gently and sharply undermined and freed up.
Excision Method: Excisional Biopsy
Modified Advancement Flap Text: The defect edges were debeveled with a #15 scalpel blade.  Given the location of the defect, shape of the defect and the proximity to free margins a modified advancement flap was deemed most appropriate.  Using a sterile surgical marker, an appropriate advancement flap was drawn incorporating the defect and placing the expected incisions within the relaxed skin tension lines where possible.    The area thus outlined was incised deep to adipose tissue with a #15 scalpel blade.  The skin margins were undermined to an appropriate distance in all directions utilizing iris scissors.
Purse String (Simple) Text: Given the location of the defect and the characteristics of the surrounding skin a purse string simple closure was deemed most appropriate.  Undermining was performed circumferentially around the surgical defect.  A purse string suture was then placed and tightened.
Z Plasty Text: The lesion was extirpated to the level of the fat with a #15 scalpel blade.  Given the location of the defect, shape of the defect and the proximity to free margins a Z-plasty was deemed most appropriate for repair.  Using a sterile surgical marker, the appropriate transposition arms of the Z-plasty were drawn incorporating the defect and placing the expected incisions within the relaxed skin tension lines where possible.    The area thus outlined was incised deep to adipose tissue with a #15 scalpel blade.  The skin margins were undermined to an appropriate distance in all directions utilizing iris scissors.  The opposing transposition arms were then transposed into place in opposite direction and anchored with interrupted buried subcutaneous sutures.
Slit Excision Additional Text (Leave Blank If You Do Not Want): A linear line was drawn on the skin overlying the lesion. An incision was made slowly until the lesion was visualized.  Once visualized, the lesion was removed with blunt dissection.
Ear Star Wedge Flap Text: The defect edges were debeveled with a #15 blade scalpel.  Given the location of the defect and the proximity to free margins (helical rim) an ear star wedge flap was deemed most appropriate.  Using a sterile surgical marker, the appropriate flap was drawn incorporating the defect and placing the expected incisions between the helical rim and antihelix where possible.  The area thus outlined was incised through and through with a #15 scalpel blade.
Wound Check: 14 days
Complex Repair And Dorsal Nasal Flap Text: The defect edges were debeveled with a #15 scalpel blade.  The primary defect was closed partially with a complex linear closure.  Given the location of the remaining defect, shape of the defect and the proximity to free margins a dorsal nasal flap was deemed most appropriate for complete closure of the defect.  Using a sterile surgical marker, an appropriate flap was drawn incorporating the defect and placing the expected incisions within the relaxed skin tension lines where possible.    The area thus outlined was incised deep to adipose tissue with a #15 scalpel blade.  The skin margins were undermined to an appropriate distance in all directions utilizing iris scissors.
Estimated Blood Loss (Cc): minimal
Lab: [id number]
Complex Repair And Split-Thickness Skin Graft Text: The defect edges were debeveled with a #15 scalpel blade.  The primary defect was closed partially with a complex linear closure.  Given the location of the defect, shape of the defect and the proximity to free margins a split thickness skin graft was deemed most appropriate to repair the remaining defect.  The graft was trimmed to fit the size of the remaining defect.  The graft was then placed in the primary defect, oriented appropriately, and sutured into place.
Bilobed Flap Text: The defect edges were debeveled with a #15 scalpel blade.  Given the location of the defect and the proximity to free margins a bilobe flap was deemed most appropriate.  Using a sterile surgical marker, an appropriate bilobe flap drawn around the defect.    The area thus outlined was incised deep to adipose tissue with a #15 scalpel blade.  The skin margins were undermined to an appropriate distance in all directions utilizing iris scissors.
Banner Transposition Flap Text: The defect edges were debeveled with a #15 scalpel blade.  Given the location of the defect and the proximity to free margins a Banner transposition flap was deemed most appropriate.  Using a sterile surgical marker, an appropriate flap drawn around the defect. The area thus outlined was incised deep to adipose tissue with a #15 scalpel blade.  The skin margins were undermined to an appropriate distance in all directions utilizing iris scissors.
Complex Repair And Ftsg Text: The defect edges were debeveled with a #15 scalpel blade.  The primary defect was closed partially with a complex linear closure.  Given the location of the defect, shape of the defect and the proximity to free margins a full thickness skin graft was deemed most appropriate to repair the remaining defect.  The graft was trimmed to fit the size of the remaining defect.  The graft was then placed in the primary defect, oriented appropriately, and sutured into place.
Mucosal Advancement Flap Text: Given the location of the defect, shape of the defect and the proximity to free margins a mucosal advancement flap was deemed most appropriate. Incisions were made with a 15 blade scalpel in the appropriate fashion along the cutaneous vermilion border and the mucosal lip. The remaining actinically damaged mucosal tissue was excised.  The mucosal advancement flap was then elevated to the gingival sulcus with care taken to preserve the neurovascular structures and advanced into the primary defect. Care was taken to ensure that precise realignment of the vermilion border was achieved.
Partial Purse String (Intermediate) Text: Given the location of the defect and the characteristics of the surrounding skin an intermediate purse string closure was deemed most appropriate.  Undermining was performed circumferentially around the surgical defect.  A purse string suture was then placed and tightened. Wound tension of the circular defect prevented complete closure of the wound.
Excisional Biopsy Additional Text (Leave Blank If You Do Not Want): The margin was drawn around the clinically apparent lesion. An elliptical shape was then drawn on the skin incorporating the lesion and margins.  Incisions were then made along these lines to the appropriate tissue plane and the lesion was extirpated.
Cheek Interpolation Flap Text: A decision was made to reconstruct the defect utilizing an interpolation axial flap and a staged reconstruction.  A telfa template was made of the defect.  This telfa template was then used to outline the Cheek Interpolation flap.  The donor area for the pedicle flap was then injected with anesthesia.  The flap was excised through the skin and subcutaneous tissue down to the layer of the underlying musculature.  The interpolation flap was carefully excised within this deep plane to maintain its blood supply.  The edges of the donor site were undermined.   The donor site was closed in a primary fashion.  The pedicle was then rotated into position and sutured.  Once the tube was sutured into place, adequate blood supply was confirmed with blanching and refill.  The pedicle was then wrapped with xeroform gauze and dressed appropriately with a telfa and gauze bandage to ensure continued blood supply and protect the attached pedicle.
Lab Facility: 
Interpolation Flap Text: A decision was made to reconstruct the defect utilizing an interpolation axial flap and a staged reconstruction.  A telfa template was made of the defect.  This telfa template was then used to outline the interpolation flap.  The donor area for the pedicle flap was then injected with anesthesia.  The flap was excised through the skin and subcutaneous tissue down to the layer of the underlying musculature.  The interpolation flap was carefully excised within this deep plane to maintain its blood supply.  The edges of the donor site were undermined.   The donor site was closed in a primary fashion.  The pedicle was then rotated into position and sutured.  Once the tube was sutured into place, adequate blood supply was confirmed with blanching and refill.  The pedicle was then wrapped with xeroform gauze and dressed appropriately with a telfa and gauze bandage to ensure continued blood supply and protect the attached pedicle.
Bilobed Transposition Flap Text: The defect edges were debeveled with a #15 scalpel blade.  Given the location of the defect and the proximity to free margins a bilobed transposition flap was deemed most appropriate.  Using a sterile surgical marker, an appropriate bilobe flap drawn around the defect.    The area thus outlined was incised deep to adipose tissue with a #15 scalpel blade.  The skin margins were undermined to an appropriate distance in all directions utilizing iris scissors.
Complex Repair And Epidermal Autograft Text: The defect edges were debeveled with a #15 scalpel blade.  The primary defect was closed partially with a complex linear closure.  Given the location of the defect, shape of the defect and the proximity to free margins an epidermal autograft was deemed most appropriate to repair the remaining defect.  The graft was trimmed to fit the size of the remaining defect.  The graft was then placed in the primary defect, oriented appropriately, and sutured into place.
Partial Purse String (Simple) Text: Given the location of the defect and the characteristics of the surrounding skin a simple purse string closure was deemed most appropriate.  Undermining was performed circumferentially around the surgical defect.  A purse string suture was then placed and tightened. Wound tension of the circular defect prevented complete closure of the wound.
Hatchet Flap Text: The defect edges were debeveled with a #15 scalpel blade.  Given the location of the defect, shape of the defect and the proximity to free margins a hatchet flap was deemed most appropriate.  Using a sterile surgical marker, an appropriate hatchet flap was drawn incorporating the defect and placing the expected incisions within the relaxed skin tension lines where possible.    The area thus outlined was incised deep to adipose tissue with a #15 scalpel blade.  The skin margins were undermined to an appropriate distance in all directions utilizing iris scissors.
Cheek-To-Nose Interpolation Flap Text: A decision was made to reconstruct the defect utilizing an interpolation axial flap and a staged reconstruction.  A telfa template was made of the defect.  This telfa template was then used to outline the Cheek-To-Nose Interpolation flap.  The donor area for the pedicle flap was then injected with anesthesia.  The flap was excised through the skin and subcutaneous tissue down to the layer of the underlying musculature.  The interpolation flap was carefully excised within this deep plane to maintain its blood supply.  The edges of the donor site were undermined.   The donor site was closed in a primary fashion.  The pedicle was then rotated into position and sutured.  Once the tube was sutured into place, adequate blood supply was confirmed with blanching and refill.  The pedicle was then wrapped with xeroform gauze and dressed appropriately with a telfa and gauze bandage to ensure continued blood supply and protect the attached pedicle.
Perilesional Excision Additional Text (Leave Blank If You Do Not Want): The margin was drawn around the clinically apparent lesion. Incisions were then made along these lines to the appropriate tissue plane and the lesion was extirpated.
Anesthesia Volume In Cc: 9
Melolabial Interpolation Flap Text: A decision was made to reconstruct the defect utilizing an interpolation axial flap and a staged reconstruction.  A telfa template was made of the defect.  This telfa template was then used to outline the melolabial interpolation flap.  The donor area for the pedicle flap was then injected with anesthesia.  The flap was excised through the skin and subcutaneous tissue down to the layer of the underlying musculature.  The pedicle flap was carefully excised within this deep plane to maintain its blood supply.  The edges of the donor site were undermined.   The donor site was closed in a primary fashion.  The pedicle was then rotated into position and sutured.  Once the tube was sutured into place, adequate blood supply was confirmed with blanching and refill.  The pedicle was then wrapped with xeroform gauze and dressed appropriately with a telfa and gauze bandage to ensure continued blood supply and protect the attached pedicle.
Complex Repair And Dermal Autograft Text: The defect edges were debeveled with a #15 scalpel blade.  The primary defect was closed partially with a complex linear closure.  Given the location of the defect, shape of the defect and the proximity to free margins an dermal autograft was deemed most appropriate to repair the remaining defect.  The graft was trimmed to fit the size of the remaining defect.  The graft was then placed in the primary defect, oriented appropriately, and sutured into place.
Trilobed Flap Text: The defect edges were debeveled with a #15 scalpel blade.  Given the location of the defect and the proximity to free margins a trilobed flap was deemed most appropriate.  Using a sterile surgical marker, an appropriate trilobed flap drawn around the defect.    The area thus outlined was incised deep to adipose tissue with a #15 scalpel blade.  The skin margins were undermined to an appropriate distance in all directions utilizing iris scissors.
Repair Performed By Another Provider Text (Leave Blank If You Do Not Want): After the tissue was excised the defect was repaired by another provider.
Complex Repair And Double Advancement Flap Text: The defect edges were debeveled with a #15 scalpel blade.  The primary defect was closed partially with a complex linear closure.  Given the location of the remaining defect, shape of the defect and the proximity to free margins a double advancement flap was deemed most appropriate for complete closure of the defect.  Using a sterile surgical marker, an appropriate advancement flap was drawn incorporating the defect and placing the expected incisions within the relaxed skin tension lines where possible.    The area thus outlined was incised deep to adipose tissue with a #15 scalpel blade.  The skin margins were undermined to an appropriate distance in all directions utilizing iris scissors.
Dermal Closure: with plication
Positioning (Leave Blank If You Do Not Want): The patient was placed in a comfortable position exposing the surgical site.
Excision Depth: adipose tissue
Complex Repair And Single Advancement Flap Text: The defect edges were debeveled with a #15 scalpel blade.  The primary defect was closed partially with a complex linear closure.  Given the location of the remaining defect, shape of the defect and the proximity to free margins a single advancement flap was deemed most appropriate for complete closure of the defect.  Using a sterile surgical marker, an appropriate advancement flap was drawn incorporating the defect and placing the expected incisions within the relaxed skin tension lines where possible.    The area thus outlined was incised deep to adipose tissue with a #15 scalpel blade.  The skin margins were undermined to an appropriate distance in all directions utilizing iris scissors.
Repair Type: Complex
Graft Donor Site Bandage (Optional-Leave Blank If You Don't Want In Note): Steri-strips and a pressure bandage were applied to the donor site.
Complex Repair And Modified Advancement Flap Text: The defect edges were debeveled with a #15 scalpel blade.  The primary defect was closed partially with a complex linear closure.  Given the location of the remaining defect, shape of the defect and the proximity to free margins a modified advancement flap was deemed most appropriate for complete closure of the defect.  Using a sterile surgical marker, an appropriate advancement flap was drawn incorporating the defect and placing the expected incisions within the relaxed skin tension lines where possible.    The area thus outlined was incised deep to adipose tissue with a #15 scalpel blade.  The skin margins were undermined to an appropriate distance in all directions utilizing iris scissors.
Epidermal Sutures: 3-0 Surgipro
No Repair - Repaired With Adjacent Surgical Defect Text (Leave Blank If You Do Not Want): After the excision the defect was repaired concurrently with another surgical defect which was in close approximation.
Mastoid Interpolation Flap Text: A decision was made to reconstruct the defect utilizing an interpolation axial flap and a staged reconstruction.  A telfa template was made of the defect.  This telfa template was then used to outline the mastoid interpolation flap.  The donor area for the pedicle flap was then injected with anesthesia.  The flap was excised through the skin and subcutaneous tissue down to the layer of the underlying musculature.  The pedicle flap was carefully excised within this deep plane to maintain its blood supply.  The edges of the donor site were undermined.   The donor site was closed in a primary fashion.  The pedicle was then rotated into position and sutured.  Once the tube was sutured into place, adequate blood supply was confirmed with blanching and refill.  The pedicle was then wrapped with xeroform gauze and dressed appropriately with a telfa and gauze bandage to ensure continued blood supply and protect the attached pedicle.
Scalpel Size: 15 blade
Detail Level: Detailed
Complex Repair Preamble Text (Leave Blank If You Do Not Want): Extensive wide undermining was performed.
Pre-Excision Curettage Text (Leave Blank If You Do Not Want): Prior to drawing the surgical margin the visible lesion was removed with electrodesiccation and curettage to clearly define the lesion size.
Dorsal Nasal Flap Text: The defect edges were debeveled with a #15 scalpel blade.  Given the location of the defect and the proximity to free margins a dorsal nasal flap was deemed most appropriate.  Using a sterile surgical marker, an appropriate dorsal nasal flap was drawn around the defect.    The area thus outlined was incised deep to adipose tissue with a #15 scalpel blade.  The skin margins were undermined to an appropriate distance in all directions utilizing iris scissors.
Complex Repair And Tissue Cultured Epidermal Autograft Text: The defect edges were debeveled with a #15 scalpel blade.  The primary defect was closed partially with a complex linear closure.  Given the location of the defect, shape of the defect and the proximity to free margins an tissue cultured epidermal autograft was deemed most appropriate to repair the remaining defect.  The graft was trimmed to fit the size of the remaining defect.  The graft was then placed in the primary defect, oriented appropriately, and sutured into place.
Intermediate Repair Preamble Text (Leave Blank If You Do Not Want): Undermining was performed with blunt dissection.
Complex Repair And A-T Advancement Flap Text: The defect edges were debeveled with a #15 scalpel blade.  The primary defect was closed partially with a complex linear closure.  Given the location of the remaining defect, shape of the defect and the proximity to free margins an A-T advancement flap was deemed most appropriate for complete closure of the defect.  Using a sterile surgical marker, an appropriate advancement flap was drawn incorporating the defect and placing the expected incisions within the relaxed skin tension lines where possible.    The area thus outlined was incised deep to adipose tissue with a #15 scalpel blade.  The skin margins were undermined to an appropriate distance in all directions utilizing iris scissors.
Referring Physician (Optional): JEMIMA Hawthorne
Intermediate / Complex Repair - Final Wound Length In Cm: 7.7
Posterior Auricular Interpolation Flap Text: A decision was made to reconstruct the defect utilizing an interpolation axial flap and a staged reconstruction.  A telfa template was made of the defect.  This telfa template was then used to outline the posterior auricular interpolation flap.  The donor area for the pedicle flap was then injected with anesthesia.  The flap was excised through the skin and subcutaneous tissue down to the layer of the underlying musculature.  The pedicle flap was carefully excised within this deep plane to maintain its blood supply.  The edges of the donor site were undermined.   The donor site was closed in a primary fashion.  The pedicle was then rotated into position and sutured.  Once the tube was sutured into place, adequate blood supply was confirmed with blanching and refill.  The pedicle was then wrapped with xeroform gauze and dressed appropriately with a telfa and gauze bandage to ensure continued blood supply and protect the attached pedicle.
Advancement Flap (Single) Text: The defect edges were debeveled with a #15 scalpel blade.  Given the location of the defect and the proximity to free margins a single advancement flap was deemed most appropriate.  Using a sterile surgical marker, an appropriate advancement flap was drawn incorporating the defect and placing the expected incisions within the relaxed skin tension lines where possible.    The area thus outlined was incised deep to adipose tissue with a #15 scalpel blade.  The skin margins were undermined to an appropriate distance in all directions utilizing iris scissors.
Island Pedicle Flap Text: The defect edges were debeveled with a #15 scalpel blade.  Given the location of the defect, shape of the defect and the proximity to free margins an island pedicle advancement flap was deemed most appropriate.  Using a sterile surgical marker, an appropriate advancement flap was drawn incorporating the defect, outlining the appropriate donor tissue and placing the expected incisions within the relaxed skin tension lines where possible.    The area thus outlined was incised deep to adipose tissue with a #15 scalpel blade.  The skin margins were undermined to an appropriate distance in all directions around the primary defect and laterally outward around the island pedicle utilizing iris scissors.  There was minimal undermining beneath the pedicle flap.
Complex Repair And Xenograft Text: The defect edges were debeveled with a #15 scalpel blade.  The primary defect was closed partially with a complex linear closure.  Given the location of the defect, shape of the defect and the proximity to free margins a xenograft was deemed most appropriate to repair the remaining defect.  The graft was trimmed to fit the size of the remaining defect.  The graft was then placed in the primary defect, oriented appropriately, and sutured into place.
Alar Island Pedicle Flap Text: The defect edges were debeveled with a #15 scalpel blade.  Given the location of the defect, shape of the defect and the proximity to the alar rim an island pedicle advancement flap was deemed most appropriate.  Using a sterile surgical marker, an appropriate advancement flap was drawn incorporating the defect, outlining the appropriate donor tissue and placing the expected incisions within the nasal ala running parallel to the alar rim. The area thus outlined was incised with a #15 scalpel blade.  The skin margins were undermined minimally to an appropriate distance in all directions around the primary defect and laterally outward around the island pedicle utilizing iris scissors.  There was minimal undermining beneath the pedicle flap.
Paramedian Forehead Flap Text: A decision was made to reconstruct the defect utilizing an interpolation axial flap and a staged reconstruction.  A telfa template was made of the defect.  This telfa template was then used to outline the paramedian forehead pedicle flap.  The donor area for the pedicle flap was then injected with anesthesia.  The flap was excised through the skin and subcutaneous tissue down to the layer of the underlying musculature.  The pedicle flap was carefully excised within this deep plane to maintain its blood supply.  The edges of the donor site were undermined.   The donor site was closed in a primary fashion.  The pedicle was then rotated into position and sutured.  Once the tube was sutured into place, adequate blood supply was confirmed with blanching and refill.  The pedicle was then wrapped with xeroform gauze and dressed appropriately with a telfa and gauze bandage to ensure continued blood supply and protect the attached pedicle.
Advancement Flap (Double) Text: The defect edges were debeveled with a #15 scalpel blade.  Given the location of the defect and the proximity to free margins a double advancement flap was deemed most appropriate.  Using a sterile surgical marker, the appropriate advancement flaps were drawn incorporating the defect and placing the expected incisions within the relaxed skin tension lines where possible.    The area thus outlined was incised deep to adipose tissue with a #15 scalpel blade.  The skin margins were undermined to an appropriate distance in all directions utilizing iris scissors.
Complex Repair And Skin Substitute Graft Text: The defect edges were debeveled with a #15 scalpel blade.  The primary defect was closed partially with a complex linear closure.  Given the location of the remaining defect, shape of the defect and the proximity to free margins a skin substitute graft was deemed most appropriate to repair the remaining defect.  The graft was trimmed to fit the size of the remaining defect.  The graft was then placed in the primary defect, oriented appropriately, and sutured into place.
Island Pedicle Flap With Canthal Suspension Text: The defect edges were debeveled with a #15 scalpel blade.  Given the location of the defect, shape of the defect and the proximity to free margins an island pedicle advancement flap was deemed most appropriate.  Using a sterile surgical marker, an appropriate advancement flap was drawn incorporating the defect, outlining the appropriate donor tissue and placing the expected incisions within the relaxed skin tension lines where possible. The area thus outlined was incised deep to adipose tissue with a #15 scalpel blade.  The skin margins were undermined to an appropriate distance in all directions around the primary defect and laterally outward around the island pedicle utilizing iris scissors.  There was minimal undermining beneath the pedicle flap. A suspension suture was placed in the canthal tendon to prevent tension and prevent ectropion.
Complex Repair And O-T Advancement Flap Text: The defect edges were debeveled with a #15 scalpel blade.  The primary defect was closed partially with a complex linear closure.  Given the location of the remaining defect, shape of the defect and the proximity to free margins an O-T advancement flap was deemed most appropriate for complete closure of the defect.  Using a sterile surgical marker, an appropriate advancement flap was drawn incorporating the defect and placing the expected incisions within the relaxed skin tension lines where possible.    The area thus outlined was incised deep to adipose tissue with a #15 scalpel blade.  The skin margins were undermined to an appropriate distance in all directions utilizing iris scissors.
Chonodrocutaneous Helical Advancement Flap Text: The defect edges were debeveled with a #15 scalpel blade.  Given the location of the defect and the proximity to free margins a chondrocutaneous helical advancement flap was deemed most appropriate.  Using a sterile surgical marker, the appropriate advancement flap was drawn incorporating the defect and placing the expected incisions within the relaxed skin tension lines where possible.    The area thus outlined was incised deep to adipose tissue with a #15 scalpel blade.  The skin margins were undermined to an appropriate distance in all directions utilizing iris scissors.
Ftsg Text: The defect edges were debeveled with a #15 scalpel blade.  Given the location of the defect, shape of the defect and the proximity to free margins a full thickness skin graft was deemed most appropriate.  Using a sterile surgical marker, the primary defect shape was transferred to the donor site. The area thus outlined was incised deep to adipose tissue with a #15 scalpel blade.  The harvested graft was then trimmed of adipose tissue until only dermis and epidermis was left.  The skin margins of the secondary defect were undermined to an appropriate distance in all directions utilizing iris scissors.  The secondary defect was closed with interrupted buried subcutaneous sutures.  The skin edges were then re-apposed with running  sutures.  The skin graft was then placed in the primary defect and oriented appropriately.
Rotation Flap Text: The defect edges were debeveled with a #15 scalpel blade.  Given the location of the defect, shape of the defect and the proximity to free margins a rotation flap was deemed most appropriate.  Using a sterile surgical marker, an appropriate rotation flap was drawn incorporating the defect and placing the expected incisions within the relaxed skin tension lines where possible.    The area thus outlined was incised deep to adipose tissue with a #15 scalpel blade.  The skin margins were undermined to an appropriate distance in all directions utilizing iris scissors.
Complex Repair And O-L Flap Text: The defect edges were debeveled with a #15 scalpel blade.  The primary defect was closed partially with a complex linear closure.  Given the location of the remaining defect, shape of the defect and the proximity to free margins an O-L flap was deemed most appropriate for complete closure of the defect.  Using a sterile surgical marker, an appropriate flap was drawn incorporating the defect and placing the expected incisions within the relaxed skin tension lines where possible.    The area thus outlined was incised deep to adipose tissue with a #15 scalpel blade.  The skin margins were undermined to an appropriate distance in all directions utilizing iris scissors.
Path Notes (To The Dermatopathologist): Please check margins. Suture marking the 12 o'clock margin.
Burow's Advancement Flap Text: The defect edges were debeveled with a #15 scalpel blade.  Given the location of the defect and the proximity to free margins a Burow's advancement flap was deemed most appropriate.  Using a sterile surgical marker, the appropriate advancement flap was drawn incorporating the defect and placing the expected incisions within the relaxed skin tension lines where possible.    The area thus outlined was incised deep to adipose tissue with a #15 scalpel blade.  The skin margins were undermined to an appropriate distance in all directions utilizing iris scissors.
Lip Wedge Excision Repair Text: Given the location of the defect and the proximity to free margins a full thickness wedge repair was deemed most appropriate.  Using a sterile surgical marker, the appropriate repair was drawn incorporating the defect and placing the expected incisions perpendicular to the vermilion border.  The vermilion border was also meticulously outlined to ensure appropriate reapproximation during the repair.  The area thus outlined was incised through and through with a #15 scalpel blade.  The muscularis and dermis were reaproximated with deep sutures following hemostasis. Care was taken to realign the vermilion border before proceeding with the superficial closure.  Once the vermilion was realigned the superfical and mucosal closure was finished.
Surgeon Performing The Repair (Optional): Panchito Bowman MD
Split-Thickness Skin Graft Text: The defect edges were debeveled with a #15 scalpel blade.  Given the location of the defect, shape of the defect and the proximity to free margins a split thickness skin graft was deemed most appropriate.  Using a sterile surgical marker, the primary defect shape was transferred to the donor site. The split thickness graft was then harvested.  The skin graft was then placed in the primary defect and oriented appropriately.
Crescentic Advancement Flap Text: The defect edges were debeveled with a #15 scalpel blade.  Given the location of the defect and the proximity to free margins a crescentic advancement flap was deemed most appropriate.  Using a sterile surgical marker, the appropriate advancement flap was drawn incorporating the defect and placing the expected incisions within the relaxed skin tension lines where possible.    The area thus outlined was incised deep to adipose tissue with a #15 scalpel blade.  The skin margins were undermined to an appropriate distance in all directions utilizing iris scissors.
Island Pedicle Flap-Requiring Vessel Identification Text: The defect edges were debeveled with a #15 scalpel blade.  Given the location of the defect, shape of the defect and the proximity to free margins an island pedicle advancement flap was deemed most appropriate.  Using a sterile surgical marker, an appropriate advancement flap was drawn, based on the axial vessel mentioned above, incorporating the defect, outlining the appropriate donor tissue and placing the expected incisions within the relaxed skin tension lines where possible.    The area thus outlined was incised deep to adipose tissue with a #15 scalpel blade.  The skin margins were undermined to an appropriate distance in all directions around the primary defect and laterally outward around the island pedicle utilizing iris scissors.  There was minimal undermining beneath the pedicle flap.
Complex Repair And Melolabial Flap Text: The defect edges were debeveled with a #15 scalpel blade.  The primary defect was closed partially with a complex linear closure.  Given the location of the remaining defect, shape of the defect and the proximity to free margins a melolabial flap was deemed most appropriate for complete closure of the defect.  Using a sterile surgical marker, an appropriate advancement flap was drawn incorporating the defect and placing the expected incisions within the relaxed skin tension lines where possible.    The area thus outlined was incised deep to adipose tissue with a #15 scalpel blade.  The skin margins were undermined to an appropriate distance in all directions utilizing iris scissors.
Star Wedge Flap Text: The defect edges were debeveled with a #15 scalpel blade.  Given the location of the defect, shape of the defect and the proximity to free margins a star wedge flap was deemed most appropriate.  Using a sterile surgical marker, an appropriate rotation flap was drawn incorporating the defect and placing the expected incisions within the relaxed skin tension lines where possible. The area thus outlined was incised deep to adipose tissue with a #15 scalpel blade.  The skin margins were undermined to an appropriate distance in all directions utilizing iris scissors.
Double Island Pedicle Flap Text: The defect edges were debeveled with a #15 scalpel blade.  Given the location of the defect, shape of the defect and the proximity to free margins a double island pedicle advancement flap was deemed most appropriate.  Using a sterile surgical marker, an appropriate advancement flap was drawn incorporating the defect, outlining the appropriate donor tissue and placing the expected incisions within the relaxed skin tension lines where possible.    The area thus outlined was incised deep to adipose tissue with a #15 scalpel blade.  The skin margins were undermined to an appropriate distance in all directions around the primary defect and laterally outward around the island pedicle utilizing iris scissors.  There was minimal undermining beneath the pedicle flap.
Spiral Flap Text: The defect edges were debeveled with a #15 scalpel blade.  Given the location of the defect, shape of the defect and the proximity to free margins a spiral flap was deemed most appropriate.  Using a sterile surgical marker, an appropriate rotation flap was drawn incorporating the defect and placing the expected incisions within the relaxed skin tension lines where possible. The area thus outlined was incised deep to adipose tissue with a #15 scalpel blade.  The skin margins were undermined to an appropriate distance in all directions utilizing iris scissors.
Complex Repair And Bilobe Flap Text: The defect edges were debeveled with a #15 scalpel blade.  The primary defect was closed partially with a complex linear closure.  Given the location of the remaining defect, shape of the defect and the proximity to free margins a bilobe flap was deemed most appropriate for complete closure of the defect.  Using a sterile surgical marker, an appropriate advancement flap was drawn incorporating the defect and placing the expected incisions within the relaxed skin tension lines where possible.    The area thus outlined was incised deep to adipose tissue with a #15 scalpel blade.  The skin margins were undermined to an appropriate distance in all directions utilizing iris scissors.
Transposition Flap Text: The defect edges were debeveled with a #15 scalpel blade.  Given the location of the defect and the proximity to free margins a transposition flap was deemed most appropriate.  Using a sterile surgical marker, an appropriate transposition flap was drawn incorporating the defect.    The area thus outlined was incised deep to adipose tissue with a #15 scalpel blade.  The skin margins were undermined to an appropriate distance in all directions utilizing iris scissors.
Complex Repair And Rotation Flap Text: The defect edges were debeveled with a #15 scalpel blade.  The primary defect was closed partially with a complex linear closure.  Given the location of the remaining defect, shape of the defect and the proximity to free margins a rotation flap was deemed most appropriate for complete closure of the defect.  Using a sterile surgical marker, an appropriate advancement flap was drawn incorporating the defect and placing the expected incisions within the relaxed skin tension lines where possible.    The area thus outlined was incised deep to adipose tissue with a #15 scalpel blade.  The skin margins were undermined to an appropriate distance in all directions utilizing iris scissors.
A-T Advancement Flap Text: The defect edges were debeveled with a #15 scalpel blade.  Given the location of the defect, shape of the defect and the proximity to free margins an A-T advancement flap was deemed most appropriate.  Using a sterile surgical marker, an appropriate advancement flap was drawn incorporating the defect and placing the expected incisions within the relaxed skin tension lines where possible.    The area thus outlined was incised deep to adipose tissue with a #15 scalpel blade.  The skin margins were undermined to an appropriate distance in all directions utilizing iris scissors.
Epidermal Closure: running locked
Cartilage Graft Text: The defect edges were debeveled with a #15 scalpel blade.  Given the location of the defect, shape of the defect, the fact the defect involved a full thickness cartilage defect a cartilage graft was deemed most appropriate.  An appropriate donor site was identified, cleansed, and anesthetized. The cartilage graft was then harvested and transferred to the recipient site, oriented appropriately and then sutured into place.  The secondary defect was then repaired using a primary closure.
Consent was obtained from the patient. The risks and benefits to therapy were discussed in detail. Specifically, the risks of infection, scarring, bleeding, prolonged wound healing, incomplete removal, allergy to anesthesia, nerve injury and recurrence were addressed. Prior to the procedure, the treatment site was clearly identified and confirmed by the patient. All components of Universal Protocol/PAUSE Rule completed.
Anesthesia Type: 1% lidocaine with epinephrine and a 1:10 solution of 8.4% sodium bicarbonate
Keystone Flap Text: The defect edges were debeveled with a #15 scalpel blade.  Given the location of the defect, shape of the defect a keystone flap was deemed most appropriate.  Using a sterile surgical marker, an appropriate keystone flap was drawn incorporating the defect, outlining the appropriate donor tissue and placing the expected incisions within the relaxed skin tension lines where possible. The area thus outlined was incised deep to adipose tissue with a #15 scalpel blade.  The skin margins were undermined to an appropriate distance in all directions around the primary defect and laterally outward around the flap utilizing iris scissors.
O-T Plasty Text: The defect edges were debeveled with a #15 scalpel blade.  Given the location of the defect, shape of the defect and the proximity to free margins an O-T plasty was deemed most appropriate.  Using a sterile surgical marker, an appropriate O-T plasty was drawn incorporating the defect and placing the expected incisions within the relaxed skin tension lines where possible.    The area thus outlined was incised deep to adipose tissue with a #15 scalpel blade.  The skin margins were undermined to an appropriate distance in all directions utilizing iris scissors.
Muscle Hinge Flap Text: The defect edges were debeveled with a #15 scalpel blade.  Given the size, depth and location of the defect and the proximity to free margins a muscle hinge flap was deemed most appropriate.  Using a sterile surgical marker, an appropriate hinge flap was drawn incorporating the defect. The area thus outlined was incised with a #15 scalpel blade.  The skin margins were undermined to an appropriate distance in all directions utilizing iris scissors.
Previous Accession (Optional): [id number].
Epidermal Closure Graft Donor Site (Optional): simple interrupted
O-T Advancement Flap Text: The defect edges were debeveled with a #15 scalpel blade.  Given the location of the defect, shape of the defect and the proximity to free margins an O-T advancement flap was deemed most appropriate.  Using a sterile surgical marker, an appropriate advancement flap was drawn incorporating the defect and placing the expected incisions within the relaxed skin tension lines where possible.    The area thus outlined was incised deep to adipose tissue with a #15 scalpel blade.  The skin margins were undermined to an appropriate distance in all directions utilizing iris scissors.
Complex Repair And Rhombic Flap Text: The defect edges were debeveled with a #15 scalpel blade.  The primary defect was closed partially with a complex linear closure.  Given the location of the remaining defect, shape of the defect and the proximity to free margins a rhombic flap was deemed most appropriate for complete closure of the defect.  Using a sterile surgical marker, an appropriate advancement flap was drawn incorporating the defect and placing the expected incisions within the relaxed skin tension lines where possible.    The area thus outlined was incised deep to adipose tissue with a #15 scalpel blade.  The skin margins were undermined to an appropriate distance in all directions utilizing iris scissors.
Composite Graft Text: The defect edges were debeveled with a #15 scalpel blade.  Given the location of the defect, shape of the defect, the proximity to free margins and the fact the defect was full thickness a composite graft was deemed most appropriate.  The defect was outline and then transferred to the donor site.  A full thickness graft was then excised from the donor site. The graft was then placed in the primary defect, oriented appropriately and then sutured into place.  The secondary defect was then repaired using a primary closure.
Anesthesia Volume In Cc: 1.5
Epidermal Autograft Text: The defect edges were debeveled with a #15 scalpel blade.  Given the location of the defect, shape of the defect and the proximity to free margins an epidermal autograft was deemed most appropriate.  Using a sterile surgical marker, the primary defect shape was transferred to the donor site. The epidermal graft was then harvested.  The skin graft was then placed in the primary defect and oriented appropriately.
O-L Flap Text: The defect edges were debeveled with a #15 scalpel blade.  Given the location of the defect, shape of the defect and the proximity to free margins an O-L flap was deemed most appropriate.  Using a sterile surgical marker, an appropriate advancement flap was drawn incorporating the defect and placing the expected incisions within the relaxed skin tension lines where possible.    The area thus outlined was incised deep to adipose tissue with a #15 scalpel blade.  The skin margins were undermined to an appropriate distance in all directions utilizing iris scissors.
O-Z Plasty Text: The defect edges were debeveled with a #15 scalpel blade.  Given the location of the defect, shape of the defect and the proximity to free margins an O-Z plasty (double transposition flap) was deemed most appropriate.  Using a sterile surgical marker, the appropriate transposition flaps were drawn incorporating the defect and placing the expected incisions within the relaxed skin tension lines where possible.    The area thus outlined was incised deep to adipose tissue with a #15 scalpel blade.  The skin margins were undermined to an appropriate distance in all directions utilizing iris scissors.  Hemostasis was achieved with electrocautery.  The flaps were then transposed into place, one clockwise and the other counterclockwise, and anchored with interrupted buried subcutaneous sutures.
Complex Repair And Transposition Flap Text: The defect edges were debeveled with a #15 scalpel blade.  The primary defect was closed partially with a complex linear closure.  Given the location of the remaining defect, shape of the defect and the proximity to free margins a transposition flap was deemed most appropriate for complete closure of the defect.  Using a sterile surgical marker, an appropriate advancement flap was drawn incorporating the defect and placing the expected incisions within the relaxed skin tension lines where possible.    The area thus outlined was incised deep to adipose tissue with a #15 scalpel blade.  The skin margins were undermined to an appropriate distance in all directions utilizing iris scissors.
Melolabial Transposition Flap Text: The defect edges were debeveled with a #15 scalpel blade.  Given the location of the defect and the proximity to free margins a melolabial flap was deemed most appropriate.  Using a sterile surgical marker, an appropriate melolabial transposition flap was drawn incorporating the defect.    The area thus outlined was incised deep to adipose tissue with a #15 scalpel blade.  The skin margins were undermined to an appropriate distance in all directions utilizing iris scissors.
Complex Repair And M Plasty Text: The defect edges were debeveled with a #15 scalpel blade.  The primary defect was closed partially with a complex linear closure.  Given the location of the remaining defect, shape of the defect and the proximity to free margins an M plasty was deemed most appropriate for complete closure of the defect.  Using a sterile surgical marker, an appropriate advancement flap was drawn incorporating the defect and placing the expected incisions within the relaxed skin tension lines where possible.    The area thus outlined was incised deep to adipose tissue with a #15 scalpel blade.  The skin margins were undermined to an appropriate distance in all directions utilizing iris scissors.
Rhomboid Transposition Flap Text: The defect edges were debeveled with a #15 scalpel blade.  Given the location of the defect and the proximity to free margins a rhomboid transposition flap was deemed most appropriate.  Using a sterile surgical marker, an appropriate rhomboid flap was drawn incorporating the defect.    The area thus outlined was incised deep to adipose tissue with a #15 scalpel blade.  The skin margins were undermined to an appropriate distance in all directions utilizing iris scissors.
Home Suture Removal Text: Patient was provided a home suture removal kit and will remove their sutures at home.  If they have any questions or difficulties they will call the office.
Double O-Z Flap Text: The defect edges were debeveled with a #15 scalpel blade.  Given the location of the defect, shape of the defect and the proximity to free margins a Double O-Z flap was deemed most appropriate.  Using a sterile surgical marker, an appropriate transposition flap was drawn incorporating the defect and placing the expected incisions within the relaxed skin tension lines where possible. The area thus outlined was incised deep to adipose tissue with a #15 scalpel blade.  The skin margins were undermined to an appropriate distance in all directions utilizing iris scissors.
Rhombic Flap Text: The defect edges were debeveled with a #15 scalpel blade.  Given the location of the defect and the proximity to free margins a rhombic flap was deemed most appropriate.  Using a sterile surgical marker, an appropriate rhombic flap was drawn incorporating the defect.    The area thus outlined was incised deep to adipose tissue with a #15 scalpel blade.  The skin margins were undermined to an appropriate distance in all directions utilizing iris scissors.
Complex Repair And V-Y Plasty Text: The defect edges were debeveled with a #15 scalpel blade.  The primary defect was closed partially with a complex linear closure.  Given the location of the remaining defect, shape of the defect and the proximity to free margins a V-Y plasty was deemed most appropriate for complete closure of the defect.  Using a sterile surgical marker, an appropriate advancement flap was drawn incorporating the defect and placing the expected incisions within the relaxed skin tension lines where possible.    The area thus outlined was incised deep to adipose tissue with a #15 scalpel blade.  The skin margins were undermined to an appropriate distance in all directions utilizing iris scissors.
Information: Selecting Yes will display possible errors in your note based on the variables you have selected. This validation is only offered as a suggestion for you. PLEASE NOTE THAT THE VALIDATION TEXT WILL BE REMOVED WHEN YOU FINALIZE YOUR NOTE. IF YOU WANT TO FAX A PRELIMINARY NOTE YOU WILL NEED TO TOGGLE THIS TO 'NO' IF YOU DO NOT WANT IT IN YOUR FAXED NOTE.
O-Z Flap Text: The defect edges were debeveled with a #15 scalpel blade.  Given the location of the defect, shape of the defect and the proximity to free margins an O-Z flap was deemed most appropriate.  Using a sterile surgical marker, an appropriate transposition flap was drawn incorporating the defect and placing the expected incisions within the relaxed skin tension lines where possible. The area thus outlined was incised deep to adipose tissue with a #15 scalpel blade.  The skin margins were undermined to an appropriate distance in all directions utilizing iris scissors.
Post-Care Instructions: I reviewed with the patient in detail post-care instructions. Patient is not to engage in any heavy lifting, exercise, or swimming for the next 14 days. Should the patient develop any fevers, chills, bleeding, severe pain patient will contact the office immediately.
Dermal Autograft Text: The defect edges were debeveled with a #15 scalpel blade.  Given the location of the defect, shape of the defect and the proximity to free margins a dermal autograft was deemed most appropriate.  Using a sterile surgical marker, the primary defect shape was transferred to the donor site. The area thus outlined was incised deep to adipose tissue with a #15 scalpel blade.  The harvested graft was then trimmed of adipose and epidermal tissue until only dermis was left.  The skin graft was then placed in the primary defect and oriented appropriately.
Double O-Z Plasty Text: The defect edges were debeveled with a #15 scalpel blade.  Given the location of the defect, shape of the defect and the proximity to free margins a Double O-Z plasty (double transposition flap) was deemed most appropriate.  Using a sterile surgical marker, the appropriate transposition flaps were drawn incorporating the defect and placing the expected incisions within the relaxed skin tension lines where possible. The area thus outlined was incised deep to adipose tissue with a #15 scalpel blade.  The skin margins were undermined to an appropriate distance in all directions utilizing iris scissors.  Hemostasis was achieved with electrocautery.  The flaps were then transposed into place, one clockwise and the other counterclockwise, and anchored with interrupted buried subcutaneous sutures.
Undermining Location (Optional): in the fascial plane

## 2019-06-28 NOTE — PROGRESS NOTES
Chief Complaint   Patient presents with   • Coronary Artery Disease     Follow up       Subjective:   Shamar Morgan is a 73 y.o. male who presents today for follow-up of coronary disease with stenting of his ostial LAD in September, 2017, PAF, chronic anticoagulation, and hyperlipidemia.  He has had no angina at all.  Patient rides his exercise bike several times a week without any problems.  His atrial fibrillation is asymptomatic except for recently on his exercise bike he noted to have a fast heart rate on the monitor.  He is on low-dose Xarelto still has some bruising problems.  His dose was reduced because of significant bruising on the usual dose.  He has been evaluated by EP since the last visit and recommendation was for continue his current dose of and a arrhythmic.  By by outpatient Holter monitoring, have an 18% atrial fibrillation burden    Past Medical History:   Diagnosis Date   • Arrhythmia 09/02/2017    Atrial Fib   • Blood clotting disorder (HCC) 09/2017    heart   • CAD (coronary artery disease) 4/16/2012   • Dental disorder     upper and lower dentures, full   • Hemorrhagic disorder (HCC)     on Plavix and Xarelto   • High cholesterol    • HTN (hypertension) 4/16/2012   • Hyperglycemia 4/16/2012   • Hyperlipemia 4/16/2012   • Hypertension    • Liver enzyme elevation     history of   • Myocardial infarct (HCC) 09/02/2017    Dr. Mcallister, cardiologist   • Obesity    • Sleep apnea     BIPAP   • Tachycardia 4/16/2012   • Tonsillitis      Past Surgical History:   Procedure Laterality Date   • TRIGGER FINGER RELEASE Left 7/17/2018    Procedure: TRIGGER FINGER RELEASE - RING;  Surgeon: Jose Miguel Kay M.D.;  Location: SURGERY [facility];  Service: Orthopedics   • MASS EXCISION ORTHO Left 7/17/2018    Procedure: MASS EXCISION ORTHO - DISTAL INTERPHALANGEAL JOINT NODULE EXCISION;  Surgeon: Jose Miguel Kay M.D.;  Location: SURGERY [facility];  Service: Orthopedics   • CARDIAC CATH, LEFT HEART   09/03/2017    successful thrombectomy  and CHELLE to ostial proximal RCA    • STENT PLACEMENT  09/03/2017    cardiac   • CARPAL TUNNEL RELEASE Right 1980's   • TRIGGER FINGER RELEASE Right 1980's     Family History   Problem Relation Age of Onset   • Diabetes Mother    • Heart Disease Mother    • Kidney Disease Mother    • Diabetes Sister      Social History     Social History   • Marital status:      Spouse name: N/A   • Number of children: N/A   • Years of education: N/A     Occupational History   • Not on file.     Social History Main Topics   • Smoking status: Former Smoker     Packs/day: 3.00     Years: 16.00     Types: Cigarettes     Quit date: 1/1/1980   • Smokeless tobacco: Never Used   • Alcohol use 0.0 oz/week      Comment: 3 per week   • Drug use: No   • Sexual activity: Yes     Partners: Female     Other Topics Concern   • Not on file     Social History Narrative   • No narrative on file     Allergies   Allergen Reactions   • Penicillins Swelling   • Sulfa Drugs Swelling   • Brilinta [Ticagrelor] Rash and Itching     .   • Lipidil [Fenofibrate]      Myalgia     Outpatient Encounter Prescriptions as of 6/27/2019   Medication Sig Dispense Refill   • atorvastatin (LIPITOR) 20 MG Tab Take 1 Tab by mouth every day. 100 Tab 0   • losartan (COZAAR) 50 MG Tab TAKE 1 TABLET BY MOUTH ONCE DAILY 90 Tab 3   • amiodarone (PACERONE) 100 MG tablet Take 1 Tab by mouth every day. 90 Tab 1   • rivaroxaban (XARELTO) 10 MG Tab tablet Take 1 Tab by mouth with dinner. 90 Tab 3   • vitamin D (CHOLECALCIFEROL) 1000 UNIT TABS Take 1,000 Units by mouth every day.     • [DISCONTINUED] metoprolol (LOPRESSOR) 25 MG Tab Take 1 Tab by mouth 2 Times a Day. 180 Tab 3   • [DISCONTINUED] clopidogrel (PLAVIX) 75 MG Tab Take 4 tablets on day 1, then 1 tablet daily (Patient not taking: Reported on 6/27/2019) 90 Tab 3     No facility-administered encounter medications on file as of 6/27/2019.      Review of Systems   Constitutional:  "Negative for chills and malaise/fatigue.   Respiratory: Negative for shortness of breath.    Cardiovascular: Negative for chest pain, palpitations and leg swelling.   Gastrointestinal: Negative for nausea and vomiting.   Neurological: Negative.  Negative for weakness.   Endo/Heme/Allergies: Bruises/bleeds easily.   Psychiatric/Behavioral: Negative for depression.        Objective:   /62 (BP Location: Left arm, Patient Position: Sitting, BP Cuff Size: Adult)   Pulse (!) 50   Ht 1.702 m (5' 7\")   Wt 101.8 kg (224 lb 6.4 oz)   SpO2 96%   BMI 35.15 kg/m²     Physical Exam   Constitutional: He is oriented to person, place, and time. He appears well-nourished. No distress.   HENT:   Head: Normocephalic and atraumatic.   Eyes: Pupils are equal, round, and reactive to light. No scleral icterus.   Neck: No JVD present.   Cardiovascular: Normal rate and regular rhythm.    No murmur heard.  Pulmonary/Chest: Breath sounds normal. No respiratory distress. He has no wheezes.   Abdominal: Soft. Bowel sounds are normal. He exhibits no distension.   Obese   Musculoskeletal: He exhibits no edema.   Neurological: He is alert and oriented to person, place, and time. No cranial nerve deficit.   Skin: Skin is warm and dry.   Psychiatric: He has a normal mood and affect.       Assessment:     1. Paroxysmal atrial fibrillation (HCC)     2. Essential hypertension     3. Anticoagulated     4. Stented coronary artery         Medical Decision Making:  Today's Assessment / Status / Plan:   PAF: Asymptomatic.  In sinus rhythm today prior monitoring demonstrates an 18% burden.  Continue antiarrhythmic.  Anticoagulation: Still has bruising on low-dose Xarelto.  Status post stenting of RCA: No angina at all.  Hyperlipidemia: Recent lab reveals LDL of 47 on his current dose of statin.  Continue current medications.  Return in 6 months and probably yearly thereafter.  "

## 2019-06-28 NOTE — HPI: PROCEDURE (SKIN SURGERY)
Has The Growth Been Previously Biopsied?: has been previously biopsied
Body Location Override (Optional): Left central frontal scalp

## 2019-06-29 DIAGNOSIS — I48.0 PAF (PAROXYSMAL ATRIAL FIBRILLATION) (HCC): Primary | ICD-10-CM

## 2019-07-01 ENCOUNTER — APPOINTMENT (RX ONLY)
Dept: URBAN - METROPOLITAN AREA CLINIC 36 | Facility: CLINIC | Age: 74
Setting detail: DERMATOLOGY
End: 2019-07-01

## 2019-07-01 DIAGNOSIS — Z48.817 ENCOUNTER FOR SURGICAL AFTERCARE FOLLOWING SURGERY ON THE SKIN AND SUBCUTANEOUS TISSUE: ICD-10-CM

## 2019-07-01 PROCEDURE — 99024 POSTOP FOLLOW-UP VISIT: CPT

## 2019-07-01 PROCEDURE — ? POST-OP WOUND EVALUATION

## 2019-07-01 PROCEDURE — ? PRESCRIPTION

## 2019-07-01 RX ORDER — HYDROCODONE BITARTRATE AND ACETAMINOPHEN 5; 325 MG/1; MG/1
TABLET ORAL
Qty: 20 | Refills: 0

## 2019-07-01 ASSESSMENT — LOCATION ZONE DERM: LOCATION ZONE: SCALP

## 2019-07-01 ASSESSMENT — LOCATION DETAILED DESCRIPTION DERM: LOCATION DETAILED: LEFT CENTRAL FRONTAL SCALP

## 2019-07-01 ASSESSMENT — LOCATION SIMPLE DESCRIPTION DERM: LOCATION SIMPLE: LEFT SCALP

## 2019-07-01 NOTE — HPI: SURGICAL COMPLICATION (POST OPERATIVE PAIN)
How Severe Is It?: severe
Is This A New Presentation, Or A Follow-Up?: Post Operative Pain
When Was Your Surgical Procedure?: 6/28/2019

## 2019-07-01 NOTE — PROCEDURE: POST-OP WOUND EVALUATION
Sutures?: intact
Wound Crusting?: clean
Wound Evaluated By (Optional): Yaritza collins MD by photo documentation
Wound Drainage?: serous drainage
Add 13035 Cpt? (Important Note: In 2017 The Use Of 48126 Is Being Tracked By Cms To Determine Future Global Period Reimbursement For Global Periods): yes
Wound Diameter In Cm(Optional): 0
Wound Edema?: minimal
Detail Level: Simple

## 2019-07-02 RX ORDER — RIVAROXABAN 10 MG/1
TABLET, FILM COATED ORAL
Qty: 90 TAB | Refills: 3 | Status: SHIPPED | OUTPATIENT
Start: 2019-07-02 | End: 2020-04-23 | Stop reason: SDUPTHER

## 2019-07-07 RX ORDER — LOSARTAN POTASSIUM 50 MG/1
TABLET ORAL
Qty: 100 TAB | Refills: 3 | Status: SHIPPED | OUTPATIENT
Start: 2019-07-07 | End: 2020-07-29

## 2019-07-12 ENCOUNTER — APPOINTMENT (RX ONLY)
Dept: URBAN - METROPOLITAN AREA CLINIC 36 | Facility: CLINIC | Age: 74
Setting detail: DERMATOLOGY
End: 2019-07-12

## 2019-07-12 DIAGNOSIS — Z48.02 ENCOUNTER FOR REMOVAL OF SUTURES: ICD-10-CM

## 2019-07-12 DIAGNOSIS — Z48.817 ENCOUNTER FOR SURGICAL AFTERCARE FOLLOWING SURGERY ON THE SKIN AND SUBCUTANEOUS TISSUE: ICD-10-CM

## 2019-07-12 PROCEDURE — 99024 POSTOP FOLLOW-UP VISIT: CPT

## 2019-07-12 PROCEDURE — ? POST-OP WOUND EVALUATION

## 2019-07-12 PROCEDURE — ? SUTURE REMOVAL (GLOBAL PERIOD)

## 2019-07-12 ASSESSMENT — LOCATION SIMPLE DESCRIPTION DERM
LOCATION SIMPLE: POSTERIOR SCALP
LOCATION SIMPLE: ANTERIOR SCALP

## 2019-07-12 ASSESSMENT — LOCATION DETAILED DESCRIPTION DERM
LOCATION DETAILED: MID-FRONTAL SCALP
LOCATION DETAILED: POSTERIOR MID-PARIETAL SCALP

## 2019-07-12 ASSESSMENT — LOCATION ZONE DERM: LOCATION ZONE: SCALP

## 2019-07-12 NOTE — PROCEDURE: SUTURE REMOVAL (GLOBAL PERIOD)
Detail Level: Detailed
Add 81946 Cpt? (Important Note: In 2017 The Use Of 70221 Is Being Tracked By Cms To Determine Future Global Period Reimbursement For Global Periods): yes

## 2019-07-12 NOTE — PROCEDURE: POST-OP WOUND EVALUATION
Wound Crusting?: crusted
Wound Color?: red
Follow Up Time Frame (Optional): weeks
Wound Drainage?: serous drainage
Wound Evaluated By (Optional): Panchito Bowman MD and Kaylan Thakur RN
Patient To Follow-Up With?: our clinic
Add 05276 Cpt? (Important Note: In 2017 The Use Of 95433 Is Being Tracked By Cms To Determine Future Global Period Reimbursement For Global Periods): yes
Wound Edema?: mild
Detail Level: Detailed
Sutures?: partially intact
Wound Granulation?: early
Wound Diameter In Cm(Optional): 0
Follow Up Units (Optional): 2
Wound Dehiscence?: dehiscence

## 2019-07-15 ENCOUNTER — APPOINTMENT (RX ONLY)
Dept: URBAN - METROPOLITAN AREA CLINIC 36 | Facility: CLINIC | Age: 74
Setting detail: DERMATOLOGY
End: 2019-07-15

## 2019-07-15 DIAGNOSIS — Z48.817 ENCOUNTER FOR SURGICAL AFTERCARE FOLLOWING SURGERY ON THE SKIN AND SUBCUTANEOUS TISSUE: ICD-10-CM

## 2019-07-15 PROCEDURE — ? POST-OP WOUND CHECK

## 2019-07-15 PROCEDURE — 99024 POSTOP FOLLOW-UP VISIT: CPT

## 2019-07-15 ASSESSMENT — LOCATION DETAILED DESCRIPTION DERM: LOCATION DETAILED: LEFT CENTRAL PARIETAL SCALP

## 2019-07-15 ASSESSMENT — LOCATION ZONE DERM: LOCATION ZONE: SCALP

## 2019-07-15 ASSESSMENT — PAIN INTENSITY VAS: HOW INTENSE IS YOUR PAIN 0 BEING NO PAIN, 10 BEING THE MOST SEVERE PAIN POSSIBLE?: 2/10 PAIN

## 2019-07-15 ASSESSMENT — LOCATION SIMPLE DESCRIPTION DERM: LOCATION SIMPLE: SCALP

## 2019-07-15 NOTE — PROCEDURE: POST-OP WOUND CHECK
Wound Dressing Override (Optional): silvrstat
Detail Level: Generalized
Wound Assessment Override (Optional): surrounding erythema, fibrin, no signs of infection
Add 01180 Cpt? (Important Note: In 2017 The Use Of 84748 Is Being Tracked By Cms To Determine Future Global Period Reimbursement For Global Periods): yes

## 2019-07-26 ENCOUNTER — APPOINTMENT (RX ONLY)
Dept: URBAN - METROPOLITAN AREA CLINIC 36 | Facility: CLINIC | Age: 74
Setting detail: DERMATOLOGY
End: 2019-07-26

## 2019-07-26 ENCOUNTER — SLEEP CENTER VISIT (OUTPATIENT)
Dept: SLEEP MEDICINE | Facility: MEDICAL CENTER | Age: 74
End: 2019-07-26
Payer: MEDICARE

## 2019-07-26 VITALS
OXYGEN SATURATION: 95 % | WEIGHT: 223.2 LBS | BODY MASS INDEX: 35.03 KG/M2 | HEART RATE: 100 BPM | HEIGHT: 67 IN | SYSTOLIC BLOOD PRESSURE: 108 MMHG | DIASTOLIC BLOOD PRESSURE: 62 MMHG | RESPIRATION RATE: 16 BRPM

## 2019-07-26 DIAGNOSIS — G47.33 OSA TREATED WITH BIPAP: ICD-10-CM

## 2019-07-26 DIAGNOSIS — I48.0 PAROXYSMAL ATRIAL FIBRILLATION (HCC): ICD-10-CM

## 2019-07-26 DIAGNOSIS — Z48.817 ENCOUNTER FOR SURGICAL AFTERCARE FOLLOWING SURGERY ON THE SKIN AND SUBCUTANEOUS TISSUE: ICD-10-CM

## 2019-07-26 PROCEDURE — ? POST-OP WOUND EVALUATION

## 2019-07-26 PROCEDURE — 99213 OFFICE O/P EST LOW 20 MIN: CPT | Performed by: INTERNAL MEDICINE

## 2019-07-26 PROCEDURE — 99024 POSTOP FOLLOW-UP VISIT: CPT

## 2019-07-26 RX ORDER — HYDROCODONE BITARTRATE AND ACETAMINOPHEN 5; 325 MG/1; MG/1
TABLET ORAL
Refills: 0 | COMMUNITY
Start: 2019-07-01 | End: 2019-08-13

## 2019-07-26 ASSESSMENT — LOCATION DETAILED DESCRIPTION DERM: LOCATION DETAILED: LEFT SUPERIOR PARIETAL SCALP

## 2019-07-26 ASSESSMENT — LOCATION ZONE DERM: LOCATION ZONE: SCALP

## 2019-07-26 ASSESSMENT — LOCATION SIMPLE DESCRIPTION DERM: LOCATION SIMPLE: SCALP

## 2019-07-26 NOTE — PROCEDURE: POST-OP WOUND EVALUATION
Wound Evaluated By (Optional): Panchito Bwoman MD
Wound Color?: pink
Wound Crusting?: clean
Add 94077 Cpt? (Important Note: In 2017 The Use Of 06104 Is Being Tracked By Cms To Determine Future Global Period Reimbursement For Global Periods): yes
Patient To Follow-Up With?: their primary dermatologist
Wound Diameter In Cm(Optional): 0
Detail Level: Detailed

## 2019-07-26 NOTE — PROGRESS NOTES
Chief Complaint   Patient presents with   • Follow-Up     RUPAL     HPI: This patient is a 74 y.o. male who returns for RUPAL. Polysomnography confirmed severe obstructive sleep apnea syndrome with AHI 73 events per hour, associated with desaturations to 67%. Compliance card shows 100% usage on BiPAP: 14/10 cm of water, for over 8 hours nightly, and AHI normal at 3.6. He is sleeping well.   Uses nasal pillows which he replaces every 2 weeks.  He denies fatigue or daytime somnolence.  Denies mask leaks.  His weight has increased with BMI: 34 and he started a new diet today.      Past Medical History:   Diagnosis Date   • Arrhythmia 09/02/2017    Atrial Fib   • Blood clotting disorder (HCC) 09/2017    heart   • CAD (coronary artery disease) 4/16/2012   • Dental disorder     upper and lower dentures, full   • Hemorrhagic disorder (HCC)     on Plavix and Xarelto   • High cholesterol    • HTN (hypertension) 4/16/2012   • Hyperglycemia 4/16/2012   • Hyperlipemia 4/16/2012   • Hypertension    • Liver enzyme elevation     history of   • Myocardial infarct (HCC) 09/02/2017    Dr. Mcallister, cardiologist   • Obesity    • Sleep apnea     BIPAP   • Tachycardia 4/16/2012   • Tonsillitis        Social History     Social History   • Marital status:      Spouse name: N/A   • Number of children: N/A   • Years of education: N/A     Occupational History   • Not on file.     Social History Main Topics   • Smoking status: Former Smoker     Packs/day: 3.00     Years: 16.00     Types: Cigarettes     Quit date: 1/1/1980   • Smokeless tobacco: Never Used   • Alcohol use 0.0 oz/week      Comment: 3 per week   • Drug use: No   • Sexual activity: Yes     Partners: Female     Other Topics Concern   • Not on file     Social History Narrative   • No narrative on file       Family History   Problem Relation Age of Onset   • Diabetes Mother    • Heart Disease Mother    • Kidney Disease Mother    • Diabetes Sister        Current Outpatient  "Prescriptions on File Prior to Visit   Medication Sig Dispense Refill   • losartan (COZAAR) 50 MG Tab TAKE 1 TABLET BY MOUTH ONCE DAILY 100 Tab 3   • XARELTO 10 MG Tab tablet TAKE 1 TABLET BY MOUTH ONCE DAILY WITH  DINNER 90 Tab 3   • amiodarone (PACERONE) 100 MG tablet Take 1 Tab by mouth every day. 90 Tab 1   • vitamin D (CHOLECALCIFEROL) 1000 UNIT TABS Take 1,000 Units by mouth every day.     • atorvastatin (LIPITOR) 20 MG Tab Take 1 Tab by mouth every day. (Patient not taking: Reported on 7/26/2019) 100 Tab 0     No current facility-administered medications on file prior to visit.        Allergies: Penicillins; Sulfa drugs; Brilinta [ticagrelor]; and Lipidil [fenofibrate]    ROS:   Constitutional: Denies fevers, chills, night sweats, fatigue or weight loss  Eyes: Denies vision loss, pain, drainage, double vision  Ears, Nose, Throat: Denies earache, difficulty hearing, tinnitus, nasal congestion, hoarseness  Cardiovascular: Denies chest pain, tightness, palpitations, orthopnea or edema  Respiratory: Denies shortness of breath, cough, wheezing, hemoptysis  Sleep: Denies daytime sleepiness, snoring, apneas, insomnia, morning headaches  GI: Denies heartburn, dysphagia, nausea, abdominal pain, diarrhea or constipation  : Denies frequent urination, hematuria, discharge or painful urination  Musculoskeletal: Denies back pain, painful joints, sore muscles  Neurological: Denies weakness or headaches  Skin: No rashes,+melanoma resected from scalp    /62 (BP Location: Left arm, Patient Position: Sitting, BP Cuff Size: Adult)   Pulse 100   Resp 16   Ht 1.702 m (5' 7\")   Wt 101.2 kg (223 lb 3.2 oz)   SpO2 95%     Physical Exam:  Appearance: Well-nourished, well-developed, in no acute distress  HEENT: Normocephalic, atraumatic, white sclera, PERRLA, Alexander potty 3  Neck: No adenopathy or masses  Respiratory: no intercostal retractions or accessory muscle use  Lungs auscultation: Clear to auscultation " bilaterally  Cardiovascular: Regular rate rhythm. No murmurs, rubs or gallops.  No LE edema  Abdomen: soft, nondistended  Gait: Normal  Digits: No clubbing, cyanosis  Motor: No focal deficits  Orientation: Oriented to time, person and place    Diagnosis:  1. RUPAL treated with BiPAP     2. BMI 34.0-34.9,adult  OBESITY COUNSELING (No Charge): Patient identified as having weight management issue.  Appropriate orders and counseling given.   3. Paroxysmal atrial fibrillation (HCC)         Plan:  The patient shows excellent compliance and response to BiPAP therapy and is benefiting from treatment.  Continue BiPAP: 14/10 cm of water.  He is replacing his nasal pillows every 2 weeks.  Patient's body mass index is 34.96 kg/m². Exercise and nutrition counseling were performed at this visit.  He has embarked on a diet today.  Return in about 6 months (around 1/26/2020).

## 2019-08-05 DIAGNOSIS — I48.0 PAF (PAROXYSMAL ATRIAL FIBRILLATION) (HCC): ICD-10-CM

## 2019-08-06 RX ORDER — AMIODARONE HYDROCHLORIDE 100 MG/1
TABLET ORAL
Qty: 90 TAB | Refills: 3 | Status: SHIPPED | OUTPATIENT
Start: 2019-08-06 | End: 2020-06-19

## 2019-08-13 ENCOUNTER — OFFICE VISIT (OUTPATIENT)
Dept: MEDICAL GROUP | Facility: MEDICAL CENTER | Age: 74
End: 2019-08-13
Payer: MEDICARE

## 2019-08-13 VITALS
WEIGHT: 214 LBS | SYSTOLIC BLOOD PRESSURE: 116 MMHG | HEIGHT: 67 IN | OXYGEN SATURATION: 95 % | BODY MASS INDEX: 33.59 KG/M2 | DIASTOLIC BLOOD PRESSURE: 66 MMHG | HEART RATE: 56 BPM | TEMPERATURE: 97.3 F

## 2019-08-13 DIAGNOSIS — R53.83 FATIGUE, UNSPECIFIED TYPE: ICD-10-CM

## 2019-08-13 DIAGNOSIS — G47.33 OSA (OBSTRUCTIVE SLEEP APNEA): ICD-10-CM

## 2019-08-13 DIAGNOSIS — I10 ESSENTIAL HYPERTENSION: ICD-10-CM

## 2019-08-13 DIAGNOSIS — I25.10 CORONARY ARTERY DISEASE INVOLVING NATIVE CORONARY ARTERY OF NATIVE HEART WITHOUT ANGINA PECTORIS: ICD-10-CM

## 2019-08-13 DIAGNOSIS — R73.02 IGT (IMPAIRED GLUCOSE TOLERANCE): ICD-10-CM

## 2019-08-13 DIAGNOSIS — I48.0 PAROXYSMAL ATRIAL FIBRILLATION (HCC): ICD-10-CM

## 2019-08-13 DIAGNOSIS — Z00.00 MEDICARE ANNUAL WELLNESS VISIT, SUBSEQUENT: ICD-10-CM

## 2019-08-13 DIAGNOSIS — I25.5 ISCHEMIC CARDIOMYOPATHY: ICD-10-CM

## 2019-08-13 DIAGNOSIS — E78.5 DYSLIPIDEMIA: ICD-10-CM

## 2019-08-13 PROCEDURE — 99214 OFFICE O/P EST MOD 30 MIN: CPT | Mod: 25 | Performed by: INTERNAL MEDICINE

## 2019-08-13 PROCEDURE — G0439 PPPS, SUBSEQ VISIT: HCPCS | Performed by: INTERNAL MEDICINE

## 2019-08-13 RX ORDER — ATORVASTATIN CALCIUM 20 MG/1
20 TABLET, FILM COATED ORAL DAILY
Qty: 100 TAB | Refills: 3 | Status: SHIPPED | OUTPATIENT
Start: 2019-08-13 | End: 2020-08-19

## 2019-08-13 ASSESSMENT — ACTIVITIES OF DAILY LIVING (ADL): BATHING_REQUIRES_ASSISTANCE: 0

## 2019-08-13 ASSESSMENT — ENCOUNTER SYMPTOMS: GENERAL WELL-BEING: GOOD

## 2019-08-13 ASSESSMENT — PATIENT HEALTH QUESTIONNAIRE - PHQ9: CLINICAL INTERPRETATION OF PHQ2 SCORE: 0

## 2019-08-23 ENCOUNTER — APPOINTMENT (RX ONLY)
Dept: URBAN - METROPOLITAN AREA CLINIC 36 | Facility: CLINIC | Age: 74
Setting detail: DERMATOLOGY
End: 2019-08-23

## 2019-08-23 DIAGNOSIS — Z48.817 ENCOUNTER FOR SURGICAL AFTERCARE FOLLOWING SURGERY ON THE SKIN AND SUBCUTANEOUS TISSUE: ICD-10-CM

## 2019-08-23 PROCEDURE — 99024 POSTOP FOLLOW-UP VISIT: CPT

## 2019-08-23 PROCEDURE — ? POST-OP WOUND EVALUATION

## 2019-08-23 ASSESSMENT — LOCATION SIMPLE DESCRIPTION DERM: LOCATION SIMPLE: SCALP

## 2019-08-23 ASSESSMENT — LOCATION ZONE DERM: LOCATION ZONE: SCALP

## 2019-08-23 ASSESSMENT — LOCATION DETAILED DESCRIPTION DERM: LOCATION DETAILED: LEFT CENTRAL PARIETAL SCALP

## 2019-08-23 NOTE — PROCEDURE: POST-OP WOUND EVALUATION
Detail Level: Detailed
Patient To Follow-Up With?: their primary dermatologist
Wound Evaluated By (Optional): Panchito Bowman MD
Wound Diameter In Cm(Optional): 0
Follow Up Units (Optional): 3
Add 27889 Cpt? (Important Note: In 2017 The Use Of 84164 Is Being Tracked By Cms To Determine Future Global Period Reimbursement For Global Periods): yes
Any New Or Residual Neoplasm?: No
Follow Up Time Frame (Optional): months
Wound Crusting?: clean
Wound Color?: pink

## 2019-09-03 ENCOUNTER — APPOINTMENT (RX ONLY)
Dept: URBAN - METROPOLITAN AREA CLINIC 20 | Facility: CLINIC | Age: 74
Setting detail: DERMATOLOGY
End: 2019-09-03

## 2019-09-03 DIAGNOSIS — D18.0 HEMANGIOMA: ICD-10-CM

## 2019-09-03 DIAGNOSIS — L57.0 ACTINIC KERATOSIS: ICD-10-CM

## 2019-09-03 DIAGNOSIS — D22 MELANOCYTIC NEVI: ICD-10-CM

## 2019-09-03 DIAGNOSIS — L81.4 OTHER MELANIN HYPERPIGMENTATION: ICD-10-CM

## 2019-09-03 DIAGNOSIS — Z86.006 PERSONAL HISTORY OF MELANOMA IN-SITU: ICD-10-CM

## 2019-09-03 DIAGNOSIS — L82.1 OTHER SEBORRHEIC KERATOSIS: ICD-10-CM

## 2019-09-03 PROBLEM — D22.62 MELANOCYTIC NEVI OF LEFT UPPER LIMB, INCLUDING SHOULDER: Status: ACTIVE | Noted: 2019-09-03

## 2019-09-03 PROBLEM — D22.71 MELANOCYTIC NEVI OF RIGHT LOWER LIMB, INCLUDING HIP: Status: ACTIVE | Noted: 2019-09-03

## 2019-09-03 PROBLEM — D22.72 MELANOCYTIC NEVI OF LEFT LOWER LIMB, INCLUDING HIP: Status: ACTIVE | Noted: 2019-09-03

## 2019-09-03 PROBLEM — Z85.820 PERSONAL HISTORY OF MALIGNANT MELANOMA OF SKIN: Status: ACTIVE | Noted: 2019-09-03

## 2019-09-03 PROBLEM — D22.5 MELANOCYTIC NEVI OF TRUNK: Status: ACTIVE | Noted: 2019-09-03

## 2019-09-03 PROBLEM — D18.01 HEMANGIOMA OF SKIN AND SUBCUTANEOUS TISSUE: Status: ACTIVE | Noted: 2019-09-03

## 2019-09-03 PROBLEM — D22.61 MELANOCYTIC NEVI OF RIGHT UPPER LIMB, INCLUDING SHOULDER: Status: ACTIVE | Noted: 2019-09-03

## 2019-09-03 PROCEDURE — ? COUNSELING

## 2019-09-03 PROCEDURE — ? LIQUID NITROGEN

## 2019-09-03 PROCEDURE — 17003 DESTRUCT PREMALG LES 2-14: CPT

## 2019-09-03 PROCEDURE — ? SUNSCREEN RECOMMENDATIONS

## 2019-09-03 PROCEDURE — 99214 OFFICE O/P EST MOD 30 MIN: CPT | Mod: 25

## 2019-09-03 PROCEDURE — 17000 DESTRUCT PREMALG LESION: CPT

## 2019-09-03 PROCEDURE — ? ADDITIONAL NOTES

## 2019-09-03 PROCEDURE — ? PHOTODYNAMIC THERAPY COUNSELING

## 2019-09-03 ASSESSMENT — LOCATION DETAILED DESCRIPTION DERM
LOCATION DETAILED: RIGHT INFERIOR VERMILION LIP
LOCATION DETAILED: LEFT CENTRAL FRONTAL SCALP
LOCATION DETAILED: LEFT CENTRAL MALAR CHEEK
LOCATION DETAILED: RIGHT ANTERIOR PROXIMAL UPPER ARM
LOCATION DETAILED: RIGHT SUPERIOR FOREHEAD
LOCATION DETAILED: LEFT CENTRAL PARIETAL SCALP
LOCATION DETAILED: RIGHT CENTRAL MALAR CHEEK
LOCATION DETAILED: RIGHT VENTRAL PROXIMAL FOREARM
LOCATION DETAILED: RIGHT VENTRAL DISTAL FOREARM
LOCATION DETAILED: RIGHT RADIAL DORSAL HAND
LOCATION DETAILED: RIGHT LATERAL FOREHEAD
LOCATION DETAILED: LEFT INFERIOR MEDIAL FOREHEAD
LOCATION DETAILED: RIGHT SUPERIOR UPPER BACK
LOCATION DETAILED: LEFT ANTERIOR PROXIMAL UPPER ARM
LOCATION DETAILED: EPIGASTRIC SKIN
LOCATION DETAILED: LEFT DISTAL POSTERIOR THIGH
LOCATION DETAILED: LEFT SUPERIOR MEDIAL UPPER BACK
LOCATION DETAILED: LEFT SUPERIOR CRUS OF ANTIHELIX
LOCATION DETAILED: RIGHT ULNAR DORSAL HAND
LOCATION DETAILED: LEFT ULNAR DORSAL HAND
LOCATION DETAILED: LEFT ANTIHELIX
LOCATION DETAILED: RIGHT SUPERIOR MEDIAL UPPER BACK
LOCATION DETAILED: RIGHT DISTAL POSTERIOR THIGH

## 2019-09-03 ASSESSMENT — LOCATION ZONE DERM
LOCATION ZONE: HAND
LOCATION ZONE: LEG
LOCATION ZONE: EAR
LOCATION ZONE: LIP
LOCATION ZONE: TRUNK
LOCATION ZONE: ARM
LOCATION ZONE: SCALP
LOCATION ZONE: FACE

## 2019-09-03 ASSESSMENT — LOCATION SIMPLE DESCRIPTION DERM
LOCATION SIMPLE: RIGHT POSTERIOR THIGH
LOCATION SIMPLE: RIGHT UPPER ARM
LOCATION SIMPLE: LEFT SCALP
LOCATION SIMPLE: SCALP
LOCATION SIMPLE: RIGHT LIP
LOCATION SIMPLE: RIGHT FOREHEAD
LOCATION SIMPLE: LEFT FOREHEAD
LOCATION SIMPLE: RIGHT UPPER BACK
LOCATION SIMPLE: LEFT UPPER BACK
LOCATION SIMPLE: LEFT HAND
LOCATION SIMPLE: RIGHT FOREARM
LOCATION SIMPLE: LEFT CHEEK
LOCATION SIMPLE: RIGHT CHEEK
LOCATION SIMPLE: LEFT UPPER ARM
LOCATION SIMPLE: RIGHT HAND
LOCATION SIMPLE: ABDOMEN
LOCATION SIMPLE: LEFT POSTERIOR THIGH
LOCATION SIMPLE: LEFT EAR

## 2019-09-03 NOTE — PROCEDURE: LIQUID NITROGEN
Duration Of Freeze Thaw-Cycle (Seconds): 10
Post-Care Instructions: I reviewed with the patient in detail post-care instructions. Patient is to wear sunprotection, and avoid picking at any of the treated lesions. Pt may apply Vaseline to crusted or scabbing areas.
Render Note In Bullet Format When Appropriate: No
Detail Level: Detailed
Consent: The patient's consent was obtained including but not limited to risks of crusting, scabbing, blistering, scarring, darker or lighter pigmentary change, recurrence, incomplete removal and infection. RTC in 2 months if lesion(s) persistent.
Render Post-Care Instructions In Note?: yes
Number Of Freeze-Thaw Cycles: 2 freeze-thaw cycles

## 2019-09-03 NOTE — PROCEDURE: COUNSELING
Detail Level: Zone
When Should The Patient Follow-Up For Their Next Full-Body Skin Exam?: 3 Months
Detail Level: Detailed
Quality 137: Melanoma: Continuity Of Care - Recall System: Patient information entered into a recall system that includes: target date for the next exam specified AND a process to follow up with patients regarding missed or unscheduled appointments

## 2019-12-04 ENCOUNTER — APPOINTMENT (RX ONLY)
Dept: URBAN - METROPOLITAN AREA CLINIC 20 | Facility: CLINIC | Age: 74
Setting detail: DERMATOLOGY
End: 2019-12-04

## 2019-12-04 DIAGNOSIS — L57.0 ACTINIC KERATOSIS: ICD-10-CM

## 2019-12-04 DIAGNOSIS — Z85.820 PERSONAL HISTORY OF MALIGNANT MELANOMA OF SKIN: ICD-10-CM

## 2019-12-04 DIAGNOSIS — L81.4 OTHER MELANIN HYPERPIGMENTATION: ICD-10-CM

## 2019-12-04 DIAGNOSIS — L82.1 OTHER SEBORRHEIC KERATOSIS: ICD-10-CM

## 2019-12-04 DIAGNOSIS — D18.0 HEMANGIOMA: ICD-10-CM

## 2019-12-04 DIAGNOSIS — L71.8 OTHER ROSACEA: ICD-10-CM

## 2019-12-04 DIAGNOSIS — D22 MELANOCYTIC NEVI: ICD-10-CM

## 2019-12-04 DIAGNOSIS — Z86.006 PERSONAL HISTORY OF MELANOMA IN-SITU: ICD-10-CM

## 2019-12-04 PROBLEM — D22.61 MELANOCYTIC NEVI OF RIGHT UPPER LIMB, INCLUDING SHOULDER: Status: ACTIVE | Noted: 2019-12-04

## 2019-12-04 PROBLEM — D22.5 MELANOCYTIC NEVI OF TRUNK: Status: ACTIVE | Noted: 2019-12-04

## 2019-12-04 PROBLEM — D22.71 MELANOCYTIC NEVI OF RIGHT LOWER LIMB, INCLUDING HIP: Status: ACTIVE | Noted: 2019-12-04

## 2019-12-04 PROBLEM — D22.62 MELANOCYTIC NEVI OF LEFT UPPER LIMB, INCLUDING SHOULDER: Status: ACTIVE | Noted: 2019-12-04

## 2019-12-04 PROBLEM — D22.72 MELANOCYTIC NEVI OF LEFT LOWER LIMB, INCLUDING HIP: Status: ACTIVE | Noted: 2019-12-04

## 2019-12-04 PROBLEM — D18.01 HEMANGIOMA OF SKIN AND SUBCUTANEOUS TISSUE: Status: ACTIVE | Noted: 2019-12-04

## 2019-12-04 PROCEDURE — 17003 DESTRUCT PREMALG LES 2-14: CPT

## 2019-12-04 PROCEDURE — ? LIQUID NITROGEN

## 2019-12-04 PROCEDURE — 17000 DESTRUCT PREMALG LESION: CPT

## 2019-12-04 PROCEDURE — ? SUNSCREEN RECOMMENDATIONS

## 2019-12-04 PROCEDURE — ? COUNSELING

## 2019-12-04 PROCEDURE — 99214 OFFICE O/P EST MOD 30 MIN: CPT | Mod: 25

## 2019-12-04 PROCEDURE — ? ADDITIONAL NOTES

## 2019-12-04 ASSESSMENT — LOCATION DETAILED DESCRIPTION DERM
LOCATION DETAILED: RIGHT CENTRAL MALAR CHEEK
LOCATION DETAILED: RIGHT VENTRAL PROXIMAL FOREARM
LOCATION DETAILED: RIGHT DISTAL POSTERIOR THIGH
LOCATION DETAILED: LEFT DISTAL POSTERIOR THIGH
LOCATION DETAILED: RIGHT SUPERIOR UPPER BACK
LOCATION DETAILED: LEFT SUPERIOR MEDIAL UPPER BACK
LOCATION DETAILED: LEFT SUPERIOR LATERAL MALAR CHEEK
LOCATION DETAILED: RIGHT SUPERIOR FOREHEAD
LOCATION DETAILED: LEFT FOREHEAD
LOCATION DETAILED: RIGHT ANTERIOR PROXIMAL UPPER ARM
LOCATION DETAILED: LEFT ANTERIOR PROXIMAL UPPER ARM
LOCATION DETAILED: LEFT ULNAR DORSAL HAND
LOCATION DETAILED: LEFT CENTRAL PARIETAL SCALP
LOCATION DETAILED: LEFT CENTRAL MALAR CHEEK
LOCATION DETAILED: RIGHT ANTERIOR DISTAL UPPER ARM
LOCATION DETAILED: RIGHT SUPERIOR LATERAL FOREHEAD
LOCATION DETAILED: RIGHT ANTECUBITAL SKIN
LOCATION DETAILED: RIGHT SUPERIOR MEDIAL UPPER BACK
LOCATION DETAILED: LEFT INFERIOR MEDIAL FOREHEAD
LOCATION DETAILED: RIGHT ULNAR DORSAL HAND
LOCATION DETAILED: EPIGASTRIC SKIN
LOCATION DETAILED: LEFT VENTRAL PROXIMAL FOREARM
LOCATION DETAILED: LEFT CENTRAL FRONTAL SCALP

## 2019-12-04 ASSESSMENT — LOCATION ZONE DERM
LOCATION ZONE: LEG
LOCATION ZONE: FACE
LOCATION ZONE: HAND
LOCATION ZONE: SCALP
LOCATION ZONE: TRUNK
LOCATION ZONE: ARM

## 2019-12-04 ASSESSMENT — LOCATION SIMPLE DESCRIPTION DERM
LOCATION SIMPLE: RIGHT HAND
LOCATION SIMPLE: RIGHT ELBOW
LOCATION SIMPLE: LEFT SCALP
LOCATION SIMPLE: LEFT UPPER ARM
LOCATION SIMPLE: LEFT HAND
LOCATION SIMPLE: RIGHT FOREARM
LOCATION SIMPLE: RIGHT FOREHEAD
LOCATION SIMPLE: LEFT POSTERIOR THIGH
LOCATION SIMPLE: RIGHT UPPER BACK
LOCATION SIMPLE: LEFT UPPER BACK
LOCATION SIMPLE: RIGHT CHEEK
LOCATION SIMPLE: RIGHT POSTERIOR THIGH
LOCATION SIMPLE: SCALP
LOCATION SIMPLE: LEFT FOREARM
LOCATION SIMPLE: LEFT CHEEK
LOCATION SIMPLE: RIGHT UPPER ARM
LOCATION SIMPLE: ABDOMEN
LOCATION SIMPLE: LEFT FOREHEAD

## 2019-12-04 NOTE — PROCEDURE: ADDITIONAL NOTES
Detail Level: Detailed
Additional Notes: Patient is not interested in topical therapy at this time.
Additional Notes: Discussed [facility]

## 2019-12-04 NOTE — PROCEDURE: LIQUID NITROGEN
Post-Care Instructions: I reviewed with the patient in detail post-care instructions. Patient is to wear sunprotection, and avoid picking at any of the treated lesions. Pt may apply Vaseline to crusted or scabbing areas.
Number Of Freeze-Thaw Cycles: 2 freeze-thaw cycles
Render Note In Bullet Format When Appropriate: No
Duration Of Freeze Thaw-Cycle (Seconds): 10
Consent: The patient's consent was obtained including but not limited to risks of crusting, scabbing, blistering, scarring, darker or lighter pigmentary change, recurrence, incomplete removal and infection. RTC in 2 months if lesion(s) persistent.
Detail Level: Detailed
Render Post-Care Instructions In Note?: yes

## 2019-12-31 ENCOUNTER — OFFICE VISIT (OUTPATIENT)
Dept: CARDIOLOGY | Facility: MEDICAL CENTER | Age: 74
End: 2019-12-31
Payer: MEDICARE

## 2019-12-31 VITALS
DIASTOLIC BLOOD PRESSURE: 66 MMHG | WEIGHT: 205 LBS | OXYGEN SATURATION: 97 % | SYSTOLIC BLOOD PRESSURE: 120 MMHG | HEART RATE: 58 BPM | HEIGHT: 67 IN | BODY MASS INDEX: 32.18 KG/M2

## 2019-12-31 DIAGNOSIS — Z95.5 STENTED CORONARY ARTERY: ICD-10-CM

## 2019-12-31 DIAGNOSIS — Z79.01 ANTICOAGULATED: ICD-10-CM

## 2019-12-31 DIAGNOSIS — I48.0 PAROXYSMAL ATRIAL FIBRILLATION (HCC): ICD-10-CM

## 2019-12-31 DIAGNOSIS — I25.10 CORONARY ARTERY DISEASE INVOLVING NATIVE CORONARY ARTERY OF NATIVE HEART WITHOUT ANGINA PECTORIS: ICD-10-CM

## 2019-12-31 DIAGNOSIS — I10 ESSENTIAL HYPERTENSION: ICD-10-CM

## 2019-12-31 PROCEDURE — 99214 OFFICE O/P EST MOD 30 MIN: CPT | Performed by: INTERNAL MEDICINE

## 2019-12-31 RX ORDER — INFLUENZA VACCINE, ADJUVANTED 15; 15; 15 UG/.5ML; UG/.5ML; UG/.5ML
INJECTION, SUSPENSION INTRAMUSCULAR
COMMUNITY
Start: 2019-11-11 | End: 2020-02-20

## 2020-01-02 ENCOUNTER — TELEPHONE (OUTPATIENT)
Dept: CARDIOLOGY | Facility: MEDICAL CENTER | Age: 75
End: 2020-01-02

## 2020-01-02 ASSESSMENT — ENCOUNTER SYMPTOMS
WEAKNESS: 0
DEPRESSION: 0
SHORTNESS OF BREATH: 0
NAUSEA: 0
BRUISES/BLEEDS EASILY: 0
NEUROLOGICAL NEGATIVE: 1
CHILLS: 0
VOMITING: 0
PALPITATIONS: 0

## 2020-01-02 NOTE — TELEPHONE ENCOUNTER
----- Message from Rodrigue Mcallister M.D. sent at 1/2/2020  7:47 AM PST -----  Regarding: Chest x-ray  Patient was seen in the office on Tuesday.  However computers were down.  He should have a surveillance PA and lateral chest x-ray because of amiodarone use.  This was not ordered on Tuesday because [employer] was not working.  I placed the order in today.

## 2020-01-02 NOTE — PROGRESS NOTES
Chief Complaint   Patient presents with   • Atrial Fibrillation   • Coronary Artery Disease       Subjective:   Shamar Morgan is a 74 y.o. male who presents today for follow-up of coronary artery disease with an inferior wall MI in September, 2017.  At that time he had a 4.0 mm Synergy stent placed in the ostial RCA and was found of nonobstructive lesions elsewhere.  He has had no chest pain.  Patient is a history of PAF and had easy bruisability on usual dose rivaroxaban.  On 10 mg, his bruising is still present but much improved over when he was on the standard dose of medication..  No chest pain no other interval health problems.  No sense of palpitations.  He is known to have asymptomatic paroxysmal atrial fibrillation by monitoring.      Past Medical History:   Diagnosis Date   • Arrhythmia 09/02/2017    Atrial Fib   • Blood clotting disorder (HCC) 09/2017    heart   • CAD (coronary artery disease) 4/16/2012   • Dental disorder     upper and lower dentures, full   • Hemorrhagic disorder (HCC)     on Plavix and Xarelto   • High cholesterol    • HTN (hypertension) 4/16/2012   • Hyperglycemia 4/16/2012   • Hyperlipemia 4/16/2012   • Hypertension    • Liver enzyme elevation     history of   • Myocardial infarct (HCC) 09/02/2017    Dr. Mcallister, cardiologist   • Obesity    • Sleep apnea     BIPAP   • Tachycardia 4/16/2012   • Tonsillitis      Past Surgical History:   Procedure Laterality Date   • TRIGGER FINGER RELEASE Left 7/17/2018    Procedure: TRIGGER FINGER RELEASE - RING;  Surgeon: Jose Miguel Kay M.D.;  Location: [facility];  Service: Orthopedics   • MASS EXCISION ORTHO Left 7/17/2018    Procedure: MASS EXCISION ORTHO - DISTAL INTERPHALANGEAL JOINT NODULE EXCISION;  Surgeon: Jose Miguel Kay M.D.;  Location: SURGERY [facility];  Service: Orthopedics   • CARDIAC CATH, LEFT HEART  09/03/2017    successful thrombectomy  and CHELLE to ostial proximal RCA    • STENT PLACEMENT  09/03/2017    cardiac    • CARPAL TUNNEL RELEASE Right    • TRIGGER FINGER RELEASE Right      Family History   Problem Relation Age of Onset   • Diabetes Mother    • Heart Disease Mother    • Kidney Disease Mother    • Diabetes Sister      Social History     Socioeconomic History   • Marital status:      Spouse name: Not on file   • Number of children: Not on file   • Years of education: Not on file   • Highest education level: Not on file   Occupational History   • Not on file   Social Needs   • Financial resource strain: Not on file   • Food insecurity:     Worry: Not on file     Inability: Not on file   • Transportation needs:     Medical: Not on file     Non-medical: Not on file   Tobacco Use   • Smoking status: Former Smoker     Packs/day: 3.00     Years: 16.00     Pack years: 48.00     Types: Cigarettes     Last attempt to quit: 1980     Years since quittin.0   • Smokeless tobacco: Never Used   Substance and Sexual Activity   • Alcohol use: Yes     Alcohol/week: 0.0 oz     Comment: 3 per week   • Drug use: No   • Sexual activity: Yes     Partners: Female   Lifestyle   • Physical activity:     Days per week: Not on file     Minutes per session: Not on file   • Stress: Not on file   Relationships   • Social connections:     Talks on phone: Not on file     Gets together: Not on file     Attends Quaker service: Not on file     Active member of club or organization: Not on file     Attends meetings of clubs or organizations: Not on file     Relationship status: Not on file   • Intimate partner violence:     Fear of current or ex partner: Not on file     Emotionally abused: Not on file     Physically abused: Not on file     Forced sexual activity: Not on file   Other Topics Concern   • Not on file   Social History Narrative   • Not on file     Allergies   Allergen Reactions   • Penicillins Swelling   • Sulfa Drugs Swelling   • Brilinta [Ticagrelor] Rash and Itching     .   • Lipidil [Fenofibrate]       "Myalgia     Outpatient Encounter Medications as of 12/31/2019   Medication Sig Dispense Refill   • atorvastatin (LIPITOR) 20 MG Tab Take 1 Tab by mouth every day. 100 Tab 3   • amiodarone (PACERONE) 100 MG tablet TAKE 1 TABLET BY MOUTH EVERY DAY 90 Tab 3   • losartan (COZAAR) 50 MG Tab TAKE 1 TABLET BY MOUTH ONCE DAILY 100 Tab 3   • XARELTO 10 MG Tab tablet TAKE 1 TABLET BY MOUTH ONCE DAILY WITH  DINNER 90 Tab 3   • vitamin D (CHOLECALCIFEROL) 1000 UNIT TABS Take 1,000 Units by mouth every day.     • FLUAD 0.5 ML Suspension Prefilled Syringe PHARMACIST ADMINISTERED IMMUNIZATION ADMINISTERED AT TIME OF DISPENSING       No facility-administered encounter medications on file as of 12/31/2019.      Review of Systems   Constitutional: Negative for chills and malaise/fatigue.   Respiratory: Negative for shortness of breath.    Cardiovascular: Negative for chest pain, palpitations and leg swelling.   Gastrointestinal: Negative for nausea and vomiting.   Neurological: Negative.  Negative for weakness.   Endo/Heme/Allergies: Does not bruise/bleed easily.   Psychiatric/Behavioral: Negative for depression.        Objective:   /66 (BP Location: Left arm, Patient Position: Sitting, BP Cuff Size: Adult)   Pulse (!) 58   Ht 1.702 m (5' 7\")   Wt 93 kg (205 lb)   SpO2 97%   BMI 32.11 kg/m²     Physical Exam   Constitutional: He is oriented to person, place, and time. He appears well-nourished. No distress.   HENT:   Head: Normocephalic and atraumatic.   Eyes: Pupils are equal, round, and reactive to light. No scleral icterus.   Neck: No JVD present. No tracheal deviation present.   Cardiovascular: Normal rate and regular rhythm.   No murmur heard.  Pulmonary/Chest: Breath sounds normal. No respiratory distress. He has no wheezes.   Abdominal: Soft. Bowel sounds are normal. He exhibits no distension. There is no tenderness.   Obese abdomen   Musculoskeletal:         General: No edema.   Neurological: He is alert and " oriented to person, place, and time.   Skin: Skin is warm and dry.   Psychiatric: He has a normal mood and affect.       Assessment:     1. Anticoagulated  CBC WITH DIFFERENTIAL   2. Essential hypertension  Comp Metabolic Panel    Lipid Profile   3. Paroxysmal atrial fibrillation (HCC)  Comp Metabolic Panel    Lipid Profile    CBC WITH DIFFERENTIAL   4. Coronary artery disease involving native coronary artery of native heart without angina pectoris     5. Stented coronary artery  Comp Metabolic Panel    Lipid Profile     Coronary disease: Status post tenting of RCA.  Remains asymptomatic.    PAF: Asymptomatic.  On antiarrhythmic.    Anticoagulation: Still has some mild bruising problems on even low-dose Xarelto    High risk medication use: Continue low-dose amiodarone.  We will plan chest x-ray for surveillance.  Medical Decision Making:  Today's Assessment / Status / Plan:

## 2020-01-03 ENCOUNTER — HOSPITAL ENCOUNTER (OUTPATIENT)
Dept: RADIOLOGY | Facility: MEDICAL CENTER | Age: 75
End: 2020-01-03
Attending: INTERNAL MEDICINE
Payer: MEDICARE

## 2020-01-03 DIAGNOSIS — I48.0 PAROXYSMAL ATRIAL FIBRILLATION (HCC): ICD-10-CM

## 2020-01-03 DIAGNOSIS — Z95.5 STENTED CORONARY ARTERY: ICD-10-CM

## 2020-01-03 DIAGNOSIS — Z79.01 ANTICOAGULATED: ICD-10-CM

## 2020-01-03 PROCEDURE — 71046 X-RAY EXAM CHEST 2 VIEWS: CPT

## 2020-01-06 ENCOUNTER — TELEPHONE (OUTPATIENT)
Dept: CARDIOLOGY | Facility: MEDICAL CENTER | Age: 75
End: 2020-01-06

## 2020-01-06 NOTE — TELEPHONE ENCOUNTER
----- Message from Rodrigue Mcallister M.D. sent at 1/6/2020  7:30 AM PST -----  Chest x-ray reviewed.  No evidence of any side effects from the amiodarone.  Continue same medications.  Chest x-ray is normal

## 2020-01-16 ENCOUNTER — HOSPITAL ENCOUNTER (OUTPATIENT)
Dept: LAB | Facility: MEDICAL CENTER | Age: 75
End: 2020-01-16
Attending: INTERNAL MEDICINE
Payer: MEDICARE

## 2020-01-16 DIAGNOSIS — R53.83 FATIGUE, UNSPECIFIED TYPE: ICD-10-CM

## 2020-01-16 DIAGNOSIS — R73.02 IGT (IMPAIRED GLUCOSE TOLERANCE): ICD-10-CM

## 2020-01-16 DIAGNOSIS — E78.5 DYSLIPIDEMIA: ICD-10-CM

## 2020-01-16 DIAGNOSIS — I48.0 PAROXYSMAL ATRIAL FIBRILLATION (HCC): ICD-10-CM

## 2020-01-16 DIAGNOSIS — Z79.01 ANTICOAGULATED: ICD-10-CM

## 2020-01-16 LAB
ALBUMIN SERPL BCP-MCNC: 4.2 G/DL (ref 3.2–4.9)
ALBUMIN/GLOB SERPL: 1.7 G/DL
ALP SERPL-CCNC: 48 U/L (ref 30–99)
ALT SERPL-CCNC: 24 U/L (ref 2–50)
ANION GAP SERPL CALC-SCNC: 9 MMOL/L (ref 0–11.9)
AST SERPL-CCNC: 18 U/L (ref 12–45)
BASOPHILS # BLD AUTO: 1 % (ref 0–1.8)
BASOPHILS # BLD: 0.06 K/UL (ref 0–0.12)
BILIRUB SERPL-MCNC: 0.8 MG/DL (ref 0.1–1.5)
BUN SERPL-MCNC: 16 MG/DL (ref 8–22)
CALCIUM SERPL-MCNC: 9.7 MG/DL (ref 8.5–10.5)
CHLORIDE SERPL-SCNC: 107 MMOL/L (ref 96–112)
CHOLEST SERPL-MCNC: 119 MG/DL (ref 100–199)
CO2 SERPL-SCNC: 25 MMOL/L (ref 20–33)
CREAT SERPL-MCNC: 1.08 MG/DL (ref 0.5–1.4)
EOSINOPHIL # BLD AUTO: 0.15 K/UL (ref 0–0.51)
EOSINOPHIL NFR BLD: 2.6 % (ref 0–6.9)
ERYTHROCYTE [DISTWIDTH] IN BLOOD BY AUTOMATED COUNT: 45.2 FL (ref 35.9–50)
EST. AVERAGE GLUCOSE BLD GHB EST-MCNC: 114 MG/DL
FASTING STATUS PATIENT QL REPORTED: NORMAL
GLOBULIN SER CALC-MCNC: 2.5 G/DL (ref 1.9–3.5)
GLUCOSE SERPL-MCNC: 102 MG/DL (ref 65–99)
HBA1C MFR BLD: 5.6 % (ref 0–5.6)
HCT VFR BLD AUTO: 51 % (ref 42–52)
HDLC SERPL-MCNC: 52 MG/DL
HGB BLD-MCNC: 17.2 G/DL (ref 14–18)
IMM GRANULOCYTES # BLD AUTO: 0.02 K/UL (ref 0–0.11)
IMM GRANULOCYTES NFR BLD AUTO: 0.3 % (ref 0–0.9)
LDLC SERPL CALC-MCNC: 51 MG/DL
LYMPHOCYTES # BLD AUTO: 2.26 K/UL (ref 1–4.8)
LYMPHOCYTES NFR BLD: 39.3 % (ref 22–41)
MCH RBC QN AUTO: 31.6 PG (ref 27–33)
MCHC RBC AUTO-ENTMCNC: 33.7 G/DL (ref 33.7–35.3)
MCV RBC AUTO: 93.8 FL (ref 81.4–97.8)
MONOCYTES # BLD AUTO: 0.61 K/UL (ref 0–0.85)
MONOCYTES NFR BLD AUTO: 10.6 % (ref 0–13.4)
NEUTROPHILS # BLD AUTO: 2.65 K/UL (ref 1.82–7.42)
NEUTROPHILS NFR BLD: 46.2 % (ref 44–72)
NRBC # BLD AUTO: 0 K/UL
NRBC BLD-RTO: 0 /100 WBC
PLATELET # BLD AUTO: 197 K/UL (ref 164–446)
PMV BLD AUTO: 10.6 FL (ref 9–12.9)
POTASSIUM SERPL-SCNC: 3.5 MMOL/L (ref 3.6–5.5)
PROT SERPL-MCNC: 6.7 G/DL (ref 6–8.2)
RBC # BLD AUTO: 5.44 M/UL (ref 4.7–6.1)
SODIUM SERPL-SCNC: 141 MMOL/L (ref 135–145)
TRIGL SERPL-MCNC: 81 MG/DL (ref 0–149)
TSH SERPL DL<=0.005 MIU/L-ACNC: 1.52 UIU/ML (ref 0.38–5.33)
WBC # BLD AUTO: 5.8 K/UL (ref 4.8–10.8)

## 2020-01-16 PROCEDURE — 83036 HEMOGLOBIN GLYCOSYLATED A1C: CPT

## 2020-01-16 PROCEDURE — 80053 COMPREHEN METABOLIC PANEL: CPT

## 2020-01-16 PROCEDURE — 80061 LIPID PANEL: CPT

## 2020-01-16 PROCEDURE — 36415 COLL VENOUS BLD VENIPUNCTURE: CPT

## 2020-01-16 PROCEDURE — 85025 COMPLETE CBC W/AUTO DIFF WBC: CPT

## 2020-01-16 PROCEDURE — 84443 ASSAY THYROID STIM HORMONE: CPT

## 2020-01-31 ENCOUNTER — SLEEP CENTER VISIT (OUTPATIENT)
Dept: SLEEP MEDICINE | Facility: MEDICAL CENTER | Age: 75
End: 2020-01-31
Payer: MEDICARE

## 2020-01-31 VITALS
OXYGEN SATURATION: 96 % | RESPIRATION RATE: 18 BRPM | SYSTOLIC BLOOD PRESSURE: 112 MMHG | HEART RATE: 94 BPM | BODY MASS INDEX: 32.49 KG/M2 | HEIGHT: 67 IN | WEIGHT: 207 LBS | DIASTOLIC BLOOD PRESSURE: 58 MMHG

## 2020-01-31 DIAGNOSIS — G47.33 OSA TREATED WITH BIPAP: ICD-10-CM

## 2020-01-31 DIAGNOSIS — I25.10 CORONARY ARTERY DISEASE INVOLVING NATIVE CORONARY ARTERY OF NATIVE HEART WITHOUT ANGINA PECTORIS: ICD-10-CM

## 2020-01-31 PROCEDURE — 99213 OFFICE O/P EST LOW 20 MIN: CPT | Performed by: INTERNAL MEDICINE

## 2020-01-31 NOTE — PROGRESS NOTES
Chief Complaint   Patient presents with   • Follow-Up     RUPAL       HPI: This patient is a 74 y.o. male who returns for RUPAL. Polysomnography confirmed severe obstructive sleep apnea syndrome with AHI 73 events per hour, associated with desaturations to 67%. Compliance card shows 100% usage on BiPAP: 14/10 cm of water, for 8.5 hours nightly, and AHI normal at 3.5. He is sleeping well.   He denies daytime somnolence.  Denies mask leaks.  His weight is down with BMI:32.    Past Medical History:   Diagnosis Date   • Arrhythmia 2017    Atrial Fib   • Blood clotting disorder (HCC) 2017    heart   • CAD (coronary artery disease) 2012   • Dental disorder     upper and lower dentures, full   • Hemorrhagic disorder (HCC)     on Plavix and Xarelto   • High cholesterol    • HTN (hypertension) 2012   • Hyperglycemia 2012   • Hyperlipemia 2012   • Hypertension    • Liver enzyme elevation     history of   • Myocardial infarct (HCC) 2017    Dr. Mcallister, cardiologist   • Obesity    • Sleep apnea     BIPAP   • Tachycardia 2012   • Tonsillitis        Social History     Socioeconomic History   • Marital status:      Spouse name: Not on file   • Number of children: Not on file   • Years of education: Not on file   • Highest education level: Not on file   Occupational History   • Not on file   Social Needs   • Financial resource strain: Not on file   • Food insecurity:     Worry: Not on file     Inability: Not on file   • Transportation needs:     Medical: Not on file     Non-medical: Not on file   Tobacco Use   • Smoking status: Former Smoker     Packs/day: 3.00     Years: 16.00     Pack years: 48.00     Types: Cigarettes     Last attempt to quit: 1980     Years since quittin.1   • Smokeless tobacco: Never Used   Substance and Sexual Activity   • Alcohol use: Yes     Alcohol/week: 0.0 oz     Comment: 3 per week   • Drug use: No   • Sexual activity: Yes     Partners: Female    Lifestyle   • Physical activity:     Days per week: Not on file     Minutes per session: Not on file   • Stress: Not on file   Relationships   • Social connections:     Talks on phone: Not on file     Gets together: Not on file     Attends Caodaism service: Not on file     Active member of club or organization: Not on file     Attends meetings of clubs or organizations: Not on file     Relationship status: Not on file   • Intimate partner violence:     Fear of current or ex partner: Not on file     Emotionally abused: Not on file     Physically abused: Not on file     Forced sexual activity: Not on file   Other Topics Concern   • Not on file   Social History Narrative   • Not on file       Family History   Problem Relation Age of Onset   • Diabetes Mother    • Heart Disease Mother    • Kidney Disease Mother    • Diabetes Sister        Current Outpatient Medications on File Prior to Visit   Medication Sig Dispense Refill   • FLUAD 0.5 ML Suspension Prefilled Syringe PHARMACIST ADMINISTERED IMMUNIZATION ADMINISTERED AT TIME OF DISPENSING     • atorvastatin (LIPITOR) 20 MG Tab Take 1 Tab by mouth every day. 100 Tab 3   • amiodarone (PACERONE) 100 MG tablet TAKE 1 TABLET BY MOUTH EVERY DAY 90 Tab 3   • losartan (COZAAR) 50 MG Tab TAKE 1 TABLET BY MOUTH ONCE DAILY 100 Tab 3   • XARELTO 10 MG Tab tablet TAKE 1 TABLET BY MOUTH ONCE DAILY WITH  DINNER 90 Tab 3   • vitamin D (CHOLECALCIFEROL) 1000 UNIT TABS Take 1,000 Units by mouth every day.       No current facility-administered medications on file prior to visit.        Allergies: Penicillins; Sulfa drugs; Brilinta [ticagrelor]; and Lipidil [fenofibrate]    ROS:   Constitutional: Denies fevers, chills, night sweats, fatigue or unintentional weight loss  Eyes: Denies vision loss, pain, drainage, double vision  Ears, Nose, Throat: Denies earache, difficulty hearing, tinnitus, nasal congestion, hoarseness  Cardiovascular: Denies chest pain, tightness, palpitations,  "orthopnea or edema  Respiratory: Denies shortness of breath, cough, wheezing, hemoptysis  Sleep: Denies daytime sleepiness, snoring, apneas, insomnia, morning headaches  GI: Denies heartburn, dysphagia, nausea, abdominal pain, diarrhea or constipation  : Denies frequent urination, hematuria, discharge or painful urination  Musculoskeletal: Denies back pain, painful joints, sore muscles  Neurological: Denies weakness or headaches  Skin: No rashes    /58 (BP Location: Right arm, Patient Position: Sitting, BP Cuff Size: Adult)   Pulse 94   Resp 18   Ht 1.702 m (5' 7\")   Wt 93.9 kg (207 lb)   SpO2 96%     Physical Exam:  Appearance: Well-nourished, well-developed, in no acute distress  HEENT: Normocephalic, atraumatic, white sclera, PERRLA, Mallampati 3  Neck: No masses  Respiratory: no intercostal retractions or accessory muscle use  Lungs auscultation: Clear to auscultation bilaterally  Cardiovascular: Regular rate rhythm. No murmurs, rubs or gallops.  No LE edema  Abdomen: soft, nondistended  Gait: Normal  Digits: No clubbing, cyanosis  Motor: No focal deficits  Orientation: Oriented to time, person and place    Diagnosis:  1. RUPAL treated with BiPAP     2. Coronary artery disease involving native coronary artery of native heart without angina pectoris     3. BMI 32.0-32.9,adult  OBESITY COUNSELING (No Charge): Patient identified as having weight management issue.  Appropriate orders and counseling given.       Plan:  Shamar shows excellent compliance and response to BiPAP.  Continue BiPAP: 14/10 cm of water.    Replace nasal pillows monthly.  Patient's body mass index is 32.42 kg/m². Exercise and nutrition counseling were performed at this visit.  Return in about 1 year (around 1/31/2021).      "

## 2020-02-19 PROBLEM — D68.318 CIRCULATING ANTICOAGULANTS (HCC): Status: ACTIVE | Noted: 2020-02-19

## 2020-02-20 ENCOUNTER — OFFICE VISIT (OUTPATIENT)
Dept: MEDICAL GROUP | Facility: MEDICAL CENTER | Age: 75
End: 2020-02-20
Payer: MEDICARE

## 2020-02-20 VITALS
TEMPERATURE: 97 F | SYSTOLIC BLOOD PRESSURE: 114 MMHG | HEIGHT: 67 IN | BODY MASS INDEX: 32.65 KG/M2 | HEART RATE: 71 BPM | OXYGEN SATURATION: 98 % | DIASTOLIC BLOOD PRESSURE: 70 MMHG | WEIGHT: 208 LBS

## 2020-02-20 DIAGNOSIS — I10 ESSENTIAL HYPERTENSION: ICD-10-CM

## 2020-02-20 DIAGNOSIS — R73.02 IGT (IMPAIRED GLUCOSE TOLERANCE): ICD-10-CM

## 2020-02-20 DIAGNOSIS — D68.318 CIRCULATING ANTICOAGULANTS (HCC): ICD-10-CM

## 2020-02-20 DIAGNOSIS — I48.0 PAROXYSMAL ATRIAL FIBRILLATION (HCC): ICD-10-CM

## 2020-02-20 PROCEDURE — 8041 PR SCP AHA: Performed by: INTERNAL MEDICINE

## 2020-02-20 PROCEDURE — 99214 OFFICE O/P EST MOD 30 MIN: CPT | Performed by: INTERNAL MEDICINE

## 2020-02-20 NOTE — PROGRESS NOTES
Annual Health Assessment Questions:    1.  Are you currently engaging in any exercise or physical activity? Yes    2.  How would you describe your mood or emotional well-being today? good    3.  Have you had any falls in the last year? No    4.  Have you noticed any problems with your balance or had difficulty walking? No    5.  In the last six months have you experienced any leakage of urine? Yes    6. DPA/Advanced Directive: Patient has Durable Power of  on file.            CC: Follow-up blood sugars, hypertension, atrial fibrillation.                                                                                                                                      HPI:   Shamar presents today with the following.    1. IGT (impaired glucose tolerance)  Presents elevated blood sugar 102 A1c is still in normal range at 5.6.  No true history of diabetes for him weight is down from 6 months ago but up in the last 2 months.  Denies excessive thirst and urination.    2. Essential hypertension  Well-controlled on current regimen denying any dizziness    3. Paroxysmal atrial fibrillation (HCC)  Denies any chest pain or palpitations he is maintained on amiodarone TSH normal.    4. Circulating anticoagulants (HCC)  Maintain on blood thinners bruising but no bleeding.      Patient Active Problem List    Diagnosis Date Noted   • Anticoagulated 02/23/2018     Priority: High   • Stented coronary artery 11/16/2017     Priority: High   • Ischemic cardiomyopathy 09/03/2017     Priority: High   • Coronary artery disease involving native coronary artery of native heart without angina pectoris 04/16/2012     Priority: High   • Dyslipidemia 04/16/2012     Priority: Medium   • Essential hypertension 04/16/2012     Priority: Medium   • Circulating anticoagulants (HCC) 02/19/2020   • Trigger ring finger of left hand 07/17/2018   • Paroxysmal atrial fibrillation (HCC) 09/05/2017   • Non morbid obesity due to excess calories  "01/19/2017   • RUPAL (obstructive sleep apnea) 01/19/2017   • IGT (impaired glucose tolerance) 07/08/2016       Current Outpatient Medications   Medication Sig Dispense Refill   • atorvastatin (LIPITOR) 20 MG Tab Take 1 Tab by mouth every day. 100 Tab 3   • amiodarone (PACERONE) 100 MG tablet TAKE 1 TABLET BY MOUTH EVERY DAY 90 Tab 3   • losartan (COZAAR) 50 MG Tab TAKE 1 TABLET BY MOUTH ONCE DAILY 100 Tab 3   • XARELTO 10 MG Tab tablet TAKE 1 TABLET BY MOUTH ONCE DAILY WITH  DINNER 90 Tab 3   • vitamin D (CHOLECALCIFEROL) 1000 UNIT TABS Take 1,000 Units by mouth every day.       No current facility-administered medications for this visit.          Allergies as of 02/20/2020 - Reviewed 02/20/2020   Allergen Reaction Noted   • Penicillins Swelling 07/31/2008   • Sulfa drugs Swelling 07/31/2008   • Brilinta [ticagrelor] Rash and Itching 07/09/2018   • Lipidil [fenofibrate]  07/29/2011        ROS: Denies Chest pain, SOB, LE edema.    /70 (BP Location: Right arm, Patient Position: Sitting)   Pulse 71   Temp 36.1 °C (97 °F)   Ht 1.702 m (5' 7\")   Wt 94.3 kg (208 lb)   SpO2 98%   BMI 32.58 kg/m²     Physical Exam:  Gen:         Alert and oriented, No apparent distress.  Neck:        No Lymphadenopathy or Bruits.  Lungs:     Clear to auscultation bilaterally  CV:          Regular rate and rhythm. No murmurs, rubs or gallops.               Ext:          No clubbing, cyanosis, edema.      Assessment and Plan.   74 y.o. male with the following issues.    1. IGT (impaired glucose tolerance)  Stable recheck 6 months.    2. Essential hypertension  Currently well controlled, Discuss diet, exercise and salt restriction.  No change to medication therapy.    3. Paroxysmal atrial fibrillation (HCC)  Stable continue current medications follow-up with cardiology.    4. Circulating anticoagulants (HCC)  Maintained on Xarelto.          "

## 2020-03-02 ENCOUNTER — TELEPHONE (OUTPATIENT)
Dept: PULMONOLOGY | Facility: HOSPICE | Age: 75
End: 2020-03-02

## 2020-03-02 DIAGNOSIS — G47.33 OSA (OBSTRUCTIVE SLEEP APNEA): ICD-10-CM

## 2020-03-02 NOTE — TELEPHONE ENCOUNTER
Patient is requesting order for mask and supplies to be faxed to DME:  [facility] /  [phone] / fax [phone]      Last Seen 01/31/20     Plan:  Shamar shows excellent compliance and response to BiPAP.  Continue BiPAP: 14/10 cm of water.    Replace nasal pillows monthly.  Patient's body mass index is 32.42 kg/m². Exercise and nutrition counseling were performed at this visit.  Return in about 1 year (around 1/31/2021).    Order pending, please review

## 2020-03-04 ENCOUNTER — APPOINTMENT (RX ONLY)
Dept: URBAN - METROPOLITAN AREA CLINIC 20 | Facility: CLINIC | Age: 75
Setting detail: DERMATOLOGY
End: 2020-03-04

## 2020-03-04 ENCOUNTER — OFFICE VISIT (OUTPATIENT)
Dept: MEDICAL GROUP | Facility: MEDICAL CENTER | Age: 75
End: 2020-03-04
Payer: MEDICARE

## 2020-03-04 ENCOUNTER — HOSPITAL ENCOUNTER (OUTPATIENT)
Dept: RADIOLOGY | Facility: MEDICAL CENTER | Age: 75
End: 2020-03-04
Attending: INTERNAL MEDICINE
Payer: MEDICARE

## 2020-03-04 ENCOUNTER — HOSPITAL ENCOUNTER (OUTPATIENT)
Dept: LAB | Facility: MEDICAL CENTER | Age: 75
End: 2020-03-04
Attending: INTERNAL MEDICINE
Payer: MEDICARE

## 2020-03-04 VITALS
HEART RATE: 68 BPM | WEIGHT: 211 LBS | HEIGHT: 67 IN | TEMPERATURE: 97.5 F | DIASTOLIC BLOOD PRESSURE: 66 MMHG | SYSTOLIC BLOOD PRESSURE: 122 MMHG | BODY MASS INDEX: 33.12 KG/M2 | OXYGEN SATURATION: 95 %

## 2020-03-04 DIAGNOSIS — L82.1 OTHER SEBORRHEIC KERATOSIS: ICD-10-CM

## 2020-03-04 DIAGNOSIS — Z12.5 SCREENING PSA (PROSTATE SPECIFIC ANTIGEN): ICD-10-CM

## 2020-03-04 DIAGNOSIS — D18.0 HEMANGIOMA: ICD-10-CM

## 2020-03-04 DIAGNOSIS — M25.551 HIP PAIN, ACUTE, RIGHT: ICD-10-CM

## 2020-03-04 DIAGNOSIS — L57.0 ACTINIC KERATOSIS: ICD-10-CM

## 2020-03-04 DIAGNOSIS — D22 MELANOCYTIC NEVI: ICD-10-CM

## 2020-03-04 DIAGNOSIS — Z86.006 PERSONAL HISTORY OF MELANOMA IN-SITU: ICD-10-CM

## 2020-03-04 DIAGNOSIS — Z71.89 OTHER SPECIFIED COUNSELING: ICD-10-CM

## 2020-03-04 DIAGNOSIS — L81.4 OTHER MELANIN HYPERPIGMENTATION: ICD-10-CM

## 2020-03-04 PROBLEM — D48.5 NEOPLASM OF UNCERTAIN BEHAVIOR OF SKIN: Status: ACTIVE | Noted: 2020-03-04

## 2020-03-04 PROBLEM — D22.61 MELANOCYTIC NEVI OF RIGHT UPPER LIMB, INCLUDING SHOULDER: Status: ACTIVE | Noted: 2020-03-04

## 2020-03-04 PROBLEM — D22.62 MELANOCYTIC NEVI OF LEFT UPPER LIMB, INCLUDING SHOULDER: Status: ACTIVE | Noted: 2020-03-04

## 2020-03-04 PROBLEM — D18.01 HEMANGIOMA OF SKIN AND SUBCUTANEOUS TISSUE: Status: ACTIVE | Noted: 2020-03-04

## 2020-03-04 PROBLEM — D22.5 MELANOCYTIC NEVI OF TRUNK: Status: ACTIVE | Noted: 2020-03-04

## 2020-03-04 PROBLEM — Z85.820 PERSONAL HISTORY OF MALIGNANT MELANOMA OF SKIN: Status: ACTIVE | Noted: 2020-03-04

## 2020-03-04 LAB — PSA SERPL-MCNC: 2.11 NG/ML (ref 0–4)

## 2020-03-04 PROCEDURE — ? COUNSELING

## 2020-03-04 PROCEDURE — ? BIOPSY BY SHAVE METHOD

## 2020-03-04 PROCEDURE — ? ADDITIONAL NOTES

## 2020-03-04 PROCEDURE — 73502 X-RAY EXAM HIP UNI 2-3 VIEWS: CPT | Mod: RT

## 2020-03-04 PROCEDURE — 99213 OFFICE O/P EST LOW 20 MIN: CPT | Performed by: INTERNAL MEDICINE

## 2020-03-04 PROCEDURE — ? LIQUID NITROGEN

## 2020-03-04 PROCEDURE — ? PHOTODYNAMIC THERAPY COUNSELING

## 2020-03-04 PROCEDURE — 17000 DESTRUCT PREMALG LESION: CPT | Mod: 59

## 2020-03-04 PROCEDURE — 11102 TANGNTL BX SKIN SINGLE LES: CPT

## 2020-03-04 PROCEDURE — 99214 OFFICE O/P EST MOD 30 MIN: CPT | Mod: 25

## 2020-03-04 PROCEDURE — 36415 COLL VENOUS BLD VENIPUNCTURE: CPT

## 2020-03-04 PROCEDURE — 17003 DESTRUCT PREMALG LES 2-14: CPT

## 2020-03-04 PROCEDURE — ? SUNSCREEN RECOMMENDATIONS

## 2020-03-04 PROCEDURE — 84153 ASSAY OF PSA TOTAL: CPT

## 2020-03-04 ASSESSMENT — LOCATION DETAILED DESCRIPTION DERM
LOCATION DETAILED: LEFT SUPERIOR LATERAL FOREHEAD
LOCATION DETAILED: RIGHT RADIAL DORSAL HAND
LOCATION DETAILED: RIGHT VENTRAL DISTAL FOREARM
LOCATION DETAILED: RIGHT SUPERIOR POSTERIOR NECK
LOCATION DETAILED: LEFT CENTRAL FRONTAL SCALP
LOCATION DETAILED: RIGHT SUPERIOR HELIX
LOCATION DETAILED: LEFT PROXIMAL DORSAL FOREARM
LOCATION DETAILED: LEFT INFERIOR CENTRAL MALAR CHEEK
LOCATION DETAILED: RIGHT INFERIOR OCCIPITAL SCALP
LOCATION DETAILED: RIGHT MEDIAL UPPER BACK
LOCATION DETAILED: LEFT CENTRAL LATERAL NECK
LOCATION DETAILED: LEFT SUPERIOR MEDIAL MALAR CHEEK
LOCATION DETAILED: INFERIOR THORACIC SPINE
LOCATION DETAILED: LEFT MEDIAL MALAR CHEEK
LOCATION DETAILED: RIGHT INFERIOR MEDIAL FOREHEAD
LOCATION DETAILED: RIGHT SUPERIOR LATERAL FOREHEAD
LOCATION DETAILED: LEFT RADIAL DORSAL HAND
LOCATION DETAILED: SUPERIOR THORACIC SPINE
LOCATION DETAILED: RIGHT PROXIMAL DORSAL FOREARM
LOCATION DETAILED: RIGHT INFERIOR UPPER BACK
LOCATION DETAILED: LEFT CENTRAL PARIETAL SCALP
LOCATION DETAILED: LEFT VENTRAL PROXIMAL FOREARM

## 2020-03-04 ASSESSMENT — LOCATION SIMPLE DESCRIPTION DERM
LOCATION SIMPLE: RIGHT FOREHEAD
LOCATION SIMPLE: POSTERIOR SCALP
LOCATION SIMPLE: LEFT HAND
LOCATION SIMPLE: RIGHT FOREARM
LOCATION SIMPLE: POSTERIOR NECK
LOCATION SIMPLE: LEFT FOREARM
LOCATION SIMPLE: LEFT CHEEK
LOCATION SIMPLE: NECK
LOCATION SIMPLE: LEFT FOREHEAD
LOCATION SIMPLE: RIGHT EAR
LOCATION SIMPLE: RIGHT HAND
LOCATION SIMPLE: SCALP
LOCATION SIMPLE: UPPER BACK
LOCATION SIMPLE: RIGHT UPPER BACK

## 2020-03-04 ASSESSMENT — LOCATION ZONE DERM
LOCATION ZONE: HAND
LOCATION ZONE: EAR
LOCATION ZONE: SCALP
LOCATION ZONE: ARM
LOCATION ZONE: FACE
LOCATION ZONE: TRUNK
LOCATION ZONE: NECK

## 2020-03-04 ASSESSMENT — FIBROSIS 4 INDEX: FIB4 SCORE: 1.4

## 2020-03-04 NOTE — PROCEDURE: LIQUID NITROGEN
Render Post-Care Instructions In Note?: yes
Number Of Freeze-Thaw Cycles: 2 freeze-thaw cycles
Consent: The patient's consent was obtained including but not limited to risks of crusting, scabbing, blistering, scarring, darker or lighter pigmentary change, recurrence, incomplete removal and infection. RTC in 2 months if lesion(s) persistent.
Render Note In Bullet Format When Appropriate: No
Post-Care Instructions: I reviewed with the patient in detail post-care instructions. Patient is to wear sunprotection, and avoid picking at any of the treated lesions. Pt may apply Vaseline to crusted or scabbing areas.
Detail Level: Detailed
Duration Of Freeze Thaw-Cycle (Seconds): 10

## 2020-03-04 NOTE — HPI: UPPER BODY SKIN CHECK
How Severe Are Your Spot(S)?: mild
What Is The Reason For Today's Visit?: Upper Body Skin Exam
Additional History: Patient declines full body melanoma check.

## 2020-03-04 NOTE — PROCEDURE: COUNSELING
When Should The Patient Follow-Up For Their Next Full-Body Skin Exam?: 3 Months
Detail Level: Detailed
Quality 137: Melanoma: Continuity Of Care - Recall System: Patient information entered into a recall system that includes: target date for the next exam specified AND a process to follow up with patients regarding missed or unscheduled appointments
Detail Level: Zone

## 2020-03-04 NOTE — PROGRESS NOTES
CC: Hip pain                                                                                                                                      HPI:   Shamar presents today with the following.    1. Hip pain, acute, right  Presents after running into a counter on the right anterior portion of his hip.  Had a large bruise maintain on blood thinners.  He reports the pain is not completely resolved.  Denies any falls directly on it.  He reports pain on the lateral aspect of his hip slightly lower when laying it at night he needs to rollover.  Reports it does hurt to walk on occasion.  Pain is 4 out of 10 in intensity.    2. Screening PSA (prostate specific antigen)        Patient Active Problem List    Diagnosis Date Noted   • Anticoagulated 02/23/2018     Priority: High   • Stented coronary artery 11/16/2017     Priority: High   • Ischemic cardiomyopathy 09/03/2017     Priority: High   • Coronary artery disease involving native coronary artery of native heart without angina pectoris 04/16/2012     Priority: High   • Dyslipidemia 04/16/2012     Priority: Medium   • Essential hypertension 04/16/2012     Priority: Medium   • Circulating anticoagulants (HCC) 02/19/2020   • Trigger ring finger of left hand 07/17/2018   • Paroxysmal atrial fibrillation (HCC) 09/05/2017   • Non morbid obesity due to excess calories 01/19/2017   • RUPAL (obstructive sleep apnea) 01/19/2017   • IGT (impaired glucose tolerance) 07/08/2016       Current Outpatient Medications   Medication Sig Dispense Refill   • atorvastatin (LIPITOR) 20 MG Tab Take 1 Tab by mouth every day. 100 Tab 3   • amiodarone (PACERONE) 100 MG tablet TAKE 1 TABLET BY MOUTH EVERY DAY 90 Tab 3   • losartan (COZAAR) 50 MG Tab TAKE 1 TABLET BY MOUTH ONCE DAILY 100 Tab 3   • XARELTO 10 MG Tab tablet TAKE 1 TABLET BY MOUTH ONCE DAILY WITH  DINNER 90 Tab 3   • vitamin D (CHOLECALCIFEROL) 1000 UNIT TABS Take 1,000 Units by mouth every day.       No current  "facility-administered medications for this visit.          Allergies as of 03/04/2020 - Reviewed 03/04/2020   Allergen Reaction Noted   • Penicillins Swelling 07/31/2008   • Sulfa drugs Swelling 07/31/2008   • Brilinta [ticagrelor] Rash and Itching 07/09/2018   • Lipidil [fenofibrate]  07/29/2011        ROS: Denies Chest pain, SOB, LE edema.    /66 (BP Location: Right arm, Patient Position: Sitting)   Pulse 68   Temp 36.4 °C (97.5 °F)   Ht 1.702 m (5' 7\")   Wt 95.7 kg (211 lb)   SpO2 95%   BMI 33.05 kg/m²     Physical Exam:  Gen:         Alert and oriented, No apparent distress.  Neck:        No Lymphadenopathy or Bruits.  Lungs:     Clear to auscultation bilaterally  CV:          Regular rate and rhythm. No murmurs, rubs or gallops.               Ext:          No clubbing, cyanosis, edema.      Assessment and Plan.   74 y.o. male with the following issues.    1. Hip pain, acute, right  Descriptors of bursitis however pushing on the lateral hip there is no obvious pain with palpation.  Have written for x-rays referred to sports medicine to discuss injections.  - DX-HIP-COMPLETE - UNILATERAL 2+ RIGHT  - REFERRAL TO SPORTS MEDICINE    2. Screening PSA (prostate specific antigen)    - PROSTATE SPECIFIC AG SCREENING; Future      "

## 2020-04-23 DIAGNOSIS — I48.0 PAF (PAROXYSMAL ATRIAL FIBRILLATION) (HCC): ICD-10-CM

## 2020-04-24 DIAGNOSIS — I48.0 PAF (PAROXYSMAL ATRIAL FIBRILLATION) (HCC): ICD-10-CM

## 2020-06-05 ENCOUNTER — APPOINTMENT (RX ONLY)
Dept: URBAN - METROPOLITAN AREA CLINIC 20 | Facility: CLINIC | Age: 75
Setting detail: DERMATOLOGY
End: 2020-06-05

## 2020-06-05 DIAGNOSIS — L57.0 ACTINIC KERATOSIS: ICD-10-CM

## 2020-06-05 DIAGNOSIS — Z86.006 PERSONAL HISTORY OF MELANOMA IN-SITU: ICD-10-CM

## 2020-06-05 DIAGNOSIS — L82.1 OTHER SEBORRHEIC KERATOSIS: ICD-10-CM

## 2020-06-05 DIAGNOSIS — D18.0 HEMANGIOMA: ICD-10-CM

## 2020-06-05 DIAGNOSIS — Z71.89 OTHER SPECIFIED COUNSELING: ICD-10-CM

## 2020-06-05 DIAGNOSIS — L81.4 OTHER MELANIN HYPERPIGMENTATION: ICD-10-CM

## 2020-06-05 DIAGNOSIS — D22 MELANOCYTIC NEVI: ICD-10-CM

## 2020-06-05 PROBLEM — D22.5 MELANOCYTIC NEVI OF TRUNK: Status: ACTIVE | Noted: 2020-06-05

## 2020-06-05 PROBLEM — Z85.820 PERSONAL HISTORY OF MALIGNANT MELANOMA OF SKIN: Status: ACTIVE | Noted: 2020-06-05

## 2020-06-05 PROBLEM — D22.62 MELANOCYTIC NEVI OF LEFT UPPER LIMB, INCLUDING SHOULDER: Status: ACTIVE | Noted: 2020-06-05

## 2020-06-05 PROBLEM — D22.61 MELANOCYTIC NEVI OF RIGHT UPPER LIMB, INCLUDING SHOULDER: Status: ACTIVE | Noted: 2020-06-05

## 2020-06-05 PROBLEM — D18.01 HEMANGIOMA OF SKIN AND SUBCUTANEOUS TISSUE: Status: ACTIVE | Noted: 2020-06-05

## 2020-06-05 PROCEDURE — ? COUNSELING

## 2020-06-05 PROCEDURE — ? ADDITIONAL NOTES

## 2020-06-05 PROCEDURE — ? SUNSCREEN RECOMMENDATIONS

## 2020-06-05 PROCEDURE — 17000 DESTRUCT PREMALG LESION: CPT

## 2020-06-05 PROCEDURE — 17003 DESTRUCT PREMALG LES 2-14: CPT

## 2020-06-05 PROCEDURE — 99214 OFFICE O/P EST MOD 30 MIN: CPT | Mod: 25

## 2020-06-05 PROCEDURE — ? LIQUID NITROGEN

## 2020-06-05 ASSESSMENT — LOCATION ZONE DERM
LOCATION ZONE: ARM
LOCATION ZONE: TRUNK
LOCATION ZONE: SCALP
LOCATION ZONE: HAND
LOCATION ZONE: FACE

## 2020-06-05 ASSESSMENT — LOCATION SIMPLE DESCRIPTION DERM
LOCATION SIMPLE: LEFT FOREARM
LOCATION SIMPLE: UPPER BACK
LOCATION SIMPLE: SCALP
LOCATION SIMPLE: LEFT CHEEK
LOCATION SIMPLE: RIGHT HAND
LOCATION SIMPLE: RIGHT CHEEK
LOCATION SIMPLE: RIGHT FOREHEAD
LOCATION SIMPLE: LEFT HAND
LOCATION SIMPLE: RIGHT TEMPLE
LOCATION SIMPLE: RIGHT UPPER BACK
LOCATION SIMPLE: RIGHT FOREARM

## 2020-06-05 ASSESSMENT — LOCATION DETAILED DESCRIPTION DERM
LOCATION DETAILED: LEFT PROXIMAL DORSAL FOREARM
LOCATION DETAILED: RIGHT VENTRAL DISTAL FOREARM
LOCATION DETAILED: LEFT RADIAL DORSAL HAND
LOCATION DETAILED: RIGHT MEDIAL UPPER BACK
LOCATION DETAILED: RIGHT INFERIOR FOREHEAD
LOCATION DETAILED: INFERIOR THORACIC SPINE
LOCATION DETAILED: LEFT CENTRAL PARIETAL SCALP
LOCATION DETAILED: RIGHT SUPERIOR CENTRAL MALAR CHEEK
LOCATION DETAILED: RIGHT INFERIOR UPPER BACK
LOCATION DETAILED: RIGHT INFERIOR MEDIAL FOREHEAD
LOCATION DETAILED: RIGHT PROXIMAL DORSAL FOREARM
LOCATION DETAILED: RIGHT LATERAL TEMPLE
LOCATION DETAILED: SUPERIOR THORACIC SPINE
LOCATION DETAILED: RIGHT RADIAL DORSAL HAND
LOCATION DETAILED: LEFT VENTRAL PROXIMAL FOREARM
LOCATION DETAILED: LEFT LATERAL BUCCAL CHEEK

## 2020-06-05 NOTE — PROCEDURE: COUNSELING
Quality 137: Melanoma: Continuity Of Care - Recall System: Patient information entered into a recall system that includes: target date for the next exam specified AND a process to follow up with patients regarding missed or unscheduled appointments
When Should The Patient Follow-Up For Their Next Full-Body Skin Exam?: 3 Months
Detail Level: Detailed
Detail Level: Zone

## 2020-06-05 NOTE — PROCEDURE: LIQUID NITROGEN
Render Post-Care Instructions In Note?: yes
Number Of Freeze-Thaw Cycles: 2 freeze-thaw cycles
Render Note In Bullet Format When Appropriate: No
Detail Level: Detailed
Post-Care Instructions: I reviewed with the patient in detail post-care instructions. Patient is to wear sunprotection, and avoid picking at any of the treated lesions. Pt may apply Vaseline to crusted or scabbing areas.
Consent: The patient's consent was obtained including but not limited to risks of crusting, scabbing, blistering, scarring, darker or lighter pigmentary change, recurrence, incomplete removal and infection. RTC in 2 months if lesion(s) persistent.
Duration Of Freeze Thaw-Cycle (Seconds): 10

## 2020-06-19 ENCOUNTER — OFFICE VISIT (OUTPATIENT)
Dept: CARDIOLOGY | Facility: MEDICAL CENTER | Age: 75
End: 2020-06-19
Payer: MEDICARE

## 2020-06-19 VITALS
BODY MASS INDEX: 32.96 KG/M2 | OXYGEN SATURATION: 96 % | HEART RATE: 88 BPM | WEIGHT: 210 LBS | SYSTOLIC BLOOD PRESSURE: 124 MMHG | HEIGHT: 67 IN | DIASTOLIC BLOOD PRESSURE: 72 MMHG

## 2020-06-19 DIAGNOSIS — Z95.5 STENTED CORONARY ARTERY: ICD-10-CM

## 2020-06-19 DIAGNOSIS — I48.0 PAF (PAROXYSMAL ATRIAL FIBRILLATION) (HCC): ICD-10-CM

## 2020-06-19 DIAGNOSIS — I48.0 PAROXYSMAL ATRIAL FIBRILLATION (HCC): ICD-10-CM

## 2020-06-19 DIAGNOSIS — I25.10 CORONARY ARTERY DISEASE INVOLVING NATIVE CORONARY ARTERY OF NATIVE HEART WITHOUT ANGINA PECTORIS: ICD-10-CM

## 2020-06-19 DIAGNOSIS — Z79.01 ANTICOAGULATED: ICD-10-CM

## 2020-06-19 PROCEDURE — 99214 OFFICE O/P EST MOD 30 MIN: CPT | Performed by: INTERNAL MEDICINE

## 2020-06-19 RX ORDER — AMIODARONE HYDROCHLORIDE 200 MG/1
200 TABLET ORAL DAILY
Qty: 30 TAB | Refills: 6 | Status: ON HOLD | OUTPATIENT
Start: 2020-06-19 | End: 2020-11-17

## 2020-06-19 ASSESSMENT — ENCOUNTER SYMPTOMS
GASTROINTESTINAL NEGATIVE: 1
CONSTITUTIONAL NEGATIVE: 1
RESPIRATORY NEGATIVE: 1
BRUISES/BLEEDS EASILY: 1
PALPITATIONS: 0
MUSCULOSKELETAL NEGATIVE: 1
NEUROLOGICAL NEGATIVE: 1
CARDIOVASCULAR NEGATIVE: 1
DEPRESSION: 0

## 2020-06-19 ASSESSMENT — FIBROSIS 4 INDEX: FIB4 SCORE: 1.4

## 2020-06-19 NOTE — PROGRESS NOTES
Chief Complaint   Patient presents with   • Atrial Fibrillation       Subjective:   Shamar Morgan is a 74 y.o. male who presents today for follow-up of PAF, high risk medication use, anticoagulation and coronary disease.  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  He has had no angina.  Patient still has some minor bruising problems on low-dose Xarelto.  He has no sense of palpitations but by his BP cuff notes that he is in atrial fibrillation more often than he was previously.  No other health new issues since last visit    Past Medical History:   Diagnosis Date   • Arrhythmia 09/02/2017    Atrial Fib   • Blood clotting disorder (HCC) 09/2017    heart   • CAD (coronary artery disease) 4/16/2012   • Dental disorder     upper and lower dentures, full   • Hemorrhagic disorder (HCC)     on Plavix and Xarelto   • High cholesterol    • HTN (hypertension) 4/16/2012   • Hyperglycemia 4/16/2012   • Hyperlipemia 4/16/2012   • Hypertension    • Liver enzyme elevation     history of   • Myocardial infarct (HCC) 09/02/2017    Dr. Mcallister, cardiologist   • Obesity    • Sleep apnea     BIPAP   • Tachycardia 4/16/2012   • Tonsillitis      Past Surgical History:   Procedure Laterality Date   • TRIGGER FINGER RELEASE Left 7/17/2018    Procedure: TRIGGER FINGER RELEASE - RING;  Surgeon: Jose Miguel Kay M.D.;  Location: SURGERY [facility];  Service: Orthopedics   • MASS EXCISION ORTHO Left 7/17/2018    Procedure: MASS EXCISION ORTHO - DISTAL INTERPHALANGEAL JOINT NODULE EXCISION;  Surgeon: Jose Miguel Kay M.D.;  Location: SURGERY [facility];  Service: Orthopedics   • CARDIAC CATH, LEFT HEART  09/03/2017    successful thrombectomy  and CHELLE to ostial proximal RCA    • STENT PLACEMENT  09/03/2017    cardiac   • CARPAL TUNNEL RELEASE Right 1980's   • TRIGGER FINGER RELEASE Right 1980's     Family History   Problem Relation Age of Onset   • Diabetes Mother     • Heart Disease Mother    • Kidney Disease Mother    • Diabetes Sister      Social History     Socioeconomic History   • Marital status:      Spouse name: Not on file   • Number of children: Not on file   • Years of education: Not on file   • Highest education level: Not on file   Occupational History   • Not on file   Social Needs   • Financial resource strain: Not on file   • Food insecurity     Worry: Not on file     Inability: Not on file   • Transportation needs     Medical: Not on file     Non-medical: Not on file   Tobacco Use   • Smoking status: Former Smoker     Packs/day: 3.00     Years: 16.00     Pack years: 48.00     Types: Cigarettes     Last attempt to quit: 1980     Years since quittin.4   • Smokeless tobacco: Never Used   Substance and Sexual Activity   • Alcohol use: Yes     Alcohol/week: 0.0 oz     Comment: 3 per week   • Drug use: No   • Sexual activity: Yes     Partners: Female   Lifestyle   • Physical activity     Days per week: Not on file     Minutes per session: Not on file   • Stress: Not on file   Relationships   • Social connections     Talks on phone: Not on file     Gets together: Not on file     Attends Yazidism service: Not on file     Active member of club or organization: Not on file     Attends meetings of clubs or organizations: Not on file     Relationship status: Not on file   • Intimate partner violence     Fear of current or ex partner: Not on file     Emotionally abused: Not on file     Physically abused: Not on file     Forced sexual activity: Not on file   Other Topics Concern   • Not on file   Social History Narrative   • Not on file     Allergies   Allergen Reactions   • Penicillins Swelling   • Sulfa Drugs Swelling   • Brilinta [Ticagrelor] Rash and Itching     .   • Lipidil [Fenofibrate]      Myalgia     Outpatient Encounter Medications as of 2020   Medication Sig Dispense Refill   • amiodarone (CORDARONE) 200 MG Tab Take 1 Tab by mouth every day.  "30 Tab 6   • rivaroxaban (XARELTO) 10 MG Tab tablet Take 1 Tab by mouth with dinner. 90 Tab 1   • atorvastatin (LIPITOR) 20 MG Tab Take 1 Tab by mouth every day. 100 Tab 3   • losartan (COZAAR) 50 MG Tab TAKE 1 TABLET BY MOUTH ONCE DAILY 100 Tab 3   • vitamin D (CHOLECALCIFEROL) 1000 UNIT TABS Take 1,000 Units by mouth every day.     • [DISCONTINUED] amiodarone (PACERONE) 100 MG tablet TAKE 1 TABLET BY MOUTH EVERY DAY 90 Tab 3     No facility-administered encounter medications on file as of 6/19/2020.      Review of Systems   Constitutional: Negative.    HENT: Negative.    Respiratory: Negative.    Cardiovascular: Negative.  Negative for chest pain and palpitations.   Gastrointestinal: Negative.    Musculoskeletal: Negative.    Skin: Negative.    Neurological: Negative.    Endo/Heme/Allergies: Bruises/bleeds easily.   Psychiatric/Behavioral: Negative for depression.        Objective:   /72 (BP Location: Left arm, Patient Position: Sitting, BP Cuff Size: Adult)   Pulse 88   Ht 1.702 m (5' 7\")   Wt 95.3 kg (210 lb)   SpO2 96%   BMI 32.89 kg/m²     Physical Exam   Constitutional: He is oriented to person, place, and time. He appears well-nourished. No distress.   HENT:   Head: Normocephalic and atraumatic.   Eyes: Pupils are equal, round, and reactive to light. No scleral icterus.   Neck: No JVD present. No tracheal deviation present.   Cardiovascular: An irregularly irregular rhythm present. Tachycardia present.   No murmur heard.  Pulmonary/Chest: Breath sounds normal. No respiratory distress. He has no wheezes.   Abdominal: Soft. Bowel sounds are normal. He exhibits no distension. There is no abdominal tenderness.   Obese abdomen   Musculoskeletal:         General: No edema.   Neurological: He is alert and oriented to person, place, and time.   Skin: Skin is warm and dry.   Psychiatric: He has a normal mood and affect.       Assessment:     1. Paroxysmal atrial fibrillation (HCC)  ZIO PATCH MONITOR   2. " Stented coronary artery     3. Anticoagulated     4. Coronary artery disease involving native coronary artery of native heart without angina pectoris     5. PAF (paroxysmal atrial fibrillation) ([facility])         Medical Decision Making:  Today's Assessment / Status / Plan:   PAF: Patient is in atrial fibrillation today.  Is asymptomatic and the only way he knows is by checking his blood pressure cuff.  He states he has had more frequent episodes of atrial fibrillation.  His amiodarone will be increased to 200 mg a day and a 2-week monitor will be placed to determine his AF burden.    Coronary disease status post stenting of ostial RCA at the time of his MI in September, 2017.  No angina.  Plan stress test in the next 6 to 12 months.    History of hyperlipidemia treated with statins.    Anticoagulation: Still has some bruising even on low-dose Xarelto.    He will be notified of the results of the 2-week heart monitor and further recommendations will be made based on that.

## 2020-07-10 ENCOUNTER — TELEPHONE (OUTPATIENT)
Dept: CARDIOLOGY | Facility: MEDICAL CENTER | Age: 75
End: 2020-07-10

## 2020-07-10 ENCOUNTER — NON-PROVIDER VISIT (OUTPATIENT)
Dept: CARDIOLOGY | Facility: MEDICAL CENTER | Age: 75
End: 2020-07-10
Attending: INTERNAL MEDICINE
Payer: MEDICARE

## 2020-07-10 DIAGNOSIS — R00.1 SINUS BRADYCARDIA: ICD-10-CM

## 2020-07-10 DIAGNOSIS — I48.0 PAROXYSMAL ATRIAL FIBRILLATION (HCC): ICD-10-CM

## 2020-07-29 RX ORDER — LOSARTAN POTASSIUM 50 MG/1
TABLET ORAL
Qty: 100 TAB | Refills: 0 | Status: SHIPPED | OUTPATIENT
Start: 2020-07-29 | End: 2020-09-23 | Stop reason: SDUPTHER

## 2020-07-31 PROCEDURE — 0296T PR EXT ECG > 48HR TO 21 DAY RCRD W/CONECT INTL RCRD: CPT | Performed by: INTERNAL MEDICINE

## 2020-07-31 PROCEDURE — 0298T PR EXT ECG > 48HR TO 21 DAY REVIEW AND INTERPRETATN: CPT | Performed by: INTERNAL MEDICINE

## 2020-08-04 ENCOUNTER — TELEPHONE (OUTPATIENT)
Dept: CARDIOLOGY | Facility: MEDICAL CENTER | Age: 75
End: 2020-08-04

## 2020-08-04 DIAGNOSIS — I48.0 PAROXYSMAL ATRIAL FIBRILLATION (HCC): ICD-10-CM

## 2020-08-04 NOTE — TELEPHONE ENCOUNTER
----- Message from Rodrigue Mcallister M.D. sent at 8/3/2020  4:48 PM PDT -----  Regarding: ZIO patch results.  Monitoring revealed that he was in atrial fibrillation 19% of the time.  At times he had heart rates as high as 135 bpm.  He also had periods of sinus bradycardia with heart rate of 43/min.  He has evidence of having a sick sinus syndrome and should be referred to EP for consultation for evaluation of pacemaker.

## 2020-08-10 ENCOUNTER — TELEPHONE (OUTPATIENT)
Dept: CARDIOLOGY | Facility: MEDICAL CENTER | Age: 75
End: 2020-08-10

## 2020-08-10 ENCOUNTER — OFFICE VISIT (OUTPATIENT)
Dept: CARDIOLOGY | Facility: MEDICAL CENTER | Age: 75
End: 2020-08-10
Payer: MEDICARE

## 2020-08-10 VITALS
BODY MASS INDEX: 33.43 KG/M2 | HEART RATE: 63 BPM | SYSTOLIC BLOOD PRESSURE: 130 MMHG | OXYGEN SATURATION: 95 % | WEIGHT: 213 LBS | DIASTOLIC BLOOD PRESSURE: 70 MMHG | HEIGHT: 67 IN

## 2020-08-10 DIAGNOSIS — I48.0 PAF (PAROXYSMAL ATRIAL FIBRILLATION) (HCC): ICD-10-CM

## 2020-08-10 LAB — EKG IMPRESSION: NORMAL

## 2020-08-10 PROCEDURE — 93000 ELECTROCARDIOGRAM COMPLETE: CPT | Performed by: INTERNAL MEDICINE

## 2020-08-10 PROCEDURE — 99215 OFFICE O/P EST HI 40 MIN: CPT | Performed by: INTERNAL MEDICINE

## 2020-08-10 ASSESSMENT — FIBROSIS 4 INDEX: FIB4 SCORE: 1.42

## 2020-08-10 NOTE — TELEPHONE ENCOUNTER
Patient to call me if he decides to schedule an afib ablation w/Dr. Voss. No medications to hold.

## 2020-08-27 NOTE — TELEPHONE ENCOUNTER
Patient scheduled for afib ablation on 11-17-20 at [facility] with Dr. Voss.

## 2020-09-23 ENCOUNTER — OFFICE VISIT (OUTPATIENT)
Dept: MEDICAL GROUP | Facility: MEDICAL CENTER | Age: 75
End: 2020-09-23
Payer: MEDICARE

## 2020-09-23 VITALS
SYSTOLIC BLOOD PRESSURE: 124 MMHG | OXYGEN SATURATION: 96 % | WEIGHT: 205 LBS | TEMPERATURE: 97 F | BODY MASS INDEX: 32.18 KG/M2 | HEART RATE: 52 BPM | HEIGHT: 67 IN | DIASTOLIC BLOOD PRESSURE: 60 MMHG

## 2020-09-23 DIAGNOSIS — Z00.00 MEDICARE ANNUAL WELLNESS VISIT, SUBSEQUENT: ICD-10-CM

## 2020-09-23 DIAGNOSIS — F51.01 PRIMARY INSOMNIA: ICD-10-CM

## 2020-09-23 DIAGNOSIS — G47.33 OSA (OBSTRUCTIVE SLEEP APNEA): ICD-10-CM

## 2020-09-23 DIAGNOSIS — I10 ESSENTIAL HYPERTENSION: ICD-10-CM

## 2020-09-23 DIAGNOSIS — I48.0 PAROXYSMAL ATRIAL FIBRILLATION (HCC): ICD-10-CM

## 2020-09-23 DIAGNOSIS — R73.02 IGT (IMPAIRED GLUCOSE TOLERANCE): ICD-10-CM

## 2020-09-23 DIAGNOSIS — R53.83 FATIGUE, UNSPECIFIED TYPE: ICD-10-CM

## 2020-09-23 DIAGNOSIS — I25.10 CORONARY ARTERY DISEASE INVOLVING NATIVE CORONARY ARTERY OF NATIVE HEART WITHOUT ANGINA PECTORIS: ICD-10-CM

## 2020-09-23 DIAGNOSIS — E78.5 DYSLIPIDEMIA: ICD-10-CM

## 2020-09-23 PROBLEM — D68.318 CIRCULATING ANTICOAGULANTS (HCC): Status: RESOLVED | Noted: 2020-02-19 | Resolved: 2020-09-23

## 2020-09-23 PROCEDURE — G0439 PPPS, SUBSEQ VISIT: HCPCS | Performed by: INTERNAL MEDICINE

## 2020-09-23 PROCEDURE — 99214 OFFICE O/P EST MOD 30 MIN: CPT | Mod: 25 | Performed by: INTERNAL MEDICINE

## 2020-09-23 RX ORDER — MIRTAZAPINE 7.5 MG/1
7.5 TABLET, FILM COATED ORAL
Qty: 90 TAB | Refills: 3 | Status: SHIPPED | OUTPATIENT
Start: 2020-09-23 | End: 2020-11-03

## 2020-09-23 RX ORDER — LOSARTAN POTASSIUM 50 MG/1
50 TABLET ORAL DAILY
Qty: 100 TAB | Refills: 3 | Status: SHIPPED | OUTPATIENT
Start: 2020-09-23 | End: 2021-09-02 | Stop reason: SDUPTHER

## 2020-09-23 ASSESSMENT — ENCOUNTER SYMPTOMS: GENERAL WELL-BEING: GOOD

## 2020-09-23 ASSESSMENT — PATIENT HEALTH QUESTIONNAIRE - PHQ9: CLINICAL INTERPRETATION OF PHQ2 SCORE: 0

## 2020-09-23 ASSESSMENT — FIBROSIS 4 INDEX: FIB4 SCORE: 1.42

## 2020-09-23 ASSESSMENT — ACTIVITIES OF DAILY LIVING (ADL): BATHING_REQUIRES_ASSISTANCE: 0

## 2020-09-23 NOTE — PROGRESS NOTES
CC: Follow-up hypertension, blood sugars and dyslipidemia complaining of insomnia due for wellness examination.    HPI:   Shamar presents today with the following.      1. Medicare annual wellness visit, subsequent  Screenings performed below advance directives already on file    2. Primary insomnia  Presents reporting that he goes to bed at 10:00 at night wakes up at 330 every day cannot go back to sleep.  He is compliant with his BiPAP.  He does feel well during the day but reports when he wakes up he often goes over projects in his head and cannot get back to sleep.  He does not take naps and overall sleep hygiene sounds fair.    3. IGT (impaired glucose tolerance)  Dated blood sugars in the past weight has gone down slightly he is never had an abnormal A1c.    4. Dyslipidemia  On statin without myalgia.    5. Essential hypertension  Well-controlled on current regimen.          Information for advance directives given to patient or instructed to bring in advance directives into to office to put in chart.      Depression Screening    Little interest or pleasure in doing things?  0 - not at all  Feeling down, depressed , or hopeless? 0 - not at all  Patient Health Questionnaire Score: 0     If depressive symptoms identified deferred to follow up visit unless specifically addressed in assessment and plan.    Interpretation of PHQ-9 Total Score   Score Severity   1-4 No Depression   5-9 Mild Depression   10-14 Moderate Depression   15-19 Moderately Severe Depression   20-27 Severe Depression    Screening for Cognitive Impairment    Three Minute Recall (river, nation, finger) 3/3    Sravan clock face with all 12 numbers and set the hands to show 10 past 11.  Yes 5/5  Cognitive concerns identified deferred for follow up unless specifically addressed in assessment and plan.    Fall Risk Assessment    Has the patient had two or more falls in the last year or any fall with injury in the last year?  No    Safety  Assessment    Throw rugs on floor.  Yes  Handrails on all stairs.  Yes  Good lighting in all hallways.  Yes  Difficulty hearing.  No  Patient counseled about all safety risks that were identified.    Functional Assessment ADLs    Are there any barriers preventing you from cooking for yourself or meeting nutritional needs?  No.    Are there any barriers preventing you from driving safely or obtaining transportation?  No.    Are there any barriers preventing you from using a telephone or calling for help?  No.    Are there any barriers preventing you from shopping?  No.    Are there any barriers preventing you from taking care of your own finances?  No.    Are there any barriers preventing you from managing your medications?  No.    Are there any barriers preventing you from showering, bathing or dressing yourself?  No.    Are you currently engaging in any exercise or physical activity?  No.     What is your perception of your health?  Good.      Health Maintenance Summary                Annual Wellness Visit Overdue 8/13/2020      Done 8/13/2019 Visit Dx: Medicare annual wellness visit, subsequent     Patient has more history with this topic...    IMM INFLUENZA Overdue 9/1/2020      Done 11/11/2019 Imm Admin: Influenza, Unspecified - HISTORICAL DATA     Patient has more history with this topic...    IMM ZOSTER VACCINES Postponed 1/11/2037 Originally 8/5/2013. Insurance/Financial     Done 6/10/2013 Imm Admin: Zoster Vaccine Live (ZVL) (Zostavax) - HISTORICAL DATA    IMM DTaP/Tdap/Td Vaccine Next Due 9/29/2027      Done 9/29/2017 Imm Admin: Tdap Vaccine     Patient has more history with this topic...          Patient Care Team:  Aldo Lemus M.D. as PCP - General (Internal Medicine)  Prosper Alcala M.D. as Consulting Physician (Gastroenterology)  Julianna Escobedo M.D. as Consulting Physician (Pulmonary Medicine)  Rodrigue Mcallister M.D. as Consulting Physician (Interventional Cardiology)  [facility] (DME  Supplier)  Jeanette Mirza as              Health Care Screening: Age-appropriate preventive services that Medicare covers were discussed today and ordered as indicated and agreed upon by the patient, and as recommended by USPTF and ACIP.     Patient Active Problem List    Diagnosis Date Noted   • Anticoagulated 02/23/2018     Priority: High   • Stented coronary artery 11/16/2017     Priority: High   • Ischemic cardiomyopathy 09/03/2017     Priority: High   • Coronary artery disease involving native coronary artery of native heart without angina pectoris 04/16/2012     Priority: High   • Dyslipidemia 04/16/2012     Priority: Medium   • Essential hypertension 04/16/2012     Priority: Medium   • Trigger ring finger of left hand 07/17/2018   • Paroxysmal atrial fibrillation (HCC) 09/05/2017   • Non morbid obesity due to excess calories 01/19/2017   • RUPAL (obstructive sleep apnea) 01/19/2017   • IGT (impaired glucose tolerance) 07/08/2016       Current Outpatient Medications   Medication Sig Dispense Refill   • losartan (COZAAR) 50 MG Tab Take 1 Tab by mouth every day. 100 Tab 3   • mirtazapine (REMERON) 7.5 MG tablet Take 1 Tab by mouth every bedtime. 90 Tab 3   • atorvastatin (LIPITOR) 20 MG Tab Take 1 tablet by mouth once daily 100 Tab 2   • amiodarone (CORDARONE) 200 MG Tab Take 1 Tab by mouth every day. 30 Tab 6   • rivaroxaban (XARELTO) 10 MG Tab tablet Take 1 Tab by mouth with dinner. 90 Tab 1   • vitamin D (CHOLECALCIFEROL) 1000 UNIT TABS Take 1,000 Units by mouth every day.       No current facility-administered medications for this visit.        Family History   Problem Relation Age of Onset   • Diabetes Mother    • Heart Disease Mother    • Kidney Disease Mother    • Diabetes Sister        Social History     Socioeconomic History   • Marital status:      Spouse name: Not on file   • Number of children: Not on file   • Years of education: Not on file   • Highest  education level: Not on file   Occupational History   • Not on file   Social Needs   • Financial resource strain: Not on file   • Food insecurity     Worry: Not on file     Inability: Not on file   • Transportation needs     Medical: Not on file     Non-medical: Not on file   Tobacco Use   • Smoking status: Former Smoker     Packs/day: 3.00     Years: 16.00     Pack years: 48.00     Types: Cigarettes     Quit date: 1980     Years since quittin.7   • Smokeless tobacco: Never Used   Substance and Sexual Activity   • Alcohol use: Yes     Alcohol/week: 0.0 oz     Comment: 3 per week   • Drug use: No   • Sexual activity: Yes     Partners: Female   Lifestyle   • Physical activity     Days per week: Not on file     Minutes per session: Not on file   • Stress: Not on file   Relationships   • Social connections     Talks on phone: Not on file     Gets together: Not on file     Attends Hoahaoism service: Not on file     Active member of club or organization: Not on file     Attends meetings of clubs or organizations: Not on file     Relationship status: Not on file   • Intimate partner violence     Fear of current or ex partner: Not on file     Emotionally abused: Not on file     Physically abused: Not on file     Forced sexual activity: Not on file   Other Topics Concern   • Not on file   Social History Narrative   • Not on file       Past Surgical History:   Procedure Laterality Date   • TRIGGER FINGER RELEASE Left 2018    Procedure: TRIGGER FINGER RELEASE - RING;  Surgeon: Jose Miguel Kay M.D.;  Location: [facility];  Service: Orthopedics   • MASS EXCISION ORTHO Left 2018    Procedure: MASS EXCISION ORTHO - DISTAL INTERPHALANGEAL JOINT NODULE EXCISION;  Surgeon: Jose Miguel Kay M.D.;  Location: [facility];  Service: Orthopedics   • CARDIAC CATH, LEFT HEART  2017    successful thrombectomy  and CHELLE to ostial proximal RCA    • STENT PLACEMENT  2017    cardiac   •  "CARPAL TUNNEL RELEASE Right 1980's   • TRIGGER FINGER RELEASE Right 1980's       Allergies as of 09/23/2020 - Reviewed 09/23/2020   Allergen Reaction Noted   • Penicillins Swelling 07/31/2008   • Sulfa drugs Swelling 07/31/2008   • Brilinta [ticagrelor] Rash and Itching 07/09/2018   • Lipidil [fenofibrate]  07/29/2011        ROS: Denies Chest pain, SOB, LE edema.    /60 (BP Location: Left arm, Patient Position: Sitting)   Pulse (!) 52   Temp 36.1 °C (97 °F)   Ht 1.702 m (5' 7\")   Wt 93 kg (205 lb)   SpO2 96%   BMI 32.11 kg/m²     Physical Exam:  Gen:         Alert and oriented, No apparent distress.  Neck:        No Lymphadenopathy or Bruits.  Lungs:     Clear to auscultation bilaterally  CV:          Regular rate and rhythm. No murmurs, rubs or gallops.  Abd:         Soft non tender, non distended. Normal active bowel sounds.  No  Hepatosplenomegaly, No pulsatile masses.                   Ext:          No clubbing, cyanosis, edema.      Assessment and Plan.   75 y.o. male with the following issues.    1. Medicare annual wellness visit, subsequent  Discussed healthy lifestyle habits as well as screening regimens.  Discussion about safe lifestyle practices, seatbelts, sunscreen, dietary recommendations.  - Subsequent Annual Wellness Visit - Includes PPPS ()    2. Primary insomnia  Have sent for TSH given that he is on amiodarone and could possibly lead to abnormal thyroid which could affect sleep.  Have started on Remeron 7.5 mg titrate to effect caution about side effects.  - mirtazapine (REMERON) 7.5 MG tablet; Take 1 Tab by mouth every bedtime.  Dispense: 90 Tab; Refill: 3    3. IGT (impaired glucose tolerance)  Clinically stable encouraged continued weight loss.    4. Dyslipidemia  Cholesterol well controlled as of last check.    5. Essential hypertension  Currently well controlled, Discuss diet, exercise and salt restriction.  No change to medication therapy.    6. Coronary artery disease " involving native coronary artery of native heart without angina pectoris  Able follow with cardiology  - Subsequent Annual Wellness Visit - Includes PPPS ()    7. Paroxysmal atrial fibrillation (HCC)  Planning for upcoming ablation.  - Subsequent Annual Wellness Visit - Includes PPPS ()    8. RUPAL (obstructive sleep apnea)  Pliant with therapy  - Subsequent Annual Wellness Visit - Includes PPPS ()    9. Fatigue, unspecified type    - TSH; Future        Referrals offered: Community-based lifestyle interventions to reduce health risks and promote self-management and wellness, fall prevention, nutrition, physical activity, tobacco-use cessation, weight loss, and mental health services as per orders if indicated.    Discussion today about general wellness and lifestyle habits:    · Prevent falls and reduce trip hazards; Cautioned about securing or removing rugs.  · Have a working fire alarm and carbon monoxide detector;   · Engage in regular physical activity and social activities

## 2020-10-06 ENCOUNTER — APPOINTMENT (RX ONLY)
Dept: URBAN - METROPOLITAN AREA CLINIC 20 | Facility: CLINIC | Age: 75
Setting detail: DERMATOLOGY
End: 2020-10-06

## 2020-10-06 DIAGNOSIS — L71.8 OTHER ROSACEA: ICD-10-CM

## 2020-10-06 DIAGNOSIS — L57.0 ACTINIC KERATOSIS: ICD-10-CM

## 2020-10-06 DIAGNOSIS — Z86.006 PERSONAL HISTORY OF MELANOMA IN-SITU: ICD-10-CM

## 2020-10-06 DIAGNOSIS — D22 MELANOCYTIC NEVI: ICD-10-CM

## 2020-10-06 DIAGNOSIS — L82.1 OTHER SEBORRHEIC KERATOSIS: ICD-10-CM

## 2020-10-06 DIAGNOSIS — L81.4 OTHER MELANIN HYPERPIGMENTATION: ICD-10-CM

## 2020-10-06 DIAGNOSIS — D18.0 HEMANGIOMA: ICD-10-CM

## 2020-10-06 DIAGNOSIS — L72.0 EPIDERMAL CYST: ICD-10-CM

## 2020-10-06 PROBLEM — Z85.820 PERSONAL HISTORY OF MALIGNANT MELANOMA OF SKIN: Status: ACTIVE | Noted: 2020-10-06

## 2020-10-06 PROBLEM — D22.5 MELANOCYTIC NEVI OF TRUNK: Status: ACTIVE | Noted: 2020-10-06

## 2020-10-06 PROBLEM — D22.71 MELANOCYTIC NEVI OF RIGHT LOWER LIMB, INCLUDING HIP: Status: ACTIVE | Noted: 2020-10-06

## 2020-10-06 PROBLEM — D18.01 HEMANGIOMA OF SKIN AND SUBCUTANEOUS TISSUE: Status: ACTIVE | Noted: 2020-10-06

## 2020-10-06 PROBLEM — D22.62 MELANOCYTIC NEVI OF LEFT UPPER LIMB, INCLUDING SHOULDER: Status: ACTIVE | Noted: 2020-10-06

## 2020-10-06 PROBLEM — D22.61 MELANOCYTIC NEVI OF RIGHT UPPER LIMB, INCLUDING SHOULDER: Status: ACTIVE | Noted: 2020-10-06

## 2020-10-06 PROBLEM — D22.72 MELANOCYTIC NEVI OF LEFT LOWER LIMB, INCLUDING HIP: Status: ACTIVE | Noted: 2020-10-06

## 2020-10-06 PROCEDURE — ? MEDICATION COUNSELING

## 2020-10-06 PROCEDURE — ? SUNSCREEN RECOMMENDATIONS

## 2020-10-06 PROCEDURE — ? LIQUID NITROGEN

## 2020-10-06 PROCEDURE — 99214 OFFICE O/P EST MOD 30 MIN: CPT | Mod: 25

## 2020-10-06 PROCEDURE — 17000 DESTRUCT PREMALG LESION: CPT

## 2020-10-06 PROCEDURE — ? ADDITIONAL NOTES

## 2020-10-06 PROCEDURE — ? PRESCRIPTION

## 2020-10-06 PROCEDURE — ? COUNSELING

## 2020-10-06 RX ORDER — FLUOROURACIL 5 MG/G
CREAM TOPICAL
Qty: 1 | Refills: 0 | Status: ERX

## 2020-10-06 RX ORDER — METRONIDAZOLE 10 MG/G
GEL TOPICAL
Qty: 1 | Refills: 10 | Status: ERX | COMMUNITY
Start: 2020-10-06

## 2020-10-06 RX ADMIN — METRONIDAZOLE 1: 10 GEL TOPICAL at 00:00

## 2020-10-06 ASSESSMENT — LOCATION DETAILED DESCRIPTION DERM
LOCATION DETAILED: RIGHT SUPERIOR UPPER BACK
LOCATION DETAILED: RIGHT SUPERIOR MEDIAL UPPER BACK
LOCATION DETAILED: RIGHT DISTAL POSTERIOR THIGH
LOCATION DETAILED: LEFT MEDIAL FRONTAL SCALP
LOCATION DETAILED: LEFT DISTAL POSTERIOR THIGH
LOCATION DETAILED: RIGHT ULNAR DORSAL HAND
LOCATION DETAILED: LEFT ANTERIOR PROXIMAL UPPER ARM
LOCATION DETAILED: LEFT INFERIOR CENTRAL MALAR CHEEK
LOCATION DETAILED: LEFT SUPERIOR MEDIAL UPPER BACK
LOCATION DETAILED: RIGHT ANTERIOR PROXIMAL UPPER ARM
LOCATION DETAILED: EPIGASTRIC SKIN
LOCATION DETAILED: NASAL DORSUM
LOCATION DETAILED: RIGHT MEDIAL SUPERIOR CHEST
LOCATION DETAILED: LEFT CENTRAL MALAR CHEEK
LOCATION DETAILED: LEFT ULNAR DORSAL HAND
LOCATION DETAILED: RIGHT INFERIOR CENTRAL MALAR CHEEK
LOCATION DETAILED: LEFT CENTRAL PARIETAL SCALP
LOCATION DETAILED: LEFT MEDIAL TEMPLE
LOCATION DETAILED: LEFT INFERIOR MEDIAL FOREHEAD
LOCATION DETAILED: RIGHT CENTRAL MALAR CHEEK

## 2020-10-06 ASSESSMENT — LOCATION SIMPLE DESCRIPTION DERM
LOCATION SIMPLE: SCALP
LOCATION SIMPLE: NOSE
LOCATION SIMPLE: RIGHT CHEEK
LOCATION SIMPLE: LEFT UPPER BACK
LOCATION SIMPLE: LEFT SCALP
LOCATION SIMPLE: LEFT TEMPLE
LOCATION SIMPLE: RIGHT UPPER ARM
LOCATION SIMPLE: ABDOMEN
LOCATION SIMPLE: LEFT POSTERIOR THIGH
LOCATION SIMPLE: RIGHT UPPER BACK
LOCATION SIMPLE: RIGHT HAND
LOCATION SIMPLE: LEFT HAND
LOCATION SIMPLE: LEFT CHEEK
LOCATION SIMPLE: RIGHT POSTERIOR THIGH
LOCATION SIMPLE: LEFT FOREHEAD
LOCATION SIMPLE: CHEST
LOCATION SIMPLE: LEFT UPPER ARM

## 2020-10-06 ASSESSMENT — LOCATION ZONE DERM
LOCATION ZONE: TRUNK
LOCATION ZONE: LEG
LOCATION ZONE: HAND
LOCATION ZONE: SCALP
LOCATION ZONE: NOSE
LOCATION ZONE: ARM
LOCATION ZONE: FACE

## 2020-10-06 NOTE — PROCEDURE: MEDICATION COUNSELING
Prednisone Pregnancy And Lactation Text: This medication is Pregnancy Category C and it isn't know if it is safe during pregnancy. This medication is excreted in breast milk.
Solaraze Pregnancy And Lactation Text: This medication is Pregnancy Category B and is considered safe. There is some data to suggest avoiding during the third trimester. It is unknown if this medication is excreted in breast milk.
Tranexamic Acid Counseling:  Patient advised of the small risk of bleeding problems with tranexamic acid. They were also instructed to call if they developed any nausea, vomiting or diarrhea. All of the patient's questions and concerns were addressed.
Nsaids Counseling: NSAID Counseling: I discussed with the patient that NSAIDs should be taken with food. Prolonged use of NSAIDs can result in the development of stomach ulcers.  Patient advised to stop taking NSAIDs if abdominal pain occurs.  The patient verbalized understanding of the proper use and possible adverse effects of NSAIDs.  All of the patient's questions and concerns were addressed.
Enbrel Pregnancy And Lactation Text: This medication is Pregnancy Category B and is considered safe during pregnancy. It is unknown if this medication is excreted in breast milk.
Doxepin Pregnancy And Lactation Text: This medication is Pregnancy Category C and it isn't known if it is safe during pregnancy. It is also excreted in breast milk and breast feeding isn't recommended.
Taltz Pregnancy And Lactation Text: The risk during pregnancy and breastfeeding is uncertain with this medication.
Doxepin Counseling:  Patient advised that the medication is sedating and not to drive a car after taking this medication. Patient informed of potential adverse effects including but not limited to dry mouth, urinary retention, and blurry vision.  The patient verbalized understanding of the proper use and possible adverse effects of doxepin.  All of the patient's questions and concerns were addressed.
Elidel Counseling: Patient may experience a mild burning sensation during topical application. Elidel is not approved in children less than 2 years of age. There have been case reports of hematologic and skin malignancies in patients using topical calcineurin inhibitors although causality is questionable.
Niacinamide Pregnancy And Lactation Text: These medications are considered safe during pregnancy.
Arava Pregnancy And Lactation Text: This medication is Pregnancy Category X and is absolutely contraindicated during pregnancy. It is unknown if it is excreted in breast milk.
Sarecycline Counseling: Patient advised regarding possible photosensitivity and discoloration of the teeth, skin, lips, tongue and gums.  Patient instructed to avoid sunlight, if possible.  When exposed to sunlight, patients should wear protective clothing, sunglasses, and sunscreen.  The patient was instructed to call the office immediately if the following severe adverse effects occur:  hearing changes, easy bruising/bleeding, severe headache, or vision changes.  The patient verbalized understanding of the proper use and possible adverse effects of sarecycline.  All of the patient's questions and concerns were addressed.
Bactrim Counseling:  I discussed with the patient the risks of sulfa antibiotics including but not limited to GI upset, allergic reaction, drug rash, diarrhea, dizziness, photosensitivity, and yeast infections.  Rarely, more serious reactions can occur including but not limited to aplastic anemia, agranulocytosis, methemoglobinemia, blood dyscrasias, liver or kidney failure, lung infiltrates or desquamative/blistering drug rashes.
Hydroxyzine Counseling: Patient advised that the medication is sedating and not to drive a car after taking this medication.  Patient informed of potential adverse effects including but not limited to dry mouth, urinary retention, and blurry vision.  The patient verbalized understanding of the proper use and possible adverse effects of hydroxyzine.  All of the patient's questions and concerns were addressed.
Topical Retinoid counseling:  Patient advised to apply a pea-sized amount only at bedtime and wait 30 minutes after washing their face before applying.  If too drying, patient may add a non-comedogenic moisturizer. The patient verbalized understanding of the proper use and possible adverse effects of retinoids.  All of the patient's questions and concerns were addressed.
Nsaids Pregnancy And Lactation Text: These medications are considered safe up to 30 weeks gestation. It is excreted in breast milk.
Bactrim Pregnancy And Lactation Text: This medication is Pregnancy Category D and is known to cause fetal risk.  It is also excreted in breast milk.
Eucrisa Counseling: Patient may experience a mild burning sensation during topical application. Eucrisa is not approved in children less than 2 years of age.
Sarecycline Pregnancy And Lactation Text: This medication is Pregnancy Category D and not consider safe during pregnancy. It is also excreted in breast milk.
Clofazimine Counseling:  I discussed with the patient the risks of clofazimine including but not limited to skin and eye pigmentation, liver damage, nausea/vomiting, gastrointestinal bleeding and allergy.
Elidel Pregnancy And Lactation Text: This medication is Pregnancy Category C. It is unknown if this medication is excreted in breast milk.
Humira Counseling:  I discussed with the patient the risks of adalimumab including but not limited to myelosuppression, immunosuppression, autoimmune hepatitis, demyelinating diseases, lymphoma, and serious infections.  The patient understands that monitoring is required including a PPD at baseline and must alert us or the primary physician if symptoms of infection or other concerning signs are noted.
Tranexamic Acid Pregnancy And Lactation Text: It is unknown if this medication is safe during pregnancy or breast feeding.
Tremfya Counseling: I discussed with the patient the risks of guselkumab including but not limited to immunosuppression, serious infections, worsening of inflammatory bowel disease and drug reactions.  The patient understands that monitoring is required including a PPD at baseline and must alert us or the primary physician if symptoms of infection or other concerning signs are noted.
Xeljanz Counseling: I discussed with the patient the risks of Xeljanz therapy including increased risk of infection, liver issues, headache, diarrhea, or cold symptoms. Live vaccines should be avoided. They were instructed to call if they have any problems.
Odomzo Counseling- I discussed with the patient the risks of Odomzo including but not limited to nausea, vomiting, diarrhea, constipation, weight loss, changes in the sense of taste, decreased appetite, muscle spasms, and hair loss.  The patient verbalized understanding of the proper use and possible adverse effects of Odomzo.  All of the patient's questions and concerns were addressed.
Hydroxyzine Pregnancy And Lactation Text: This medication is not safe during pregnancy and should not be taken. It is also excreted in breast milk and breast feeding isn't recommended.
Eucrisa Pregnancy And Lactation Text: This medication has not been assigned a Pregnancy Risk Category but animal studies failed to show danger with the topical medication. It is unknown if the medication is excreted in breast milk.
Isotretinoin Pregnancy And Lactation Text: This medication is Pregnancy Category X and is considered extremely dangerous during pregnancy. It is unknown if it is excreted in breast milk.
Valtrex Counseling: I discussed with the patient the risks of valacyclovir including but not limited to kidney damage, nausea, vomiting and severe allergy.  The patient understands that if the infection seems to be worsening or is not improving, they are to call.
Clofazimine Pregnancy And Lactation Text: This medication is Pregnancy Category C and isn't considered safe during pregnancy. It is excreted in breast milk.
Tetracycline Counseling: Patient counseled regarding possible photosensitivity and increased risk for sunburn.  Patient instructed to avoid sunlight, if possible.  When exposed to sunlight, patients should wear protective clothing, sunglasses, and sunscreen.  The patient was instructed to call the office immediately if the following severe adverse effects occur:  hearing changes, easy bruising/bleeding, severe headache, or vision changes.  The patient verbalized understanding of the proper use and possible adverse effects of tetracycline.  All of the patient's questions and concerns were addressed. Patient understands to avoid pregnancy while on therapy due to potential birth defects.
Acitretin Counseling:  I discussed with the patient the risks of acitretin including but not limited to hair loss, dry lips/skin/eyes, liver damage, hyperlipidemia, depression/suicidal ideation, photosensitivity.  Serious rare side effects can include but are not limited to pancreatitis, pseudotumor cerebri, bony changes, clot formation/stroke/heart attack.  Patient understands that alcohol is contraindicated since it can result in liver toxicity and significantly prolong the elimination of the drug by many years.
Isotretinoin Counseling: Patient should get monthly blood tests, not donate blood, not drive at night if vision affected, not share medication, and not undergo elective surgery for 6 months after tx completed. Side effects reviewed, pt to contact office should one occur.
Cephalexin Counseling: I counseled the patient regarding use of cephalexin as an antibiotic for prophylactic and/or therapeutic purposes. Cephalexin (commonly prescribed under brand name Keflex) is a cephalosporin antibiotic which is active against numerous classes of bacteria, including most skin bacteria. Side effects may include nausea, diarrhea, gastrointestinal upset, rash, hives, yeast infections, and in rare cases, hepatitis, kidney disease, seizures, fever, confusion, neurologic symptoms, and others. Patients with severe allergies to penicillin medications are cautioned that there is about a 10% incidence of cross-reactivity with cephalosporins. When possible, patients with penicillin allergies should use alternatives to cephalosporins for antibiotic therapy.
Tazorac Counseling:  Patient advised that medication is irritating and drying.  Patient may need to apply sparingly and wash off after an hour before eventually leaving it on overnight.  The patient verbalized understanding of the proper use and possible adverse effects of tazorac.  All of the patient's questions and concerns were addressed.
Xeltomz Pregnancy And Lactation Text: This medication is Pregnancy Category D and is not considered safe during pregnancy.  The risk during breast feeding is also uncertain.
High Dose Vitamin A Counseling: Side effects reviewed, pt to contact office should one occur.
Acitretin Pregnancy And Lactation Text: This medication is Pregnancy Category X and should not be given to women who are pregnant or may become pregnant in the future. This medication is excreted in breast milk.
Cephalexin Pregnancy And Lactation Text: This medication is Pregnancy Category B and considered safe during pregnancy.  It is also excreted in breast milk but can be used safely for shorter doses.
Ilumya Counseling: I discussed with the patient the risks of tildrakizumab including but not limited to immunosuppression, malignancy, posterior leukoencephalopathy syndrome, and serious infections.  The patient understands that monitoring is required including a PPD at baseline and must alert us or the primary physician if symptoms of infection or other concerning signs are noted.
Valtrex Pregnancy And Lactation Text: this medication is Pregnancy Category B and is considered safe during pregnancy. This medication is not directly found in breast milk but it's metabolite acyclovir is present.
Hydroquinone Counseling:  Patient advised that medication may result in skin irritation, lightening (hypopigmentation), dryness, and burning.  In the event of skin irritation, the patient was advised to reduce the amount of the drug applied or use it less frequently.  Rarely, spots that are treated with hydroquinone can become darker (pseudoochronosis).  Should this occur, patient instructed to stop medication and call the office. The patient verbalized understanding of the proper use and possible adverse effects of hydroquinone.  All of the patient's questions and concerns were addressed.
Colchicine Counseling:  Patient counseled regarding adverse effects including but not limited to stomach upset (nausea, vomiting, stomach pain, or diarrhea).  Patient instructed to limit alcohol consumption while taking this medication.  Colchicine may reduce blood counts especially with prolonged use.  The patient understands that monitoring of kidney function and blood counts may be required, especially at baseline. The patient verbalized understanding of the proper use and possible adverse effects of colchicine.  All of the patient's questions and concerns were addressed.
High Dose Vitamin A Pregnancy And Lactation Text: High dose vitamin A therapy is contraindicated during pregnancy and breast feeding.
Fluconazole Counseling:  Patient counseled regarding adverse effects of fluconazole including but not limited to headache, diarrhea, nausea, upset stomach, liver function test abnormalities, taste disturbance, and stomach pain.  There is a rare possibility of liver failure that can occur when taking fluconazole.  The patient understands that monitoring of LFTs and kidney function test may be required, especially at baseline. The patient verbalized understanding of the proper use and possible adverse effects of fluconazole.  All of the patient's questions and concerns were addressed.
Otezla Counseling: The side effects of Otezla were discussed with the patient, including but not limited to worsening or new depression, weight loss, diarrhea, nausea, upper respiratory tract infection, and headache. Patient instructed to call the office should any adverse effect occur.  The patient verbalized understanding of the proper use and possible adverse effects of Otezla.  All the patient's questions and concerns were addressed.
Dapsone Counseling: I discussed with the patient the risks of dapsone including but not limited to hemolytic anemia, agranulocytosis, rashes, methemoglobinemia, kidney failure, peripheral neuropathy, headaches, GI upset, and liver toxicity.  Patients who start dapsone require monitoring including baseline LFTs and weekly CBCs for the first month, then every month thereafter.  The patient verbalized understanding of the proper use and possible adverse effects of dapsone.  All of the patient's questions and concerns were addressed.
Clindamycin Pregnancy And Lactation Text: This medication can be used in pregnancy if certain situations. Clindamycin is also present in breast milk.
Infliximab Counseling:  I discussed with the patient the risks of infliximab including but not limited to myelosuppression, immunosuppression, autoimmune hepatitis, demyelinating diseases, lymphoma, and serious infections.  The patient understands that monitoring is required including a PPD at baseline and must alert us or the primary physician if symptoms of infection or other concerning signs are noted.
Albendazole Counseling:  I discussed with the patient the risks of albendazole including but not limited to cytopenia, kidney damage, nausea/vomiting and severe allergy.  The patient understands that this medication is being used in an off-label manner.
Xolair Counseling:  Patient informed of potential adverse effects including but not limited to fever, muscle aches, rash and allergic reactions.  The patient verbalized understanding of the proper use and possible adverse effects of Xolair.  All of the patient's questions and concerns were addressed.
Clindamycin Counseling: I counseled the patient regarding use of clindamycin as an antibiotic for prophylactic and/or therapeutic purposes. Clindamycin is active against numerous classes of bacteria, including skin bacteria. Side effects may include nausea, diarrhea, gastrointestinal upset, rash, hives, yeast infections, and in rare cases, colitis.
Bexarotene Counseling:  I discussed with the patient the risks of bexarotene including but not limited to hair loss, dry lips/skin/eyes, liver abnormalities, hyperlipidemia, pancreatitis, depression/suicidal ideation, photosensitivity, drug rash/allergic reactions, hypothyroidism, anemia, leukopenia, infection, cataracts, and teratogenicity.  Patient understands that they will need regular blood tests to check lipid profile, liver function tests, white blood cell count, thyroid function tests and pregnancy test if applicable.
Tazorac Pregnancy And Lactation Text: This medication is not safe during pregnancy. It is unknown if this medication is excreted in breast milk.
Use Enhanced Medication Counseling?: No
Imiquimod Counseling:  I discussed with the patient the risks of imiquimod including but not limited to erythema, scaling, itching, weeping, crusting, and pain.  Patient understands that the inflammatory response to imiquimod is variable from person to person and was educated regarded proper titration schedule.  If flu-like symptoms develop, patient knows to discontinue the medication and contact us.
Doxycycline Counseling:  Patient counseled regarding possible photosensitivity and increased risk for sunburn.  Patient instructed to avoid sunlight, if possible.  When exposed to sunlight, patients should wear protective clothing, sunglasses, and sunscreen.  The patient was instructed to call the office immediately if the following severe adverse effects occur:  hearing changes, easy bruising/bleeding, severe headache, or vision changes.  The patient verbalized understanding of the proper use and possible adverse effects of doxycycline.  All of the patient's questions and concerns were addressed.
Dapsone Pregnancy And Lactation Text: This medication is Pregnancy Category C and is not considered safe during pregnancy or breast feeding.
Xolair Pregnancy And Lactation Text: This medication is Pregnancy Category B and is considered safe during pregnancy. This medication is excreted in breast milk.
Bexarotene Pregnancy And Lactation Text: This medication is Pregnancy Category X and should not be given to women who are pregnant or may become pregnant. This medication should not be used if you are breast feeding.
Topical Clindamycin Counseling: Patient counseled that this medication may cause skin irritation or allergic reactions.  In the event of skin irritation, the patient was advised to reduce the amount of the drug applied or use it less frequently.   The patient verbalized understanding of the proper use and possible adverse effects of clindamycin.  All of the patient's questions and concerns were addressed.
Albendazole Pregnancy And Lactation Text: This medication is Pregnancy Category C and it isn't known if it is safe during pregnancy. It is also excreted in breast milk.
Otezla Pregnancy And Lactation Text: This medication is Pregnancy Category C and it isn't known if it is safe during pregnancy. It is unknown if it is excreted in breast milk.
Fluconazole Pregnancy And Lactation Text: This medication is Pregnancy Category C and it isn't know if it is safe during pregnancy. It is also excreted in breast milk.
Rituxan Counseling:  I discussed with the patient the risks of Rituxan infusions. Side effects can include infusion reactions, severe drug rashes including mucocutaneous reactions, reactivation of latent hepatitis and other infections and rarely progressive multifocal leukoencephalopathy.  All of the patient's questions and concerns were addressed.
Erivedge Counseling- I discussed with the patient the risks of Erivedge including but not limited to nausea, vomiting, diarrhea, constipation, weight loss, changes in the sense of taste, decreased appetite, muscle spasms, and hair loss.  The patient verbalized understanding of the proper use and possible adverse effects of Erivedge.  All of the patient's questions and concerns were addressed.
Topical Sulfur Applications Counseling: Topical Sulfur Counseling: Patient counseled that this medication may cause skin irritation or allergic reactions.  In the event of skin irritation, the patient was advised to reduce the amount of the drug applied or use it less frequently.   The patient verbalized understanding of the proper use and possible adverse effects of topical sulfur application.  All of the patient's questions and concerns were addressed.
Griseofulvin Counseling:  I discussed with the patient the risks of griseofulvin including but not limited to photosensitivity, cytopenia, liver damage, nausea/vomiting and severe allergy.  The patient understands that this medication is best absorbed when taken with a fatty meal (e.g., ice cream or french fries).
Topical Clindamycin Pregnancy And Lactation Text: This medication is Pregnancy Category B and is considered safe during pregnancy. It is unknown if it is excreted in breast milk.
Doxycycline Pregnancy And Lactation Text: This medication is Pregnancy Category D and not consider safe during pregnancy. It is also excreted in breast milk but is considered safe for shorter treatment courses.
Oxybutynin Counseling:  I discussed with the patient the risks of oxybutynin including but not limited to skin rash, drowsiness, dry mouth, difficulty urinating, and blurred vision.
Ivermectin Counseling:  Patient instructed to take medication on an empty stomach with a full glass of water.  Patient informed of potential adverse effects including but not limited to nausea, diarrhea, dizziness, itching, and swelling of the extremities or lymph nodes.  The patient verbalized understanding of the proper use and possible adverse effects of ivermectin.  All of the patient's questions and concerns were addressed.
Topical Sulfur Applications Pregnancy And Lactation Text: This medication is Pregnancy Category C and has an unknown safety profile during pregnancy. It is unknown if this topical medication is excreted in breast milk.
Benzoyl Peroxide Counseling: Patient counseled that medicine may cause skin irritation and bleach clothing.  In the event of skin irritation, the patient was advised to reduce the amount of the drug applied or use it less frequently.   The patient verbalized understanding of the proper use and possible adverse effects of benzoyl peroxide.  All of the patient's questions and concerns were addressed.
Propranolol Counseling:  I discussed with the patient the risks of propranolol including but not limited to low heart rate, low blood pressure, low blood sugar, restlessness and increased cold sensitivity. They should call the office if they experience any of these side effects.
Rituxan Pregnancy And Lactation Text: This medication is Pregnancy Category C and it isn't know if it is safe during pregnancy. It is unknown if this medication is excreted in breast milk but similar antibodies are known to be excreted.
Azathioprine Counseling:  I discussed with the patient the risks of azathioprine including but not limited to myelosuppression, immunosuppression, hepatotoxicity, lymphoma, and infections.  The patient understands that monitoring is required including baseline LFTs, Creatinine, possible TPMP genotyping and weekly CBCs for the first month and then every 2 weeks thereafter.  The patient verbalized understanding of the proper use and possible adverse effects of azathioprine.  All of the patient's questions and concerns were addressed.
Minoxidil Counseling: Minoxidil is a topical medication which can increase blood flow where it is applied. It is uncertain how this medication increases hair growth. Side effects are uncommon and include stinging and allergic reactions.
Griseofulvin Pregnancy And Lactation Text: This medication is Pregnancy Category X and is known to cause serious birth defects. It is unknown if this medication is excreted in breast milk but breast feeding should be avoided.
Erythromycin Counseling:  I discussed with the patient the risks of erythromycin including but not limited to GI upset, allergic reaction, drug rash, diarrhea, increase in liver enzymes, and yeast infections.
Propranolol Pregnancy And Lactation Text: This medication is Pregnancy Category C and it isn't known if it is safe during pregnancy. It is excreted in breast milk.
Wartpeel Counseling:  I discussed with the patient the risks of Wartpeel including but not limited to erythema, scaling, itching, weeping, crusting, and pain.
Cellcept Counseling:  I discussed with the patient the risks of mycophenolate mofetil including but not limited to infection/immunosuppression, GI upset, hypokalemia, hypercholesterolemia, bone marrow suppression, lymphoproliferative disorders, malignancy, GI ulceration/bleed/perforation, colitis, interstitial lung disease, kidney failure, progressive multifocal leukoencephalopathy, and birth defects.  The patient understands that monitoring is required including a baseline creatinine and regular CBC testing. In addition, patient must alert us immediately if symptoms of infection or other concerning signs are noted.
Mirvaso Counseling: Mirvaso is a topical medication which can decrease superficial blood flow where applied. Side effects are uncommon and include stinging, redness and allergic reactions.
Itraconazole Counseling:  I discussed with the patient the risks of itraconazole including but not limited to liver damage, nausea/vomiting, neuropathy, and severe allergy.  The patient understands that this medication is best absorbed when taken with acidic beverages such as non-diet cola or ginger ale.  The patient understands that monitoring is required including baseline LFTs and repeat LFTs at intervals.  The patient understands that they are to contact us or the primary physician if concerning signs are noted.
Erythromycin Pregnancy And Lactation Text: This medication is Pregnancy Category B and is considered safe during pregnancy. It is also excreted in breast milk.
Finasteride Male Counseling: Finasteride Counseling:  I discussed with the patient the risks of use of finasteride including but not limited to decreased libido, decreased ejaculate volume, gynecomastia, and depression. Women should not handle medication.  All of the patient's questions and concerns were addressed.
Siliq Counseling:  I discussed with the patient the risks of Siliq including but not limited to new or worsening depression, suicidal thoughts and behavior, immunosuppression, malignancy, posterior leukoencephalopathy syndrome, and serious infections.  The patient understands that monitoring is required including a PPD at baseline and must alert us or the primary physician if symptoms of infection or other concerning signs are noted. There is also a special program designed to monitor depression which is required with Siliq.
Benzoyl Peroxide Pregnancy And Lactation Text: This medication is Pregnancy Category C. It is unknown if benzoyl peroxide is excreted in breast milk.
Azathioprine Pregnancy And Lactation Text: This medication is Pregnancy Category D and isn't considered safe during pregnancy. It is unknown if this medication is excreted in breast milk.
Mirvaso Pregnancy And Lactation Text: This medication has not been assigned a Pregnancy Risk Category. It is unknown if the medication is excreted in breast milk.
Birth Control Pills Counseling: Birth Control Pill Counseling: I discussed with the patient the potential side effects of OCPs including but not limited to increased risk of stroke, heart attack, thrombophlebitis, deep venous thrombosis, hepatic adenomas, breast changes, GI upset, headaches, and depression.  The patient verbalized understanding of the proper use and possible adverse effects of OCPs. All of the patient's questions and concerns were addressed.
Gabapentin Counseling: I discussed with the patient the risks of gabapentin including but not limited to dizziness, somnolence, fatigue and ataxia.
Ketoconazole Counseling:   Patient counseled regarding improving absorption with orange juice.  Adverse effects include but are not limited to breast enlargement, headache, diarrhea, nausea, upset stomach, liver function test abnormalities, taste disturbance, and stomach pain.  There is a rare possibility of liver failure that can occur when taking ketoconazole. The patient understands that monitoring of LFTs may be required, especially at baseline. The patient verbalized understanding of the proper use and possible adverse effects of ketoconazole.  All of the patient's questions and concerns were addressed.
Carac Pregnancy And Lactation Text: This medication is Pregnancy Category X and contraindicated in pregnancy and in women who may become pregnant. It is unknown if this medication is excreted in breast milk.
Finasteride Pregnancy And Lactation Text: This medication is absolutely contraindicated during pregnancy. It is unknown if it is excreted in breast milk.
Carac Counseling:  I discussed with the patient the risks of Carac including but not limited to erythema, scaling, itching, weeping, crusting, and pain.
Metronidazole Counseling:  I discussed with the patient the risks of metronidazole including but not limited to seizures, nausea/vomiting, a metallic taste in the mouth, nausea/vomiting and severe allergy.
Detail Level: Zone
Ketoconazole Pregnancy And Lactation Text: This medication is Pregnancy Category C and it isn't know if it is safe during pregnancy. It is also excreted in breast milk and breast feeding isn't recommended.
Picato Counseling:  I discussed with the patient the risks of Picato including but not limited to erythema, scaling, itching, weeping, crusting, and pain.
Zyclara Counseling:  I discussed with the patient the risks of imiquimod including but not limited to erythema, scaling, itching, weeping, crusting, and pain.  Patient understands that the inflammatory response to imiquimod is variable from person to person and was educated regarded proper titration schedule.  If flu-like symptoms develop, patient knows to discontinue the medication and contact us.
Calcipotriene Counseling:  I discussed with the patient the risks of calcipotriene including but not limited to erythema, scaling, itching, and irritation.
Cyclophosphamide Counseling:  I discussed with the patient the risks of cyclophosphamide including but not limited to hair loss, hormonal abnormalities, decreased fertility, abdominal pain, diarrhea, nausea and vomiting, bone marrow suppression and infection. The patient understands that monitoring is required while taking this medication.
Metronidazole Pregnancy And Lactation Text: This medication is Pregnancy Category B and considered safe during pregnancy.  It is also excreted in breast milk.
Birth Control Pills Pregnancy And Lactation Text: This medication should be avoided if pregnant and for the first 30 days post-partum.
Simponi Counseling:  I discussed with the patient the risks of golimumab including but not limited to myelosuppression, immunosuppression, autoimmune hepatitis, demyelinating diseases, lymphoma, and serious infections.  The patient understands that monitoring is required including a PPD at baseline and must alert us or the primary physician if symptoms of infection or other concerning signs are noted.
Cimzia Counseling:  I discussed with the patient the risks of Cimzia including but not limited to immunosuppression, allergic reactions and infections.  The patient understands that monitoring is required including a PPD at baseline and must alert us or the primary physician if symptoms of infection or other concerning signs are noted.
Terbinafine Counseling: Patient counseling regarding adverse effects of terbinafine including but not limited to headache, diarrhea, rash, upset stomach, liver function test abnormalities, itching, taste/smell disturbance, nausea, abdominal pain, and flatulence.  There is a rare possibility of liver failure that can occur when taking terbinafine.  The patient understands that a baseline LFT and kidney function test may be required. The patient verbalized understanding of the proper use and possible adverse effects of terbinafine.  All of the patient's questions and concerns were addressed.
Spironolactone Counseling: Patient advised regarding risks of diarrhea, abdominal pain, hyperkalemia, birth defects (for female patients), liver toxicity and renal toxicity. The patient may need blood work to monitor liver and kidney function and potassium levels while on therapy. The patient verbalized understanding of the proper use and possible adverse effects of spironolactone.  All of the patient's questions and concerns were addressed.
Calcipotriene Pregnancy And Lactation Text: This medication has not been proven safe during pregnancy. It is unknown if this medication is excreted in breast milk.
Glycopyrrolate Counseling:  I discussed with the patient the risks of glycopyrrolate including but not limited to skin rash, drowsiness, dry mouth, difficulty urinating, and blurred vision.
Skyrizi Counseling: I discussed with the patient the risks of risankizumab-rzaa including but not limited to immunosuppression, and serious infections.  The patient understands that monitoring is required including a PPD at baseline and must alert us or the primary physician if symptoms of infection or other concerning signs are noted.
Cyclophosphamide Pregnancy And Lactation Text: This medication is Pregnancy Category D and it isn't considered safe during pregnancy. This medication is excreted in breast milk.
Minocycline Counseling: Patient advised regarding possible photosensitivity and discoloration of the teeth, skin, lips, tongue and gums.  Patient instructed to avoid sunlight, if possible.  When exposed to sunlight, patients should wear protective clothing, sunglasses, and sunscreen.  The patient was instructed to call the office immediately if the following severe adverse effects occur:  hearing changes, easy bruising/bleeding, severe headache, or vision changes.  The patient verbalized understanding of the proper use and possible adverse effects of minocycline.  All of the patient's questions and concerns were addressed.
Cimzia Pregnancy And Lactation Text: This medication crosses the placenta but can be considered safe in certain situations. Cimzia may be excreted in breast milk.
Protopic Counseling: Patient may experience a mild burning sensation during topical application. Protopic is not approved in children less than 2 years of age. There have been case reports of hematologic and skin malignancies in patients using topical calcineurin inhibitors although causality is questionable.
Quinolones Counseling:  I discussed with the patient the risks of fluoroquinolones including but not limited to GI upset, allergic reaction, drug rash, diarrhea, dizziness, photosensitivity, yeast infections, liver function test abnormalities, tendonitis/tendon rupture.
Cyclosporine Counseling:  I discussed with the patient the risks of cyclosporine including but not limited to hypertension, gingival hyperplasia,myelosuppression, immunosuppression, liver damage, kidney damage, neurotoxicity, lymphoma, and serious infections. The patient understands that monitoring is required including baseline blood pressure, CBC, CMP, lipid panel and uric acid, and then 1-2 times monthly CMP and blood pressure.
Glycopyrrolate Pregnancy And Lactation Text: This medication is Pregnancy Category B and is considered safe during pregnancy. It is unknown if it is excreted breast milk.
Cosentyx Counseling:  I discussed with the patient the risks of Cosentyx including but not limited to worsening of Crohn's disease, immunosuppression, allergic reactions and infections.  The patient understands that monitoring is required including a PPD at baseline and must alert us or the primary physician if symptoms of infection or other concerning signs are noted.
Spironolactone Pregnancy And Lactation Text: This medication can cause feminization of the male fetus and should be avoided during pregnancy. The active metabolite is also found in breast milk.
Terbinafine Pregnancy And Lactation Text: This medication is Pregnancy Category B and is considered safe during pregnancy. It is also excreted in breast milk and breast feeding isn't recommended.
5-Fu Counseling: 5-Fluorouracil Counseling:  I discussed with the patient the risks of 5-fluorouracil including but not limited to erythema, scaling, itching, weeping, crusting, and pain.
Stelara Counseling:  I discussed with the patient the risks of ustekinumab including but not limited to immunosuppression, malignancy, posterior leukoencephalopathy syndrome, and serious infections.  The patient understands that monitoring is required including a PPD at baseline and must alert us or the primary physician if symptoms of infection or other concerning signs are noted.
Hydroxychloroquine Counseling:  I discussed with the patient that a baseline ophthalmologic exam is needed at the start of therapy and every year thereafter while on therapy. A CBC may also be warranted for monitoring.  The side effects of this medication were discussed with the patient, including but not limited to agranulocytosis, aplastic anemia, seizures, rashes, retinopathy, and liver toxicity. Patient instructed to call the office should any adverse effect occur.  The patient verbalized understanding of the proper use and possible adverse effects of Plaquenil.  All the patient's questions and concerns were addressed.
Opioid Pregnancy And Lactation Text: These medications can lead to premature delivery and should be avoided during pregnancy. These medications are also present in breast milk in small amounts.
Opioid Counseling: I discussed with the patient the potential side effects of opioids including but not limited to addiction, altered mental status, and depression. I stressed avoiding alcohol, benzodiazepines, muscle relaxants and sleep aids unless specifically okayed by a physician. The patient verbalized understanding of the proper use and possible adverse effects of opioids. All of the patient's questions and concerns were addressed. They were instructed to flush the remaining pills down the toilet if they did not need them for pain.
SSKI Counseling:  I discussed with the patient the risks of SSKI including but not limited to thyroid abnormalities, metallic taste, GI upset, fever, headache, acne, arthralgias, paraesthesias, lymphadenopathy, easy bleeding, arrhythmias, and allergic reaction.
Protopic Pregnancy And Lactation Text: This medication is Pregnancy Category C. It is unknown if this medication is excreted in breast milk when applied topically.
Azithromycin Counseling:  I discussed with the patient the risks of azithromycin including but not limited to GI upset, allergic reaction, drug rash, diarrhea, and yeast infections.
Drysol Counseling:  I discussed with the patient the risks of drysol/aluminum chloride including but not limited to skin rash, itching, irritation, burning.
Dupixent Pregnancy And Lactation Text: This medication likely crosses the placenta but the risk for the fetus is uncertain. This medication is excreted in breast milk.
Thalidomide Counseling: I discussed with the patient the risks of thalidomide including but not limited to birth defects, anxiety, weakness, chest pain, dizziness, cough and severe allergy.
Methotrexate Counseling:  Patient counseled regarding adverse effects of methotrexate including but not limited to nausea, vomiting, abnormalities in liver function tests. Patients may develop mouth sores, rash, diarrhea, and abnormalities in blood counts. The patient understands that monitoring is required including LFT's and blood counts.  There is a rare possibility of scarring of the liver and lung problems that can occur when taking methotrexate. Persistent nausea, loss of appetite, pale stools, dark urine, cough, and shortness of breath should be reported immediately. Patient advised to discontinue methotrexate treatment at least three months before attempting to become pregnant.  I discussed the need for folate supplements while taking methotrexate.  These supplements can decrease side effects during methotrexate treatment. The patient verbalized understanding of the proper use and possible adverse effects of methotrexate.  All of the patient's questions and concerns were addressed.
Rhofade Counseling: Rhofade is a topical medication which can decrease superficial blood flow where applied. Side effects are uncommon and include stinging, redness and allergic reactions.
Sski Pregnancy And Lactation Text: This medication is Pregnancy Category D and isn't considered safe during pregnancy. It is excreted in breast milk.
Cimetidine Counseling:  I discussed with the patient the risks of Cimetidine including but not limited to gynecomastia, headache, diarrhea, nausea, drowsiness, arrhythmias, pancreatitis, skin rashes, psychosis, bone marrow suppression and kidney toxicity.
Hydroxychloroquine Pregnancy And Lactation Text: This medication has been shown to cause fetal harm but it isn't assigned a Pregnancy Risk Category. There are small amounts excreted in breast milk.
Dupixent Counseling: I discussed with the patient the risks of dupilumab including but not limited to eye infection and irritation, cold sores, injection site reactions, worsening of asthma, allergic reactions and increased risk of parasitic infection.  Live vaccines should be avoided while taking dupilumab. Dupilumab will also interact with certain medications such as warfarin and cyclosporine. The patient understands that monitoring is required and they must alert us or the primary physician if symptoms of infection or other concerning signs are noted.
Rifampin Counseling: I discussed with the patient the risks of rifampin including but not limited to liver damage, kidney damage, red-orange body fluids, nausea/vomiting and severe allergy.
Enbrel Counseling:  I discussed with the patient the risks of etanercept including but not limited to myelosuppression, immunosuppression, autoimmune hepatitis, demyelinating diseases, lymphoma, and infections.  The patient understands that monitoring is required including a PPD at baseline and must alert us or the primary physician if symptoms of infection or other concerning signs are noted.
Arava Counseling:  Patient counseled regarding adverse effects of Arava including but not limited to nausea, vomiting, abnormalities in liver function tests. Patients may develop mouth sores, rash, diarrhea, and abnormalities in blood counts. The patient understands that monitoring is required including LFTs and blood counts.  There is a rare possibility of scarring of the liver and lung problems that can occur when taking methotrexate. Persistent nausea, loss of appetite, pale stools, dark urine, cough, and shortness of breath should be reported immediately. Patient advised to discontinue Arava treatment and consult with a physician prior to attempting conception. The patient will have to undergo a treatment to eliminate Arava from the body prior to conception.
Prednisone Counseling:  I discussed with the patient the risks of prolonged use of prednisone including but not limited to weight gain, insomnia, osteoporosis, mood changes, diabetes, susceptibility to infection, glaucoma and high blood pressure.  In cases where prednisone use is prolonged, patients should be monitored with blood pressure checks, serum glucose levels and an eye exam.  Additionally, the patient may need to be placed on GI prophylaxis, PCP prophylaxis, and calcium and vitamin D supplementation and/or a bisphosphonate.  The patient verbalized understanding of the proper use and the possible adverse effects of prednisone.  All of the patient's questions and concerns were addressed.
Solaraze Counseling:  I discussed with the patient the risks of Solaraze including but not limited to erythema, scaling, itching, weeping, crusting, and pain.
Rifampin Pregnancy And Lactation Text: This medication is Pregnancy Category C and it isn't know if it is safe during pregnancy. It is also excreted in breast milk and should not be used if you are breast feeding.
Niacinamide Counseling: I recommended taking niacin or niacinamide, also know as vitamin B3, twice daily. Recent evidence suggests that taking vitamin B3 (500 mg twice daily) can reduce the risk of actinic keratoses and non-melanoma skin cancers. Side effects of vitamin B3 include flushing and headache.
Taltz Counseling: I discussed with the patient the risks of ixekizumab including but not limited to immunosuppression, serious infections, worsening of inflammatory bowel disease and drug reactions.  The patient understands that monitoring is required including a PPD at baseline and must alert us or the primary physician if symptoms of infection or other concerning signs are noted.
Drysol Pregnancy And Lactation Text: This medication is considered safe during pregnancy and breast feeding.
Azithromycin Pregnancy And Lactation Text: This medication is considered safe during pregnancy and is also secreted in breast milk.
Methotrexate Pregnancy And Lactation Text: This medication is Pregnancy Category X and is known to cause fetal harm. This medication is excreted in breast milk.

## 2020-10-06 NOTE — PROCEDURE: MIPS QUALITY
Quality 431: Preventive Care And Screening: Unhealthy Alcohol Use - Screening: Patient screened for unhealthy alcohol use using a single question and scores less than 2 times per year
Detail Level: Detailed
Quality 138: Melanoma: Coordination Of Care: A treatment plan was communicated to the physicians providing continuing care within one month of diagnosis outlining: diagnosis, tumor thickness and a plan for surgery or alternate care.
Quality 226: Preventive Care And Screening: Tobacco Use: Screening And Cessation Intervention: Patient screened for tobacco use and is an ex/non-smoker
Quality 137: Melanoma: Continuity Of Care - Recall System: Patient information entered into a recall system that includes: target date for the next exam specified AND a process to follow up with patients regarding missed or unscheduled appointments
Quality 111:Pneumonia Vaccination Status For Older Adults: Pneumococcal Vaccination Previously Received
Quality 130: Documentation Of Current Medications In The Medical Record: Current Medications Documented

## 2020-10-06 NOTE — PROCEDURE: LIQUID NITROGEN
Render Note In Bullet Format When Appropriate: No
Number Of Freeze-Thaw Cycles: 2 freeze-thaw cycles
Post-Care Instructions: I reviewed with the patient in detail post-care instructions. Patient is to wear sunprotection, and avoid picking at any of the treated lesions. Pt may apply Vaseline to crusted or scabbing areas.
Duration Of Freeze Thaw-Cycle (Seconds): 10
Detail Level: Detailed
Render Post-Care Instructions In Note?: yes
Consent: The patient's consent was obtained including but not limited to risks of crusting, scabbing, blistering, scarring, darker or lighter pigmentary change, recurrence, incomplete removal and infection. RTC in 2 months if lesion(s) persistent.

## 2020-10-14 ENCOUNTER — HOSPITAL ENCOUNTER (OUTPATIENT)
Dept: LAB | Facility: MEDICAL CENTER | Age: 75
End: 2020-10-14
Attending: INTERNAL MEDICINE
Payer: MEDICARE

## 2020-10-14 DIAGNOSIS — R53.83 FATIGUE, UNSPECIFIED TYPE: ICD-10-CM

## 2020-10-14 LAB — TSH SERPL DL<=0.005 MIU/L-ACNC: 0.49 UIU/ML (ref 0.38–5.33)

## 2020-10-14 PROCEDURE — 84443 ASSAY THYROID STIM HORMONE: CPT

## 2020-10-14 PROCEDURE — 36415 COLL VENOUS BLD VENIPUNCTURE: CPT

## 2020-10-28 SDOH — HEALTH STABILITY: PHYSICAL HEALTH

## 2020-10-28 SDOH — ECONOMIC STABILITY: HOUSING INSECURITY
IN THE LAST 12 MONTHS, WAS THERE A TIME WHEN YOU DID NOT HAVE A STEADY PLACE TO SLEEP OR SLEPT IN A SHELTER (INCLUDING NOW)?: NO

## 2020-10-28 SDOH — HEALTH STABILITY: MENTAL HEALTH
STRESS IS WHEN SOMEONE FEELS TENSE, NERVOUS, ANXIOUS, OR CAN'T SLEEP AT NIGHT BECAUSE THEIR MIND IS TROUBLED. HOW STRESSED ARE YOU?: TO SOME EXTENT

## 2020-10-28 SDOH — ECONOMIC STABILITY: HOUSING INSECURITY: IN THE LAST 12 MONTHS, HOW MANY PLACES HAVE YOU LIVED?: 1

## 2020-10-28 SDOH — ECONOMIC STABILITY: TRANSPORTATION INSECURITY
IN THE PAST 12 MONTHS, HAS LACK OF RELIABLE TRANSPORTATION KEPT YOU FROM MEDICAL APPOINTMENTS, MEETINGS, WORK OR FROM GETTING THINGS NEEDED FOR DAILY LIVING?: NO

## 2020-10-28 SDOH — ECONOMIC STABILITY: INCOME INSECURITY: IN THE LAST 12 MONTHS, WAS THERE A TIME WHEN YOU WERE NOT ABLE TO PAY THE MORTGAGE OR RENT ON TIME?: NO

## 2020-10-28 SDOH — HEALTH STABILITY: PHYSICAL HEALTH: ON AVERAGE, HOW MANY DAYS PER WEEK DO YOU ENGAGE IN MODERATE TO STRENUOUS EXERCISE (LIKE A BRISK WALK)?: 0 DAYS

## 2020-10-28 SDOH — ECONOMIC STABILITY: TRANSPORTATION INSECURITY
IN THE PAST 12 MONTHS, HAS THE LACK OF TRANSPORTATION KEPT YOU FROM MEDICAL APPOINTMENTS OR FROM GETTING MEDICATIONS?: NO

## 2020-10-28 ASSESSMENT — SOCIAL DETERMINANTS OF HEALTH (SDOH)
IN A TYPICAL WEEK, HOW MANY TIMES DO YOU TALK ON THE PHONE WITH FAMILY, FRIENDS, OR NEIGHBORS?: ONCE A WEEK
HOW OFTEN DO YOU GET TOGETHER WITH FRIENDS OR RELATIVES?: ONCE A WEEK
WITHIN THE PAST 12 MONTHS, THE FOOD YOU BOUGHT JUST DIDN'T LAST AND YOU DIDN'T HAVE MONEY TO GET MORE: NEVER TRUE
HOW OFTEN DO YOU HAVE SIX OR MORE DRINKS ON ONE OCCASION: NEVER
WITHIN THE PAST 12 MONTHS, YOU WORRIED THAT YOUR FOOD WOULD RUN OUT BEFORE YOU GOT THE MONEY TO BUY MORE: NEVER TRUE
HOW OFTEN DO YOU ATTEND CHURCH OR RELIGIOUS SERVICES?: NEVER
HOW MANY DRINKS CONTAINING ALCOHOL DO YOU HAVE ON A TYPICAL DAY WHEN YOU ARE DRINKING: 1 OR 2
HOW OFTEN DO YOU ATTENT MEETINGS OF THE CLUB OR ORGANIZATION YOU BELONG TO?: NEVER
HOW OFTEN DO YOU HAVE A DRINK CONTAINING ALCOHOL: 4 OR MORE TIMES A WEEK
DO YOU BELONG TO ANY CLUBS OR ORGANIZATIONS SUCH AS CHURCH GROUPS UNIONS, FRATERNAL OR ATHLETIC GROUPS, OR SCHOOL GROUPS?: NO
HOW HARD IS IT FOR YOU TO PAY FOR THE VERY BASICS LIKE FOOD, HOUSING, MEDICAL CARE, AND HEATING?: NOT HARD AT ALL

## 2020-11-03 ENCOUNTER — OFFICE VISIT (OUTPATIENT)
Dept: MEDICAL GROUP | Facility: PHYSICIAN GROUP | Age: 75
End: 2020-11-03
Payer: MEDICARE

## 2020-11-03 VITALS
HEIGHT: 67 IN | OXYGEN SATURATION: 97 % | HEART RATE: 62 BPM | SYSTOLIC BLOOD PRESSURE: 130 MMHG | BODY MASS INDEX: 33.49 KG/M2 | TEMPERATURE: 98.6 F | WEIGHT: 213.4 LBS | DIASTOLIC BLOOD PRESSURE: 90 MMHG | RESPIRATION RATE: 16 BRPM

## 2020-11-03 DIAGNOSIS — I10 ESSENTIAL HYPERTENSION: ICD-10-CM

## 2020-11-03 DIAGNOSIS — I48.0 PAROXYSMAL ATRIAL FIBRILLATION (HCC): ICD-10-CM

## 2020-11-03 DIAGNOSIS — I25.10 CORONARY ARTERY DISEASE INVOLVING NATIVE HEART WITHOUT ANGINA PECTORIS, UNSPECIFIED VESSEL OR LESION TYPE: ICD-10-CM

## 2020-11-03 PROCEDURE — 99214 OFFICE O/P EST MOD 30 MIN: CPT | Performed by: FAMILY MEDICINE

## 2020-11-03 ASSESSMENT — FIBROSIS 4 INDEX: FIB4 SCORE: 1.42

## 2020-11-03 NOTE — ASSESSMENT & PLAN NOTE
This is a chronic issue  NSTEMI in 2017 s/p stent placement x1  Asymptomatic now and medically optimized.

## 2020-11-03 NOTE — PROGRESS NOTES
Subjective:     Chief Complaint   Patient presents with   • Establish Care       HPI:   Shamar presents today to discuss the following.  Prior PCP: Dr. Lemus    Paroxysmal atrial fibrillation (CMS-HCC)  This is a chronic issue  On xarelto and on amiodarone   Also on statin drug therapy   Follows up with cardiology  Due for an ablation 20    Stented coronary artery  This is a chronic issue  NSTEMI in 2017 s/p stent placement x1  Asymptomatic now and medically optimized.     Essential hypertension  Stable. Monitoring BP at home. Currently taking losartan 50mg as directed.  Denies lightheadedness, vision changes, headache, palpitations or leg swelling.        Past Medical History:   Diagnosis Date   • Arrhythmia 2017    Atrial Fib   • Blood clotting disorder (HCC) 2017    heart   • CAD (coronary artery disease) 2012   • Dental disorder     upper and lower dentures, full   • Hemorrhagic disorder (HCC)     on Plavix and Xarelto   • High cholesterol    • HTN (hypertension) 2012   • Hyperglycemia 2012   • Hyperlipemia 2012   • Hypertension    • Liver enzyme elevation     history of   • Myocardial infarct (HCC) 2017    Dr. Mcallister, cardiologist   • Obesity    • Sleep apnea     BIPAP   • Tachycardia 2012   • Tonsillitis        Social History     Tobacco Use   • Smoking status: Former Smoker     Packs/day: 3.00     Years: 16.00     Pack years: 48.00     Types: Cigarettes     Quit date: 1980     Years since quittin.8   • Smokeless tobacco: Never Used   Substance Use Topics   • Alcohol use: Yes     Alcohol/week: 0.0 oz     Frequency: 4 or more times a week     Drinks per session: 1 or 2     Binge frequency: Never     Comment: 3 per week   • Drug use: No       Current Outpatient Medications Ordered in Epic   Medication Sig Dispense Refill   • losartan (COZAAR) 50 MG Tab Take 1 Tab by mouth every day. 100 Tab 3   • atorvastatin (LIPITOR) 20 MG Tab Take 1 tablet by mouth once  "daily 100 Tab 2   • amiodarone (CORDARONE) 200 MG Tab Take 1 Tab by mouth every day. 30 Tab 6   • rivaroxaban (XARELTO) 10 MG Tab tablet Take 1 Tab by mouth with dinner. 90 Tab 1   • vitamin D (CHOLECALCIFEROL) 1000 UNIT TABS Take 1,000 Units by mouth every day.       No current [facility]-ordered facility-administered medications on file.        Allergies:  Penicillins, Sulfa drugs, Brilinta [ticagrelor], and Lipidil [fenofibrate]    Health Maintenance: Completed    ROS:  Gen: no fevers/chills, no changes in weight  Eyes: no changes in vision  ENT: no sore throat, no hearing loss, no bloody nose  Pulm: no sob, no cough  CV: no chest pain, no palpitations  GI: no nausea/vomiting, no diarrhea  : no dysuria      Objective:     Exam:  /90 (BP Location: Left arm, Patient Position: Sitting, BP Cuff Size: Adult)   Pulse 62   Temp 37 °C (98.6 °F) (Temporal)   Resp 16   Ht 1.702 m (5' 7\")   Wt 96.8 kg (213 lb 6.4 oz)   SpO2 97%   BMI 33.42 kg/m²  Body mass index is 33.42 kg/m².    Constitutional: Alert, no distress, well-groomed.  Skin: Warm, dry, good turgor, no rashes in visible areas.  Eye: Equal, round and reactive, conjunctiva clear, lids normal.  ENMT: Lips without lesions, good dentition, moist mucous membranes.  Neck: Trachea midline, no masses, no thyromegaly.  Respiratory: Unlabored respiratory effort, no cough.  MSK: Normal gait, moves all extremities.  Neuro: Grossly non-focal.   Psych: Alert and oriented x3, normal affect and mood.        Assessment & Plan:     75 y.o. male with the following -     1. Paroxysmal atrial fibrillation (HCC)  This is a chronic, stable condition.  He follows up with cardiology for this.  He is due for an ablation on November 17, 2020.  Currently he is on Xarelto and amiodarone.  -Await cardiology's further recommendations    2. Coronary artery disease involving native heart without angina pectoris, unspecified vessel or lesion type  This is a chronic, stable condition.  The " patient has a history of CAD status post stent placement in 2017.  He is doing pretty well at this time and as mentioned above continues to follow-up with cardiology.  He denies any acute chest pain or shortness of breath.    3. Essential hypertension  This is a chronic, stable condition.  Blood pressure in the office today is 130/90.  He is currently on losartan 50 mg daily.  No refills are requested today.  Continue with current regimen.      Return in about 6 months (around 5/3/2021).    Please note that this dictation was created using voice recognition software. I have made every reasonable attempt to correct obvious errors, but I expect that there are errors of grammar and possibly content that I did not discover before finalizing the note.

## 2020-11-03 NOTE — ASSESSMENT & PLAN NOTE
Stable. Monitoring BP at home. Currently taking losartan 50mg as directed.  Denies lightheadedness, vision changes, headache, palpitations or leg swelling.

## 2020-11-03 NOTE — ASSESSMENT & PLAN NOTE
This is a chronic issue  On xarelto and on amiodarone   Also on statin drug therapy   Follows up with cardiology  Due for an ablation 11/17/20

## 2020-11-12 ENCOUNTER — PRE-ADMISSION TESTING (OUTPATIENT)
Dept: ADMISSIONS | Facility: MEDICAL CENTER | Age: 75
End: 2020-11-12
Attending: INTERNAL MEDICINE
Payer: MEDICARE

## 2020-11-12 DIAGNOSIS — Z01.810 PRE-OPERATIVE CARDIOVASCULAR EXAMINATION: ICD-10-CM

## 2020-11-12 DIAGNOSIS — Z01.812 PRE-OPERATIVE LABORATORY EXAMINATION: ICD-10-CM

## 2020-11-12 LAB
ALBUMIN SERPL BCP-MCNC: 4.3 G/DL (ref 3.2–4.9)
ALBUMIN/GLOB SERPL: 1.7 G/DL
ALP SERPL-CCNC: 55 U/L (ref 30–99)
ALT SERPL-CCNC: 33 U/L (ref 2–50)
ANION GAP SERPL CALC-SCNC: 9 MMOL/L (ref 7–16)
AST SERPL-CCNC: 16 U/L (ref 12–45)
BASOPHILS # BLD AUTO: 0.3 % (ref 0–1.8)
BASOPHILS # BLD: 0.02 K/UL (ref 0–0.12)
BILIRUB SERPL-MCNC: 0.5 MG/DL (ref 0.1–1.5)
BUN SERPL-MCNC: 15 MG/DL (ref 8–22)
CALCIUM SERPL-MCNC: 9.9 MG/DL (ref 8.5–10.5)
CHLORIDE SERPL-SCNC: 104 MMOL/L (ref 96–112)
CO2 SERPL-SCNC: 27 MMOL/L (ref 20–33)
COVID ORDER STATUS COVID19: NORMAL
CREAT SERPL-MCNC: 0.98 MG/DL (ref 0.5–1.4)
EKG IMPRESSION: NORMAL
EOSINOPHIL # BLD AUTO: 0.08 K/UL (ref 0–0.51)
EOSINOPHIL NFR BLD: 1.4 % (ref 0–6.9)
ERYTHROCYTE [DISTWIDTH] IN BLOOD BY AUTOMATED COUNT: 47.2 FL (ref 35.9–50)
GLOBULIN SER CALC-MCNC: 2.6 G/DL (ref 1.9–3.5)
GLUCOSE SERPL-MCNC: 91 MG/DL (ref 65–99)
HCT VFR BLD AUTO: 48.5 % (ref 42–52)
HGB BLD-MCNC: 15.9 G/DL (ref 14–18)
IMM GRANULOCYTES # BLD AUTO: 0.02 K/UL (ref 0–0.11)
IMM GRANULOCYTES NFR BLD AUTO: 0.3 % (ref 0–0.9)
INR PPP: 1.15 (ref 0.87–1.13)
LYMPHOCYTES # BLD AUTO: 1.96 K/UL (ref 1–4.8)
LYMPHOCYTES NFR BLD: 33.9 % (ref 22–41)
MCH RBC QN AUTO: 31.2 PG (ref 27–33)
MCHC RBC AUTO-ENTMCNC: 32.8 G/DL (ref 33.7–35.3)
MCV RBC AUTO: 95.3 FL (ref 81.4–97.8)
MONOCYTES # BLD AUTO: 0.61 K/UL (ref 0–0.85)
MONOCYTES NFR BLD AUTO: 10.5 % (ref 0–13.4)
NEUTROPHILS # BLD AUTO: 3.1 K/UL (ref 1.82–7.42)
NEUTROPHILS NFR BLD: 53.6 % (ref 44–72)
NRBC # BLD AUTO: 0 K/UL
NRBC BLD-RTO: 0 /100 WBC
PLATELET # BLD AUTO: 196 K/UL (ref 164–446)
PMV BLD AUTO: 10.2 FL (ref 9–12.9)
POTASSIUM SERPL-SCNC: 4.3 MMOL/L (ref 3.6–5.5)
PROT SERPL-MCNC: 6.9 G/DL (ref 6–8.2)
PROTHROMBIN TIME: 15.1 SEC (ref 12–14.6)
RBC # BLD AUTO: 5.09 M/UL (ref 4.7–6.1)
SODIUM SERPL-SCNC: 140 MMOL/L (ref 135–145)
WBC # BLD AUTO: 5.8 K/UL (ref 4.8–10.8)

## 2020-11-12 PROCEDURE — 85025 COMPLETE CBC W/AUTO DIFF WBC: CPT

## 2020-11-12 PROCEDURE — U0003 INFECTIOUS AGENT DETECTION BY NUCLEIC ACID (DNA OR RNA); SEVERE ACUTE RESPIRATORY SYNDROME CORONAVIRUS 2 (SARS-COV-2) (CORONAVIRUS DISEASE [COVID-19]), AMPLIFIED PROBE TECHNIQUE, MAKING USE OF HIGH THROUGHPUT TECHNOLOGIES AS DESCRIBED BY CMS-2020-01-R: HCPCS

## 2020-11-12 PROCEDURE — 36415 COLL VENOUS BLD VENIPUNCTURE: CPT

## 2020-11-12 PROCEDURE — 85610 PROTHROMBIN TIME: CPT

## 2020-11-12 PROCEDURE — 93005 ELECTROCARDIOGRAM TRACING: CPT

## 2020-11-12 PROCEDURE — 80053 COMPREHEN METABOLIC PANEL: CPT

## 2020-11-12 PROCEDURE — 93010 ELECTROCARDIOGRAM REPORT: CPT | Performed by: INTERNAL MEDICINE

## 2020-11-12 ASSESSMENT — FIBROSIS 4 INDEX: FIB4 SCORE: 1.42

## 2020-11-13 LAB
SARS-COV-2 RNA RESP QL NAA+PROBE: NOTDETECTED
SPECIMEN SOURCE: NORMAL

## 2020-11-16 NOTE — PROGRESS NOTES
COVID-19 Pre-Surgery Screenin. Do you have an undiagnosed respiratory illness or symptoms such as coughing or sneezing? no     2. Do you have an unexplained fever greater than 100.4 degrees Fahrenheit or 38 degrees Celsius? no  ?  3. Have you had direct exposure to a patient who tested positive for Covid-19? no    4. Patient informed of current visitation and mask policies by this RN. yes

## 2020-11-17 ENCOUNTER — APPOINTMENT (OUTPATIENT)
Dept: CARDIOLOGY | Facility: MEDICAL CENTER | Age: 75
End: 2020-11-17
Attending: INTERNAL MEDICINE
Payer: MEDICARE

## 2020-11-17 ENCOUNTER — ANESTHESIA EVENT (OUTPATIENT)
Dept: CARDIOLOGY | Facility: MEDICAL CENTER | Age: 75
End: 2020-11-17
Payer: MEDICARE

## 2020-11-17 ENCOUNTER — ANESTHESIA (OUTPATIENT)
Dept: CARDIOLOGY | Facility: MEDICAL CENTER | Age: 75
End: 2020-11-17
Payer: MEDICARE

## 2020-11-17 ENCOUNTER — HOSPITAL ENCOUNTER (OUTPATIENT)
Facility: MEDICAL CENTER | Age: 75
End: 2020-11-17
Attending: INTERNAL MEDICINE | Admitting: INTERNAL MEDICINE
Payer: MEDICARE

## 2020-11-17 VITALS
BODY MASS INDEX: 33.91 KG/M2 | WEIGHT: 210.98 LBS | TEMPERATURE: 97.7 F | SYSTOLIC BLOOD PRESSURE: 132 MMHG | RESPIRATION RATE: 18 BRPM | HEIGHT: 66 IN | HEART RATE: 56 BPM | DIASTOLIC BLOOD PRESSURE: 71 MMHG | OXYGEN SATURATION: 98 %

## 2020-11-17 DIAGNOSIS — I48.0 PAF (PAROXYSMAL ATRIAL FIBRILLATION) (HCC): ICD-10-CM

## 2020-11-17 LAB
ACT BLD: 324 SEC (ref 74–137)
EKG IMPRESSION: NORMAL

## 2020-11-17 PROCEDURE — 85347 COAGULATION TIME ACTIVATED: CPT

## 2020-11-17 PROCEDURE — 93005 ELECTROCARDIOGRAM TRACING: CPT | Mod: XE | Performed by: INTERNAL MEDICINE

## 2020-11-17 PROCEDURE — 93010 ELECTROCARDIOGRAM REPORT: CPT | Performed by: INTERNAL MEDICINE

## 2020-11-17 PROCEDURE — A9270 NON-COVERED ITEM OR SERVICE: HCPCS | Performed by: INTERNAL MEDICINE

## 2020-11-17 PROCEDURE — 93655 ICAR CATH ABLTJ DSCRT ARRHYT: CPT | Performed by: INTERNAL MEDICINE

## 2020-11-17 PROCEDURE — 93656 COMPRE EP EVAL ABLTJ ATR FIB: CPT | Performed by: INTERNAL MEDICINE

## 2020-11-17 PROCEDURE — 700111 HCHG RX REV CODE 636 W/ 250 OVERRIDE (IP)

## 2020-11-17 PROCEDURE — 700111 HCHG RX REV CODE 636 W/ 250 OVERRIDE (IP): Performed by: ANESTHESIOLOGY

## 2020-11-17 PROCEDURE — 160002 HCHG RECOVERY MINUTES (STAT)

## 2020-11-17 PROCEDURE — 93312 ECHO TRANSESOPHAGEAL: CPT

## 2020-11-17 PROCEDURE — 93613 INTRACARDIAC EPHYS 3D MAPG: CPT

## 2020-11-17 PROCEDURE — 93662 INTRACARDIAC ECG (ICE): CPT | Mod: 26 | Performed by: INTERNAL MEDICINE

## 2020-11-17 PROCEDURE — 700105 HCHG RX REV CODE 258: Performed by: INTERNAL MEDICINE

## 2020-11-17 PROCEDURE — 700101 HCHG RX REV CODE 250: Performed by: ANESTHESIOLOGY

## 2020-11-17 PROCEDURE — 700102 HCHG RX REV CODE 250 W/ 637 OVERRIDE(OP): Performed by: INTERNAL MEDICINE

## 2020-11-17 PROCEDURE — 93613 INTRACARDIAC EPHYS 3D MAPG: CPT | Performed by: INTERNAL MEDICINE

## 2020-11-17 PROCEDURE — 93623 PRGRMD STIMJ&PACG IV RX NFS: CPT | Mod: 26 | Performed by: INTERNAL MEDICINE

## 2020-11-17 PROCEDURE — 700101 HCHG RX REV CODE 250

## 2020-11-17 RX ORDER — HALOPERIDOL 5 MG/ML
1 INJECTION INTRAMUSCULAR
Status: DISCONTINUED | OUTPATIENT
Start: 2020-11-17 | End: 2020-11-17 | Stop reason: HOSPADM

## 2020-11-17 RX ORDER — SODIUM CHLORIDE, SODIUM LACTATE, POTASSIUM CHLORIDE, CALCIUM CHLORIDE 600; 310; 30; 20 MG/100ML; MG/100ML; MG/100ML; MG/100ML
INJECTION, SOLUTION INTRAVENOUS CONTINUOUS
Status: DISCONTINUED | OUTPATIENT
Start: 2020-11-17 | End: 2020-11-17 | Stop reason: HOSPADM

## 2020-11-17 RX ORDER — PROTAMINE SULFATE 10 MG/ML
INJECTION, SOLUTION INTRAVENOUS PRN
Status: DISCONTINUED | OUTPATIENT
Start: 2020-11-17 | End: 2020-11-17 | Stop reason: SURG

## 2020-11-17 RX ORDER — LIDOCAINE HYDROCHLORIDE 40 MG/ML
SOLUTION TOPICAL PRN
Status: DISCONTINUED | OUTPATIENT
Start: 2020-11-17 | End: 2020-11-17 | Stop reason: SURG

## 2020-11-17 RX ORDER — OXYCODONE HCL 5 MG/5 ML
10 SOLUTION, ORAL ORAL
Status: DISCONTINUED | OUTPATIENT
Start: 2020-11-17 | End: 2020-11-17 | Stop reason: HOSPADM

## 2020-11-17 RX ORDER — OXYCODONE HCL 5 MG/5 ML
5 SOLUTION, ORAL ORAL
Status: DISCONTINUED | OUTPATIENT
Start: 2020-11-17 | End: 2020-11-17 | Stop reason: HOSPADM

## 2020-11-17 RX ORDER — ISOPROTERENOL HYDROCHLORIDE 0.2 MG/ML
INJECTION, SOLUTION INTRAVENOUS
Status: COMPLETED
Start: 2020-11-17 | End: 2020-11-17

## 2020-11-17 RX ORDER — HYDROMORPHONE HYDROCHLORIDE 1 MG/ML
0.4 INJECTION, SOLUTION INTRAMUSCULAR; INTRAVENOUS; SUBCUTANEOUS
Status: DISCONTINUED | OUTPATIENT
Start: 2020-11-17 | End: 2020-11-17 | Stop reason: HOSPADM

## 2020-11-17 RX ORDER — DIPHENHYDRAMINE HYDROCHLORIDE 50 MG/ML
12.5 INJECTION INTRAMUSCULAR; INTRAVENOUS
Status: DISCONTINUED | OUTPATIENT
Start: 2020-11-17 | End: 2020-11-17 | Stop reason: HOSPADM

## 2020-11-17 RX ORDER — LIDOCAINE HYDROCHLORIDE 40 MG/ML
SOLUTION TOPICAL
Status: COMPLETED
Start: 2020-11-17 | End: 2020-11-17

## 2020-11-17 RX ORDER — HYDROMORPHONE HYDROCHLORIDE 1 MG/ML
0.1 INJECTION, SOLUTION INTRAMUSCULAR; INTRAVENOUS; SUBCUTANEOUS
Status: DISCONTINUED | OUTPATIENT
Start: 2020-11-17 | End: 2020-11-17 | Stop reason: HOSPADM

## 2020-11-17 RX ORDER — MEPERIDINE HYDROCHLORIDE 25 MG/ML
6.25 INJECTION INTRAMUSCULAR; INTRAVENOUS; SUBCUTANEOUS
Status: DISCONTINUED | OUTPATIENT
Start: 2020-11-17 | End: 2020-11-17 | Stop reason: HOSPADM

## 2020-11-17 RX ORDER — HYDROMORPHONE HYDROCHLORIDE 1 MG/ML
0.2 INJECTION, SOLUTION INTRAMUSCULAR; INTRAVENOUS; SUBCUTANEOUS
Status: DISCONTINUED | OUTPATIENT
Start: 2020-11-17 | End: 2020-11-17 | Stop reason: HOSPADM

## 2020-11-17 RX ORDER — ONDANSETRON 2 MG/ML
4 INJECTION INTRAMUSCULAR; INTRAVENOUS
Status: DISCONTINUED | OUTPATIENT
Start: 2020-11-17 | End: 2020-11-17 | Stop reason: HOSPADM

## 2020-11-17 RX ORDER — BUPIVACAINE HYDROCHLORIDE 2.5 MG/ML
INJECTION, SOLUTION EPIDURAL; INFILTRATION; INTRACAUDAL
Status: COMPLETED
Start: 2020-11-17 | End: 2020-11-17

## 2020-11-17 RX ORDER — PROTAMINE SULFATE 10 MG/ML
INJECTION, SOLUTION INTRAVENOUS
Status: COMPLETED
Start: 2020-11-17 | End: 2020-11-17

## 2020-11-17 RX ORDER — HEPARIN SODIUM 200 [USP'U]/100ML
INJECTION, SOLUTION INTRAVENOUS
Status: COMPLETED
Start: 2020-11-17 | End: 2020-11-17

## 2020-11-17 RX ORDER — LIDOCAINE HYDROCHLORIDE 20 MG/ML
INJECTION, SOLUTION INFILTRATION; PERINEURAL
Status: COMPLETED
Start: 2020-11-17 | End: 2020-11-17

## 2020-11-17 RX ORDER — HEPARIN SODIUM,PORCINE 1000/ML
VIAL (ML) INJECTION
Status: COMPLETED
Start: 2020-11-17 | End: 2020-11-17

## 2020-11-17 RX ADMIN — ROCURONIUM BROMIDE 20 MG: 10 INJECTION, SOLUTION INTRAVENOUS at 12:28

## 2020-11-17 RX ADMIN — POVIDONE IODINE 15 ML: 100 SOLUTION TOPICAL at 09:43

## 2020-11-17 RX ADMIN — SODIUM CHLORIDE, POTASSIUM CHLORIDE, SODIUM LACTATE AND CALCIUM CHLORIDE: 600; 310; 30; 20 INJECTION, SOLUTION INTRAVENOUS at 09:45

## 2020-11-17 RX ADMIN — PROPOFOL 200 MG: 10 INJECTION, EMULSION INTRAVENOUS at 11:04

## 2020-11-17 RX ADMIN — ISOPROTERENOL HYDROCHLORIDE 200 MCG: 0.2 INJECTION, SOLUTION INTRAMUSCULAR; INTRAVENOUS at 12:15

## 2020-11-17 RX ADMIN — ROCURONIUM BROMIDE 100 MG: 10 INJECTION, SOLUTION INTRAVENOUS at 11:04

## 2020-11-17 RX ADMIN — HEPARIN SODIUM: 200 INJECTION, SOLUTION INTRAVENOUS at 10:30

## 2020-11-17 RX ADMIN — BUPIVACAINE HYDROCHLORIDE: 2.5 INJECTION, SOLUTION EPIDURAL; INFILTRATION; INTRACAUDAL; PERINEURAL at 11:19

## 2020-11-17 RX ADMIN — PROTAMINE SULFATE 50 MG: 10 INJECTION, SOLUTION INTRAVENOUS at 12:39

## 2020-11-17 RX ADMIN — HEPARIN SODIUM 14000 UNITS: 1000 INJECTION, SOLUTION INTRAVENOUS; SUBCUTANEOUS at 10:30

## 2020-11-17 RX ADMIN — LIDOCAINE HYDROCHLORIDE: 20 INJECTION, SOLUTION INFILTRATION; PERINEURAL at 11:19

## 2020-11-17 RX ADMIN — LIDOCAINE HYDROCHLORIDE 4 ML: 40 SOLUTION TOPICAL at 11:06

## 2020-11-17 ASSESSMENT — PAIN SCALES - GENERAL: PAIN_LEVEL: 0

## 2020-11-17 ASSESSMENT — FIBROSIS 4 INDEX: FIB4 SCORE: 1.08

## 2020-11-17 NOTE — ANESTHESIA TIME REPORT
Anesthesia Start and Stop Event Times     Date Time Event    11/17/2020 1056 Ready for Procedure     1058 Anesthesia Start     1308 Anesthesia Stop        Responsible Staff  11/17/20    Name Role Begin End    José Miguel Ponce M.D. Anesth 1058 1308        Preop Diagnosis (Free Text):  Pre-op Diagnosis             Preop Diagnosis (Codes):    Post op Diagnosis  A-fib (HCC)      Premium Reason  Non-Premium    Comments: jose

## 2020-11-17 NOTE — ANESTHESIA POSTPROCEDURE EVALUATION
Patient: Shamar Morgan    Procedure Summary     Date: 11/17/20 Room / Location: [facility] IMAGING - CATH LAB [facility]    Anesthesia Start: 1058 Anesthesia Stop: 1308    Procedure: CL-EP ABLATION ATRIAL FIBRILLATION Diagnosis:       PAF (paroxysmal atrial fibrillation) (HCC)      Paroxysmal atrial fibrillation      (See Associated Dx)    Scheduled Providers: Niko Voss M.D.; José Miguel Ponce M.D. Responsible Provider: José Miguel Ponce M.D.    Anesthesia Type: general ASA Status: 3          Final Anesthesia Type: general  Last vitals  BP   Blood Pressure : 144/89    Temp   36.6 °C (97.9 °F)    Pulse   Pulse: 88   Resp   18    SpO2   94 %      Anesthesia Post Evaluation    Patient location during evaluation: PACU  Patient participation: complete - patient participated  Level of consciousness: awake and alert  Pain score: 0    Airway patency: patent  Anesthetic complications: no  Cardiovascular status: hemodynamically stable  Respiratory status: acceptable  Hydration status: euvolemic    PONV: none           Nurse Pain Score: 0 (NPRS)

## 2020-11-17 NOTE — OP REPORT
Electrophysiology Procedure Note    Procedures Performed:  Pulmonary Vein Isolation  Ablation of additional arrhythmia (typical right atrial flutter)  Intracardiac Echocardiography  Three-dimensional intracardiac mapping  IV isoproterenol infusion with programmed stimulation    Electrophysiologist: Niko Voss MD    Assistant(s): None    Anesthesia: General anesthesia was provided by Dr. Rodríguez of the Anesthesiology service.    Statement of Medical Necessity: This is a 75  year-old male with history of symptomatic paroxysmal atrial  fibrillation     Description of Procedure:    Access and catheter placement: After obtaining informed written consent, the patient was  brought to the EP lab in the fasting, non-sedated stated. The patient was sedated and intubated  by the anesthesiologist. The patient was prepped and draped in the usual sterile fashion. Using  the modified Seldinger technique, access was obtained in the right  femoral vein. Guidewires were advanced into the IVC. In the right femoral vein, 3 sheaths of   8F were placed. A deflectable  decapolar catheter was advanced through the LFV to the coronary sinus. A 8F intracardiac  echo catheter was advanced to the right atrium. Through and 8F sheath,  a long wire was advanced to the SVC. The short sheath was  changed out for a medium-curl Vizigo (Bios[employer]ix Mercado) sheath which was advanced to the SVC. The wire was  removed and the dilator was flushed. A 98-cm transseptal needle (newMentor) was advanced to the tip of the  dilator. The transseptal needle was attached to the manifold and  flushed. Under intracardiac echocardiographic guidance, the sheath/needle   system was withdrawn to the fossa ovalis. At this time, 14,000 units of heparin were administered.   Additional boluses of heparin were given as needed to keep -350 sec during LA dwell time.   The needle was advanced out of the dilator, and RF energy applied via the Bethel needle.   ICE visualized the  needle passage through the fossa ovalis into the left  atrium. Confirmation of left atrial location was confirmed by injecting saline and transducing  pressure. The dilator was advanced over the needle into the left atrium, and then the sheath was advanced over the dilator. The dilator and  needle were removed. The sheath was flushed and connected to a continuous heparinized   saline drip.       A duodecapolar Penta-Ray catheter ([employer]-Mercado) was  advanced through the Vizigo sheath into the left atrium. A 3-dimensional electroanatomical  map was constructed using the CARTO system. The Penta-ray was removed and a 3.5-mm externally-irrigated ablation catheter (Biosense-Mercado   Thermocool ST SF DF-curve) was advanced through the Vizigo sheath into the left atrium.     Attention was first turned to the left pulmonary veins. The Penta-Ray  catheter was placed in the LSPV and LIPV which showed conduction into the vein. A circumferential  ablation line using radiofrequency energy 20-40W was placed which resulted in LSPV and LIPV isolation. Atrial fibrillation terminated while completing the ablation line on the roof of the LSPV.  The catheters were then moved to the RSPV and RIPV which showed evidence of conduction into the veins,   and ablation 20-40W was performed circumferentially around these veins   resulting in RSPV and RIPV isolation. Power was reduced near the esophagus.  The Penta-Ray was advanced into all four pulmonary veins and persistent conduction block into the right and left pulmonary veins was demonstrated.     Isuprel 2 mcg/min was administered and burst pacing was performed in the left atrium. Atrial flutter was induced, TCL 230ms. ECG appeared consistent with typical right atrial flutter. The left atrial sheaths and catheters were withdrawn to the right atrium. LAT mapping was created in the left atrium which showed CCW typical atrial flutter. Ablation was performed at 35-40W from the inferior  tricuspid annulus to the IVC resulting in bidirectional block >170ms. Atrial flutter terminated during RF application. Further burst pacing only induced short bursts of non-sustained atrial fibrillation.    ICE visualization   of the pericardium confirmed no pericardial effusion at the end of the case. The  patient was awakened from anesthesia, catheters and sheaths were removed, and manual  pressure was held on bilateral groins until hemostasis was achieved.    Electrophysiological Findings:  Sinus cycle length 1224 msec  Intervals- A-H 82 msec  H-V 49 msec   msec   msec   msec  AV block  ms  AVERP: 500/350 ms    Total RF time: 1113 sec  Fluoro time: 0 min    Arrhythmias:  Atrial fibrillation was present at baseline    1) None    Estimated blood loss - 30 mL    Complications: None    Impression:  1. Atrial fibrillation, paroxysmal  2. Successful isolation of all four pulmonary veins  3. Typical right atrial flutter, TCL 230ms  4. Successful CTI ablation with bidirectional block >170ms    Recommendations:  1. Bed rest for 2 hours  2. Telemetry monitoring during recovery  3. Anticoagulant therapy for at least 6-12 months  4. Follow up in Arrhythmia clinic in 4 weeks      Niko Voss MD  Cardiac Electrophysiology

## 2020-11-17 NOTE — ANESTHESIA PROCEDURE NOTES
Airway    Date/Time: 11/17/2020 11:06 AM  Performed by: José Miguel Ponce M.D.  Authorized by: José Miguel Ponce M.D.     Location:  OR  Urgency:  Elective  Difficult Airway: No    Indications for Airway Management:  Anesthesia      Spontaneous Ventilation: absent    Sedation Level:  Deep  Preoxygenated: Yes    Patient Position:  Sniffing  Mask Difficulty Assessment:  0 - not attempted  Final Airway Type:  Endotracheal airway  Final Endotracheal Airway:  ETT  Cuffed: Yes    Technique Used for Successful ETT Placement:  Direct laryngoscopy    Insertion Site:  Oral  Blade Type:  Becca  Laryngoscope Blade/Videolaryngoscope Blade Size:  3  ETT Size (mm):  7.0  Measured from:  Teeth  ETT to Teeth (cm):  21  Placement Verified by: auscultation and capnometry    Cormack-Lehane Classification:  Grade I - full view of glottis  Number of Attempts at Approach:  1

## 2020-11-17 NOTE — ANESTHESIA PREPROCEDURE EVALUATION
"    Relevant Problems   ANESTHESIA   (+) RUPAL (obstructive sleep apnea)      CARDIAC   (+) Coronary artery disease involving native coronary artery of native heart without angina pectoris   (+) Essential hypertension   (+) Paroxysmal atrial fibrillation (HCC)   (+) Stented coronary artery     /89   Pulse 88   Temp 36.6 °C (97.9 °F) (Temporal)   Resp 18   Ht 1.676 m (5' 6\")   Wt 95.7 kg (210 lb 15.7 oz)   SpO2 94%   BMI 34.05 kg/m²       Physical Exam    Airway   Mallampati: II  TM distance: >3 FB  Neck ROM: full       Cardiovascular - normal exam  Rhythm: regular  Rate: normal  (-) murmur     Dental - normal exam           Pulmonary - normal exam  Breath sounds clear to auscultation     Abdominal    Neurological - normal exam                 Anesthesia Plan    ASA 3 (rupal)       Plan - general       Airway plan will be ETT        Induction: intravenous    Postoperative Plan: Postoperative administration of opioids is intended.    Pertinent diagnostic labs and testing reviewed    Informed Consent:    Anesthetic plan and risks discussed with patient.    Use of blood products discussed with: patient whom consented to blood products.         "

## 2020-11-17 NOTE — H&P
[facility]  Electrophysiology Pre-procedure H&P    DOS:11/17/2020    Planned Procedure: PVI    Chief complaint/Reason for Procedure: Afib, SSS    HPI: 76 y/o M with pAF on amiodarone and SSS referred for ablation, feeling well today      Past Medical History:   Diagnosis Date   • Arrhythmia 09/02/2017    Atrial Fib   • Blood clotting disorder (HCC) 09/2017    heart   • CAD (coronary artery disease) 4/16/2012   • Dental disorder     upper and lower dentures, full   • Hemorrhagic disorder (HCC)     on Plavix and Xarelto   • High cholesterol    • HTN (hypertension) 4/16/2012   • Hyperglycemia 4/16/2012   • Hyperlipemia 4/16/2012   • Hypertension    • Liver enzyme elevation     history of   • Myocardial infarct (HCC) 09/02/2017    Dr. Mcallister, cardiologist   • Obesity    • Sleep apnea     BIPAP   • Tachycardia 4/16/2012   • Tonsillitis    • Urinary incontinence        Past Surgical History:   Procedure Laterality Date   • TRIGGER FINGER RELEASE Left 7/17/2018    Procedure: TRIGGER FINGER RELEASE - RING;  Surgeon: Jose Miguel Kay M.D.;  Location: SURGERY [facility];  Service: Orthopedics   • MASS EXCISION ORTHO Left 7/17/2018    Procedure: MASS EXCISION ORTHO - DISTAL INTERPHALANGEAL JOINT NODULE EXCISION;  Surgeon: Jose Miguel Kay M.D.;  Location: SURGERY [facility];  Service: Orthopedics   • CARDIAC CATH, LEFT HEART  09/03/2017    successful thrombectomy  and CHELLE to ostial proximal RCA    • STENT PLACEMENT  09/03/2017    cardiac   • CARPAL TUNNEL RELEASE Right 1980's   • TRIGGER FINGER RELEASE Right 1980's       Social History     Socioeconomic History   • Marital status:      Spouse name: Not on file   • Number of children: Not on file   • Years of education: Not on file   • Highest education level: 12th grade   Occupational History   • Not on file   Social Needs   • Financial resource strain: Not hard at all   • Food insecurity     Worry: Never true     Inability: Never true   •  Transportation needs     Medical: No     Non-medical: No   Tobacco Use   • Smoking status: Former Smoker     Packs/day: 3.00     Years: 16.00     Pack years: 48.00     Types: Cigarettes     Quit date: 1980     Years since quittin.9   • Smokeless tobacco: Never Used   Substance and Sexual Activity   • Alcohol use: Yes     Alcohol/week: 0.0 oz     Frequency: 4 or more times a week     Drinks per session: 1 or 2     Binge frequency: Never     Comment: 2 per day   • Drug use: No   • Sexual activity: Yes     Partners: Female   Lifestyle   • Physical activity     Days per week: 0 days     Minutes per session: Not on file   • Stress: To some extent   Relationships   • Social connections     Talks on phone: Once a week     Gets together: Once a week     Attends Jew service: Never     Active member of club or organization: No     Attends meetings of clubs or organizations: Never     Relationship status:    • Intimate partner violence     Fear of current or ex partner: Not on file     Emotionally abused: Not on file     Physically abused: Not on file     Forced sexual activity: Not on file   Other Topics Concern   • Not on file   Social History Narrative   • Not on file       Family History   Problem Relation Age of Onset   • Diabetes Mother    • Heart Disease Mother    • Kidney Disease Mother    • Diabetes Sister        Allergies   Allergen Reactions   • Penicillins Swelling   • Sulfa Drugs Swelling   • Brilinta [Ticagrelor] Rash and Itching     .   • Lipidil [Fenofibrate]      Myalgia       Current Facility-Administered Medications   Medication Dose Route Frequency Provider Last Rate Last Admin   • lactated ringers infusion   Intravenous Continuous Niko Voss M.D. 10 mL/hr at 20 0945 New Bag at 20 0945   • HEPARIN 1000 UNITS/ML OR USE ONLY            • HEPARIN (PORCINE) IN NACL 2000-0.9 UNIT/L-% IV SOLN            • LIDOCAINE HCL 2 % INJ SOLN            • BUPIVACAINE HCL (PF) 0.25 %  "INJ SOLN                Physical Exam:  Vitals:    11/12/20 1307 11/17/20 1009   BP:  144/89   Pulse:  88   Resp:  18   Temp:  36.6 °C (97.9 °F)   TempSrc:  Temporal   SpO2:  94%   Weight: 97.4 kg (214 lb 11.7 oz) 95.7 kg (210 lb 15.7 oz)   Height: 1.702 m (5' 7\") 1.676 m (5' 6\")     General appearance: NAD, conversant   Neck: Trachea midline; FROM, supple, no thyromegaly or lymphadenopathy  CV: RRR, no MRGs, no JVD   Extremities: No peripheral edema or extremity lymphadenopathy  Skin: Normal temperature, turgor and texture; no rash, ulcers or subcutaneous nodules  Psych: Appropriate affect, alert and oriented to person, place and time    Data:  Lab Results   Component Value Date/Time    CHOLSTRLTOT 119 01/16/2020 07:32 AM    LDL 51 01/16/2020 07:32 AM    HDL 52 01/16/2020 07:32 AM    TRIGLYCERIDE 81 01/16/2020 07:32 AM       Lab Results   Component Value Date/Time    SODIUM 140 11/12/2020 01:40 PM    POTASSIUM 4.3 11/12/2020 01:40 PM    CHLORIDE 104 11/12/2020 01:40 PM    CO2 27 11/12/2020 01:40 PM    GLUCOSE 91 11/12/2020 01:40 PM    BUN 15 11/12/2020 01:40 PM    CREATININE 0.98 11/12/2020 01:40 PM     Lab Results   Component Value Date/Time    ALKPHOSPHAT 55 11/12/2020 01:40 PM    ASTSGOT 16 11/12/2020 01:40 PM    ALTSGPT 33 11/12/2020 01:40 PM    TBILIRUBIN 0.5 11/12/2020 01:40 PM      Lab Results   Component Value Date/Time    BNPBTYPENAT 39 09/03/2017 06:05 PM               EKG interpreted by me: NSR    Impression/Plan:  1) pAF  2) Amiodarone use    - Plan PVI today    We discussed pulmonary vein isolation for therapeutic management and continued rhythm control.  We discussed the risks and benefits of this procedure.  Risks include 1-3% risk of major cardiovascular event including stroke, myocardial infarction, phrenic nerve damage, esophageal injury and/or fistula formation, cardiac perforation, pericardial effusion, tamponade, major bleeding, or death.  I quoted a 70 to 80% chance free of atrial fibrillation " at 12 months.  We discussed that he may also need a second procedure.        Niko Voss MD  Cardiac Electrophysiology

## 2020-11-18 ENCOUNTER — TELEPHONE (OUTPATIENT)
Dept: CARDIOLOGY | Facility: MEDICAL CENTER | Age: 75
End: 2020-11-18

## 2020-11-18 LAB — ACT BLD: 263 SEC (ref 74–137)

## 2020-11-18 NOTE — DISCHARGE INSTRUCTIONS
"ACTIVITY: Rest and take it easy for the first 24 hours.  A responsible adult is recommended to remain with you during that time.  It is normal to feel sleepy.  We encourage you to not do anything that requires balance, judgment or coordination.    MILD FLU-LIKE SYMPTOMS ARE NORMAL. YOU MAY EXPERIENCE GENERALIZED MUSCLE ACHES, THROAT IRRITATION, HEADACHE AND/OR SOME NAUSEA.    FOR 24 HOURS DO NOT:  Drive, operate machinery or run household appliances.  Drink beer or alcoholic beverages.   Make important decisions or sign legal documents.    SPECIAL INSTRUCTIONS:    Groin Care Instructions     INSTRUCTIONS  1. Examine (look and feel) the site of your incision site TODAY so you can recognize changes that should be called to your doctor (see below).  2. Avoid straining either by lifting or pulling objects for 5 days. Avoid lifting over 5 pounds.   3. For at least 72 hours, if you should sneeze or cough, please hold pressure over your groin area.  4. If you should begin to have oozing from the catheterization site, please hold firm pressure and call your doctor's office immediately.  5. If profuse bleeding occurs from the catheterization site, hold firm pressure and call \"[phone]\" immediately for assistance.  6. Remove bandage after 24 hours.     ACTIVITY  1. Limit activity as instructed by your doctor.  2. No driving or very limited driving with frequent stops for one week.   3. If you must take a long car ride, stop every hour and walk around the car.   4. Warm showers are permitted after the bandage is removed. Avoid hot showers, baths, hot tubs, and swimming for one week.    PLEASE CALL YOUR DOCTOR IF:  1. Temperature elevation occurs.  2. Catheterization site becomes reddened or begins to drain.   3. Bruising appears to be new or not resolving. The bruise may move down your leg. This is normal.  4. The small round lump in the groin increases in size.  5. Any leg numbness, aching, or discomfort " (immediately).  6. Increasing discomfort in the leg at the insertion site.  7. Chest pains, even if relieved by Nitroglycerin.    MISCELLANEOUS INSTRUCTIONS  1. Bruising may occur as a result of heart catheterization. Some of the discoloration may travel down the leg, going from blue to green in color.  2. A small round lump under the catheterization site will remain for up to six weeks.  3. If any questions arise call your physician's office. You can also call the HEALTH HOTLINE open 24 hours/day, 7 days/week and speak to a nurse at [phone], or toll free at [phone].   4. You should call 911 if you develop problems with breathing or chest pain.    FOR PROBLEMS CALL LUCRECIA Voss AT: [phone]    DIET: To avoid nausea, slowly advance diet as tolerated, avoiding spicy or greasy foods for the first day.  Add more substantial food to your diet according to your physician's instructions.  Babies can be fed formula or breast milk as soon as they are hungry.  INCREASE FLUIDS AND FIBER TO AVOID CONSTIPATION.    SURGICAL DRESSING/BATHING: Keep dressing and incision dry and intact for 24 hours, may remove dressing and shower after 5 PM on 11/18, do not need to replace.  Do not submerge site in water for 7 days.    FOLLOW-UP APPOINTMENT:  A follow-up appointment should be arranged with your Dr. Voss [phone]; call to schedule.    You should CALL YOUR PHYSICIAN if you develop:  Fever greater than 101 degrees F.  Pain not relieved by medication, or persistent nausea or vomiting.  Excessive bleeding (blood soaking through dressing) or unexpected drainage from the wound.  Extreme redness or swelling around the incision site, drainage of pus or foul smelling drainage.  Inability to urinate or empty your bladder within 8 hours.  Problems with breathing or chest pain.    You should call 911 if you develop problems with breathing or chest pain.  If you are unable to contact your doctor or surgical center, you should go to the  nearest emergency room or urgent care center.  Physician's telephone #: [phone]    If any questions arise, call your doctor.  If your doctor is not available, please feel free to call the Surgical Center at [phone]. The Contact Center is open Monday through Friday 7AM to 5PM and may speak to a nurse at [phone], or toll free at [phone].     A registered nurse may call you a few days after your surgery to see how you are doing after your procedure.    MEDICATIONS: Resume taking daily medication.  Take prescribed pain medication with food.  If no medication is prescribed, you may take non-aspirin pain medication if needed.  PAIN MEDICATION CAN BE VERY CONSTIPATING.  Take a stool softener or laxative such as senokot, pericolace, or milk of magnesia if needed.    If your physician has prescribed pain medication that includes Acetaminophen (Tylenol), do not take additional Acetaminophen (Tylenol) while taking the prescribed medication.    Depression / Suicide Risk    As you are discharged from this [facility] facility, it is important to learn how to keep safe from harming yourself.    Recognize the warning signs:  · Abrupt changes in personality, positive or negative- including increase in energy   · Giving away possessions  · Change in eating patterns- significant weight changes-  positive or negative  · Change in sleeping patterns- unable to sleep or sleeping all the time   · Unwillingness or inability to communicate  · Depression  · Unusual sadness, discouragement and loneliness  · Talk of wanting to die  · Neglect of personal appearance   · Rebelliousness- reckless behavior  · Withdrawal from people/activities they love  · Confusion- inability to concentrate     If you or a loved one observes any of these behaviors or has concerns about self-harm, here's what you can do:  · Talk about it- your feelings and reasons for harming yourself  · Remove any means that you might use to hurt yourself  (examples: pills, rope, extension cords, firearm)  · Get professional help from the community (Mental Health, Substance Abuse, psychological counseling)  · Do not be alone:Call your Safe Contact- someone whom you trust who will be there for you.  · Call your local CRISIS HOTLINE 183-9778 or 112-347-8977  · Call your local Children's Mobile Crisis Response Team [place] (967) 015-0435 or www.Eyelation  · Call the toll free National Suicide Prevention Hotlines   · National Suicide Prevention Lifeline 160-489-AAVI (7384)  · National Hope Line Network 800-SUICIDE (275-6579)

## 2020-11-18 NOTE — TELEPHONE ENCOUNTER
Pt s/p ablation with jose   No sob, some chest soreness.   Having slight jaw pain  he had severe jaw pain when he had his heart attack this pain is not that bad.     Pt did have a JOSE pt feels like its likely related to that but wanted to make sure there was a tube in his mouth during procedure. Advised yes there was and try some tylenol. Pt wanted to take tramadol. Advised if that does not help please call back.     Discussed irration post ablation causing chest soreness.

## 2020-11-18 NOTE — OR NURSING
1308 Patient arrived to unit, arouses to voice.  Report from anesthesia and RN.  Right groin with scant amount of blood, area soft.  Patient denies pain at this time.  Updated on plan of care, verbalized understanding.  1330 Access site unchanged, patient arouses to voice.  1355 Head of bed elevated.  1400 Call back received from wife Camille, updated on patient status and expected discharge time.  1500 Patient ambulated to bathroom, back to [facility].  Small amount of blood noted to dressing, area soft.  1540 Small increase in amount of blood to dressing, remains soft, will monitor.  Patient tolerating oral intake.  1610 Dressing changed, no active bleeding to groin.  1630 Patient ambulated around unit, back to [facility], tolerated well.  Right groin clean, dry and soft.  1640 Patient up to edge of bed, denies discomfort.  Access site clean, dry and soft.  All lines and monitors discontinued.  1700 Patient taken down via wheelchair to meet wife Camille.  Reviewed discharge paperwork with pt and Camille. Discussed diet, activity, medications, follow up care and worsening symptoms. No questions at this time.  Pt discharged home with Camille.

## 2020-11-20 LAB — LV EJECT FRACT  99904: 30

## 2020-12-16 ENCOUNTER — OFFICE VISIT (OUTPATIENT)
Dept: CARDIOLOGY | Facility: MEDICAL CENTER | Age: 75
End: 2020-12-16
Payer: MEDICARE

## 2020-12-16 VITALS
SYSTOLIC BLOOD PRESSURE: 142 MMHG | DIASTOLIC BLOOD PRESSURE: 76 MMHG | OXYGEN SATURATION: 97 % | BODY MASS INDEX: 33.43 KG/M2 | HEIGHT: 67 IN | HEART RATE: 78 BPM | WEIGHT: 213 LBS

## 2020-12-16 DIAGNOSIS — I48.0 PAF (PAROXYSMAL ATRIAL FIBRILLATION) (HCC): ICD-10-CM

## 2020-12-16 LAB — EKG IMPRESSION: NORMAL

## 2020-12-16 PROCEDURE — 99215 OFFICE O/P EST HI 40 MIN: CPT | Performed by: INTERNAL MEDICINE

## 2020-12-16 PROCEDURE — 93000 ELECTROCARDIOGRAM COMPLETE: CPT | Performed by: INTERNAL MEDICINE

## 2020-12-16 RX ORDER — INDOMETHACIN 50 MG/1
CAPSULE ORAL
COMMUNITY
Start: 2020-11-30 | End: 2021-05-04

## 2020-12-16 RX ORDER — COLCHICINE 0.6 MG/1
TABLET ORAL
COMMUNITY
Start: 2020-11-30 | End: 2021-05-04

## 2020-12-16 RX ORDER — METOPROLOL SUCCINATE 25 MG/1
25 TABLET, EXTENDED RELEASE ORAL DAILY
Qty: 90 TAB | Refills: 3 | Status: SHIPPED | OUTPATIENT
Start: 2020-12-16 | End: 2021-06-22

## 2020-12-16 ASSESSMENT — FIBROSIS 4 INDEX: FIB4 SCORE: 1.08

## 2020-12-16 NOTE — PROGRESS NOTES
Arrhythmia Clinic Note (Established patient)    DOS: 12/16/2020    Chief complaint/Reason for consult: S/p AF/AFL ablation    Interval History: S/p AF/AFL ablation. Doing well since. No palpitations. Off amiodarone now.  No palpitations, no chest pain on exertion, no shortness of breath, still has a dry cough.  Mainly has a question today about his MINOO results prior to ablation which did show depressed ejection fraction.    ROS (+ highlighted in bold):  Constitutional: Fevers/chills/fatigue/weightloss  HEENT: Blurry vision/eye pain/sore throat/hearing loss  Respiratory: Shortness of breath/cough  Cardiovascular: Chest pain/palpitations/edema/orthopnea/syncope  GI: Nausea/vomitting/diarrhea  MSK: Arthralgias/myagias/muscle weakness  Skin: Rash/sores  Neurological: Numbness/tremors/vertigo  Endocrine: Excessive thirst/polyuria/cold intolerance/heat intolerance  Psych: Depression/anxiety    Past Medical History:   Diagnosis Date   • Arrhythmia 09/02/2017    Atrial Fib   • Blood clotting disorder (HCC) 09/2017    heart   • CAD (coronary artery disease) 4/16/2012   • Dental disorder     upper and lower dentures, full   • Hemorrhagic disorder (HCC)     on Plavix and Xarelto   • High cholesterol    • HTN (hypertension) 4/16/2012   • Hyperglycemia 4/16/2012   • Hyperlipemia 4/16/2012   • Hypertension    • Liver enzyme elevation     history of   • Myocardial infarct (HCC) 09/02/2017    Dr. Mcallister, cardiologist   • Obesity    • Sleep apnea     BIPAP   • Tachycardia 4/16/2012   • Tonsillitis    • Urinary incontinence        Past Surgical History:   Procedure Laterality Date   • TRIGGER FINGER RELEASE Left 7/17/2018    Procedure: TRIGGER FINGER RELEASE - RING;  Surgeon: Jose Miguel Kay M.D.;  Location: SURGERY [facility];  Service: Orthopedics   • MASS EXCISION ORTHO Left 7/17/2018    Procedure: MASS EXCISION ORTHO - DISTAL INTERPHALANGEAL JOINT NODULE EXCISION;  Surgeon: Jose Miguel Kay M.D.;  Location: [facility]  CONSTANTINE ORS;  Service: Orthopedics   • CARDIAC CATH, LEFT HEART  2017    successful thrombectomy  and CHELLE to ostial proximal RCA    • STENT PLACEMENT  2017    cardiac   • CARPAL TUNNEL RELEASE Right    • TRIGGER FINGER RELEASE Right        Social History     Socioeconomic History   • Marital status:      Spouse name: Not on file   • Number of children: Not on file   • Years of education: Not on file   • Highest education level: 12th grade   Occupational History   • Not on file   Social Needs   • Financial resource strain: Not hard at all   • Food insecurity     Worry: Never true     Inability: Never true   • Transportation needs     Medical: No     Non-medical: No   Tobacco Use   • Smoking status: Former Smoker     Packs/day: 3.00     Years: 16.00     Pack years: 48.00     Types: Cigarettes     Quit date: 1980     Years since quittin.9   • Smokeless tobacco: Never Used   Substance and Sexual Activity   • Alcohol use: Yes     Alcohol/week: 0.0 oz     Frequency: 4 or more times a week     Drinks per session: 1 or 2     Binge frequency: Never     Comment: 2 per day   • Drug use: No   • Sexual activity: Yes     Partners: Female   Lifestyle   • Physical activity     Days per week: 0 days     Minutes per session: Not on file   • Stress: To some extent   Relationships   • Social connections     Talks on phone: Once a week     Gets together: Once a week     Attends Alevism service: Never     Active member of club or organization: No     Attends meetings of clubs or organizations: Never     Relationship status:    • Intimate partner violence     Fear of current or ex partner: Not on file     Emotionally abused: Not on file     Physically abused: Not on file     Forced sexual activity: Not on file   Other Topics Concern   • Not on file   Social History Narrative   • Not on file       Family History   Problem Relation Age of Onset   • Diabetes Mother    • Heart Disease Mother   "  • Kidney Disease Mother    • Diabetes Sister        Allergies   Allergen Reactions   • Penicillins Swelling   • Sulfa Drugs Swelling   • Brilinta [Ticagrelor] Rash and Itching     .   • Lipidil [Fenofibrate]      Myalgia       Current Outpatient Medications   Medication Sig Dispense Refill   • metoprolol SR (TOPROL XL) 25 MG TABLET SR 24 HR Take 1 Tab by mouth every day. 90 Tab 3   • losartan (COZAAR) 50 MG Tab Take 1 Tab by mouth every day. 100 Tab 3   • atorvastatin (LIPITOR) 20 MG Tab Take 1 tablet by mouth once daily 100 Tab 2   • rivaroxaban (XARELTO) 10 MG Tab tablet Take 1 Tab by mouth with dinner. 90 Tab 1   • vitamin D (CHOLECALCIFEROL) 1000 UNIT TABS Take 1,000 Units by mouth every day.     • indomethacin (INDOCIN) 50 MG Cap TAKE 1 CAPSULE BY MOUTH THREE TIMES DAILY AS NEEDED     • colchicine (COLCRYS) 0.6 MG Tab TAKE 2 TABLETS BY MOUTH AS A ONE TIME DOSE THEN TAKE 1 TABLET 1 HOUR LATER       No current facility-administered medications for this visit.        Physical Exam:  Vitals:    12/16/20 1407   BP: 142/76   BP Location: Left arm   Patient Position: Sitting   BP Cuff Size: Large adult   Pulse: 78   SpO2: 97%   Weight: 96.6 kg (213 lb)   Height: 1.702 m (5' 7\")     General appearance: NAD, conversant   Eyes: anicteric sclerae, moist conjunctivae; no lid-lag; PERRLA  HENT: Atraumatic; oropharynx clear with moist mucous membranes and no mucosal ulcerations; normal hard and soft palate  Neck: Trachea midline; FROM, supple, no thyromegaly or lymphadenopathy  Lungs: CTA, with normal respiratory effort and no intercostal retractions  CV: RRR, no MRGs, no JVD  Abdomen: Soft, non-tender; no masses or HSM  Extremities: No peripheral edema or extremity lymphadenopathy  Skin: Normal temperature, turgor and texture; no rash, ulcers or subcutaneous nodules  Psych: Appropriate affect, alert and oriented to person, place and time    Data:  Lipids:   Lab Results   Component Value Date/Time    CHOLSTRLTOT 119 " 01/16/2020 07:32 AM    TRIGLYCERIDE 81 01/16/2020 07:32 AM    HDL 52 01/16/2020 07:32 AM    LDL 51 01/16/2020 07:32 AM        BMP:  Lab Results   Component Value Date/Time    SODIUM 140 11/12/2020 1340    POTASSIUM 4.3 11/12/2020 1340    CHLORIDE 104 11/12/2020 1340    CO2 27 11/12/2020 1340    GLUCOSE 91 11/12/2020 1340    BUN 15 11/12/2020 1340    CREATININE 0.98 11/12/2020 1340    CALCIUM 9.9 11/12/2020 1340    ANION 9.0 11/12/2020 1340        TSH:   Lab Results   Component Value Date/Time    TSHULTRASEN 0.493 10/14/2020 1329        THYROXINE (T4):   No results found for: MEAGHANIR     CBC:   Lab Results   Component Value Date/Time    WBC 5.8 11/12/2020 01:39 PM    RBC 5.09 11/12/2020 01:39 PM    HEMOGLOBIN 15.9 11/12/2020 01:39 PM    HEMATOCRIT 48.5 11/12/2020 01:39 PM    MCV 95.3 11/12/2020 01:39 PM    MCH 31.2 11/12/2020 01:39 PM    MCHC 32.8 (L) 11/12/2020 01:39 PM    RDW 47.2 11/12/2020 01:39 PM    PLATELETCT 196 11/12/2020 01:39 PM    MPV 10.2 11/12/2020 01:39 PM    NEUTSPOLYS 53.60 11/12/2020 01:39 PM    LYMPHOCYTES 33.90 11/12/2020 01:39 PM    MONOCYTES 10.50 11/12/2020 01:39 PM    EOSINOPHILS 1.40 11/12/2020 01:39 PM    BASOPHILS 0.30 11/12/2020 01:39 PM    IMMGRAN 0.30 11/12/2020 01:39 PM    NRBC 0.00 11/12/2020 01:39 PM    NEUTS 3.10 11/12/2020 01:39 PM    LYMPHS 1.96 11/12/2020 01:39 PM    MONOS 0.61 11/12/2020 01:39 PM    EOS 0.08 11/12/2020 01:39 PM    BASO 0.02 11/12/2020 01:39 PM    IMMGRANAB 0.02 11/12/2020 01:39 PM    NRBCAB 0.00 11/12/2020 01:39 PM        CBC w/o DIFF  Lab Results   Component Value Date/Time    WBC 5.8 11/12/2020 01:39 PM    RBC 5.09 11/12/2020 01:39 PM    HEMOGLOBIN 15.9 11/12/2020 01:39 PM    MCV 95.3 11/12/2020 01:39 PM    MCH 31.2 11/12/2020 01:39 PM    MCHC 32.8 (L) 11/12/2020 01:39 PM    RDW 47.2 11/12/2020 01:39 PM    MPV 10.2 11/12/2020 01:39 PM       Prior echo/stress reviewed: MINOO showed ejection fraction 30%, I have reviewed and I believe this is closer to 40%    EKG  interpreted by me: Sinus rhythm, PVC    Impression/Plan:  1. PAF (paroxysmal atrial fibrillation) ([facility])  EKG    EC-ECHOCARDIOGRAM COMPLETE W/O CONT     1.  Paroxysmal atrial fibrillation  2.  Ischemic cardiomyopathy  3.  Coronary disease status post PCI and MI  4.  Paroxysmal atrial flutter  5.  Chronic oral anticoagulation with Xarelto    -Status post ablation, now off amiodarone, no evidence of recurrence of atrial fibrillation or flutter to date  -I will order another transthoracic echocardiogram to ensure that his ejection fraction looks okay after his ablation, if it does show depressed ejection fraction I will discuss with Dr. Mcallister if we need to perform repeat angiogram  -I will restart Toprol-XL 25 mg daily, his heart rate is improved status post discontinuation of amiodarone. Continue Losartan    Follow-up in 6 months, will arrange closer follow-up with general cardiology if needed after echocardiogram results    Niko Voss MD  Cardiac Electrophysiology

## 2021-01-08 DIAGNOSIS — Z23 NEED FOR VACCINATION: ICD-10-CM

## 2021-01-18 ENCOUNTER — HOSPITAL ENCOUNTER (OUTPATIENT)
Dept: CARDIOLOGY | Facility: MEDICAL CENTER | Age: 76
End: 2021-01-18
Attending: INTERNAL MEDICINE
Payer: MEDICARE

## 2021-01-18 DIAGNOSIS — I48.0 PAF (PAROXYSMAL ATRIAL FIBRILLATION) (HCC): ICD-10-CM

## 2021-01-18 LAB
LV EJECT FRACT  99904: 55
LV EJECT FRACT MOD 2C 99903: 72.61
LV EJECT FRACT MOD 4C 99902: 56.65
LV EJECT FRACT MOD BP 99901: 64.87

## 2021-01-18 PROCEDURE — 93306 TTE W/DOPPLER COMPLETE: CPT | Mod: 26 | Performed by: INTERNAL MEDICINE

## 2021-01-18 PROCEDURE — 93306 TTE W/DOPPLER COMPLETE: CPT

## 2021-01-26 ENCOUNTER — IMMUNIZATION (OUTPATIENT)
Dept: FAMILY PLANNING/WOMEN'S HEALTH CLINIC | Facility: IMMUNIZATION CENTER | Age: 76
End: 2021-01-26
Attending: INTERNAL MEDICINE
Payer: MEDICARE

## 2021-01-26 DIAGNOSIS — Z23 ENCOUNTER FOR VACCINATION: Primary | ICD-10-CM

## 2021-01-26 DIAGNOSIS — Z23 NEED FOR VACCINATION: ICD-10-CM

## 2021-01-26 PROCEDURE — 91300 PFIZER SARS-COV-2 VACCINE: CPT

## 2021-01-26 PROCEDURE — 0001A PFIZER SARS-COV-2 VACCINE: CPT

## 2021-02-18 ENCOUNTER — IMMUNIZATION (OUTPATIENT)
Dept: FAMILY PLANNING/WOMEN'S HEALTH CLINIC | Facility: IMMUNIZATION CENTER | Age: 76
End: 2021-02-18
Attending: INTERNAL MEDICINE
Payer: MEDICARE

## 2021-02-18 DIAGNOSIS — Z23 ENCOUNTER FOR VACCINATION: Primary | ICD-10-CM

## 2021-02-18 PROCEDURE — 91300 PFIZER SARS-COV-2 VACCINE: CPT

## 2021-02-18 PROCEDURE — 0002A PFIZER SARS-COV-2 VACCINE: CPT

## 2021-03-05 NOTE — PROGRESS NOTES
CC: Follow-up hypertension, dyslipidemia, elevated sugars due for wellness examination    HPI:   Shamar presents today with the following.      1. Medicare annual wellness visit, subsequent  Screenings performed below advance directives already on file    2. Essential hypertension  Well-controlled on current regimen denying any chest pain or dizziness he is exercising regularly reports his heart rate has been well controlled except for during exercise.    3. Dyslipidemia  Maintain on statin without myalgia will be due for blood work before next visit.    4. IGT (impaired glucose tolerance)  Sugars elevated he has gone on a diet and able to lose 10 pounds.  No significant family members with diabetes although distant relatives have disease.      Information for advance directives given to patient or instructed to bring in advance directives into to office to put in chart.      Depression Screening    Little interest or pleasure in doing things?  0 - not at all  Feeling down, depressed , or hopeless? 0 - not at all  Patient Health Questionnaire Score: 0     If depressive symptoms identified deferred to follow up visit unless specifically addressed in assessment and plan.    Interpretation of PHQ-9 Total Score   Score Severity   1-4 No Depression   5-9 Mild Depression   10-14 Moderate Depression   15-19 Moderately Severe Depression   20-27 Severe Depression    Screening for Cognitive Impairment    Three Minute Recall (village, kitchen, baby) 3/3    Sravan clock face with all 12 numbers and set the hands to show 10 past 10.  Yes 5/5  Cognitive concerns identified deferred for follow up unless specifically addressed in assessment and plan.    Fall Risk Assessment    Has the patient had two or more falls in the last year or any fall with injury in the last year?  No    Safety Assessment    Throw rugs on floor.  Yes  Handrails on all stairs.  Yes  Good lighting in all hallways.  Yes  Difficulty hearing.  No  Patient  Case Management Initial Discharge Plan  Kristy Bingham,             Met with:patient to discuss discharge plans. Information verified: address, contacts, phone number, , insurance Yes    Emergency Contact/Next of Kin name & number: sister Saundra Kumar [phone]    PCP: DK Schwarz CNP  Date of last visit: past years    Insurance Provider: pending medicaid     Discharge Planning    Living Arrangements:  Alone   Support Systems:  Friends/Neighbors    Patient able to perform ADL's:Independent    Current Services (outpatient & in home) none  DME equipment:   DME provider:     Receiving oral anticoagulation therapy? No    If indicated:   Physician managing anticoagulation treatment: no  Where does patient obtain lab work for ATC treatment? Potential Assistance Needed:  N/A    Patient agreeable to home care: No  [facility] of choice provided:  no    Prior SNF/Rehab Placement and Facility: no  Agreeable to SNF/Rehab: No  [facility] of choice provided: no     Evaluation: no    Expected Discharge date:  21    Patient expects to be discharged to:  home  Follow Up Appointment: Best Day/ Time:      Transportation provider: self  Transportation arrangements needed for discharge: No    Readmission Risk              Risk of Unplanned Readmission:        12         Does patient have a readmission risk score greater than 14?: No  If yes, follow-up appointment must be made within 7 days of discharge.      Goals of Care: control BP      Discharge Plan: home, independent          Electronically signed by [facility], RN on 3/5/21 at 5:54 PM EST counseled about all safety risks that were identified.    Functional Assessment ADLs    Are there any barriers preventing you from cooking for yourself or meeting nutritional needs?  No.    Are there any barriers preventing you from driving safely or obtaining transportation?  No.    Are there any barriers preventing you from using a telephone or calling for help?  No.    Are there any barriers preventing you from shopping?  No.    Are there any barriers preventing you from taking care of your own finances?  No.    Are there any barriers preventing you from managing your medications?  No.    Are there any barriers preventing you from showering, bathing or dressing yourself?  No.    Are you currently engaging in any exercise or physical activity?  Yes.     What is your perception of your health?  Good.      Health Maintenance Summary                IMM ZOSTER VACCINES Postponed 1/11/2037 Originally 8/5/2013. Insurance/Financial     Done 6/10/2013 Imm Admin: Zoster Vaccine Live (ZVL) (Zostavax)    IMM INFLUENZA Next Due 9/1/2019      Done 11/5/2018 Imm Admin: Influenza, Unspecified - Historical Data     Patient has more history with this topic...    Annual Wellness Visit Next Due 8/13/2020      Done 8/13/2019 Visit Dx: Medicare annual wellness visit, subsequent     Patient has more history with this topic...    COLONOSCOPY Next Due 4/17/2024      Done 4/17/2019 REFERRAL TO GI FOR COLONOSCOPY     Patient has more history with this topic...    IMM DTaP/Tdap/Td Vaccine Next Due 9/29/2027      Done 9/29/2017 Imm Admin: Tdap Vaccine     Patient has more history with this topic...          Patient Care Team:  Aldo Lemus M.D. as PCP - General (Internal Medicine)  Prosper Alcala M.D. as Consulting Physician (Gastroenterology)  Julianna Escobedo M.D. as Consulting Physician (Pulmonary Medicine)  Rodrigue Mcallister M.D. as Consulting Physician (Interventional Cardiology)  [facility] (DME Supplier)  Jeanette Pinto  Hermes as              Health Care Screening: Age-appropriate preventive services that Medicare covers were discussed today and ordered as indicated and agreed upon by the patient, and as recommended by USPTF and ACIP.     Patient Active Problem List    Diagnosis Date Noted   • Anticoagulated 02/23/2018     Priority: High   • Stented coronary artery 11/16/2017     Priority: High   • Ischemic cardiomyopathy 09/03/2017     Priority: High   • Coronary artery disease involving native coronary artery of native heart without angina pectoris 04/16/2012     Priority: High   • Dyslipidemia 04/16/2012     Priority: Medium   • Essential hypertension 04/16/2012     Priority: Medium   • Trigger ring finger of left hand 07/17/2018   • Paroxysmal atrial fibrillation (HCC) 09/05/2017   • Non morbid obesity due to excess calories 01/19/2017   • RUPAL (obstructive sleep apnea) 01/19/2017   • IGT (impaired glucose tolerance) 07/08/2016   • Vitamin d deficiency 11/16/2012       Current Outpatient Medications   Medication Sig Dispense Refill   • atorvastatin (LIPITOR) 20 MG Tab Take 1 Tab by mouth every day. 100 Tab 3   • amiodarone (PACERONE) 100 MG tablet TAKE 1 TABLET BY MOUTH EVERY DAY 90 Tab 3   • losartan (COZAAR) 50 MG Tab TAKE 1 TABLET BY MOUTH ONCE DAILY 100 Tab 3   • XARELTO 10 MG Tab tablet TAKE 1 TABLET BY MOUTH ONCE DAILY WITH  DINNER 90 Tab 3   • vitamin D (CHOLECALCIFEROL) 1000 UNIT TABS Take 1,000 Units by mouth every day.       No current facility-administered medications for this visit.        Family History   Problem Relation Age of Onset   • Diabetes Mother    • Heart Disease Mother    • Kidney Disease Mother    • Diabetes Sister        Social History     Socioeconomic History   • Marital status:      Spouse name: Not on file   • Number of children: Not on file   • Years of education: Not on file   • Highest education level: Not on file   Occupational History   • Not on file   Social  Needs   • Financial resource strain: Not on file   • Food insecurity:     Worry: Not on file     Inability: Not on file   • Transportation needs:     Medical: Not on file     Non-medical: Not on file   Tobacco Use   • Smoking status: Former Smoker     Packs/day: 3.00     Years: 16.00     Pack years: 48.00     Types: Cigarettes     Last attempt to quit: 1980     Years since quittin.6   • Smokeless tobacco: Never Used   Substance and Sexual Activity   • Alcohol use: Yes     Alcohol/week: 0.0 oz     Comment: 3 per week   • Drug use: No   • Sexual activity: Yes     Partners: Female   Lifestyle   • Physical activity:     Days per week: Not on file     Minutes per session: Not on file   • Stress: Not on file   Relationships   • Social connections:     Talks on phone: Not on file     Gets together: Not on file     Attends Holiness service: Not on file     Active member of club or organization: Not on file     Attends meetings of clubs or organizations: Not on file     Relationship status: Not on file   • Intimate partner violence:     Fear of current or ex partner: Not on file     Emotionally abused: Not on file     Physically abused: Not on file     Forced sexual activity: Not on file   Other Topics Concern   • Not on file   Social History Narrative   • Not on file       Past Surgical History:   Procedure Laterality Date   • TRIGGER FINGER RELEASE Left 2018    Procedure: TRIGGER FINGER RELEASE - RING;  Surgeon: Jose Miguel Kay M.D.;  Location: SURGERY [facility];  Service: Orthopedics   • MASS EXCISION ORTHO Left 2018    Procedure: MASS EXCISION ORTHO - DISTAL INTERPHALANGEAL JOINT NODULE EXCISION;  Surgeon: Jose Miguel Kay M.D.;  Location: SURGERY [facility];  Service: Orthopedics   • CARDIAC CATH, LEFT HEART  2017    successful thrombectomy  and CHELLE to ostial proximal RCA    • STENT PLACEMENT  2017    cardiac   • CARPAL TUNNEL RELEASE Right    • TRIGGER FINGER RELEASE  "Right 1980's       Allergies as of 08/13/2019 - Reviewed 08/13/2019   Allergen Reaction Noted   • Penicillins Swelling 07/31/2008   • Sulfa drugs Swelling 07/31/2008   • Brilinta [ticagrelor] Rash and Itching 07/09/2018   • Lipidil [fenofibrate]  07/29/2011        ROS: Denies Chest pain, SOB, LE edema.    /66 (BP Location: Right arm, Patient Position: Sitting)   Pulse (!) 56   Temp 36.3 °C (97.3 °F)   Ht 1.702 m (5' 7\")   Wt 97.1 kg (214 lb)   SpO2 95%   BMI 33.52 kg/m²     Physical Exam:  Gen:         Alert and oriented, No apparent distress.  Neck:        No Lymphadenopathy or Bruits.  Lungs:     Clear to auscultation bilaterally  CV:          Regular rate and rhythm. No murmurs, rubs or gallops.  Abd:         Soft non tender, non distended. Normal active bowel sounds.  No  Hepatosplenomegaly, No pulsatile masses.                   Ext:          No clubbing, cyanosis, edema.      Assessment and Plan.   74 y.o. male with the following issues.    1. Medicare annual wellness visit, subsequent  Discussed healthy lifestyle habits as well as screening regimens.    2. Essential hypertension  Currently well controlled, Discuss diet, exercise and salt restriction.  No change to medication therapy.    3. Dyslipidemia  Refilled statin recheck next lab draw  - Comp Metabolic Panel; Future  - Lipid Profile; Future  - atorvastatin (LIPITOR) 20 MG Tab; Take 1 Tab by mouth every day.  Dispense: 100 Tab; Refill: 3    4. IGT (impaired glucose tolerance)  Rip continued weight loss  - HEMOGLOBIN A1C; Future    5. Ischemic cardiomyopathy  Stable follow with cardiology    6. Paroxysmal atrial fibrillation (HCC)  Intermittently in A. fib but asymptomatic    7. RUPAL (obstructive sleep apnea)  T new current therapy    8. Coronary artery disease involving native coronary artery of native heart without angina pectoris  Stable continue current medications      - TSH; Future        Referrals offered: Community-based lifestyle " interventions to reduce health risks and promote self-management and wellness, fall prevention, nutrition, physical activity, tobacco-use cessation, weight loss, and mental health services as per orders if indicated.    Discussion today about general wellness and lifestyle habits:    · Prevent falls and reduce trip hazards; Cautioned about securing or removing rugs.  · Have a working fire alarm and carbon monoxide detector;   · Engage in regular physical activity and social activities

## 2021-04-06 ENCOUNTER — APPOINTMENT (RX ONLY)
Dept: URBAN - METROPOLITAN AREA CLINIC 20 | Facility: CLINIC | Age: 76
Setting detail: DERMATOLOGY
End: 2021-04-06

## 2021-04-06 DIAGNOSIS — L57.0 ACTINIC KERATOSIS: ICD-10-CM

## 2021-04-06 DIAGNOSIS — L81.4 OTHER MELANIN HYPERPIGMENTATION: ICD-10-CM

## 2021-04-06 DIAGNOSIS — S31000A OPEN WOUND(S) (MULTIPLE) OF UNSPECIFIED SITE(S), WITHOUT MENTION OF COMPLICATION: ICD-10-CM

## 2021-04-06 DIAGNOSIS — D18.0 HEMANGIOMA: ICD-10-CM

## 2021-04-06 DIAGNOSIS — Z71.89 OTHER SPECIFIED COUNSELING: ICD-10-CM

## 2021-04-06 DIAGNOSIS — Z86.006 PERSONAL HISTORY OF MELANOMA IN-SITU: ICD-10-CM

## 2021-04-06 DIAGNOSIS — D22 MELANOCYTIC NEVI: ICD-10-CM

## 2021-04-06 DIAGNOSIS — L82.1 OTHER SEBORRHEIC KERATOSIS: ICD-10-CM

## 2021-04-06 PROBLEM — D48.5 NEOPLASM OF UNCERTAIN BEHAVIOR OF SKIN: Status: ACTIVE | Noted: 2021-04-06

## 2021-04-06 PROBLEM — D22.5 MELANOCYTIC NEVI OF TRUNK: Status: ACTIVE | Noted: 2021-04-06

## 2021-04-06 PROBLEM — D22.62 MELANOCYTIC NEVI OF LEFT UPPER LIMB, INCLUDING SHOULDER: Status: ACTIVE | Noted: 2021-04-06

## 2021-04-06 PROBLEM — D18.01 HEMANGIOMA OF SKIN AND SUBCUTANEOUS TISSUE: Status: ACTIVE | Noted: 2021-04-06

## 2021-04-06 PROBLEM — D22.61 MELANOCYTIC NEVI OF RIGHT UPPER LIMB, INCLUDING SHOULDER: Status: ACTIVE | Noted: 2021-04-06

## 2021-04-06 PROBLEM — Z85.820 PERSONAL HISTORY OF MALIGNANT MELANOMA OF SKIN: Status: ACTIVE | Noted: 2021-04-06

## 2021-04-06 PROBLEM — S51.802A UNSPECIFIED OPEN WOUND OF LEFT FOREARM, INITIAL ENCOUNTER: Status: ACTIVE | Noted: 2021-04-06

## 2021-04-06 PROCEDURE — ? LIQUID NITROGEN

## 2021-04-06 PROCEDURE — ? ADDITIONAL NOTES

## 2021-04-06 PROCEDURE — ? COUNSELING

## 2021-04-06 PROCEDURE — ? BIOPSY BY SHAVE METHOD

## 2021-04-06 PROCEDURE — 99213 OFFICE O/P EST LOW 20 MIN: CPT | Mod: 25

## 2021-04-06 PROCEDURE — 17000 DESTRUCT PREMALG LESION: CPT | Mod: 59

## 2021-04-06 PROCEDURE — ? SUNSCREEN RECOMMENDATIONS

## 2021-04-06 PROCEDURE — ? DRESSING CHANGE (GLOBAL PERIOD)

## 2021-04-06 PROCEDURE — 17003 DESTRUCT PREMALG LES 2-14: CPT

## 2021-04-06 PROCEDURE — 11102 TANGNTL BX SKIN SINGLE LES: CPT

## 2021-04-06 ASSESSMENT — LOCATION SIMPLE DESCRIPTION DERM
LOCATION SIMPLE: LEFT HAND
LOCATION SIMPLE: LEFT FOREARM
LOCATION SIMPLE: RIGHT FOREHEAD
LOCATION SIMPLE: UPPER BACK
LOCATION SIMPLE: LEFT CHEEK
LOCATION SIMPLE: RIGHT TEMPLE
LOCATION SIMPLE: SCALP
LOCATION SIMPLE: RIGHT UPPER BACK
LOCATION SIMPLE: LEFT FOREHEAD
LOCATION SIMPLE: RIGHT FOREARM
LOCATION SIMPLE: RIGHT HAND

## 2021-04-06 ASSESSMENT — LOCATION DETAILED DESCRIPTION DERM
LOCATION DETAILED: LEFT LATERAL FOREHEAD
LOCATION DETAILED: RIGHT INFERIOR MEDIAL FOREHEAD
LOCATION DETAILED: RIGHT PROXIMAL DORSAL FOREARM
LOCATION DETAILED: LEFT RADIAL DORSAL HAND
LOCATION DETAILED: LEFT PROXIMAL DORSAL FOREARM
LOCATION DETAILED: RIGHT MID TEMPLE
LOCATION DETAILED: LEFT LATERAL MALAR CHEEK
LOCATION DETAILED: INFERIOR THORACIC SPINE
LOCATION DETAILED: LEFT CENTRAL PARIETAL SCALP
LOCATION DETAILED: SUPERIOR THORACIC SPINE
LOCATION DETAILED: LEFT VENTRAL PROXIMAL FOREARM
LOCATION DETAILED: RIGHT INFERIOR UPPER BACK
LOCATION DETAILED: RIGHT CENTRAL PARIETAL SCALP
LOCATION DETAILED: RIGHT VENTRAL DISTAL FOREARM
LOCATION DETAILED: RIGHT RADIAL DORSAL HAND
LOCATION DETAILED: RIGHT MEDIAL UPPER BACK

## 2021-04-06 ASSESSMENT — LOCATION ZONE DERM
LOCATION ZONE: TRUNK
LOCATION ZONE: FACE
LOCATION ZONE: SCALP
LOCATION ZONE: ARM
LOCATION ZONE: HAND

## 2021-04-06 NOTE — PROCEDURE: DRESSING CHANGE (GLOBAL PERIOD)
Detail Level: Detailed
Add 15074 Cpt? (Important Note: In 2017 The Use Of 87436 Is Being Tracked By Cms To Determine Future Global Period Reimbursement For Global Periods): no
Wound Evaluated By: florecita

## 2021-04-06 NOTE — PROCEDURE: MIPS QUALITY
Detail Level: Detailed
Quality 137: Melanoma: Continuity Of Care - Recall System: Patient information entered into a recall system that includes: target date for the next exam specified AND a process to follow up with patients regarding missed or unscheduled appointments
Quality 130: Documentation Of Current Medications In The Medical Record: Current Medications Documented
Quality 431: Preventive Care And Screening: Unhealthy Alcohol Use - Screening: Patient screened for unhealthy alcohol use using a single question and scores less than 2 times per year
Quality 138: Melanoma: Coordination Of Care: A treatment plan was communicated to the physicians providing continuing care within one month of diagnosis outlining: diagnosis, tumor thickness and a plan for surgery or alternate care.
Quality 226: Preventive Care And Screening: Tobacco Use: Screening And Cessation Intervention: Patient screened for tobacco use and is an ex/non-smoker

## 2021-04-06 NOTE — PROCEDURE: LIQUID NITROGEN
Consent: The patient's consent was obtained including but not limited to risks of crusting, scabbing, blistering, scarring, darker or lighter pigmentary change, recurrence, incomplete removal and infection. RTC in 2 months if lesion(s) persistent.
Duration Of Freeze Thaw-Cycle (Seconds): 10
Render Note In Bullet Format When Appropriate: No
Number Of Freeze-Thaw Cycles: 2 freeze-thaw cycles
Detail Level: Detailed
Post-Care Instructions: I reviewed with the patient in detail post-care instructions. Patient is to wear sunprotection, and avoid picking at any of the treated lesions. Pt may apply Vaseline to crusted or scabbing areas.
Render Post-Care Instructions In Note?: yes

## 2021-04-06 NOTE — PROCEDURE: BIOPSY BY SHAVE METHOD
Detail Level: Detailed
Depth Of Biopsy: dermis
Was A Bandage Applied: Yes
Size Of Lesion In Cm: 0
Biopsy Type: H and E
Biopsy Method: Personna blade
Anesthesia Type: 1% lidocaine with 1:100,000 epinephrine and a 1:12 solution of 8.4% sodium bicarbonate
Anesthesia Volume In Cc: 1
Hemostasis: Drysol and Electrocautery
Wound Care: Bacitracin
Dressing: Band-Aid
Type Of Destruction Used: Curettage
Lab: [id number]
Lab Facility: 
Render Path Notes In Note?: No
Consent: Written consent was obtained and risks were reviewed including but not limited to scarring, infection, bleeding, scabbing, incomplete removal, nerve damage and allergy to anesthesia.
Post-Care Instructions: I reviewed with the patient in detail post-care instructions. Patient is to keep the biopsy site clean once daily and then apply petroleum and bandaid  until healed.
Notification Instructions: Patient will be notified of biopsy results. However, patient instructed to call the office if not contacted within 2 weeks.
Billing Type: Third-Party Bill
Information: Selecting Yes will display possible errors in your note based on the variables you have selected. This validation is only offered as a suggestion for you. PLEASE NOTE THAT THE VALIDATION TEXT WILL BE REMOVED WHEN YOU FINALIZE YOUR NOTE. IF YOU WANT TO FAX A PRELIMINARY NOTE YOU WILL NEED TO TOGGLE THIS TO 'NO' IF YOU DO NOT WANT IT IN YOUR FAXED NOTE.

## 2021-04-06 NOTE — PROCEDURE: ADDITIONAL NOTES
Additional Notes: Wound secondary to a fall
Detail Level: Detailed
Render Risk Assessment In Note?: no
Additional Notes: Ed & c performed during biopsy

## 2021-04-21 ENCOUNTER — APPOINTMENT (RX ONLY)
Dept: URBAN - METROPOLITAN AREA CLINIC 20 | Facility: CLINIC | Age: 76
Setting detail: DERMATOLOGY
End: 2021-04-21

## 2021-04-21 DIAGNOSIS — L30.9 DERMATITIS, UNSPECIFIED: ICD-10-CM | Status: RESOLVING

## 2021-04-21 DIAGNOSIS — L57.0 ACTINIC KERATOSIS: ICD-10-CM

## 2021-04-21 PROCEDURE — 99212 OFFICE O/P EST SF 10 MIN: CPT | Mod: 25

## 2021-04-21 PROCEDURE — 17000 DESTRUCT PREMALG LESION: CPT

## 2021-04-21 PROCEDURE — ? LIQUID NITROGEN

## 2021-04-21 PROCEDURE — ? ADDITIONAL NOTES

## 2021-04-21 PROCEDURE — ? COUNSELING

## 2021-04-21 ASSESSMENT — LOCATION DETAILED DESCRIPTION DERM
LOCATION DETAILED: 3RD WEB SPACE LEFT HAND
LOCATION DETAILED: LEFT ULNAR DORSAL HAND

## 2021-04-21 ASSESSMENT — LOCATION ZONE DERM: LOCATION ZONE: HAND

## 2021-04-21 ASSESSMENT — LOCATION SIMPLE DESCRIPTION DERM: LOCATION SIMPLE: LEFT HAND

## 2021-04-21 NOTE — PROCEDURE: LIQUID NITROGEN
Number Of Freeze-Thaw Cycles: 2 freeze-thaw cycles
Detail Level: Detailed
Render Note In Bullet Format When Appropriate: No
Duration Of Freeze Thaw-Cycle (Seconds): 10
Consent: The patient's consent was obtained including but not limited to risks of crusting, scabbing, blistering, scarring, darker or lighter pigmentary change, recurrence, incomplete removal and infection. RTC in 2 months if lesion(s) persistent.
Render Post-Care Instructions In Note?: yes
Post-Care Instructions: I reviewed with the patient in detail post-care instructions. Patient is to wear sunprotection, and avoid picking at any of the treated lesions. Pt may apply Vaseline to crusted or scabbing areas.

## 2021-04-21 NOTE — PROCEDURE: MIPS QUALITY
Quality 265: Biopsy Follow-Up: Biopsy results reviewed, communicated, tracked, and documented
Quality 111:Pneumonia Vaccination Status For Older Adults: Pneumococcal Vaccination Previously Received
Quality 138: Melanoma: Coordination Of Care: A treatment plan was communicated to the physicians providing continuing care within one month of diagnosis outlining: diagnosis, tumor thickness and a plan for surgery or alternate care.
Quality 130: Documentation Of Current Medications In The Medical Record: Current Medications Documented
Quality 431: Preventive Care And Screening: Unhealthy Alcohol Use - Screening: Patient screened for unhealthy alcohol use using a single question and scores less than 2 times per year
Quality 397: Melanoma: Reporting: The pathology report includes a pT Category and statement on thickness and ulceration for pT1, mitotic rate.
Quality 137: Melanoma: Continuity Of Care - Recall System: Patient information entered into a recall system that includes: target date for the next exam specified AND a process to follow up with patients regarding missed or unscheduled appointments
Detail Level: Detailed
Quality 226: Preventive Care And Screening: Tobacco Use: Screening And Cessation Intervention: Patient screened for tobacco use and is an ex/non-smoker

## 2021-04-21 NOTE — PROCEDURE: ADDITIONAL NOTES
Render Risk Assessment In Note?: no
Detail Level: Detailed
Additional Notes: Bx proven: SUPPURATIVE DERMATITIS

## 2021-04-27 ENCOUNTER — TELEPHONE (OUTPATIENT)
Dept: MEDICAL GROUP | Facility: PHYSICIAN GROUP | Age: 76
End: 2021-04-27

## 2021-04-27 NOTE — TELEPHONE ENCOUNTER
ESTABLISHED PATIENT PRE-VISIT PLANNING     Patient was NOT contacted to complete PVP.     Note: Patient will not be contacted if there is no indication to call.     1.  Reviewed notes from the last few office visits within the medical group: Yes    2.  If any orders were placed at last visit or intended to be done for this visit (i.e. 6 mos follow-up), do we have Results/Consult Notes?         •  Labs - Labs were not ordered at last office visit.  Note: If patient appointment is for lab review and patient did not complete labs, check with provider if OK to reschedule patient until labs completed.       •  Imaging - Imaging was not ordered at last office visit.       •  Referrals - No referrals were ordered at last office visit.    3. Is this appointment scheduled as a Hospital Follow-Up? No    4.  Immunizations were updated in Epic using Reconcile Outside Information activity? Yes    5.  Patient is due for the following Health Maintenance Topics:   There are no preventive care reminders to display for this patient.      6.  AHA (Pulse8) form printed for Provider? Yes

## 2021-05-04 ENCOUNTER — OFFICE VISIT (OUTPATIENT)
Dept: MEDICAL GROUP | Facility: PHYSICIAN GROUP | Age: 76
End: 2021-05-04
Payer: MEDICARE

## 2021-05-04 VITALS
HEIGHT: 67 IN | WEIGHT: 214.5 LBS | SYSTOLIC BLOOD PRESSURE: 140 MMHG | TEMPERATURE: 97.2 F | RESPIRATION RATE: 15 BRPM | DIASTOLIC BLOOD PRESSURE: 79 MMHG | BODY MASS INDEX: 33.67 KG/M2 | HEART RATE: 66 BPM | OXYGEN SATURATION: 93 %

## 2021-05-04 DIAGNOSIS — I48.0 PAROXYSMAL ATRIAL FIBRILLATION (HCC): ICD-10-CM

## 2021-05-04 DIAGNOSIS — I10 ESSENTIAL HYPERTENSION: ICD-10-CM

## 2021-05-04 DIAGNOSIS — E78.5 DYSLIPIDEMIA: ICD-10-CM

## 2021-05-04 PROCEDURE — 99213 OFFICE O/P EST LOW 20 MIN: CPT | Performed by: FAMILY MEDICINE

## 2021-05-04 PROCEDURE — 8041 PR SCP AHA: Performed by: FAMILY MEDICINE

## 2021-05-04 ASSESSMENT — PATIENT HEALTH QUESTIONNAIRE - PHQ9: CLINICAL INTERPRETATION OF PHQ2 SCORE: 0

## 2021-05-04 ASSESSMENT — FIBROSIS 4 INDEX: FIB4 SCORE: 1.08

## 2021-05-04 NOTE — ASSESSMENT & PLAN NOTE
Chronic issue  S/p ablation 9/20 with EP  Following up with cardiology  Still on xarelto and waiting for further recommendations   Off the amiodarone  Denies any palpitations

## 2021-05-04 NOTE — PROGRESS NOTES
Subjective:     Chief Complaint   Patient presents with   • Medication Management       HPI:   Shamar presents today to discuss the following.    Paroxysmal atrial fibrillation (CMS-HCC)  Chronic issue  S/p ablation 9/20 with EP  Following up with cardiology  Still on xarelto and waiting for further recommendations   Off the amiodarone  Denies any palpitations     Essential hypertension  Chronic issue  On losartan 50mg daily and metoprolol 25mg daily       Past Medical History:   Diagnosis Date   • Arrhythmia 09/02/2017    Atrial Fib   • Blood clotting disorder (HCC) 09/2017    heart   • CAD (coronary artery disease) 4/16/2012   • Dental disorder     upper and lower dentures, full   • Hemorrhagic disorder (HCC)     on Plavix and Xarelto   • High cholesterol    • HTN (hypertension) 4/16/2012   • Hyperglycemia 4/16/2012   • Hyperlipemia 4/16/2012   • Hypertension    • Liver enzyme elevation     history of   • Myocardial infarct (HCC) 09/02/2017    Dr. Mcallister, cardiologist   • Obesity    • Sleep apnea     BIPAP   • Tachycardia 4/16/2012   • Tonsillitis    • Urinary incontinence        Current Outpatient Medications Ordered in Epic   Medication Sig Dispense Refill   • metoprolol SR (TOPROL XL) 25 MG TABLET SR 24 HR Take 1 Tab by mouth every day. 90 Tab 3   • losartan (COZAAR) 50 MG Tab Take 1 Tab by mouth every day. 100 Tab 3   • atorvastatin (LIPITOR) 20 MG Tab Take 1 tablet by mouth once daily 100 Tab 2   • rivaroxaban (XARELTO) 10 MG Tab tablet Take 1 Tab by mouth with dinner. 90 Tab 1   • vitamin D (CHOLECALCIFEROL) 1000 UNIT TABS Take 1,000 Units by mouth every day.       No current [facility]-ordered facility-administered medications on file.       Allergies:  Penicillins, Sulfa drugs, Brilinta [ticagrelor], and Lipidil [fenofibrate]    Health Maintenance: Completed    ROS:  Gen: no fevers/chills, no changes in weight  Eyes: no changes in vision  Pulm: no sob, no cough  CV: no chest pain, no palpitations  GI: no  "nausea/vomiting, no diarrhea      Objective:     Exam:  /79 (BP Location: Left arm, Patient Position: Sitting, BP Cuff Size: Adult)   Pulse 66   Temp 36.2 °C (97.2 °F) (Temporal)   Resp 15   Ht 1.702 m (5' 7\")   Wt 97.3 kg (214 lb 8 oz)   SpO2 93%   BMI 33.60 kg/m²  Body mass index is 33.6 kg/m².    Gen: Alert and oriented, No apparent distress.  HENT: TMs are clear. Oropharynx is w/o erythema or exudates. Neck is supple without cervical lymphadenopathy. No JVD.   Eyes: PERRLA  Lungs: Normal effort, CTA bilaterally, no wheezes, rhonchi, or rales  CV: Regular rate and rhythm. No murmurs, rubs, or gallops.  Abdomen: Soft, nontender, nondistended. Normal bowel sounds. Liver and spleen are not palpable  Ext: No clubbing, cyanosis, edema.  Skin: no rash, lesions or ulcers  Neuro: Moves all extremities.  Psych: AAOx3        Assessment & Plan:     75 y.o. male with the following -     1. Paroxysmal atrial fibrillation (HCC)  Chronic, stable condition.  Patient has a history of paroxysmal atrial fibrillation.  Status post ablation September 2020.  In sinus rhythm today.  He continues to follow-up with electrophysiology for this.  Still on Xarelto.  I recommend that he discusses this medication with cardiology/EP he is agreeable to plan.  - CBC WITHOUT DIFFERENTIAL; Future  - Comp Metabolic Panel; Future  - Lipid Profile; Future    2. Essential hypertension  Chronic, stable condition.  We will continue with current regimen.  Blood pressure is within goal.  - CBC WITHOUT DIFFERENTIAL; Future  - Comp Metabolic Panel; Future  - Lipid Profile; Future    3. Dyslipidemia  Chronic, stable condition.  On Lipitor.  He is due for repeat labs.  Labs in place today.  - CBC WITHOUT DIFFERENTIAL; Future  - Comp Metabolic Panel; Future  - Lipid Profile; Future      Return in about 6 months (around 11/4/2021).    Please note that this dictation was created using voice recognition software. I have made every reasonable attempt " to correct obvious errors, but I expect that there are errors of grammar and possibly content that I did not discover before finalizing the note.

## 2021-05-04 NOTE — PROGRESS NOTES
Annual Health Assessment Questions:    1.  Are you currently engaging in any exercise or physical activity? Yes    2.  How would you describe your mood or emotional well-being today? good    3.  Have you had any falls in the last year? No    4.  Have you noticed any problems with your balance or had difficulty walking? Yes    5.  In the last six months have you experienced any leakage of urine? Yes    6. DPA/Advanced Directive: Patient has Durable Power of  on file.

## 2021-05-05 ENCOUNTER — OFFICE VISIT (OUTPATIENT)
Dept: SLEEP MEDICINE | Facility: MEDICAL CENTER | Age: 76
End: 2021-05-05
Payer: MEDICARE

## 2021-05-05 VITALS
OXYGEN SATURATION: 96 % | DIASTOLIC BLOOD PRESSURE: 50 MMHG | HEIGHT: 67 IN | HEART RATE: 77 BPM | SYSTOLIC BLOOD PRESSURE: 122 MMHG | RESPIRATION RATE: 20 BRPM | WEIGHT: 226.1 LBS | BODY MASS INDEX: 35.49 KG/M2

## 2021-05-05 DIAGNOSIS — I25.5 ISCHEMIC CARDIOMYOPATHY: ICD-10-CM

## 2021-05-05 DIAGNOSIS — I25.10 CORONARY ARTERY DISEASE INVOLVING NATIVE CORONARY ARTERY OF NATIVE HEART WITHOUT ANGINA PECTORIS: ICD-10-CM

## 2021-05-05 DIAGNOSIS — G47.33 OSA (OBSTRUCTIVE SLEEP APNEA): ICD-10-CM

## 2021-05-05 PROCEDURE — 99213 OFFICE O/P EST LOW 20 MIN: CPT | Performed by: INTERNAL MEDICINE

## 2021-05-05 ASSESSMENT — FIBROSIS 4 INDEX: FIB4 SCORE: 1.08

## 2021-05-05 NOTE — PROGRESS NOTES
Chief Complaint   Patient presents with   • Follow-Up     1y FV, last seen 20 for RUPAL treated with BiPAP,Coronary artery disease involving native coronary artery of native heart without angina pectoris,BMI 32.0-32.9,adult      HPI: This patient is a 75 y.o. male who returns for annual follow up of RUPAL. Polysomnography showed severe obstructive sleep apnea syndrome with AHI 73 events per hour, associated with desaturations to 67%. Compliance card shows 100% usage on BiPAP: 14/10 cm of water, for 8 hours nightly. AHI is 2.9. He is sleeping well on BIPAP- admits he cannot sleep without it.   He denies daytime somnolence.  Denies mask leaks.  His weight is up.  Requires new CPAP supplies.      Past Medical History:   Diagnosis Date   • Arrhythmia 2017    Atrial Fib   • Blood clotting disorder (HCC) 2017    heart   • CAD (coronary artery disease) 2012   • Dental disorder     upper and lower dentures, full   • Hemorrhagic disorder (HCC)     on Plavix and Xarelto   • High cholesterol    • HTN (hypertension) 2012   • Hyperglycemia 2012   • Hyperlipemia 2012   • Hypertension    • Liver enzyme elevation     history of   • Myocardial infarct (HCC) 2017    Dr. Mcallister, cardiologist   • Obesity    • Sleep apnea     BIPAP   • Tachycardia 2012   • Tonsillitis    • Urinary incontinence        Social History     Socioeconomic History   • Marital status:      Spouse name: Not on file   • Number of children: Not on file   • Years of education: Not on file   • Highest education level: 12th grade   Occupational History   • Not on file   Tobacco Use   • Smoking status: Former Smoker     Packs/day: 3.00     Years: 16.00     Pack years: 48.00     Types: Cigarettes     Quit date: 1980     Years since quittin.3   • Smokeless tobacco: Never Used   Substance and Sexual Activity   • Alcohol use: Yes     Alcohol/week: 0.0 oz     Comment: 2 per day   • Drug use: No   • Sexual activity:  Yes     Partners: Female   Other Topics Concern   • Not on file   Social History Narrative   • Not on file     Social Determinants of Health     Financial Resource Strain: Low Risk    • Difficulty of Paying Living Expenses: Not hard at all   Food Insecurity: No Food Insecurity   • Worried About Running Out of Food in the Last Year: Never true   • Ran Out of Food in the Last Year: Never true   Transportation Needs: No Transportation Needs   • Lack of Transportation (Medical): No   • Lack of Transportation (Non-Medical): No   Physical Activity: Unknown   • Days of Exercise per Week: 0 days   • Minutes of Exercise per Session: Not on file   Stress: Stress Concern Present   • Feeling of Stress : To some extent   Social Connections: Moderately Isolated   • Frequency of Communication with Friends and Family: Once a week   • Frequency of Social Gatherings with Friends and Family: Once a week   • Attends Adventism Services: Never   • Active Member of Clubs or Organizations: No   • Attends Club or Organization Meetings: Never   • Marital Status:    Intimate Partner Violence:    • Fear of Current or Ex-Partner:    • Emotionally Abused:    • Physically Abused:    • Sexually Abused:        Family History   Problem Relation Age of Onset   • Diabetes Mother    • Heart Disease Mother    • Kidney Disease Mother    • Diabetes Sister        Current Outpatient Medications on File Prior to Visit   Medication Sig Dispense Refill   • metoprolol SR (TOPROL XL) 25 MG TABLET SR 24 HR Take 1 Tab by mouth every day. 90 Tab 3   • losartan (COZAAR) 50 MG Tab Take 1 Tab by mouth every day. 100 Tab 3   • atorvastatin (LIPITOR) 20 MG Tab Take 1 tablet by mouth once daily 100 Tab 2   • rivaroxaban (XARELTO) 10 MG Tab tablet Take 1 Tab by mouth with dinner. 90 Tab 1   • vitamin D (CHOLECALCIFEROL) 1000 UNIT TABS Take 1,000 Units by mouth every day.       No current facility-administered medications on file prior to visit.       Allergies:  "Penicillins, Sulfa drugs, Brilinta [ticagrelor], and Lipidil [fenofibrate]    ROS:   Constitutional: Denies fevers, chills, night sweats, fatigue or weight loss  Eyes: Denies vision loss, pain, drainage, double vision  Ears, Nose, Throat: Denies earache, difficulty hearing, tinnitus, nasal congestion, hoarseness  Cardiovascular: Denies chest pain, tightness, palpitations, orthopnea or edema  Respiratory: Denies shortness of breath, cough, wheezing, hemoptysis  Sleep: Denies daytime sleepiness, snoring, apneas, insomnia, morning headaches  GI: Denies heartburn, dysphagia, nausea, abdominal pain, diarrhea or constipation  : Denies frequent urination, hematuria, discharge or painful urination  Musculoskeletal: Denies back pain, painful joints, sore muscles  Neurological: Denies weakness or headaches  Skin: No rashes    /50   Pulse 77   Resp 20   Ht 1.702 m (5' 7\")   Wt 103 kg (226 lb 1.6 oz)   SpO2 96%     Physical Exam:  Appearance: Well-nourished, well-developed, in no acute distress  HEENT: Normocephalic, atraumatic, white sclera, PERRLA, masked  Neck: No adenopathy or masses  Respiratory: no intercostal retractions or accessory muscle use  Lungs auscultation: Clear to auscultation bilaterally  Cardiovascular: Regular rate rhythm. No murmurs, rubs or gallops.  No LE edema  Abdomen: soft, nondistended  Gait: Normal  Digits: No clubbing, cyanosis  Motor: No focal deficits  Orientation: Oriented to time, person and place    Diagnosis:  1. RUPAL (obstructive sleep apnea)  DME Mask and Supplies   2. Coronary artery disease involving native coronary artery of native heart without angina pectoris     3. Ischemic cardiomyopathy         Plan:  Shamar shows excellent compliance and response to BiPAP. AHI is normal.   Continue BiPAP: 14/10 cm of water.    Replace nasal pillows monthly. Script submitted to DME for supplies.  Patient's body mass index is 32.42 kg/m². Exercise and nutrition counseling were performed at " this visit.  Return in about 1 year (around 5/5/2022).

## 2021-05-18 ENCOUNTER — PATIENT MESSAGE (OUTPATIENT)
Dept: HEALTH INFORMATION MANAGEMENT | Facility: OTHER | Age: 76
End: 2021-05-18

## 2021-06-02 ENCOUNTER — PATIENT OUTREACH (OUTPATIENT)
Dept: HEALTH INFORMATION MANAGEMENT | Facility: OTHER | Age: 76
End: 2021-06-02

## 2021-06-02 NOTE — PROGRESS NOTES
Outcome: Declined  Comprehensive Health Assessment.    Mbr stated he is not interested and no further needs at this time.     Please transfer to Patient Outreach Team at [phone] when patient returns call.    Attempt # 2

## 2021-06-14 ENCOUNTER — HOSPITAL ENCOUNTER (OUTPATIENT)
Dept: LAB | Facility: MEDICAL CENTER | Age: 76
End: 2021-06-14
Attending: FAMILY MEDICINE
Payer: MEDICARE

## 2021-06-14 DIAGNOSIS — I48.0 PAROXYSMAL ATRIAL FIBRILLATION (HCC): ICD-10-CM

## 2021-06-14 DIAGNOSIS — E78.5 DYSLIPIDEMIA: ICD-10-CM

## 2021-06-14 DIAGNOSIS — I10 ESSENTIAL HYPERTENSION: ICD-10-CM

## 2021-06-14 LAB
ALBUMIN SERPL BCP-MCNC: 4 G/DL (ref 3.2–4.9)
ALBUMIN/GLOB SERPL: 1.4 G/DL
ALP SERPL-CCNC: 63 U/L (ref 30–99)
ALT SERPL-CCNC: 31 U/L (ref 2–50)
ANION GAP SERPL CALC-SCNC: 8 MMOL/L (ref 7–16)
AST SERPL-CCNC: 23 U/L (ref 12–45)
BILIRUB SERPL-MCNC: 0.7 MG/DL (ref 0.1–1.5)
BUN SERPL-MCNC: 16 MG/DL (ref 8–22)
CALCIUM SERPL-MCNC: 9.5 MG/DL (ref 8.5–10.5)
CHLORIDE SERPL-SCNC: 107 MMOL/L (ref 96–112)
CHOLEST SERPL-MCNC: 129 MG/DL (ref 100–199)
CO2 SERPL-SCNC: 25 MMOL/L (ref 20–33)
CREAT SERPL-MCNC: 0.81 MG/DL (ref 0.5–1.4)
ERYTHROCYTE [DISTWIDTH] IN BLOOD BY AUTOMATED COUNT: 44.4 FL (ref 35.9–50)
FASTING STATUS PATIENT QL REPORTED: NORMAL
GLOBULIN SER CALC-MCNC: 2.8 G/DL (ref 1.9–3.5)
GLUCOSE SERPL-MCNC: 108 MG/DL (ref 65–99)
HCT VFR BLD AUTO: 45.8 % (ref 42–52)
HDLC SERPL-MCNC: 48 MG/DL
HGB BLD-MCNC: 15.4 G/DL (ref 14–18)
LDLC SERPL CALC-MCNC: 68 MG/DL
MCH RBC QN AUTO: 31.6 PG (ref 27–33)
MCHC RBC AUTO-ENTMCNC: 33.6 G/DL (ref 33.7–35.3)
MCV RBC AUTO: 94 FL (ref 81.4–97.8)
PLATELET # BLD AUTO: 188 K/UL (ref 164–446)
PMV BLD AUTO: 10 FL (ref 9–12.9)
POTASSIUM SERPL-SCNC: 3.9 MMOL/L (ref 3.6–5.5)
PROT SERPL-MCNC: 6.8 G/DL (ref 6–8.2)
RBC # BLD AUTO: 4.87 M/UL (ref 4.7–6.1)
SODIUM SERPL-SCNC: 140 MMOL/L (ref 135–145)
TRIGL SERPL-MCNC: 66 MG/DL (ref 0–149)
WBC # BLD AUTO: 5.2 K/UL (ref 4.8–10.8)

## 2021-06-14 PROCEDURE — 36415 COLL VENOUS BLD VENIPUNCTURE: CPT

## 2021-06-14 PROCEDURE — 80061 LIPID PANEL: CPT

## 2021-06-14 PROCEDURE — 80053 COMPREHEN METABOLIC PANEL: CPT

## 2021-06-14 PROCEDURE — 85027 COMPLETE CBC AUTOMATED: CPT

## 2021-06-15 DIAGNOSIS — E78.5 DYSLIPIDEMIA: ICD-10-CM

## 2021-06-15 RX ORDER — ATORVASTATIN CALCIUM 20 MG/1
TABLET, FILM COATED ORAL
Qty: 100 TABLET | Refills: 2 | Status: SHIPPED | OUTPATIENT
Start: 2021-06-15 | End: 2022-04-12 | Stop reason: SDUPTHER

## 2021-06-22 ENCOUNTER — OFFICE VISIT (OUTPATIENT)
Dept: CARDIOLOGY | Facility: MEDICAL CENTER | Age: 76
End: 2021-06-22
Payer: MEDICARE

## 2021-06-22 VITALS
DIASTOLIC BLOOD PRESSURE: 72 MMHG | HEART RATE: 56 BPM | HEIGHT: 67 IN | OXYGEN SATURATION: 94 % | BODY MASS INDEX: 35.31 KG/M2 | SYSTOLIC BLOOD PRESSURE: 128 MMHG | WEIGHT: 225 LBS

## 2021-06-22 DIAGNOSIS — I48.0 PAF (PAROXYSMAL ATRIAL FIBRILLATION) (HCC): ICD-10-CM

## 2021-06-22 LAB — EKG IMPRESSION: NORMAL

## 2021-06-22 PROCEDURE — 93000 ELECTROCARDIOGRAM COMPLETE: CPT | Performed by: INTERNAL MEDICINE

## 2021-06-22 PROCEDURE — 99214 OFFICE O/P EST MOD 30 MIN: CPT | Performed by: INTERNAL MEDICINE

## 2021-06-22 RX ORDER — METOPROLOL SUCCINATE 25 MG/1
12.5 TABLET, EXTENDED RELEASE ORAL DAILY
Qty: 90 TABLET | Refills: 3 | Status: SHIPPED | OUTPATIENT
Start: 2021-06-22 | End: 2022-03-10 | Stop reason: SDUPTHER

## 2021-06-22 ASSESSMENT — FIBROSIS 4 INDEX: FIB4 SCORE: 1.67

## 2021-06-22 NOTE — PROGRESS NOTES
Arrhythmia Clinic Note (Established patient)    DOS: 6/22/2021    Chief complaint/Reason for consult: Afib    Interval History: Doing well.  Has been almost a year since his ablation.  No recurrence of atrial fibrillation.  He is on losartan, metoprolol, and Xarelto.  Still has some shortness of breath, still has some low energy.  He has easy bleeding and had been taken down to Xarelto 10 mg daily by his cardiologist.    ROS (+ highlighted in bold):  Constitutional: Fevers/chills/fatigue/weightloss  HEENT: Blurry vision/eye pain/sore throat/hearing loss  Respiratory: Shortness of breath/cough  Cardiovascular: Chest pain/palpitations/edema/orthopnea/syncope  GI: Nausea/vomitting/diarrhea  MSK: Arthralgias/myagias/muscle weakness  Skin: Rash/sores  Neurological: Numbness/tremors/vertigo  Endocrine: Excessive thirst/polyuria/cold intolerance/heat intolerance  Psych: Depression/anxiety    Past Medical History:   Diagnosis Date   • Arrhythmia 09/02/2017    Atrial Fib   • Blood clotting disorder (HCC) 09/2017    heart   • CAD (coronary artery disease) 4/16/2012   • Dental disorder     upper and lower dentures, full   • Hemorrhagic disorder (HCC)     on Plavix and Xarelto   • High cholesterol    • HTN (hypertension) 4/16/2012   • Hyperglycemia 4/16/2012   • Hyperlipemia 4/16/2012   • Hypertension    • Liver enzyme elevation     history of   • Myocardial infarct (HCC) 09/02/2017    Dr. Mcallister, cardiologist   • Obesity    • Sleep apnea     BIPAP   • Tachycardia 4/16/2012   • Tonsillitis    • Urinary incontinence        Past Surgical History:   Procedure Laterality Date   • TRIGGER FINGER RELEASE Left 7/17/2018    Procedure: TRIGGER FINGER RELEASE - RING;  Surgeon: Jose Miguel Kay M.D.;  Location: [facility];  Service: Orthopedics   • MASS EXCISION ORTHO Left 7/17/2018    Procedure: MASS EXCISION ORTHO - DISTAL INTERPHALANGEAL JOINT NODULE EXCISION;  Surgeon: Jose Miguel Kay M.D.;  Location: [facility]  ORS;  Service: Orthopedics   • CARDIAC CATH, LEFT HEART  2017    successful thrombectomy  and CHELLE to ostial proximal RCA    • STENT PLACEMENT  2017    cardiac   • CARPAL TUNNEL RELEASE Right    • TRIGGER FINGER RELEASE Right        Social History     Socioeconomic History   • Marital status:      Spouse name: Not on file   • Number of children: Not on file   • Years of education: Not on file   • Highest education level: 12th grade   Occupational History   • Not on file   Tobacco Use   • Smoking status: Former Smoker     Packs/day: 3.00     Years: 16.00     Pack years: 48.00     Types: Cigarettes     Quit date: 1980     Years since quittin.5   • Smokeless tobacco: Never Used   Vaping Use   • Vaping Use: Never used   Substance and Sexual Activity   • Alcohol use: Yes     Alcohol/week: 0.0 oz     Comment: 2 per day   • Drug use: No   • Sexual activity: Yes     Partners: Female   Other Topics Concern   • Not on file   Social History Narrative   • Not on file     Social Determinants of Health     Financial Resource Strain: Low Risk    • Difficulty of Paying Living Expenses: Not hard at all   Food Insecurity: No Food Insecurity   • Worried About Running Out of Food in the Last Year: Never true   • Ran Out of Food in the Last Year: Never true   Transportation Needs: No Transportation Needs   • Lack of Transportation (Medical): No   • Lack of Transportation (Non-Medical): No   Physical Activity: Unknown   • Days of Exercise per Week: 0 days   • Minutes of Exercise per Session: Not on file   Stress: Stress Concern Present   • Feeling of Stress : To some extent   Social Connections: Socially Isolated   • Frequency of Communication with Friends and Family: Once a week   • Frequency of Social Gatherings with Friends and Family: Once a week   • Attends Orthodox Services: Never   • Active Member of Clubs or Organizations: No   • Attends Club or Organization Meetings: Never   • Marital  "Status:    Intimate Partner Violence:    • Fear of Current or Ex-Partner:    • Emotionally Abused:    • Physically Abused:    • Sexually Abused:        Family History   Problem Relation Age of Onset   • Diabetes Mother    • Heart Disease Mother    • Kidney Disease Mother    • Diabetes Sister        Allergies   Allergen Reactions   • Penicillins Swelling   • Sulfa Drugs Swelling   • Brilinta [Ticagrelor] Rash and Itching     .   • Lipidil [Fenofibrate]      Myalgia       Current Outpatient Medications   Medication Sig Dispense Refill   • metoprolol SR (TOPROL XL) 25 MG TABLET SR 24 HR Take 0.5 Tablets by mouth every day. 90 tablet 3   • rivaroxaban (XARELTO) 10 MG Tab tablet Take 1 tablet by mouth with dinner. 90 tablet 1   • atorvastatin (LIPITOR) 20 MG Tab Take 1 tablet by mouth once daily 100 tablet 2   • losartan (COZAAR) 50 MG Tab Take 1 Tab by mouth every day. 100 Tab 3   • vitamin D (CHOLECALCIFEROL) 1000 UNIT TABS Take 1,000 Units by mouth every day.       No current facility-administered medications for this visit.       Physical Exam:  Vitals:    06/22/21 1255   BP: 128/72   BP Location: Left arm   Patient Position: Sitting   BP Cuff Size: Adult   Pulse: (!) 56   SpO2: 94%   Weight: 102 kg (225 lb)   Height: 1.702 m (5' 7\")     General appearance: NAD, conversant   Eyes: anicteric sclerae, moist conjunctivae; no lid-lag; PERRLA  HENT: Atraumatic; oropharynx clear with moist mucous membranes and no mucosal ulcerations; normal hard and soft palate  Neck: Trachea midline; FROM, supple, no thyromegaly or lymphadenopathy  Lungs: CTA, with normal respiratory effort and no intercostal retractions  CV: RRR, no MRGs, no JVD  Abdomen: Soft, non-tender; no masses or HSM  Extremities: No peripheral edema or extremity lymphadenopathy  Skin: Normal temperature, turgor and texture; no rash, ulcers or subcutaneous nodules  Psych: Appropriate affect, alert and oriented to person, place and time    Data:  Lipids: "   Lab Results   Component Value Date/Time    CHOLSTRLTOT 129 06/14/2021 08:38 AM    TRIGLYCERIDE 66 06/14/2021 08:38 AM    HDL 48 06/14/2021 08:38 AM    LDL 68 06/14/2021 08:38 AM        BMP:  Lab Results   Component Value Date/Time    SODIUM 140 06/14/2021 0838    POTASSIUM 3.9 06/14/2021 0838    CHLORIDE 107 06/14/2021 0838    CO2 25 06/14/2021 0838    GLUCOSE 108 (H) 06/14/2021 0838    BUN 16 06/14/2021 0838    CREATININE 0.81 06/14/2021 0838    CALCIUM 9.5 06/14/2021 0838    ANION 8.0 06/14/2021 0838        TSH:   Lab Results   Component Value Date/Time    TSHULTRASEN 0.493 10/14/2020 1329        THYROXINE (T4):   No results found for: FREEDIR     CBC:   Lab Results   Component Value Date/Time    WBC 5.2 06/14/2021 08:38 AM    RBC 4.87 06/14/2021 08:38 AM    HEMOGLOBIN 15.4 06/14/2021 08:38 AM    HEMATOCRIT 45.8 06/14/2021 08:38 AM    MCV 94.0 06/14/2021 08:38 AM    MCH 31.6 06/14/2021 08:38 AM    MCHC 33.6 (L) 06/14/2021 08:38 AM    RDW 44.4 06/14/2021 08:38 AM    PLATELETCT 188 06/14/2021 08:38 AM    MPV 10.0 06/14/2021 08:38 AM    NEUTSPOLYS 53.60 11/12/2020 01:39 PM    LYMPHOCYTES 33.90 11/12/2020 01:39 PM    MONOCYTES 10.50 11/12/2020 01:39 PM    EOSINOPHILS 1.40 11/12/2020 01:39 PM    BASOPHILS 0.30 11/12/2020 01:39 PM    IMMGRAN 0.30 11/12/2020 01:39 PM    NRBC 0.00 11/12/2020 01:39 PM    NEUTS 3.10 11/12/2020 01:39 PM    LYMPHS 1.96 11/12/2020 01:39 PM    MONOS 0.61 11/12/2020 01:39 PM    EOS 0.08 11/12/2020 01:39 PM    BASO 0.02 11/12/2020 01:39 PM    IMMGRANAB 0.02 11/12/2020 01:39 PM    NRBCAB 0.00 11/12/2020 01:39 PM        CBC w/o DIFF  Lab Results   Component Value Date/Time    WBC 5.2 06/14/2021 08:38 AM    RBC 4.87 06/14/2021 08:38 AM    HEMOGLOBIN 15.4 06/14/2021 08:38 AM    MCV 94.0 06/14/2021 08:38 AM    MCH 31.6 06/14/2021 08:38 AM    MCHC 33.6 (L) 06/14/2021 08:38 AM    RDW 44.4 06/14/2021 08:38 AM    MPV 10.0 06/14/2021 08:38 AM       Prior echo/stress reviewed: Prior echo showed normal  ejection fraction    EKG interpreted by me: Sinus rhythm    Impression/Plan:  1. PAF (paroxysmal atrial fibrillation) ([facility])  EKG    rivaroxaban (XARELTO) 10 MG Tab tablet     1.  Atrial fibrillation status post ablation  2.  Atrial flutter status post ablation  3.  Chronic oral anticoagulation management, Xarelto  4.  Coronary disease status post myocardial infarction and PCI    -No recurrence of atrial arrhythmias to date  -He is on a subtherapeutic dose of Xarelto due to easy bleeding.  I would prefer he be on a higher dose but he says he cannot tolerate it due to bleeding.  We discussed that if he had recurrence clinically of atrial fibrillation or flutter, I would recommend that he be evaluated for left atrial appendage closure.  -He is on a beta-blocker for coronary disease.  I am going to decrease this to 12.5 mg given his symptoms of low energy to see if this helps.  He is not on a baby aspirin because of history of easy bruising and bleeding, PCI was in 2017.  If he does have left atrial appendage closure, I would probably restart aspirin 81 mg after discontinuation of systemic oral anticoagulation.  -His current cardiologist is retiring.  I will refer him to be established with a new cardiologist.    Annual follow-up    Niko Voss MD  Cardiac Electrophysiology

## 2021-08-18 PROBLEM — M65.322 TRIGGER FINGER, LEFT INDEX FINGER: Status: ACTIVE | Noted: 2021-08-18

## 2021-08-18 PROBLEM — M65.332 TRIGGER MIDDLE FINGER OF LEFT HAND: Status: ACTIVE | Noted: 2021-08-18

## 2021-09-02 RX ORDER — LOSARTAN POTASSIUM 50 MG/1
50 TABLET ORAL DAILY
Qty: 100 TABLET | Refills: 3 | Status: SHIPPED | OUTPATIENT
Start: 2021-09-02 | End: 2022-10-13 | Stop reason: SDUPTHER

## 2021-10-11 ENCOUNTER — TELEPHONE (OUTPATIENT)
Dept: MEDICAL GROUP | Facility: PHYSICIAN GROUP | Age: 76
End: 2021-10-11

## 2021-10-11 NOTE — TELEPHONE ENCOUNTER
VOICEMAIL  1. Caller Name: Shamar Morgan                          Call Back Number: [phone]    2. Message: Patient left  requesting urine labs.     3. Patient approves office to leave a detailed voicemail/MyChart message: N\A

## 2021-10-14 NOTE — TELEPHONE ENCOUNTER
Phone Number Called: [phone]      Call outcome: Did not leave a detailed message. Requested patient to call back.    Message: LVM for patient to please call back.

## 2021-10-14 NOTE — TELEPHONE ENCOUNTER
Phone Number Called: [phone]     Call outcome: Spoke to patient regarding message below.    Message: Spoke to patient regarding orders, Patient however stated he no longer needs test and will discuss more on upcoming appointment.

## 2021-11-04 ENCOUNTER — OFFICE VISIT (OUTPATIENT)
Dept: MEDICAL GROUP | Facility: PHYSICIAN GROUP | Age: 76
End: 2021-11-04
Payer: MEDICARE

## 2021-11-04 VITALS
WEIGHT: 225 LBS | BODY MASS INDEX: 35.31 KG/M2 | OXYGEN SATURATION: 93 % | RESPIRATION RATE: 20 BRPM | TEMPERATURE: 98.1 F | SYSTOLIC BLOOD PRESSURE: 134 MMHG | HEIGHT: 67 IN | HEART RATE: 80 BPM | DIASTOLIC BLOOD PRESSURE: 82 MMHG

## 2021-11-04 DIAGNOSIS — I48.0 PAROXYSMAL ATRIAL FIBRILLATION (HCC): ICD-10-CM

## 2021-11-04 DIAGNOSIS — I10 ESSENTIAL HYPERTENSION: ICD-10-CM

## 2021-11-04 PROCEDURE — 99214 OFFICE O/P EST MOD 30 MIN: CPT | Performed by: FAMILY MEDICINE

## 2021-11-04 ASSESSMENT — FIBROSIS 4 INDEX: FIB4 SCORE: 1.69

## 2021-11-04 NOTE — PROGRESS NOTES
Subjective:     Chief Complaint   Patient presents with   • Follow-Up       HPI:   Shamar presents today to discuss the following.    Paroxysmal atrial fibrillation (CMS-HCC)  Chronic issue  S/p ablation  Still on xarelto at 10mg  To discuss further with cards     Essential hypertension  Chronic issue  Now on 1/2 metoprolol 12.5mg daily and losartan      Past Medical History:   Diagnosis Date   • Arrhythmia 09/02/2017    Atrial Fib   • Blood clotting disorder (HCC) 09/2017    heart   • CAD (coronary artery disease) 4/16/2012   • Dental disorder     upper and lower dentures, full   • Hemorrhagic disorder (HCC)     on Plavix and Xarelto   • High cholesterol    • HTN (hypertension) 4/16/2012   • Hyperglycemia 4/16/2012   • Hyperlipemia 4/16/2012   • Hypertension    • Liver enzyme elevation     history of   • Myocardial infarct (HCC) 09/02/2017    Dr. Mcallister, cardiologist   • Obesity    • Sleep apnea     BIPAP   • Tachycardia 4/16/2012   • Tonsillitis    • Urinary incontinence        Current Outpatient Medications Ordered in Epic   Medication Sig Dispense Refill   • losartan (COZAAR) 50 MG Tab Take 1 Tablet by mouth every day. 100 Tablet 3   • metoprolol SR (TOPROL XL) 25 MG TABLET SR 24 HR Take 0.5 Tablets by mouth every day. (Patient taking differently: Take 12.5 mg by mouth every day. Taking half) 90 tablet 3   • rivaroxaban (XARELTO) 10 MG Tab tablet Take 1 tablet by mouth with dinner. 90 tablet 1   • atorvastatin (LIPITOR) 20 MG Tab Take 1 tablet by mouth once daily 100 tablet 2   • vitamin D (CHOLECALCIFEROL) 1000 UNIT TABS Take 1,000 Units by mouth every day.       No current [facility]-ordered facility-administered medications on file.       Allergies:  Penicillins, Sulfa drugs, Brilinta [ticagrelor], and Lipidil [fenofibrate]    Health Maintenance: Completed    ROS:  Gen: no fevers/chills, no changes in weight  Eyes: no changes in vision  Pulm: no sob, no cough  CV: no chest pain, no palpitations  GI: no  "nausea/vomiting, no diarrhea      Objective:     Exam:  /82 (BP Location: Left arm, Patient Position: Sitting, BP Cuff Size: Adult)   Pulse 80   Temp 36.7 °C (98.1 °F) (Temporal)   Resp 20   Ht 1.702 m (5' 7\")   Wt 102 kg (225 lb)   SpO2 93%   BMI 35.24 kg/m²  Body mass index is 35.24 kg/m².      Constitutional: Alert, no distress, well-groomed.  Skin: Warm, dry, good turgor, no rashes in visible areas.  Eye: Equal, round and reactive, conjunctiva clear, lids normal.  ENMT: Lips without lesions, good dentition, moist mucous membranes.  Neck: Trachea midline, no masses, no thyromegaly.  Respiratory: Unlabored respiratory effort, no cough.  MSK: Normal gait, moves all extremities.  Neuro: Grossly non-focal.   Psych: Alert and oriented x3, normal affect and mood.        Assessment & Plan:     76 y.o. male with the following -     1. Paroxysmal atrial fibrillation (HCC)  Chronic, stable condition.  Continue with metoprolol and Xarelto.  He will discuss with cardiology whether to continue with Xarelto given that he is status post ablation.    2. Essential hypertension  Chronic, stable condition.  Continue with metoprolol and losartan.      Return in about 6 months (around 5/4/2022).    Please note that this dictation was created using voice recognition software. I have made every reasonable attempt to correct obvious errors, but I expect that there are errors of grammar and possibly content that I did not discover before finalizing the note.      "

## 2021-11-08 ENCOUNTER — APPOINTMENT (RX ONLY)
Dept: URBAN - METROPOLITAN AREA CLINIC 20 | Facility: CLINIC | Age: 76
Setting detail: DERMATOLOGY
End: 2021-11-08

## 2021-11-08 DIAGNOSIS — L82.1 OTHER SEBORRHEIC KERATOSIS: ICD-10-CM

## 2021-11-08 DIAGNOSIS — L57.0 ACTINIC KERATOSIS: ICD-10-CM

## 2021-11-08 DIAGNOSIS — Z71.89 OTHER SPECIFIED COUNSELING: ICD-10-CM

## 2021-11-08 DIAGNOSIS — Z86.006 PERSONAL HISTORY OF MELANOMA IN-SITU: ICD-10-CM

## 2021-11-08 DIAGNOSIS — L91.8 OTHER HYPERTROPHIC DISORDERS OF THE SKIN: ICD-10-CM

## 2021-11-08 DIAGNOSIS — L81.4 OTHER MELANIN HYPERPIGMENTATION: ICD-10-CM

## 2021-11-08 DIAGNOSIS — D18.0 HEMANGIOMA: ICD-10-CM

## 2021-11-08 DIAGNOSIS — D22 MELANOCYTIC NEVI: ICD-10-CM

## 2021-11-08 PROBLEM — D22.5 MELANOCYTIC NEVI OF TRUNK: Status: ACTIVE | Noted: 2021-11-08

## 2021-11-08 PROBLEM — Z85.820 PERSONAL HISTORY OF MALIGNANT MELANOMA OF SKIN: Status: ACTIVE | Noted: 2021-11-08

## 2021-11-08 PROBLEM — D18.01 HEMANGIOMA OF SKIN AND SUBCUTANEOUS TISSUE: Status: ACTIVE | Noted: 2021-11-08

## 2021-11-08 PROBLEM — D22.61 MELANOCYTIC NEVI OF RIGHT UPPER LIMB, INCLUDING SHOULDER: Status: ACTIVE | Noted: 2021-11-08

## 2021-11-08 PROBLEM — D22.62 MELANOCYTIC NEVI OF LEFT UPPER LIMB, INCLUDING SHOULDER: Status: ACTIVE | Noted: 2021-11-08

## 2021-11-08 PROCEDURE — ? LIQUID NITROGEN

## 2021-11-08 PROCEDURE — 17000 DESTRUCT PREMALG LESION: CPT

## 2021-11-08 PROCEDURE — ? COUNSELING

## 2021-11-08 PROCEDURE — ? SUNSCREEN RECOMMENDATIONS

## 2021-11-08 PROCEDURE — 17003 DESTRUCT PREMALG LES 2-14: CPT

## 2021-11-08 PROCEDURE — 99213 OFFICE O/P EST LOW 20 MIN: CPT | Mod: 25

## 2021-11-08 ASSESSMENT — LOCATION SIMPLE DESCRIPTION DERM
LOCATION SIMPLE: LEFT UPPER ARM
LOCATION SIMPLE: LEFT FOREARM
LOCATION SIMPLE: RIGHT CHEEK
LOCATION SIMPLE: RIGHT UPPER BACK
LOCATION SIMPLE: NECK
LOCATION SIMPLE: LEFT HAND
LOCATION SIMPLE: LEFT OCCIPITAL SCALP
LOCATION SIMPLE: RIGHT ANTERIOR NECK
LOCATION SIMPLE: UPPER BACK
LOCATION SIMPLE: RIGHT HAND
LOCATION SIMPLE: RIGHT FOREHEAD
LOCATION SIMPLE: SCALP
LOCATION SIMPLE: LEFT FOREHEAD
LOCATION SIMPLE: LEFT EAR
LOCATION SIMPLE: RIGHT FOREARM

## 2021-11-08 ASSESSMENT — LOCATION DETAILED DESCRIPTION DERM
LOCATION DETAILED: RIGHT INFERIOR MEDIAL FOREHEAD
LOCATION DETAILED: RIGHT RADIAL DORSAL HAND
LOCATION DETAILED: LEFT INFERIOR LATERAL FOREHEAD
LOCATION DETAILED: LEFT VENTRAL PROXIMAL FOREARM
LOCATION DETAILED: RIGHT SUPERIOR LATERAL NECK
LOCATION DETAILED: LEFT DISTAL POSTERIOR UPPER ARM
LOCATION DETAILED: RIGHT INFERIOR UPPER BACK
LOCATION DETAILED: LEFT PROXIMAL DORSAL FOREARM
LOCATION DETAILED: INFERIOR THORACIC SPINE
LOCATION DETAILED: LEFT SUPERIOR OCCIPITAL SCALP
LOCATION DETAILED: RIGHT INFERIOR ANTERIOR NECK
LOCATION DETAILED: RIGHT PROXIMAL DORSAL FOREARM
LOCATION DETAILED: RIGHT MEDIAL UPPER BACK
LOCATION DETAILED: RIGHT SUPERIOR LATERAL BUCCAL CHEEK
LOCATION DETAILED: LEFT SUPERIOR HELIX
LOCATION DETAILED: LEFT RADIAL DORSAL HAND
LOCATION DETAILED: RIGHT VENTRAL DISTAL FOREARM
LOCATION DETAILED: LEFT CENTRAL PARIETAL SCALP
LOCATION DETAILED: RIGHT INFERIOR POSTAURICULAR SKIN
LOCATION DETAILED: SUPERIOR THORACIC SPINE

## 2021-11-08 ASSESSMENT — LOCATION ZONE DERM
LOCATION ZONE: NECK
LOCATION ZONE: FACE
LOCATION ZONE: EAR
LOCATION ZONE: HAND
LOCATION ZONE: SCALP
LOCATION ZONE: ARM
LOCATION ZONE: TRUNK

## 2021-11-08 NOTE — PROCEDURE: LIQUID NITROGEN
Duration Of Freeze Thaw-Cycle (Seconds): 10
Number Of Freeze-Thaw Cycles: 2 freeze-thaw cycles
Show Aperture Variable?: Yes
Detail Level: Detailed
Render Note In Bullet Format When Appropriate: No
Consent: The patient's consent was obtained including but not limited to risks of crusting, scabbing, blistering, scarring, darker or lighter pigmentary change, recurrence, incomplete removal and infection. RTC in 2 months if lesion(s) persistent.
Post-Care Instructions: I reviewed with the patient in detail post-care instructions. Patient is to wear sunprotection, and avoid picking at any of the treated lesions. Pt may apply Vaseline to crusted or scabbing areas.

## 2021-11-08 NOTE — PROCEDURE: MIPS QUALITY
Quality 111:Pneumonia Vaccination Status For Older Adults: Pneumococcal Vaccination Previously Received
Quality 138: Melanoma: Coordination Of Care: A treatment plan was communicated to the physicians providing continuing care within one month of diagnosis outlining: diagnosis, tumor thickness and a plan for surgery or alternate care.
Quality 130: Documentation Of Current Medications In The Medical Record: Current Medications Documented
Quality 431: Preventive Care And Screening: Unhealthy Alcohol Use - Screening: Patient screened for unhealthy alcohol use using a single question and scores less than 2 times per year
Quality 397: Melanoma: Reporting: The pathology report includes a pT Category and statement on thickness and ulceration for pT1, mitotic rate.
Quality 137: Melanoma: Continuity Of Care - Recall System: Patient information entered into a recall system that includes: target date for the next exam specified AND a process to follow up with patients regarding missed or unscheduled appointments
Detail Level: Detailed
Quality 226: Preventive Care And Screening: Tobacco Use: Screening And Cessation Intervention: Patient screened for tobacco use and is an ex/non-smoker
Quality 431: Preventive Care And Screening: Unhealthy Alcohol Use - Screening: Patient not identified as an unhealthy alcohol user when screened for unhealthy alcohol use using a systematic screening method

## 2021-12-14 ENCOUNTER — OFFICE VISIT (OUTPATIENT)
Dept: CARDIOLOGY | Facility: MEDICAL CENTER | Age: 76
End: 2021-12-14
Payer: MEDICARE

## 2021-12-14 VITALS
DIASTOLIC BLOOD PRESSURE: 84 MMHG | SYSTOLIC BLOOD PRESSURE: 132 MMHG | WEIGHT: 228.3 LBS | RESPIRATION RATE: 16 BRPM | HEART RATE: 90 BPM | HEIGHT: 67 IN | BODY MASS INDEX: 35.83 KG/M2 | OXYGEN SATURATION: 95 %

## 2021-12-14 DIAGNOSIS — E78.5 DYSLIPIDEMIA: ICD-10-CM

## 2021-12-14 DIAGNOSIS — I48.0 PAROXYSMAL ATRIAL FIBRILLATION (HCC): ICD-10-CM

## 2021-12-14 DIAGNOSIS — I10 ESSENTIAL HYPERTENSION: ICD-10-CM

## 2021-12-14 DIAGNOSIS — I25.10 CORONARY ARTERY DISEASE INVOLVING NATIVE CORONARY ARTERY OF NATIVE HEART WITHOUT ANGINA PECTORIS: ICD-10-CM

## 2021-12-14 PROCEDURE — 99214 OFFICE O/P EST MOD 30 MIN: CPT | Performed by: INTERNAL MEDICINE

## 2021-12-14 ASSESSMENT — FIBROSIS 4 INDEX: FIB4 SCORE: 1.69

## 2021-12-14 NOTE — PROGRESS NOTES
"CARDIOLOGY OUTPATIENT FOLLOWUP    PCP: Rodney Dan M.D.    1. Coronary artery disease involving native coronary artery of native heart without angina pectoris    2. Paroxysmal atrial fibrillation (HCC)    3. Essential hypertension    4. Dyslipidemia        Shamar Morgan is stable with regards to both coronary disease and with apparently resolved atrial fibrillation after catheter ablation.  We did discuss the potential to come off of anticoagulation if a heart monitor was negative we decided to continue with the present regimen.  Blood pressure and cholesterol otherwise well controlled.  We did review the elevation in the fasting blood sugar and I advised a diet low in carbohydrates as well as an active lifestyle.    Follow up: 1 year    Chief Complaint   Patient presents with   • Coronary Artery Disease     F/V Dx: Coronary artery disease involving native coronary artery of native heart without angina pectoris   • Cardiomyopathy (Ischemic)       History: Shamar Morgan is a 76 y.o. male with history of PCI to the RCA for MI back in 2017 and paroxysmal atrial fibrillation closely presenting for follow-up.  He also has prediabetes.    He was asymptomatic with regards to atrial fibrillation and underwent catheter ablation a little over a year ago.  There has been no test of cure and he has been on 10 mg Xarelto ever since.  He feels well when physically exerting himself and is never experienced angina.  The risk factors otherwise well controlled.      ROS:   All other systems reviewed and negative except as per the HPI    PE:  /84 (BP Location: Left arm, Patient Position: Sitting, BP Cuff Size: Adult)   Pulse 90   Resp 16   Ht 1.702 m (5' 7\")   Wt 104 kg (228 lb 4.8 oz)   SpO2 95%   BMI 35.76 kg/m²   Gen: no acute distress  HEENT: Symmetric face. Anicteric sclerae. Moist mucus membranes  NECK: No JVD. No lymphadenopathy  CARDIAC: Regular, Normal S1, S2, No murmur  VASCULATURE: carotids are " normal bilaterally without bruit  RESP: Clear to auscultation bilaterally  ABD: Soft, non-tender, non-distended  EXT: No edema, no clubbing or cyanosis  SKIN: Warm and dry  NEURO: No gross deficits  PSYCH: Appropriate affect, participates in conversation    The ASCVD Risk score (Adrian RADHA Jr, et al., 2013) failed to calculate.    Past Medical History:   Diagnosis Date   • Arrhythmia 09/02/2017    Atrial Fib   • Blood clotting disorder (HCC) 09/2017    heart   • CAD (coronary artery disease) 4/16/2012   • Dental disorder     upper and lower dentures, full   • Hemorrhagic disorder (HCC)     on Plavix and Xarelto   • High cholesterol    • HTN (hypertension) 4/16/2012   • Hyperglycemia 4/16/2012   • Hyperlipemia 4/16/2012   • Hypertension    • Liver enzyme elevation     history of   • Myocardial infarct (HCC) 09/02/2017    Dr. Mcallister, cardiologist   • Obesity    • Sleep apnea     BIPAP   • Tachycardia 4/16/2012   • Tonsillitis    • Urinary incontinence      Allergies   Allergen Reactions   • Penicillins Swelling   • Sulfa Drugs Swelling   • Brilinta [Ticagrelor] Rash and Itching     .   • Lipidil [Fenofibrate]      Myalgia     Outpatient Encounter Medications as of 12/14/2021   Medication Sig Dispense Refill   • losartan (COZAAR) 50 MG Tab Take 1 Tablet by mouth every day. 100 Tablet 3   • metoprolol SR (TOPROL XL) 25 MG TABLET SR 24 HR Take 0.5 Tablets by mouth every day. (Patient taking differently: Take 12.5 mg by mouth every day. Taking half) 90 tablet 3   • rivaroxaban (XARELTO) 10 MG Tab tablet Take 1 tablet by mouth with dinner. 90 tablet 1   • atorvastatin (LIPITOR) 20 MG Tab Take 1 tablet by mouth once daily 100 tablet 2   • vitamin D (CHOLECALCIFEROL) 1000 UNIT TABS Take 1,000 Units by mouth every day.       No facility-administered encounter medications on file as of 12/14/2021.     Social History     Socioeconomic History   • Marital status:      Spouse name: Not on file   • Number of children: Not on  file   • Years of education: Not on file   • Highest education level: 12th grade   Occupational History   • Not on file   Tobacco Use   • Smoking status: Former Smoker     Packs/day: 3.00     Years: 16.00     Pack years: 48.00     Types: Cigarettes     Quit date: 1980     Years since quittin.9   • Smokeless tobacco: Never Used   Vaping Use   • Vaping Use: Never used   Substance and Sexual Activity   • Alcohol use: Yes     Alcohol/week: 0.0 oz     Comment: 2 per day   • Drug use: No   • Sexual activity: Yes     Partners: Female   Other Topics Concern   • Not on file   Social History Narrative   • Not on file     Social Determinants of Health     Financial Resource Strain:    • Difficulty of Paying Living Expenses: Not on file   Food Insecurity:    • Worried About Running Out of Food in the Last Year: Not on file   • Ran Out of Food in the Last Year: Not on file   Transportation Needs:    • Lack of Transportation (Medical): Not on file   • Lack of Transportation (Non-Medical): Not on file   Physical Activity:    • Days of Exercise per Week: Not on file   • Minutes of Exercise per Session: Not on file   Stress:    • Feeling of Stress : Not on file   Social Connections:    • Frequency of Communication with Friends and Family: Not on file   • Frequency of Social Gatherings with Friends and Family: Not on file   • Attends Caoda[employer] Services: Not on file   • Active Member of Clubs or Organizations: Not on file   • Attends Club or Organization Meetings: Not on file   • Marital Status: Not on file   Intimate Partner Violence:    • Fear of Current or Ex-Partner: Not on file   • Emotionally Abused: Not on file   • Physically Abused: Not on file   • Sexually Abused: Not on file   Housing Stability:    • Unable to Pay for Housing in the Last Year: Not on file   • Number of Places Lived in the Last Year: Not on file   • Unstable Housing in the Last Year: Not on file       Studies  Lab Results   Component Value Date/Time     CHOLSTRLTOT 129 06/14/2021 08:38 AM    LDL 68 06/14/2021 08:38 AM    HDL 48 06/14/2021 08:38 AM    TRIGLYCERIDE 66 06/14/2021 08:38 AM       Lab Results   Component Value Date/Time    SODIUM 140 06/14/2021 08:38 AM    POTASSIUM 3.9 06/14/2021 08:38 AM    CHLORIDE 107 06/14/2021 08:38 AM    CO2 25 06/14/2021 08:38 AM    GLUCOSE 108 (H) 06/14/2021 08:38 AM    BUN 16 06/14/2021 08:38 AM    CREATININE 0.81 06/14/2021 08:38 AM     Lab Results   Component Value Date/Time    ALKPHOSPHAT 63 06/14/2021 08:38 AM    ASTSGOT 23 06/14/2021 08:38 AM    ALTSGPT 31 06/14/2021 08:38 AM    TBILIRUBIN 0.7 06/14/2021 08:38 AM        For this encounter I reviewed the following medical records PCP notes, BMP, Lipid profile and CBC

## 2022-01-11 DIAGNOSIS — I48.0 PAF (PAROXYSMAL ATRIAL FIBRILLATION) (HCC): ICD-10-CM

## 2022-01-12 RX ORDER — RIVAROXABAN 10 MG/1
TABLET, FILM COATED ORAL
Qty: 90 TABLET | Refills: 3 | Status: SHIPPED | OUTPATIENT
Start: 2022-01-12 | End: 2023-01-11

## 2022-01-19 ENCOUNTER — PATIENT MESSAGE (OUTPATIENT)
Dept: HEALTH INFORMATION MANAGEMENT | Facility: OTHER | Age: 77
End: 2022-01-19
Payer: MEDICARE

## 2022-03-10 DIAGNOSIS — I48.0 PAROXYSMAL ATRIAL FIBRILLATION (HCC): ICD-10-CM

## 2022-03-11 RX ORDER — METOPROLOL SUCCINATE 25 MG/1
12.5 TABLET, EXTENDED RELEASE ORAL DAILY
Qty: 50 TABLET | Refills: 3 | Status: SHIPPED | OUTPATIENT
Start: 2022-03-11 | End: 2022-03-21 | Stop reason: SDUPTHER

## 2022-03-15 ENCOUNTER — PATIENT MESSAGE (OUTPATIENT)
Dept: CARDIOLOGY | Facility: MEDICAL CENTER | Age: 77
End: 2022-03-15
Payer: MEDICARE

## 2022-03-21 ENCOUNTER — PATIENT MESSAGE (OUTPATIENT)
Dept: CARDIOLOGY | Facility: MEDICAL CENTER | Age: 77
End: 2022-03-21
Payer: MEDICARE

## 2022-03-21 DIAGNOSIS — I48.0 PAROXYSMAL ATRIAL FIBRILLATION (HCC): ICD-10-CM

## 2022-03-21 RX ORDER — METOPROLOL SUCCINATE 25 MG/1
25 TABLET, EXTENDED RELEASE ORAL DAILY
Qty: 100 TABLET | Refills: 3 | Status: SHIPPED | OUTPATIENT
Start: 2022-03-21 | End: 2022-08-24 | Stop reason: SDUPTHER

## 2022-03-21 NOTE — PATIENT COMMUNICATION
FW: Blood pressure  Received: Yesterday  Fabio Alba M.D.  Tessa Zurita R.N.  The higher dosage would be preferred, assuming he is not having any side effects or other reason that he should be on the lower dosage.     Thanks   [facility]      Previous Messages     ----- Message -----   From: Tessa Zurita R.N.   Sent: 3/18/2022   5:38 PM PDT   To: Fabio Alba M.D.   Subject: FW: Blood pressure                               Hi! Patient has been taking 12.5 mg of Metoprolol with BP readings mid 130/80's. Advised that this was normal, but he has increased to 25 mg. He is now getting readings mid to upper 120's/75. Would you like him to continue with the 25 mg? Thanks!   _____________________________________________________________    MyChart message sent to patient.

## 2022-04-06 ENCOUNTER — OFFICE VISIT (OUTPATIENT)
Dept: SLEEP MEDICINE | Facility: MEDICAL CENTER | Age: 77
End: 2022-04-06
Payer: MEDICARE

## 2022-04-06 VITALS
HEIGHT: 66 IN | DIASTOLIC BLOOD PRESSURE: 82 MMHG | OXYGEN SATURATION: 93 % | HEART RATE: 84 BPM | SYSTOLIC BLOOD PRESSURE: 132 MMHG | RESPIRATION RATE: 16 BRPM | BODY MASS INDEX: 36.35 KG/M2 | WEIGHT: 226.2 LBS

## 2022-04-06 DIAGNOSIS — G47.33 OSA (OBSTRUCTIVE SLEEP APNEA): ICD-10-CM

## 2022-04-06 PROCEDURE — 99213 OFFICE O/P EST LOW 20 MIN: CPT | Performed by: NURSE PRACTITIONER

## 2022-04-06 ASSESSMENT — FIBROSIS 4 INDEX: FIB4 SCORE: 1.69

## 2022-04-06 ASSESSMENT — PATIENT HEALTH QUESTIONNAIRE - PHQ9: CLINICAL INTERPRETATION OF PHQ2 SCORE: 0

## 2022-04-06 NOTE — PROGRESS NOTES
Chief Complaint   Patient presents with   • Apnea       HPI:  Shamar Morgan is a 76 y.o. year old male here today for follow-up on RUPAL with annual follow-up.  Last seen 5/5/2021 by Dr Escobedo.  Polysomnography showed severe obstructive sleep apnea syndrome with AHI 73 events per hour, associated with desaturations to 67%.  Current therapy is BiPAP at 14/10 centimeters/H2O.    Patient denies any difficulty with mask fit or pressures.  Current compliance shows 100% use with an average time of 8 hours and 9 minutes and a resultant AHI of 2.8.  Patient remains on previous settings of 14/10 centimeters/H2O on a [employer] device.  There is no evidence of excessive leakage on compliance report.    Patient states he is registered for the recall but has not heard any information regarding replacement or repair of his device.  He denies any neck symptoms associated with the recall including headache, upper airway irritation, cough, chest pressure, or recurrent sinus infections.  He does have seasonal allergies and experiences an occasional cough associated with postnasal drip, but nothing severe or frequent.    ROS: As per HPI and otherwise negative if not stated.    Past Medical History:   Diagnosis Date   • Arrhythmia 09/02/2017    Atrial Fib   • Blood clotting disorder (HCC) 09/2017    heart   • CAD (coronary artery disease) 4/16/2012   • Dental disorder     upper and lower dentures, full   • Hemorrhagic disorder (HCC)     on Plavix and Xarelto   • High cholesterol    • HTN (hypertension) 4/16/2012   • Hyperglycemia 4/16/2012   • Hyperlipemia 4/16/2012   • Hypertension    • Liver enzyme elevation     history of   • Myocardial infarct (HCC) 09/02/2017    Dr. Mcallister, cardiologist   • Obesity    • Sleep apnea     BIPAP   • Tachycardia 4/16/2012   • Tonsillitis    • Urinary incontinence        Past Surgical History:   Procedure Laterality Date   • TRIGGER FINGER RELEASE Left 7/17/2018    Procedure: TRIGGER FINGER  RELEASE - RING;  Surgeon: Jose Miguel Kay M.D.;  Location: SURGERY [facility];  Service: Orthopedics   • MASS EXCISION ORTHO Left 7/17/2018    Procedure: MASS EXCISION ORTHO - DISTAL INTERPHALANGEAL JOINT NODULE EXCISION;  Surgeon: Jose Miguel Kay M.D.;  Location: SURGERY [facility];  Service: Orthopedics   • CARDIAC CATH, LEFT HEART  09/03/2017    successful thrombectomy  and CHELLE to ostial proximal RCA    • STENT PLACEMENT  09/03/2017    cardiac   • CARPAL TUNNEL RELEASE Right 1980's   • TRIGGER FINGER RELEASE Right 1980's       Family History   Problem Relation Age of Onset   • Diabetes Mother    • Heart Disease Mother    • Kidney Disease Mother    • Diabetes Sister        Allergies as of 04/06/2022 - Reviewed 12/14/2021   Allergen Reaction Noted   • Penicillins Swelling 07/31/2008   • Sulfa drugs Swelling 07/31/2008   • Brilinta [ticagrelor] Rash and Itching 07/09/2018   • Lipidil [fenofibrate]  07/29/2011        Vitals:  There were no vitals taken for this visit.    Current medications as of today   Current Outpatient Medications   Medication Sig Dispense Refill   • metoprolol SR (TOPROL XL) 25 MG TABLET SR 24 HR Take 1 Tablet by mouth every day. 100 Tablet 3   • XARELTO 10 MG Tab tablet TAKE 1 TABLET BY MOUTH WITH SUPPER 90 Tablet 3   • losartan (COZAAR) 50 MG Tab Take 1 Tablet by mouth every day. 100 Tablet 3   • atorvastatin (LIPITOR) 20 MG Tab Take 1 tablet by mouth once daily 100 tablet 2   • vitamin D (CHOLECALCIFEROL) 1000 UNIT TABS Take 1,000 Units by mouth every day.       No current facility-administered medications for this visit.         Physical Exam:   Gen:           Alert and oriented, No apparent distress. Mood and affect appropriate, normal interaction with examiner.  Eyes:          PERRL, EOM intact, sclere white, conjunctive moist.  Ears:          Not examined.   Hearing:     Grossly intact.  Nose:          Normal, no lesions or deformities.  Dentition:    Good  dentition.  Oropharynx:   Tongue normal, posterior pharynx without erythema or exudate.  Neck:        Supple, trachea midline, no masses.  Respiratory Effort: No intercostal retractions or use of accessory muscles.   Lung Auscultation:      Clear to auscultation bilaterally; no rales, rhonchi or wheezing.  CV:            Regular rate and rhythm. No murmurs, rubs or gallops.  Abd:           Not examined.   Lymphadenopathy: Not examined.  Gait and Station: Normal.  Digits and Nails: No clubbing, cyanosis, petechiae, or nodes.   Cranial Nerves: II-XII grossly intact.  Skin:        No rashes, lesions or ulcers noted.               Ext:           No cyanosis or edema.      Assessment:  1. RUPAL (obstructive sleep apnea)         Plan:  1.  Reviewed compliance with patient which shows adequate control of RUPAL with a resultant AHI of 2.8.  Order was placed for mask and supplies through preferred home care.  Follow-up annually or sooner if needed.  Patient continues to use and benefit from BiPAP therapy at this time.    Please note that this dictation was created using voice recognition software. I have made every reasonable attempt to correct obvious errors, but it is possible there are errors of grammar and possibly content that I did not discover before finalizing the note.

## 2022-05-05 ENCOUNTER — TELEPHONE (OUTPATIENT)
Dept: HEALTH INFORMATION MANAGEMENT | Facility: OTHER | Age: 77
End: 2022-05-05
Payer: MEDICARE

## 2022-05-09 ENCOUNTER — APPOINTMENT (RX ONLY)
Dept: URBAN - METROPOLITAN AREA CLINIC 20 | Facility: CLINIC | Age: 77
Setting detail: DERMATOLOGY
End: 2022-05-09

## 2022-05-09 DIAGNOSIS — Z86.006 PERSONAL HISTORY OF MELANOMA IN-SITU: ICD-10-CM

## 2022-05-09 DIAGNOSIS — L82.1 OTHER SEBORRHEIC KERATOSIS: ICD-10-CM

## 2022-05-09 DIAGNOSIS — L81.4 OTHER MELANIN HYPERPIGMENTATION: ICD-10-CM

## 2022-05-09 DIAGNOSIS — D22 MELANOCYTIC NEVI: ICD-10-CM

## 2022-05-09 DIAGNOSIS — L57.0 ACTINIC KERATOSIS: ICD-10-CM

## 2022-05-09 DIAGNOSIS — L71.8 OTHER ROSACEA: ICD-10-CM

## 2022-05-09 DIAGNOSIS — D18.0 HEMANGIOMA: ICD-10-CM

## 2022-05-09 PROBLEM — D22.61 MELANOCYTIC NEVI OF RIGHT UPPER LIMB, INCLUDING SHOULDER: Status: ACTIVE | Noted: 2022-05-09

## 2022-05-09 PROBLEM — D22.5 MELANOCYTIC NEVI OF TRUNK: Status: ACTIVE | Noted: 2022-05-09

## 2022-05-09 PROBLEM — D22.72 MELANOCYTIC NEVI OF LEFT LOWER LIMB, INCLUDING HIP: Status: ACTIVE | Noted: 2022-05-09

## 2022-05-09 PROBLEM — D22.62 MELANOCYTIC NEVI OF LEFT UPPER LIMB, INCLUDING SHOULDER: Status: ACTIVE | Noted: 2022-05-09

## 2022-05-09 PROBLEM — D18.01 HEMANGIOMA OF SKIN AND SUBCUTANEOUS TISSUE: Status: ACTIVE | Noted: 2022-05-09

## 2022-05-09 PROBLEM — D22.71 MELANOCYTIC NEVI OF RIGHT LOWER LIMB, INCLUDING HIP: Status: ACTIVE | Noted: 2022-05-09

## 2022-05-09 PROCEDURE — ? LIQUID NITROGEN

## 2022-05-09 PROCEDURE — ? COUNSELING

## 2022-05-09 PROCEDURE — 99213 OFFICE O/P EST LOW 20 MIN: CPT | Mod: 25

## 2022-05-09 PROCEDURE — ? PRESCRIPTION

## 2022-05-09 PROCEDURE — 17003 DESTRUCT PREMALG LES 2-14: CPT

## 2022-05-09 PROCEDURE — 17000 DESTRUCT PREMALG LESION: CPT

## 2022-05-09 PROCEDURE — ? SUNSCREEN RECOMMENDATIONS

## 2022-05-09 RX ORDER — METRONIDAZOLE 10 MG/G
1 GEL TOPICAL
Qty: 60 | Refills: 6 | Status: ERX

## 2022-05-09 ASSESSMENT — LOCATION ZONE DERM
LOCATION ZONE: ARM
LOCATION ZONE: EAR
LOCATION ZONE: SCALP
LOCATION ZONE: HAND
LOCATION ZONE: NOSE
LOCATION ZONE: FACE
LOCATION ZONE: LEG
LOCATION ZONE: TRUNK
LOCATION ZONE: NECK

## 2022-05-09 ASSESSMENT — LOCATION DETAILED DESCRIPTION DERM
LOCATION DETAILED: LEFT SUPERIOR LATERAL MALAR CHEEK
LOCATION DETAILED: LEFT ULNAR DORSAL HAND
LOCATION DETAILED: LEFT INFERIOR HELIX
LOCATION DETAILED: LEFT DISTAL POSTERIOR THIGH
LOCATION DETAILED: LEFT CENTRAL LATERAL NECK
LOCATION DETAILED: LEFT RADIAL DORSAL HAND
LOCATION DETAILED: LEFT SUPERIOR HELIX
LOCATION DETAILED: RIGHT DISTAL POSTERIOR THIGH
LOCATION DETAILED: RIGHT SUPERIOR MEDIAL UPPER BACK
LOCATION DETAILED: RIGHT MEDIAL FRONTAL SCALP
LOCATION DETAILED: LEFT ANTERIOR PROXIMAL UPPER ARM
LOCATION DETAILED: LEFT INFERIOR MEDIAL FOREHEAD
LOCATION DETAILED: LEFT MEDIAL MALAR CHEEK
LOCATION DETAILED: LEFT CENTRAL MALAR CHEEK
LOCATION DETAILED: RIGHT LATERAL BUCCAL CHEEK
LOCATION DETAILED: RIGHT ANTERIOR PROXIMAL UPPER ARM
LOCATION DETAILED: EPIGASTRIC SKIN
LOCATION DETAILED: LEFT INFERIOR MEDIAL MALAR CHEEK
LOCATION DETAILED: RIGHT ULNAR DORSAL HAND
LOCATION DETAILED: RIGHT INFERIOR PREAURICULAR CHEEK
LOCATION DETAILED: LEFT SUPERIOR MEDIAL UPPER BACK
LOCATION DETAILED: RIGHT SUPERIOR UPPER BACK
LOCATION DETAILED: LEFT CENTRAL PARIETAL SCALP
LOCATION DETAILED: NASAL DORSUM
LOCATION DETAILED: RIGHT LATERAL FOREHEAD
LOCATION DETAILED: RIGHT CENTRAL MALAR CHEEK

## 2022-05-09 ASSESSMENT — LOCATION SIMPLE DESCRIPTION DERM
LOCATION SIMPLE: LEFT POSTERIOR THIGH
LOCATION SIMPLE: RIGHT HAND
LOCATION SIMPLE: LEFT UPPER ARM
LOCATION SIMPLE: LEFT HAND
LOCATION SIMPLE: LEFT EAR
LOCATION SIMPLE: SCALP
LOCATION SIMPLE: RIGHT SCALP
LOCATION SIMPLE: RIGHT UPPER ARM
LOCATION SIMPLE: LEFT FOREHEAD
LOCATION SIMPLE: LEFT CHEEK
LOCATION SIMPLE: RIGHT FOREHEAD
LOCATION SIMPLE: ABDOMEN
LOCATION SIMPLE: NOSE
LOCATION SIMPLE: RIGHT UPPER BACK
LOCATION SIMPLE: NECK
LOCATION SIMPLE: RIGHT CHEEK
LOCATION SIMPLE: RIGHT POSTERIOR THIGH
LOCATION SIMPLE: LEFT UPPER BACK

## 2022-05-09 NOTE — PROCEDURE: MIPS QUALITY
Quality 431: Preventive Care And Screening: Unhealthy Alcohol Use - Screening: Patient not identified as an unhealthy alcohol user when screened for unhealthy alcohol use using a systematic screening method
Quality 226: Preventive Care And Screening: Tobacco Use: Screening And Cessation Intervention: Patient screened for tobacco use and is an ex/non-smoker
Detail Level: Detailed
Quality 137: Melanoma: Continuity Of Care - Recall System: Patient information entered into a recall system that includes: target date for the next exam specified AND a process to follow up with patients regarding missed or unscheduled appointments
Quality 111:Pneumonia Vaccination Status For Older Adults: Pneumococcal Vaccination Previously Received
Quality 138: Melanoma: Coordination Of Care: A treatment plan was communicated to the physicians providing continuing care within one month of diagnosis outlining: diagnosis, tumor thickness and a plan for surgery or alternate care.
Quality 130: Documentation Of Current Medications In The Medical Record: Current Medications Documented

## 2022-05-09 NOTE — PROCEDURE: LIQUID NITROGEN
Duration Of Freeze Thaw-Cycle (Seconds): 10
Render Note In Bullet Format When Appropriate: No
Detail Level: Detailed
Post-Care Instructions: I reviewed with the patient in detail post-care instructions. Patient is to wear sunprotection, and avoid picking at any of the treated lesions. Pt may apply Vaseline to crusted or scabbing areas.
Render Post-Care Instructions In Note?: yes
Number Of Freeze-Thaw Cycles: 2 freeze-thaw cycles
Consent: The patient's consent was obtained including but not limited to risks of crusting, scabbing, blistering, scarring, darker or lighter pigmentary change, recurrence, incomplete removal and infection. RTC in 2 months if lesion(s) persistent.

## 2022-05-12 ENCOUNTER — OFFICE VISIT (OUTPATIENT)
Dept: MEDICAL GROUP | Facility: PHYSICIAN GROUP | Age: 77
End: 2022-05-12
Payer: MEDICARE

## 2022-05-12 VITALS
BODY MASS INDEX: 34.84 KG/M2 | OXYGEN SATURATION: 96 % | WEIGHT: 222 LBS | TEMPERATURE: 97.7 F | HEIGHT: 67 IN | HEART RATE: 65 BPM | SYSTOLIC BLOOD PRESSURE: 122 MMHG | DIASTOLIC BLOOD PRESSURE: 80 MMHG

## 2022-05-12 DIAGNOSIS — E78.5 DYSLIPIDEMIA: ICD-10-CM

## 2022-05-12 DIAGNOSIS — I48.0 PAROXYSMAL ATRIAL FIBRILLATION (HCC): ICD-10-CM

## 2022-05-12 DIAGNOSIS — N52.8 OTHER MALE ERECTILE DYSFUNCTION: ICD-10-CM

## 2022-05-12 DIAGNOSIS — I10 ESSENTIAL HYPERTENSION: ICD-10-CM

## 2022-05-12 DIAGNOSIS — Z12.5 SCREENING FOR MALIGNANT NEOPLASM OF PROSTATE: ICD-10-CM

## 2022-05-12 PROCEDURE — 99214 OFFICE O/P EST MOD 30 MIN: CPT | Performed by: FAMILY MEDICINE

## 2022-05-12 RX ORDER — METRONIDAZOLE 10 MG/G
GEL TOPICAL
COMMUNITY
Start: 2022-05-10 | End: 2022-11-18

## 2022-05-12 RX ORDER — TADALAFIL 20 MG/1
20 TABLET ORAL
Qty: 30 TABLET | Refills: 1 | Status: SHIPPED | OUTPATIENT
Start: 2022-05-12 | End: 2022-06-11

## 2022-05-12 ASSESSMENT — FIBROSIS 4 INDEX: FIB4 SCORE: 1.69

## 2022-05-12 NOTE — PROGRESS NOTES
Subjective:     Chief Complaint   Patient presents with   • Follow-Up       HPI:   Shamar presents today to discuss the following.      Essential hypertension  Chronic issue  Now on metoprolol 12.5mg daily. Felt like his BP is good enough at this point.   Also taking losartan 50mg daily    Paroxysmal atrial fibrillation (CMS-HCC)  Chronic issue  On xarelto and managed by cards.     Dyslipidemia  Chronic issue  On statin    Other male erectile dysfunction  Chronic issue  Asking Rx      Past Medical History:   Diagnosis Date   • Arrhythmia 09/02/2017    Atrial Fib   • Blood clotting disorder (HCC) 09/2017    heart   • CAD (coronary artery disease) 4/16/2012   • Dental disorder     upper and lower dentures, full   • Hemorrhagic disorder (HCC)     on Plavix and Xarelto   • High cholesterol    • HTN (hypertension) 4/16/2012   • Hyperglycemia 4/16/2012   • Hyperlipemia 4/16/2012   • Hypertension    • Liver enzyme elevation     history of   • Myocardial infarct (HCC) 09/02/2017    Dr. Mcallister, cardiologist   • Obesity    • Sleep apnea     BIPAP   • Tachycardia 4/16/2012   • Tonsillitis    • Urinary incontinence        Current Outpatient Medications Ordered in Epic   Medication Sig Dispense Refill   • metronidazole (METROGEL) 1 % gel      • tadalafil (CIALIS) 20 MG tablet Take 1 Tablet by mouth 1 time a day as needed for Erectile Dysfunction for up to 30 days. 30 Tablet 1   • atorvastatin (LIPITOR) 20 MG Tab Take 1 tablet by mouth once daily 100 Tablet 3   • metoprolol SR (TOPROL XL) 25 MG TABLET SR 24 HR Take 1 Tablet by mouth every day. (Patient taking differently: Take 25 mg by mouth every day. Take half a pill) 100 Tablet 3   • XARELTO 10 MG Tab tablet TAKE 1 TABLET BY MOUTH WITH SUPPER 90 Tablet 3   • losartan (COZAAR) 50 MG Tab Take 1 Tablet by mouth every day. 100 Tablet 3   • vitamin D (CHOLECALCIFEROL) 1000 UNIT TABS Take 1,000 Units by mouth every day.       No current [facility]-ordered facility-administered medications on  "file.       Allergies:  Penicillins, Sulfa drugs, Brilinta [ticagrelor], and Lipidil [fenofibrate]    Health Maintenance: Completed    ROS:  Gen: no fevers/chills, no changes in weight  Eyes: no changes in vision  Pulm: no sob, no cough  CV: no chest pain, no palpitations  GI: no nausea/vomiting, no diarrhea      Objective:     Exam:  /80 (BP Location: Left arm, Patient Position: Sitting, BP Cuff Size: Adult)   Pulse 65   Temp 36.5 °C (97.7 °F) (Temporal)   Ht 1.702 m (5' 7\")   Wt 101 kg (222 lb)   SpO2 96%   BMI 34.77 kg/m²  Body mass index is 34.77 kg/m².      Constitutional: Alert, no distress, well-groomed.  Skin: Warm, dry, good turgor, no rashes in visible areas.  Eye: Equal, round and reactive, conjunctiva clear, lids normal.  ENMT: Lips without lesions, good dentition, moist mucous membranes.  Neck: Trachea midline, no masses, no thyromegaly.  Respiratory: Unlabored respiratory effort, no cough.  MSK: Normal gait, moves all extremities.  Neuro: Grossly non-focal.   Psych: Alert and oriented x3, normal affect and mood.        Assessment & Plan:     76 y.o. male with the following -     1. Essential hypertension  Chronic, stable condition.  Continue with current regimen.  - CBC WITHOUT DIFFERENTIAL; Future  - Comp Metabolic Panel; Future  - Lipid Profile; Future    2. Paroxysmal atrial fibrillation (HCC)  Chronic, stable condition.  Continue with cardiology follow-up.  Continue with Xarelto.    3. Dyslipidemia  Chronic, stable condition.  Due for repeat labs in June.  - CBC WITHOUT DIFFERENTIAL; Future  - Comp Metabolic Panel; Future  - Lipid Profile; Future    4. Screening for malignant neoplasm of prostate  - PROSTATE SPECIFIC AG SCREENING; Future    5. Other male erectile dysfunction  Trial of Cialis.      Return in about 6 months (around 11/12/2022).    Please note that this dictation was created using voice recognition software. I have made every reasonable attempt to correct obvious errors, " but I expect that there are errors of grammar and possibly content that I did not discover before finalizing the note.

## 2022-05-12 NOTE — ASSESSMENT & PLAN NOTE
Chronic issue  Now on metoprolol 12.5mg daily. Felt like his BP is good enough at this point.   Also taking losartan 50mg daily

## 2022-06-16 ENCOUNTER — HOSPITAL ENCOUNTER (OUTPATIENT)
Dept: LAB | Facility: MEDICAL CENTER | Age: 77
End: 2022-06-16
Attending: FAMILY MEDICINE
Payer: MEDICARE

## 2022-06-16 DIAGNOSIS — I10 ESSENTIAL HYPERTENSION: ICD-10-CM

## 2022-06-16 DIAGNOSIS — Z12.5 SCREENING FOR MALIGNANT NEOPLASM OF PROSTATE: ICD-10-CM

## 2022-06-16 DIAGNOSIS — E78.5 DYSLIPIDEMIA: ICD-10-CM

## 2022-06-16 LAB
ALBUMIN SERPL BCP-MCNC: 4.4 G/DL (ref 3.2–4.9)
ALBUMIN/GLOB SERPL: 1.8 G/DL
ALP SERPL-CCNC: 65 U/L (ref 30–99)
ALT SERPL-CCNC: 34 U/L (ref 2–50)
ANION GAP SERPL CALC-SCNC: 12 MMOL/L (ref 7–16)
AST SERPL-CCNC: 23 U/L (ref 12–45)
BILIRUB SERPL-MCNC: 0.6 MG/DL (ref 0.1–1.5)
BUN SERPL-MCNC: 21 MG/DL (ref 8–22)
CALCIUM SERPL-MCNC: 9.5 MG/DL (ref 8.5–10.5)
CHLORIDE SERPL-SCNC: 107 MMOL/L (ref 96–112)
CHOLEST SERPL-MCNC: 129 MG/DL (ref 100–199)
CO2 SERPL-SCNC: 23 MMOL/L (ref 20–33)
CREAT SERPL-MCNC: 0.74 MG/DL (ref 0.5–1.4)
ERYTHROCYTE [DISTWIDTH] IN BLOOD BY AUTOMATED COUNT: 45.9 FL (ref 35.9–50)
GFR SERPLBLD CREATININE-BSD FMLA CKD-EPI: 93 ML/MIN/1.73 M 2
GLOBULIN SER CALC-MCNC: 2.5 G/DL (ref 1.9–3.5)
GLUCOSE SERPL-MCNC: 104 MG/DL (ref 65–99)
HCT VFR BLD AUTO: 48.9 % (ref 42–52)
HDLC SERPL-MCNC: 51 MG/DL
HGB BLD-MCNC: 16.5 G/DL (ref 14–18)
LDLC SERPL CALC-MCNC: 66 MG/DL
MCH RBC QN AUTO: 32 PG (ref 27–33)
MCHC RBC AUTO-ENTMCNC: 33.7 G/DL (ref 33.7–35.3)
MCV RBC AUTO: 94.8 FL (ref 81.4–97.8)
PLATELET # BLD AUTO: 208 K/UL (ref 164–446)
PMV BLD AUTO: 10.3 FL (ref 9–12.9)
POTASSIUM SERPL-SCNC: 4.3 MMOL/L (ref 3.6–5.5)
PROT SERPL-MCNC: 6.9 G/DL (ref 6–8.2)
PSA SERPL-MCNC: 1.93 NG/ML (ref 0–4)
RBC # BLD AUTO: 5.16 M/UL (ref 4.7–6.1)
SODIUM SERPL-SCNC: 142 MMOL/L (ref 135–145)
TRIGL SERPL-MCNC: 58 MG/DL (ref 0–149)
WBC # BLD AUTO: 5.8 K/UL (ref 4.8–10.8)

## 2022-06-16 PROCEDURE — 85027 COMPLETE CBC AUTOMATED: CPT

## 2022-06-16 PROCEDURE — 80053 COMPREHEN METABOLIC PANEL: CPT

## 2022-06-16 PROCEDURE — 36415 COLL VENOUS BLD VENIPUNCTURE: CPT

## 2022-06-16 PROCEDURE — 84153 ASSAY OF PSA TOTAL: CPT

## 2022-06-16 PROCEDURE — 80061 LIPID PANEL: CPT

## 2022-06-22 PROBLEM — G56.02 CARPAL TUNNEL SYNDROME OF LEFT WRIST: Status: ACTIVE | Noted: 2022-06-22

## 2022-08-22 DIAGNOSIS — I48.0 PAROXYSMAL ATRIAL FIBRILLATION (HCC): ICD-10-CM

## 2022-08-23 RX ORDER — METOPROLOL SUCCINATE 25 MG/1
25 TABLET, EXTENDED RELEASE ORAL DAILY
Qty: 100 TABLET | Refills: 1 | OUTPATIENT
Start: 2022-08-23

## 2022-08-23 NOTE — TELEPHONE ENCOUNTER
Is the patient due for a refill? No    Was the patient seen the past year? Yes    Date of last office visit: 12/14/21    Does the patient have an upcoming appointment?  Yes   If yes, When? 12/16/22    Provider to refill: [facility]    Does the patients insurance require a 100 day supply?  Yes

## 2022-08-24 RX ORDER — METOPROLOL SUCCINATE 25 MG/1
12.5 TABLET, EXTENDED RELEASE ORAL DAILY
Qty: 50 TABLET | Refills: 3 | Status: SHIPPED | OUTPATIENT
Start: 2022-08-24 | End: 2023-09-14

## 2022-08-24 NOTE — TELEPHONE ENCOUNTER
[facility]    Caller: Shamar Morgan    Topic/issue: METOPROLOL    Patient states that he is needing a new script sent to the pharmacy. Per pt, pharmacy will not refill the medication without a new script from [facility]. Please advise.    Thank you,  Tobi COOPER    Callback Number: [phone] (home)

## 2022-08-24 NOTE — TELEPHONE ENCOUNTER
S/w pt and explained why metoprolol wasn't filled yesterday.     Pt explains that he increased the 25mg metoprolol to a full tab once daily for a short period of time with his BP was up. His BP is now better again and so he went back to 0.5tab daily. He would like a prescription to reflect this so that he can  50 tablets.    Refill sent as requested.

## 2022-09-10 NOTE — PROCEDURE: SUNSCREEN RECOMMENDATIONS
Ongoing SW/CM Assessment/Plan of Care Note     MD requests transfer to [facility], transfer center notified states will call MD and patient will need new covid test RN notified.   
General Sunscreen Counseling: Reviewed sun protection including SPF 30 or higher, reapplication every 2 hours while in direct sunlight, sunglasses, wide brim hats, UPF clothing.
Detail Level: Zone

## 2022-11-07 ENCOUNTER — APPOINTMENT (RX ONLY)
Dept: URBAN - METROPOLITAN AREA CLINIC 20 | Facility: CLINIC | Age: 77
Setting detail: DERMATOLOGY
End: 2022-11-07

## 2022-11-07 DIAGNOSIS — L57.0 ACTINIC KERATOSIS: ICD-10-CM

## 2022-11-07 DIAGNOSIS — Z86.006 PERSONAL HISTORY OF MELANOMA IN-SITU: ICD-10-CM

## 2022-11-07 DIAGNOSIS — L81.4 OTHER MELANIN HYPERPIGMENTATION: ICD-10-CM

## 2022-11-07 DIAGNOSIS — D18.0 HEMANGIOMA: ICD-10-CM

## 2022-11-07 DIAGNOSIS — Z71.89 OTHER SPECIFIED COUNSELING: ICD-10-CM

## 2022-11-07 DIAGNOSIS — D22 MELANOCYTIC NEVI: ICD-10-CM

## 2022-11-07 DIAGNOSIS — L82.1 OTHER SEBORRHEIC KERATOSIS: ICD-10-CM

## 2022-11-07 PROBLEM — D18.01 HEMANGIOMA OF SKIN AND SUBCUTANEOUS TISSUE: Status: ACTIVE | Noted: 2022-11-07

## 2022-11-07 PROBLEM — D22.61 MELANOCYTIC NEVI OF RIGHT UPPER LIMB, INCLUDING SHOULDER: Status: ACTIVE | Noted: 2022-11-07

## 2022-11-07 PROBLEM — D22.62 MELANOCYTIC NEVI OF LEFT UPPER LIMB, INCLUDING SHOULDER: Status: ACTIVE | Noted: 2022-11-07

## 2022-11-07 PROBLEM — D22.5 MELANOCYTIC NEVI OF TRUNK: Status: ACTIVE | Noted: 2022-11-07

## 2022-11-07 PROCEDURE — ? COUNSELING

## 2022-11-07 PROCEDURE — ? SUNSCREEN RECOMMENDATIONS

## 2022-11-07 PROCEDURE — ? LIQUID NITROGEN

## 2022-11-07 PROCEDURE — 17000 DESTRUCT PREMALG LESION: CPT

## 2022-11-07 PROCEDURE — 99213 OFFICE O/P EST LOW 20 MIN: CPT | Mod: 25

## 2022-11-07 PROCEDURE — ? PHOTODYNAMIC THERAPY COUNSELING

## 2022-11-07 PROCEDURE — 17003 DESTRUCT PREMALG LES 2-14: CPT

## 2022-11-07 ASSESSMENT — LOCATION ZONE DERM
LOCATION ZONE: SCALP
LOCATION ZONE: TRUNK
LOCATION ZONE: HAND
LOCATION ZONE: ARM
LOCATION ZONE: FACE

## 2022-11-07 ASSESSMENT — LOCATION SIMPLE DESCRIPTION DERM
LOCATION SIMPLE: ANTERIOR SCALP
LOCATION SIMPLE: LEFT FOREARM
LOCATION SIMPLE: RIGHT FOREHEAD
LOCATION SIMPLE: LEFT SCALP
LOCATION SIMPLE: RIGHT FOREARM
LOCATION SIMPLE: RIGHT UPPER BACK
LOCATION SIMPLE: RIGHT HAND
LOCATION SIMPLE: UPPER BACK
LOCATION SIMPLE: LEFT HAND
LOCATION SIMPLE: SCALP
LOCATION SIMPLE: LEFT FOREHEAD

## 2022-11-07 ASSESSMENT — LOCATION DETAILED DESCRIPTION DERM
LOCATION DETAILED: RIGHT VENTRAL DISTAL FOREARM
LOCATION DETAILED: RIGHT INFERIOR UPPER BACK
LOCATION DETAILED: LEFT VENTRAL PROXIMAL FOREARM
LOCATION DETAILED: SUPERIOR THORACIC SPINE
LOCATION DETAILED: LEFT SUPERIOR MEDIAL FOREHEAD
LOCATION DETAILED: LEFT PROXIMAL DORSAL FOREARM
LOCATION DETAILED: LEFT MEDIAL FRONTAL SCALP
LOCATION DETAILED: LEFT CENTRAL PARIETAL SCALP
LOCATION DETAILED: RIGHT RADIAL DORSAL HAND
LOCATION DETAILED: RIGHT INFERIOR MEDIAL FOREHEAD
LOCATION DETAILED: RIGHT MEDIAL UPPER BACK
LOCATION DETAILED: LEFT RADIAL DORSAL HAND
LOCATION DETAILED: MID-FRONTAL SCALP
LOCATION DETAILED: INFERIOR THORACIC SPINE
LOCATION DETAILED: RIGHT PROXIMAL DORSAL FOREARM

## 2022-11-07 NOTE — PROCEDURE: MIPS QUALITY
Detail Level: Detailed
Quality 137: Melanoma: Continuity Of Care - Recall System: Patient information entered into a recall system that includes: target date for the next exam specified AND a process to follow up with patients regarding missed or unscheduled appointments
Quality 111:Pneumonia Vaccination Status For Older Adults: Pneumococcal vaccine (PPSV23) administered on or after patient’s 60th birthday and before the end of the measurement period
Quality 431: Preventive Care And Screening: Unhealthy Alcohol Use - Screening: Patient not identified as an unhealthy alcohol user when screened for unhealthy alcohol use using a systematic screening method
Quality 130: Documentation Of Current Medications In The Medical Record: Current Medications Documented
Quality 138: Melanoma: Coordination Of Care: A treatment plan was communicated to the physicians providing continuing care within one month of diagnosis outlining: diagnosis, tumor thickness and a plan for surgery or alternate care.
Quality 226: Preventive Care And Screening: Tobacco Use: Screening And Cessation Intervention: Patient screened for tobacco use and is an ex/non-smoker

## 2022-11-07 NOTE — PROCEDURE: LIQUID NITROGEN
Show Applicator Variable?: Yes
Consent: The patient's consent was obtained including but not limited to risks of crusting, scabbing, blistering, scarring, darker or lighter pigmentary change, recurrence, incomplete removal and infection. RTC in 2 months if lesion(s) persistent.
Render Note In Bullet Format When Appropriate: No
Post-Care Instructions: I reviewed with the patient in detail post-care instructions. Patient is to wear sunprotection, and avoid picking at any of the treated lesions. Pt may apply Vaseline to crusted or scabbing areas.
Duration Of Freeze Thaw-Cycle (Seconds): 10
Number Of Freeze-Thaw Cycles: 2 freeze-thaw cycles
Detail Level: Detailed

## 2022-11-18 ENCOUNTER — OFFICE VISIT (OUTPATIENT)
Dept: MEDICAL GROUP | Facility: PHYSICIAN GROUP | Age: 77
End: 2022-11-18
Payer: MEDICARE

## 2022-11-18 VITALS
BODY MASS INDEX: 33.91 KG/M2 | HEIGHT: 66 IN | HEART RATE: 64 BPM | OXYGEN SATURATION: 96 % | WEIGHT: 211 LBS | SYSTOLIC BLOOD PRESSURE: 142 MMHG | DIASTOLIC BLOOD PRESSURE: 84 MMHG | TEMPERATURE: 98.1 F

## 2022-11-18 DIAGNOSIS — R73.02 IGT (IMPAIRED GLUCOSE TOLERANCE): ICD-10-CM

## 2022-11-18 DIAGNOSIS — Z12.5 SCREENING FOR MALIGNANT NEOPLASM OF PROSTATE: ICD-10-CM

## 2022-11-18 DIAGNOSIS — Z00.00 WELL ADULT EXAM: ICD-10-CM

## 2022-11-18 DIAGNOSIS — I48.0 PAROXYSMAL ATRIAL FIBRILLATION (HCC): ICD-10-CM

## 2022-11-18 DIAGNOSIS — M25.512 ACUTE PAIN OF LEFT SHOULDER: ICD-10-CM

## 2022-11-18 DIAGNOSIS — I10 ESSENTIAL HYPERTENSION: ICD-10-CM

## 2022-11-18 PROCEDURE — 99214 OFFICE O/P EST MOD 30 MIN: CPT | Performed by: FAMILY MEDICINE

## 2022-11-18 ASSESSMENT — FIBROSIS 4 INDEX: FIB4 SCORE: 1.48

## 2022-11-18 NOTE — ASSESSMENT & PLAN NOTE
Chronic isue  On metop and losartan  BP  Little up today and took meds last night  BP at home has been monitored and ranges 120/70-s

## 2022-11-18 NOTE — ASSESSMENT & PLAN NOTE
New problem  Started having left shoulder pain after doing overhead activities.   Overall better  Not taking any medicine

## 2022-11-18 NOTE — PROGRESS NOTES
Subjective:     Chief Complaint   Patient presents with    Follow-Up     routine       HPI:   Shamar presents today to discuss the following.    Acute pain of left shoulder  New problem  Started having left shoulder pain after doing overhead activities.   Overall better  Not taking any medicine    Paroxysmal atrial fibrillation (CMS-HCC)  Chronic issue  On xarelto  Seeing cards    Essential hypertension  Chronic isue  On metop and losartan  BP  Little up today and took meds last night  BP at home has been monitored and ranges 120/70-s    Past Medical History:   Diagnosis Date    Arrhythmia 09/02/2017    Atrial Fib    Blood clotting disorder (HCC) 09/2017    heart    CAD (coronary artery disease) 4/16/2012    Dental disorder     upper and lower dentures, full    Hemorrhagic disorder (HCC)     on Plavix and Xarelto    High cholesterol     HTN (hypertension) 4/16/2012    Hyperglycemia 4/16/2012    Hyperlipemia 4/16/2012    Hypertension     Liver enzyme elevation     history of    Myocardial infarct (HCC) 09/02/2017    Dr. Mcallister, cardiologist    Obesity     Sleep apnea     BIPAP    Tachycardia 4/16/2012    Tonsillitis     Urinary incontinence        Current Outpatient Medications Ordered in Epic   Medication Sig Dispense Refill    losartan (COZAAR) 50 MG Tab Take 1 tablet by mouth once daily 100 Tablet 3    metoprolol SR (TOPROL XL) 25 MG TABLET SR 24 HR Take 0.5 Tablets by mouth every day. 50 Tablet 3    atorvastatin (LIPITOR) 20 MG Tab Take 1 tablet by mouth once daily 100 Tablet 3    XARELTO 10 MG Tab tablet TAKE 1 TABLET BY MOUTH WITH SUPPER 90 Tablet 3    vitamin D (CHOLECALCIFEROL) 1000 UNIT TABS Take 1 Tablet by mouth every day.       No current [facility]-ordered facility-administered medications on file.       Allergies:  Penicillins, Sulfa drugs, Brilinta [ticagrelor], and Lipidil [fenofibrate]    Health Maintenance: Completed    ROS:  Gen: no fevers/chills, no changes in weight  Eyes: no changes in vision  Pulm: no  "sob, no cough  CV: no chest pain, no palpitations  GI: no nausea/vomiting, no diarrhea      Objective:     Exam:  BP (!) 142/84 (BP Location: Left arm, Patient Position: Sitting, BP Cuff Size: Adult)   Pulse 64   Temp 36.7 °C (98.1 °F) (Temporal)   Ht 1.676 m (5' 6\")   Wt 95.7 kg (211 lb)   SpO2 96%   BMI 34.06 kg/m²  Body mass index is 34.06 kg/m².        Constitutional: Alert, no distress, well-groomed.  Skin: Warm, dry, good turgor, no rashes in visible areas.  Eye: Equal, round and reactive, conjunctiva clear, lids normal.  ENMT: Lips without lesions, good dentition, moist mucous membranes.  Neck: Trachea midline, no masses, no thyromegaly.  Respiratory: Unlabored respiratory effort, no cough.  MSK: Normal gait, moves all extremities.  Neuro: Grossly non-focal.   Psych: Alert and oriented x3, normal affect and mood.        Assessment & Plan:     77 y.o. male with the following -     1. Acute pain of left shoulder  New problem  Self-resolving. May continue with OTC meds for now.    2. Paroxysmal atrial fibrillation (HCC)  Chronic issue. Stable and continue to monitor by cardiology.    3. Essential hypertension  Chronic issue. Continue with current regimen.    4. Well adult exam  - CBC WITHOUT DIFFERENTIAL; Future  - Comp Metabolic Panel; Future  - Lipid Profile; Future  - PROSTATE SPECIFIC AG SCREENING; Future  - HEMOGLOBIN A1C; Future    5. Screening for malignant neoplasm of prostate    - PROSTATE SPECIFIC AG SCREENING; Future    6. IGT (impaired glucose tolerance)  - HEMOGLOBIN A1C; Future      Return in about 8 months (around 7/18/2023).          Please note that this dictation was created using voice recognition software. I have made every reasonable attempt to correct obvious errors, but I expect that there are errors of grammar and possibly content that I did not discover before finalizing the note.        "

## 2022-12-16 ENCOUNTER — OFFICE VISIT (OUTPATIENT)
Dept: CARDIOLOGY | Facility: MEDICAL CENTER | Age: 77
End: 2022-12-16
Payer: MEDICARE

## 2022-12-16 VITALS
OXYGEN SATURATION: 96 % | DIASTOLIC BLOOD PRESSURE: 80 MMHG | RESPIRATION RATE: 16 BRPM | BODY MASS INDEX: 33.12 KG/M2 | HEART RATE: 60 BPM | WEIGHT: 211 LBS | SYSTOLIC BLOOD PRESSURE: 130 MMHG | HEIGHT: 67 IN

## 2022-12-16 DIAGNOSIS — I25.10 CORONARY ARTERY DISEASE INVOLVING NATIVE CORONARY ARTERY OF NATIVE HEART WITHOUT ANGINA PECTORIS: ICD-10-CM

## 2022-12-16 DIAGNOSIS — I48.0 PAROXYSMAL ATRIAL FIBRILLATION (HCC): ICD-10-CM

## 2022-12-16 DIAGNOSIS — E78.5 DYSLIPIDEMIA: ICD-10-CM

## 2022-12-16 DIAGNOSIS — I10 ESSENTIAL HYPERTENSION: ICD-10-CM

## 2022-12-16 PROCEDURE — 99214 OFFICE O/P EST MOD 30 MIN: CPT | Performed by: INTERNAL MEDICINE

## 2022-12-16 ASSESSMENT — FIBROSIS 4 INDEX: FIB4 SCORE: 1.48

## 2022-12-16 NOTE — PROGRESS NOTES
"CARDIOLOGY OUTPATIENT FOLLOWUP    PCP: Rodney Dan M.D.    1. Coronary artery disease involving native coronary artery of native heart without angina pectoris    2. Paroxysmal atrial fibrillation (HCC)    3. Dyslipidemia    4. Essential hypertension        Shamar Morgan is experiencing infrequent irregular heart rhythm notifications on his home blood pressure monitor which may represent recurrent arrhythmia though I am doubtful given the alert disappears on immediate recheck.  I advised to get a personal ECG device for added monitoring.  Cardiac risk factors otherwise well controlled and I will make no changes to the regimen.    Follow up: 1 year    Chief Complaint   Patient presents with    Coronary Artery Disease     F/V DX: Coronary artery disease involving native coronary artery of native heart without angina pectoris       History: Shamar Morgan is a 77 y.o. male with history of PCI to the RCA for MI back in 2017 and paroxysmal atrial fibrillation s/p ablation presenting for follow-up. He also has prediabetes.    Since our last evaluation he has been free of any cardiovascular symptoms.  Home blood pressure monitoring reveals an average value of around 130/72.  On occasion he has had irregular heart rhythm notifications though on immediate recheck the alert disappears.  He prefers to continue on Xarelto.    He walks his 18-month-old chocolate lab 2 miles daily.    ROS:   10 point review systems is otherwise negative except as per the HPI    PE:  /80   Pulse 60   Resp 16   Ht 1.702 m (5' 7\")   Wt 95.7 kg (211 lb)   SpO2 96%   BMI 33.05 kg/m²   Gen: no acute distress  HEENT: Symmetric face. Anicteric sclerae. Moist mucus membranes  NECK: No JVD. No lymphadenopathy  CARDIAC: Regular, Normal S1, S2, No murmur  VASCULATURE: carotids are normal bilaterally without bruit  RESP: Clear to auscultation bilaterally  ABD: Soft, non-tender, non-distended  EXT: Trace lower extremity edema, no " clubbing or cyanosis  SKIN: Warm and dry  NEURO: No gross deficits  PSYCH: Appropriate affect, participates in conversation    The ASCVD Risk score (Eastlake Weir DK, et al., 2019) failed to calculate.    Past Medical History:   Diagnosis Date    Arrhythmia 09/02/2017    Atrial Fib    Blood clotting disorder (HCC) 09/2017    heart    CAD (coronary artery disease) 4/16/2012    Dental disorder     upper and lower dentures, full    Hemorrhagic disorder (HCC)     on Plavix and Xarelto    High cholesterol     HTN (hypertension) 4/16/2012    Hyperglycemia 4/16/2012    Hyperlipemia 4/16/2012    Hypertension     Liver enzyme elevation     history of    Myocardial infarct (HCC) 09/02/2017    Dr. Mcallister, cardiologist    Obesity     Sleep apnea     BIPAP    Tachycardia 4/16/2012    Tonsillitis     Urinary incontinence      Allergies   Allergen Reactions    Penicillins Swelling    Sulfa Drugs Swelling    Brilinta [Ticagrelor] Rash and Itching     .    Lipidil [Fenofibrate]      Myalgia     Outpatient Encounter Medications as of 12/16/2022   Medication Sig Dispense Refill    losartan (COZAAR) 50 MG Tab Take 1 tablet by mouth once daily 100 Tablet 3    metoprolol SR (TOPROL XL) 25 MG TABLET SR 24 HR Take 0.5 Tablets by mouth every day. 50 Tablet 3    atorvastatin (LIPITOR) 20 MG Tab Take 1 tablet by mouth once daily 100 Tablet 3    XARELTO 10 MG Tab tablet TAKE 1 TABLET BY MOUTH WITH SUPPER 90 Tablet 3    vitamin D (CHOLECALCIFEROL) 1000 UNIT TABS Take 1 Tablet by mouth every day.       No facility-administered encounter medications on file as of 12/16/2022.     Social History     Socioeconomic History    Marital status:      Spouse name: Not on file    Number of children: Not on file    Years of education: Not on file    Highest education level: 12th grade   Occupational History    Not on file   Tobacco Use    Smoking status: Former     Packs/day: 3.00     Years: 16.00     Pack years: 48.00     Types: Cigarettes     Quit date:  1980     Years since quittin.9    Smokeless tobacco: Never   Vaping Use    Vaping Use: Never used   Substance and Sexual Activity    Alcohol use: Yes     Alcohol/week: 0.0 oz     Comment: 2 per day    Drug use: No    Sexual activity: Yes     Partners: Female   Other Topics Concern    Not on file   Social History Narrative    Not on file     Social Determinants of Health     Financial Resource Strain: Not on file   Food Insecurity: Not on file   Transportation Needs: Not on file   Physical Activity: Not on file   Stress: Not on file   Social Connections: Not on file   Intimate Partner Violence: Not on file   Housing Stability: Not on file       Studies  Lab Results   Component Value Date/Time    CHOLSTRLTOT 129 2022 07:54 AM    LDL 66 2022 07:54 AM    HDL 51 2022 07:54 AM    TRIGLYCERIDE 58 2022 07:54 AM       Lab Results   Component Value Date/Time    SODIUM 142 2022 07:54 AM    POTASSIUM 4.3 2022 07:54 AM    CHLORIDE 107 2022 07:54 AM    CO2 23 2022 07:54 AM    GLUCOSE 104 (H) 2022 07:54 AM    BUN 21 2022 07:54 AM    CREATININE 0.74 2022 07:54 AM     Lab Results   Component Value Date/Time    ALKPHOSPHAT 65 2022 07:54 AM    ASTSGOT 23 2022 07:54 AM    ALTSGPT 34 2022 07:54 AM    TBILIRUBIN 0.6 2022 07:54 AM

## 2023-03-24 ENCOUNTER — OFFICE VISIT (OUTPATIENT)
Dept: SLEEP MEDICINE | Facility: MEDICAL CENTER | Age: 78
End: 2023-03-24
Attending: NURSE PRACTITIONER
Payer: MEDICARE

## 2023-03-24 VITALS
BODY MASS INDEX: 36.11 KG/M2 | RESPIRATION RATE: 16 BRPM | SYSTOLIC BLOOD PRESSURE: 116 MMHG | OXYGEN SATURATION: 98 % | HEIGHT: 66 IN | DIASTOLIC BLOOD PRESSURE: 62 MMHG | HEART RATE: 66 BPM | WEIGHT: 224.7 LBS

## 2023-03-24 DIAGNOSIS — G47.33 OSA (OBSTRUCTIVE SLEEP APNEA): ICD-10-CM

## 2023-03-24 PROCEDURE — 99212 OFFICE O/P EST SF 10 MIN: CPT | Performed by: NURSE PRACTITIONER

## 2023-03-24 PROCEDURE — 99213 OFFICE O/P EST LOW 20 MIN: CPT | Performed by: NURSE PRACTITIONER

## 2023-03-24 ASSESSMENT — PATIENT HEALTH QUESTIONNAIRE - PHQ9: CLINICAL INTERPRETATION OF PHQ2 SCORE: 0

## 2023-03-24 ASSESSMENT — FIBROSIS 4 INDEX: FIB4 SCORE: 1.48

## 2023-03-24 NOTE — PROGRESS NOTES
Chief Complaint   Patient presents with    Apnea       HPI:  Shamar Morgan is a 77 y.o. year old male here today for follow-up on annual RUPAL.  Last seen 4/6/2022 by me.     Polysomnography showed severe obstructive sleep apnea syndrome with AHI 73 events per hour, associated with desaturations to 67%.  Current therapy is BiPAP at 14/10 centimeters/H2O.    At last visit, patient received his replacement BiPAP through recall program.  He is currently using a medium ease onto nasal mask and denies any difficulty with mask fit or pressures.  He also uses a heated tubing.  He gets between 8 to 10 hours of sleep nightly and denies any difficulty falling or staying asleep.  Occasionally he will wake up at 4 AM and have racing thoughts in a busy mind and be unable to fall back asleep.  He will then start his day early.  He notes overall improvement in sleep quality using BiPAP.  He denies any excessive daytime sleepiness, morning headaches, palpitations, concentration or memory problems.    Compliance is not able to be reviewed due to corrupt data on ST card.  Replacement ST card was given.    ROS: As per HPI and otherwise negative if not stated.    Past Medical History:   Diagnosis Date    Arrhythmia 09/02/2017    Atrial Fib    Blood clotting disorder (HCC) 09/2017    heart    CAD (coronary artery disease) 4/16/2012    Dental disorder     upper and lower dentures, full    Hemorrhagic disorder (HCC)     on Plavix and Xarelto    High cholesterol     HTN (hypertension) 4/16/2012    Hyperglycemia 4/16/2012    Hyperlipemia 4/16/2012    Hypertension     Liver enzyme elevation     history of    Myocardial infarct (HCC) 09/02/2017    Dr. Mcallister, cardiologist    Obesity     Sleep apnea     BIPAP    Tachycardia 4/16/2012    Tonsillitis     Urinary incontinence        Past Surgical History:   Procedure Laterality Date    TRIGGER FINGER RELEASE Left 7/17/2018    Procedure: TRIGGER FINGER RELEASE - RING;  Surgeon: Jose Miguel Kay,  "M.D.;  Location: SURGERY [facility];  Service: Orthopedics    MASS EXCISION ORTHO Left 7/17/2018    Procedure: MASS EXCISION ORTHO - DISTAL INTERPHALANGEAL JOINT NODULE EXCISION;  Surgeon: Jose Miguel Kay M.D.;  Location: SURGERY [facility];  Service: Orthopedics    CARDIAC CATH, LEFT HEART  09/03/2017    successful thrombectomy  and CHELLE to ostial proximal RCA     STENT PLACEMENT  09/03/2017    cardiac    CARPAL TUNNEL RELEASE Right 1980's    TRIGGER FINGER RELEASE Right 1980's       Family History   Problem Relation Age of Onset    Diabetes Mother     Heart Disease Mother     Kidney Disease Mother     Diabetes Sister        Allergies as of 03/24/2023 - Reviewed 03/24/2023   Allergen Reaction Noted    Penicillins Swelling 07/31/2008    Sulfa drugs Swelling 07/31/2008    Brilinta [ticagrelor] Rash and Itching 07/09/2018    Lipidil [fenofibrate]  07/29/2011        Vitals:  /62 (BP Location: Left arm, Patient Position: Sitting, BP Cuff Size: Large adult)   Pulse 66   Resp 16   Ht 1.676 m (5' 6\")   Wt 102 kg (224 lb 11.2 oz)   SpO2 98%     Current medications as of today   Current Outpatient Medications   Medication Sig Dispense Refill    XARELTO 10 MG Tab tablet TAKE 1 TABLET BY MOUTH DAILY WITH SUPPER 90 Tablet 3    losartan (COZAAR) 50 MG Tab Take 1 tablet by mouth once daily 100 Tablet 3    metoprolol SR (TOPROL XL) 25 MG TABLET SR 24 HR Take 0.5 Tablets by mouth every day. 50 Tablet 3    atorvastatin (LIPITOR) 20 MG Tab Take 1 tablet by mouth once daily 100 Tablet 3    vitamin D (CHOLECALCIFEROL) 1000 UNIT TABS Take 1 Tablet by mouth every day.       No current facility-administered medications for this visit.         Physical Exam:   Gen:           Alert and oriented, No apparent distress. Mood and affect appropriate, normal interaction with examiner.  Eyes:          PERRL, EOM intact, sclere white, conjunctive moist.  Ears:          Not examined.   Hearing:     Grossly intact.  Nose:       "    Normal, no lesions or deformities.  Dentition:    Good dentition.  Oropharynx:   Tongue normal, posterior pharynx without erythema or exudate.  Neck:        Supple, trachea midline, no masses.  Respiratory Effort: No intercostal retractions or use of accessory muscles.   Lung Auscultation:      Clear to auscultation bilaterally; no rales, rhonchi or wheezing.  CV:            Regular rate and rhythm. No murmurs, rubs or gallops.  Abd:           Not examined.   Lymphadenopathy: Not examined.  Gait and Station: Normal.  Digits and Nails: No clubbing, cyanosis, petechiae, or nodes.   Cranial Nerves: II-XII grossly intact.  Skin:        No rashes, lesions or ulcers noted.               Ext:           No cyanosis or edema.      Assessment:  1. RUPAL (obstructive sleep apnea)  DME Mask and Supplies            Plan:  Patient is using and benefiting from BiPAP therapy.  Compliance data unavailable to be reviewed today.  ST replacement card given.  Patient did receive his replacement BiPAP through recall program.  He continues to be compliant with 14/10 BiPAP.  He cannot sleep without it.  Order placed for mask and supplies included heated tubing through [facility].  Order will be good for 1 year.  Advise annual follow-up, but can be seen sooner if needed.    Please note that this dictation was created using voice recognition software. I have made every reasonable attempt to correct obvious errors, but it is possible there are errors of grammar and possibly content that I did not discover before finalizing the note.

## 2023-04-20 ENCOUNTER — OFFICE VISIT (OUTPATIENT)
Dept: MEDICAL GROUP | Facility: PHYSICIAN GROUP | Age: 78
End: 2023-04-20
Payer: MEDICARE

## 2023-04-20 VITALS
BODY MASS INDEX: 35.26 KG/M2 | RESPIRATION RATE: 16 BRPM | TEMPERATURE: 97.9 F | HEART RATE: 76 BPM | WEIGHT: 219.4 LBS | OXYGEN SATURATION: 97 % | HEIGHT: 66 IN | DIASTOLIC BLOOD PRESSURE: 70 MMHG | SYSTOLIC BLOOD PRESSURE: 138 MMHG

## 2023-04-20 DIAGNOSIS — E66.01 MORBID (SEVERE) OBESITY DUE TO EXCESS CALORIES (HCC): ICD-10-CM

## 2023-04-20 DIAGNOSIS — I48.0 PAROXYSMAL ATRIAL FIBRILLATION (HCC): ICD-10-CM

## 2023-04-20 DIAGNOSIS — R05.2 SUBACUTE COUGH: ICD-10-CM

## 2023-04-20 PROCEDURE — 99214 OFFICE O/P EST MOD 30 MIN: CPT | Performed by: FAMILY MEDICINE

## 2023-04-20 RX ORDER — DOXYCYCLINE HYCLATE 100 MG
100 TABLET ORAL 2 TIMES DAILY
Qty: 14 TABLET | Refills: 0 | Status: SHIPPED | OUTPATIENT
Start: 2023-04-20 | End: 2023-04-27

## 2023-04-20 ASSESSMENT — FIBROSIS 4 INDEX: FIB4 SCORE: 1.48

## 2023-04-20 NOTE — ASSESSMENT & PLAN NOTE
New issue  Has had persistent cough over 6 weeks  Endorses some sore throat  Denies worsening SOB  Endorses sinus drainage - brown in color  Denies any GERD

## 2023-04-20 NOTE — PROGRESS NOTES
Subjective:     Chief Complaint   Patient presents with    Congestion     Nasal,chest x 6 weeks    Cough     X 6 weeks        HPI:   Shamar presents today to discuss the following.    Subacute cough  New issue  Has had persistent cough over 6 weeks  Endorses some sore throat  Denies worsening SOB  Endorses sinus drainage - brown in color  Denies any GERD      Past Medical History:   Diagnosis Date    Arrhythmia 09/02/2017    Atrial Fib    Blood clotting disorder (HCC) 09/2017    heart    CAD (coronary artery disease) 4/16/2012    Dental disorder     upper and lower dentures, full    Hemorrhagic disorder (HCC)     on Plavix and Xarelto    High cholesterol     HTN (hypertension) 4/16/2012    Hyperglycemia 4/16/2012    Hyperlipemia 4/16/2012    Hypertension     Liver enzyme elevation     history of    Myocardial infarct (HCC) 09/02/2017    Dr. Mcallister, cardiologist    Obesity     Sleep apnea     BIPAP    Tachycardia 4/16/2012    Tonsillitis     Urinary incontinence        Current Outpatient Medications Ordered in Epic   Medication Sig Dispense Refill    doxycycline (VIBRAMYCIN) 100 MG Tab Take 1 Tablet by mouth 2 times a day for 7 days. 14 Tablet 0    XARELTO 10 MG Tab tablet TAKE 1 TABLET BY MOUTH DAILY WITH SUPPER 90 Tablet 3    losartan (COZAAR) 50 MG Tab Take 1 tablet by mouth once daily 100 Tablet 3    metoprolol SR (TOPROL XL) 25 MG TABLET SR 24 HR Take 0.5 Tablets by mouth every day. 50 Tablet 3    atorvastatin (LIPITOR) 20 MG Tab Take 1 tablet by mouth once daily 100 Tablet 3    vitamin D (CHOLECALCIFEROL) 1000 UNIT TABS Take 1 Tablet by mouth every day.       No current [facility]-ordered facility-administered medications on file.       Allergies:  Penicillins, Sulfa drugs, Brilinta [ticagrelor], and Lipidil [fenofibrate]    Health Maintenance: Completed    ROS:  Gen: no fevers/chills, no changes in weight  Eyes: no changes in vision  Pulm: no sob, no cough  CV: no chest pain, no palpitations  GI: no nausea/vomiting, no  "diarrhea      Objective:     Exam:  /70 (BP Location: Left arm, Patient Position: Sitting)   Pulse 76   Temp 36.6 °C (97.9 °F) (Temporal)   Resp 16   Ht 1.676 m (5' 6\")   Wt 99.5 kg (219 lb 6.4 oz)   SpO2 97%   BMI 35.41 kg/m²  Body mass index is 35.41 kg/m².    Gen: Alert and oriented, No apparent distress.  HENT: TMs are clear. Oropharynx is w/o erythema or exudates.   Respiratory: Unlabored respiratory effort, no cough.  MSK: Normal gait, moves all extremities.  Neuro: Grossly non-focal.   Psych: Alert and oriented x3, normal affect and mood.        Assessment & Plan:     77 y.o. male with the following -     1. Subacute cough  New problem.  Unknown etiology and prognosis.  However upon further questioning I suspect that it may be postnasal drip versus subacute sinusitis.  I would like to empirically treat with doxycycline to help with inflammation and eradicate infection.  Return precautions recommended.  - doxycycline (VIBRAMYCIN) 100 MG Tab; Take 1 Tablet by mouth 2 times a day for 7 days.  Dispense: 14 Tablet; Refill: 0      No follow-ups on file.    HCC Gap Form    Diagnosis: E66.01 - Morbid (severe) obesity due to excess calories (HCC)  Z68.35 - Body mass index (BMI) 35.0-35.9, adult  Comorbidity Diagnosis: Paroxysmal atrial fibrillation (HCC)  The current BMI is 35.41 kg/m2 as of 04/20/23 15:30 PDT  Assessment and plan: Chronic, stable. Encouraged healthy diet and physical activity changes with a goal of weight loss. Follow up at least annually. The comorbid condition is asymptomatic based on today's assessment. Continue current treatment plan. Follow up at least yearly.  Diagnosis to address: I48.0 - Paroxysmal atrial fibrillation (HCC)  Assessment and plan: Chronic, stable. Continue with current defined treatment plan: Seeing cardiology. Follow-up at least annually.  Last edited 04/20/23 15:30 PDT by Rodney Dan M.D.           Please note that this dictation was created using voice " recognition software. I have made every reasonable attempt to correct obvious errors, but I expect that there are errors of grammar and possibly content that I did not discover before finalizing the note.

## 2023-04-26 ENCOUNTER — HOSPITAL ENCOUNTER (OUTPATIENT)
Dept: LAB | Facility: MEDICAL CENTER | Age: 78
End: 2023-04-26
Attending: FAMILY MEDICINE
Payer: MEDICARE

## 2023-04-26 DIAGNOSIS — Z12.5 SCREENING FOR MALIGNANT NEOPLASM OF PROSTATE: ICD-10-CM

## 2023-04-26 DIAGNOSIS — R73.02 IGT (IMPAIRED GLUCOSE TOLERANCE): ICD-10-CM

## 2023-04-26 DIAGNOSIS — Z00.00 WELL ADULT EXAM: ICD-10-CM

## 2023-04-26 LAB
ALBUMIN SERPL BCP-MCNC: 3.9 G/DL (ref 3.2–4.9)
ALBUMIN/GLOB SERPL: 1.3 G/DL
ALP SERPL-CCNC: 72 U/L (ref 30–99)
ALT SERPL-CCNC: 36 U/L (ref 2–50)
ANION GAP SERPL CALC-SCNC: 10 MMOL/L (ref 7–16)
AST SERPL-CCNC: 22 U/L (ref 12–45)
BILIRUB SERPL-MCNC: 0.7 MG/DL (ref 0.1–1.5)
BUN SERPL-MCNC: 17 MG/DL (ref 8–22)
CALCIUM ALBUM COR SERPL-MCNC: 9.8 MG/DL (ref 8.5–10.5)
CALCIUM SERPL-MCNC: 9.7 MG/DL (ref 8.5–10.5)
CHLORIDE SERPL-SCNC: 108 MMOL/L (ref 96–112)
CHOLEST SERPL-MCNC: 125 MG/DL (ref 100–199)
CO2 SERPL-SCNC: 25 MMOL/L (ref 20–33)
CREAT SERPL-MCNC: 0.75 MG/DL (ref 0.5–1.4)
ERYTHROCYTE [DISTWIDTH] IN BLOOD BY AUTOMATED COUNT: 45.6 FL (ref 35.9–50)
EST. AVERAGE GLUCOSE BLD GHB EST-MCNC: 131 MG/DL
FASTING STATUS PATIENT QL REPORTED: NORMAL
GFR SERPLBLD CREATININE-BSD FMLA CKD-EPI: 92 ML/MIN/1.73 M 2
GLOBULIN SER CALC-MCNC: 3 G/DL (ref 1.9–3.5)
GLUCOSE SERPL-MCNC: 101 MG/DL (ref 65–99)
HBA1C MFR BLD: 6.2 % (ref 4–5.6)
HCT VFR BLD AUTO: 46 % (ref 42–52)
HDLC SERPL-MCNC: 49 MG/DL
HGB BLD-MCNC: 15.4 G/DL (ref 14–18)
LDLC SERPL CALC-MCNC: 65 MG/DL
MCH RBC QN AUTO: 30.9 PG (ref 27–33)
MCHC RBC AUTO-ENTMCNC: 33.5 G/DL (ref 33.7–35.3)
MCV RBC AUTO: 92.4 FL (ref 81.4–97.8)
PLATELET # BLD AUTO: 206 K/UL (ref 164–446)
PMV BLD AUTO: 9.6 FL (ref 9–12.9)
POTASSIUM SERPL-SCNC: 4.4 MMOL/L (ref 3.6–5.5)
PROT SERPL-MCNC: 6.9 G/DL (ref 6–8.2)
PSA SERPL-MCNC: 2.32 NG/ML (ref 0–4)
RBC # BLD AUTO: 4.98 M/UL (ref 4.7–6.1)
SODIUM SERPL-SCNC: 143 MMOL/L (ref 135–145)
TRIGL SERPL-MCNC: 55 MG/DL (ref 0–149)
WBC # BLD AUTO: 6.2 K/UL (ref 4.8–10.8)

## 2023-04-26 PROCEDURE — 83036 HEMOGLOBIN GLYCOSYLATED A1C: CPT

## 2023-04-26 PROCEDURE — 80061 LIPID PANEL: CPT

## 2023-04-26 PROCEDURE — 80053 COMPREHEN METABOLIC PANEL: CPT

## 2023-04-26 PROCEDURE — 84153 ASSAY OF PSA TOTAL: CPT

## 2023-04-26 PROCEDURE — 36415 COLL VENOUS BLD VENIPUNCTURE: CPT

## 2023-04-26 PROCEDURE — 85027 COMPLETE CBC AUTOMATED: CPT

## 2023-05-12 ENCOUNTER — APPOINTMENT (RX ONLY)
Dept: URBAN - METROPOLITAN AREA CLINIC 20 | Facility: CLINIC | Age: 78
Setting detail: DERMATOLOGY
End: 2023-05-12

## 2023-05-12 DIAGNOSIS — L71.8 OTHER ROSACEA: ICD-10-CM

## 2023-05-12 DIAGNOSIS — L85.3 XEROSIS CUTIS: ICD-10-CM

## 2023-05-12 DIAGNOSIS — L57.0 ACTINIC KERATOSIS: ICD-10-CM

## 2023-05-12 DIAGNOSIS — Z86.006 PERSONAL HISTORY OF MELANOMA IN-SITU: ICD-10-CM

## 2023-05-12 DIAGNOSIS — L82.0 INFLAMED SEBORRHEIC KERATOSIS: ICD-10-CM

## 2023-05-12 PROCEDURE — 17003 DESTRUCT PREMALG LES 2-14: CPT

## 2023-05-12 PROCEDURE — ? LIQUID NITROGEN

## 2023-05-12 PROCEDURE — 99213 OFFICE O/P EST LOW 20 MIN: CPT | Mod: 25

## 2023-05-12 PROCEDURE — ? COUNSELING

## 2023-05-12 PROCEDURE — 17000 DESTRUCT PREMALG LESION: CPT

## 2023-05-12 ASSESSMENT — LOCATION DETAILED DESCRIPTION DERM
LOCATION DETAILED: RIGHT DISTAL DORSAL FOREARM
LOCATION DETAILED: LEFT CENTRAL PARIETAL SCALP
LOCATION DETAILED: LEFT SUPERIOR FOREHEAD
LOCATION DETAILED: RIGHT SUPERIOR UPPER BACK
LOCATION DETAILED: RIGHT PROXIMAL DORSAL FOREARM
LOCATION DETAILED: RIGHT CENTRAL MALAR CHEEK
LOCATION DETAILED: NASAL DORSUM
LOCATION DETAILED: LEFT SUPERIOR FRONTAL SCALP
LOCATION DETAILED: LEFT CENTRAL MALAR CHEEK
LOCATION DETAILED: LEFT INFERIOR LATERAL FOREHEAD
LOCATION DETAILED: RIGHT SUPERIOR PARIETAL SCALP
LOCATION DETAILED: RIGHT SUPERIOR FOREHEAD
LOCATION DETAILED: LEFT LATERAL EYEBROW
LOCATION DETAILED: LEFT PROXIMAL DORSAL FOREARM
LOCATION DETAILED: LEFT SUPERIOR MEDIAL MALAR CHEEK

## 2023-05-12 ASSESSMENT — LOCATION ZONE DERM
LOCATION ZONE: NOSE
LOCATION ZONE: SCALP
LOCATION ZONE: FACE
LOCATION ZONE: TRUNK
LOCATION ZONE: ARM

## 2023-05-12 ASSESSMENT — LOCATION SIMPLE DESCRIPTION DERM
LOCATION SIMPLE: SCALP
LOCATION SIMPLE: LEFT FOREHEAD
LOCATION SIMPLE: RIGHT UPPER BACK
LOCATION SIMPLE: RIGHT CHEEK
LOCATION SIMPLE: RIGHT FOREHEAD
LOCATION SIMPLE: NOSE
LOCATION SIMPLE: RIGHT FOREARM
LOCATION SIMPLE: LEFT EYEBROW
LOCATION SIMPLE: LEFT FOREARM
LOCATION SIMPLE: LEFT CHEEK

## 2023-05-12 NOTE — PROCEDURE: LIQUID NITROGEN
Show Applicator Variable?: Yes
Number Of Freeze-Thaw Cycles: 2 freeze-thaw cycles
Post-Care Instructions: I reviewed with the patient in detail post-care instructions. Patient is to wear sunprotection, and avoid picking at any of the treated lesions. Pt may apply Vaseline to crusted or scabbing areas.
Consent: The patient's consent was obtained including but not limited to risks of crusting, scabbing, blistering, scarring, darker or lighter pigmentary change, recurrence, incomplete removal and infection. RTC in 2 months if lesion(s) persistent.
Detail Level: Detailed
Render Note In Bullet Format When Appropriate: No
Duration Of Freeze Thaw-Cycle (Seconds): 10

## 2023-05-20 DIAGNOSIS — E78.5 DYSLIPIDEMIA: ICD-10-CM

## 2023-05-22 RX ORDER — ATORVASTATIN CALCIUM 20 MG/1
TABLET, FILM COATED ORAL
Qty: 100 TABLET | Refills: 0 | Status: SHIPPED | OUTPATIENT
Start: 2023-05-22 | End: 2023-08-21

## 2023-06-05 ENCOUNTER — OFFICE VISIT (OUTPATIENT)
Dept: MEDICAL GROUP | Facility: PHYSICIAN GROUP | Age: 78
End: 2023-06-05
Payer: MEDICARE

## 2023-06-05 VITALS
WEIGHT: 218.2 LBS | HEART RATE: 57 BPM | TEMPERATURE: 97 F | HEIGHT: 66 IN | DIASTOLIC BLOOD PRESSURE: 68 MMHG | BODY MASS INDEX: 35.07 KG/M2 | OXYGEN SATURATION: 94 % | SYSTOLIC BLOOD PRESSURE: 130 MMHG

## 2023-06-05 DIAGNOSIS — J06.9 ACUTE URI: ICD-10-CM

## 2023-06-05 LAB
FLUAV RNA SPEC QL NAA+PROBE: NEGATIVE
FLUBV RNA SPEC QL NAA+PROBE: NEGATIVE
RSV RNA SPEC QL NAA+PROBE: NEGATIVE
SARS-COV-2 RNA RESP QL NAA+PROBE: NEGATIVE

## 2023-06-05 PROCEDURE — 3078F DIAST BP <80 MM HG: CPT | Performed by: FAMILY MEDICINE

## 2023-06-05 PROCEDURE — 0241U POCT CEPHEID COV-2, FLU A/B, RSV - PCR: CPT | Performed by: FAMILY MEDICINE

## 2023-06-05 PROCEDURE — 3075F SYST BP GE 130 - 139MM HG: CPT | Performed by: FAMILY MEDICINE

## 2023-06-05 PROCEDURE — 99213 OFFICE O/P EST LOW 20 MIN: CPT | Performed by: FAMILY MEDICINE

## 2023-06-05 RX ORDER — FLUOROMETHOLONE 0.1 %
SUSPENSION, DROPS(FINAL DOSAGE FORM)(ML) OPHTHALMIC (EYE)
COMMUNITY
Start: 2023-05-23 | End: 2023-06-14

## 2023-06-05 ASSESSMENT — FIBROSIS 4 INDEX: FIB4 SCORE: 1.39

## 2023-06-05 NOTE — PROGRESS NOTES
Chief Complaint   Patient presents with    Sinusitis     X3 days     Nasal Congestion    Nasal Drainage        HPI: Patient is a 77 y.o. male complaining of 2 days of illness including: dry cough, shortness of breath, nasal congestion, sore throat, post-nasal drip .   Mucus is: clear.  Similarly ill exposures: yes.  Treatments tried: none  He  reports that he quit smoking about 43 years ago. His smoking use included cigarettes. He has a 48.00 pack-year smoking history. He has never used smokeless tobacco..     ROS:  No fever, nausea, changes in bowel movements or skin rash.      I reviewed the patient's medications, allergies and medical history:  Current Outpatient Medications   Medication Sig Dispense Refill    fluorometholone (FML) 0.1 % Suspension INSTILL 1 DROP INTO EACH EYE 4 TIMES DAILY FOR 1 WEEK THEN TWICE A DAY FOR 1 WEEK      atorvastatin (LIPITOR) 20 MG Tab Take 1 tablet by mouth once daily 100 Tablet 0    XARELTO 10 MG Tab tablet TAKE 1 TABLET BY MOUTH DAILY WITH SUPPER 90 Tablet 3    losartan (COZAAR) 50 MG Tab Take 1 tablet by mouth once daily 100 Tablet 3    metoprolol SR (TOPROL XL) 25 MG TABLET SR 24 HR Take 0.5 Tablets by mouth every day. 50 Tablet 3    vitamin D (CHOLECALCIFEROL) 1000 UNIT TABS Take 1 Tablet by mouth every day.       No current facility-administered medications for this visit.     Penicillins, Sulfa drugs, Brilinta [ticagrelor], and Lipidil [fenofibrate]  Past Medical History:   Diagnosis Date    Arrhythmia 09/02/2017    Atrial Fib    Blood clotting disorder (HCC) 09/2017    heart    CAD (coronary artery disease) 4/16/2012    Dental disorder     upper and lower dentures, full    Hemorrhagic disorder (HCC)     on Plavix and Xarelto    High cholesterol     HTN (hypertension) 4/16/2012    Hyperglycemia 4/16/2012    Hyperlipemia 4/16/2012    Hypertension     Liver enzyme elevation     history of    Myocardial infarct (HCC) 09/02/2017    Dr. Mcallister, cardiologist    Obesity     Sleep  "apnea     BIPAP    Tachycardia 4/16/2012    Tonsillitis     Urinary incontinence         EXAM:  /68 (BP Location: Left arm, Patient Position: Sitting, BP Cuff Size: Adult)   Pulse (!) 57   Temp 36.1 °C (97 °F) (Temporal)   Ht 1.676 m (5' 6\")   Wt 99 kg (218 lb 3.2 oz)   SpO2 94%   General: Alert, no conversational dyspnea or audible wheeze, non-toxic appearance.  Eyes: PERRL, conjunctiva slightly injected, no eye discharge.  Ears: Normal pinnae,TM's normal bilaterally.  Nares: Patent with thin mucus.  Sinuses: non tender over maxillary / frontal sinuses.  Throat: Erythematous injection without exudate.   Neck: Supple, with no adenopathy.  Lungs: Clear to auscultation bilaterally, no wheeze, crackles or rhonchi.   Heart: Regular rate without murmur.  Skin: Warm and dry without rash.    Results for orders placed or performed in visit on 06/05/23   POCT CoV-2, Flu A/B, RSV by PCR   Result Value Ref Range    SARS-CoV-2 by PCR Negative Negative, Invalid    Influenza virus A RNA Negative Negative, Invalid    Influenza virus B, PCR Negative Negative, Invalid    RSV, PCR Negative Negative, Invalid          ASSESSMENT:   1. Acute URI          PLAN:  1. Educated patient that majority of upper respiratory infections are viral and do not need antibiotics.   2. Twice daily use of nasal saline rinse or Neti-Pot.  3. OTC anti-pyretics and decongestants as needed.  4. Follow-up in office or urgent care for worsening symptoms, difficulty breathing, lack of expected recovery, or should new symptoms or problems arise.    "

## 2023-06-05 NOTE — PATIENT INSTRUCTIONS
My recommendations for an upper respiratory infection:  - Use a humidifier, especially at night. Cold or warm water humidifiers have the same effect.  - Yuan Med squeeze bottle sinus rinses twice a day.  - Hot tea + honey + fresh lemon juice  - Throat Coat herbal tea  - Honey by itself has been shown to help fight bugs and provide cough relief  - Chicken noodle soup has also been shown to help fight bugs    Effective treatments for an upper respiratory infection:    Acetaminophen 500-1000  mg Single dose Improves nasal congestion & runny nose   Antihistamine + decongestant Varies Varies Improves congestion   NSAIDs (ibuprofen, advil, motrin, aleve, etc) Varies Single dose to 7 day course Improves sneezing, headache, ear pain, muscle pain, joint pain   Zinc acetate or gluconate 80-92 mg per day Start within 3 days & continue as long as symptoms persist Can shorten duration of symptoms

## 2023-06-07 ENCOUNTER — TELEPHONE (OUTPATIENT)
Dept: CARDIOLOGY | Facility: MEDICAL CENTER | Age: 78
End: 2023-06-07
Payer: MEDICARE

## 2023-06-07 PROBLEM — G56.00 CARPAL TUNNEL SYNDROME: Status: ACTIVE | Noted: 2023-06-07

## 2023-06-07 NOTE — TELEPHONE ENCOUNTER
Clearance letter generated and faxed to [facility] at [phone].  ----------------------------------------------------------------------------  ----- Message from Fabio Alba M.D. sent at 6/7/2023  2:24 PM PDT -----  Okay for surgery.  Low to intermediate risk during low risk.  May hold Xarelto.  ----- Message -----  From: Lori Zarate, Med Ass't  Sent: 6/7/2023   1:56 PM PDT  To: Fabio Alba M.D.

## 2023-06-08 ENCOUNTER — TELEPHONE (OUTPATIENT)
Dept: MEDICAL GROUP | Facility: PHYSICIAN GROUP | Age: 78
End: 2023-06-08
Payer: MEDICARE

## 2023-06-08 NOTE — TELEPHONE ENCOUNTER
VOICEMAIL  1. Caller Name: Shamar Morgan                         Call Back Number: [phone] (home)      2. Message: PT had a appointment 04/20/20232 with Dr. Dan for sinus infection. Was proscribed Doxycycline. PT states that sinus infection has returned, PT is requesting refill or does PT need an appointment.       3. Patient approves office to leave a detailed voicemail/MyChart message: N\A

## 2023-06-08 NOTE — TELEPHONE ENCOUNTER
He was already seen on 6/5/2023 for this infection.  He has only been sick for 5 days and we do not give antibiotics until someone has been sick for 10-14 days.  If he feels that the illness has changed, he should make another appointment to be evaluated.  If he feels the same as he did when he came in 3 days ago, then he needs to give it more time.

## 2023-06-29 ENCOUNTER — PATIENT MESSAGE (OUTPATIENT)
Dept: HEALTH INFORMATION MANAGEMENT | Facility: OTHER | Age: 78
End: 2023-06-29

## 2023-06-29 ENCOUNTER — DOCUMENTATION (OUTPATIENT)
Dept: HEALTH INFORMATION MANAGEMENT | Facility: OTHER | Age: 78
End: 2023-06-29
Payer: MEDICARE

## 2023-07-05 ENCOUNTER — TELEPHONE (OUTPATIENT)
Dept: CARDIOLOGY | Facility: MEDICAL CENTER | Age: 78
End: 2023-07-05

## 2023-07-05 NOTE — TELEPHONE ENCOUNTER
BE    Caller: [facility]     Office Name, phone number, fax number: [facility]   Phone:[phone]  Fax: [phone]    Faxed clearance to [phone] on 6/28/23    Procedure Name: ENDOSCOPIC CARPAL TUNNEL RELEASE    Procedure Scheduled Date: N/A    Callback Number: [phone] (home)     [facility] states clearance request has been faxed over since 6/14/23 and most recent fax was 7/3/23. See medica from 6/28/23. Please advise.     Thank you,   Nadia WOODS

## 2023-07-10 PROBLEM — M65.332 TRIGGER FINGER, LEFT MIDDLE FINGER: Status: ACTIVE | Noted: 2023-06-07

## 2023-07-20 ENCOUNTER — OFFICE VISIT (OUTPATIENT)
Dept: MEDICAL GROUP | Facility: PHYSICIAN GROUP | Age: 78
End: 2023-07-20
Payer: MEDICARE

## 2023-07-20 VITALS
BODY MASS INDEX: 33.56 KG/M2 | HEART RATE: 70 BPM | OXYGEN SATURATION: 95 % | DIASTOLIC BLOOD PRESSURE: 58 MMHG | HEIGHT: 67 IN | WEIGHT: 213.8 LBS | SYSTOLIC BLOOD PRESSURE: 132 MMHG | TEMPERATURE: 98.5 F

## 2023-07-20 DIAGNOSIS — G62.9 PERIPHERAL POLYNEUROPATHY: ICD-10-CM

## 2023-07-20 DIAGNOSIS — I10 ESSENTIAL HYPERTENSION: ICD-10-CM

## 2023-07-20 DIAGNOSIS — E78.5 DYSLIPIDEMIA: ICD-10-CM

## 2023-07-20 DIAGNOSIS — Z00.00 MEDICARE ANNUAL WELLNESS VISIT, SUBSEQUENT: Primary | ICD-10-CM

## 2023-07-20 DIAGNOSIS — I48.0 PAROXYSMAL ATRIAL FIBRILLATION (HCC): ICD-10-CM

## 2023-07-20 PROCEDURE — 3078F DIAST BP <80 MM HG: CPT | Performed by: FAMILY MEDICINE

## 2023-07-20 PROCEDURE — G0439 PPPS, SUBSEQ VISIT: HCPCS | Performed by: FAMILY MEDICINE

## 2023-07-20 PROCEDURE — 3075F SYST BP GE 130 - 139MM HG: CPT | Performed by: FAMILY MEDICINE

## 2023-07-20 RX ORDER — GABAPENTIN 300 MG/1
300 CAPSULE ORAL 3 TIMES DAILY
Qty: 90 CAPSULE | Refills: 0 | Status: SHIPPED | OUTPATIENT
Start: 2023-07-20 | End: 2023-08-14

## 2023-07-20 ASSESSMENT — FIBROSIS 4 INDEX: FIB4 SCORE: 1.41

## 2023-07-20 ASSESSMENT — ENCOUNTER SYMPTOMS: GENERAL WELL-BEING: GOOD

## 2023-07-20 ASSESSMENT — ACTIVITIES OF DAILY LIVING (ADL): BATHING_REQUIRES_ASSISTANCE: 0

## 2023-07-20 ASSESSMENT — PATIENT HEALTH QUESTIONNAIRE - PHQ9: CLINICAL INTERPRETATION OF PHQ2 SCORE: 0

## 2023-07-20 NOTE — PROGRESS NOTES
Chief Complaint   Patient presents with    Medicare Annual Wellness       HPI:  Shamar is a 78 y.o. here for Medicare Annual Wellness Visit      Patient Active Problem List    Diagnosis Date Noted    Peripheral polyneuropathy 07/20/2023    Carpal tunnel syndrome 06/07/2023    Trigger finger, left middle finger 06/07/2023    Subacute cough 04/20/2023    Body mass index (BMI) 35.0-35.9, adult 04/20/2023    Acute pain of left shoulder 11/18/2022    Carpal tunnel syndrome of left wrist 06/22/2022    Other male erectile dysfunction 05/12/2022    Trigger finger, left index finger 08/18/2021    Trigger middle finger of left hand 08/18/2021    Trigger ring finger of left hand 07/17/2018    Anticoagulated 02/23/2018    Stented coronary artery 11/16/2017    Paroxysmal atrial fibrillation (HCC) 09/05/2017    Ischemic cardiomyopathy 09/03/2017    Non morbid obesity due to excess calories 01/19/2017    RUPAL (obstructive sleep apnea) 01/19/2017    IGT (impaired glucose tolerance) 07/08/2016    Morbid (severe) obesity due to excess calories (HCC)     Coronary artery disease involving native coronary artery of native heart without angina pectoris 04/16/2012    Dyslipidemia 04/16/2012    Essential hypertension 04/16/2012       Current Outpatient Medications   Medication Sig Dispense Refill    gabapentin (NEURONTIN) 300 MG Cap Take 1 Capsule by mouth 3 times a day. 90 Capsule 0    atorvastatin (LIPITOR) 20 MG Tab Take 1 tablet by mouth once daily 100 Tablet 0    XARELTO 10 MG Tab tablet TAKE 1 TABLET BY MOUTH DAILY WITH SUPPER 90 Tablet 3    losartan (COZAAR) 50 MG Tab Take 1 tablet by mouth once daily 100 Tablet 3    metoprolol SR (TOPROL XL) 25 MG TABLET SR 24 HR Take 0.5 Tablets by mouth every day. 50 Tablet 3    vitamin D (CHOLECALCIFEROL) 1000 UNIT TABS Take 1 Tablet by mouth every day.       No current facility-administered medications for this visit.        Patient is taking medications as noted in medication list.  Current  supplements as per medication list.     Allergies: Penicillins, Sulfa drugs, Brilinta [ticagrelor], and Lipidil [fenofibrate]    Current social contact/activities: bike, walking, yard work, walking dog      Is patient current with immunizations? Yes.    He  reports that he quit smoking about 43 years ago. His smoking use included cigarettes. He has a 48.00 pack-year smoking history. He has never used smokeless tobacco. He reports that he does not currently use alcohol. He reports that he does not use drugs.  Counseling given: Not Answered      ROS:    Gait: Uses no assistive device   Ostomy: No   Other tubes: No   Amputations: No   Chronic oxygen use No   Last eye exam January 2023   Wears hearing aids: No   : Reports urinary leakage during the last 6 months that has not interfered at all with their daily activities or sleep.    Screening:    Depression Screening  Little interest or pleasure in doing things?  0 - not at all  Feeling down, depressed, or hopeless? 0 - not at all  Trouble falling or staying asleep, or sleeping too much?     Feeling tired or having little energy?     Poor appetite or overeating?     Feeling bad about yourself - or that you are a failure or have let yourself or your family down?    Trouble concentrating on things, such as reading the newspaper or watching television?    Moving or speaking so slowly that other people could have noticed.  Or the opposite - being so fidgety or restless that you have been moving around a lot more than usual?     Thoughts that you would be better off dead, or of hurting yourself?     Patient Health Questionnaire Score:      If depressive symptoms identified deferred to follow up visit unless specifically addressed in assessment and plan.    Interpretation of PHQ-9 Total Score   Score Severity   1-4 No Depression   5-9 Mild Depression   10-14 Moderate Depression   15-19 Moderately Severe Depression   20-27 Severe Depression    Screening for Cognitive  Impairment  Three Minute Recall (Banana, Sunrise, Chair)  3/3    Draw clock face with all 12 numbers and set the hands to show 20 past 8.  Yes    If cognitive concerns identified, deferred for follow up unless specifically addressed in assessment and plan.    Fall Risk Assessment  Has the patient had two or more falls in the last year or any fall with injury in the last year?  No  If fall risk identified, deferred for follow up unless specifically addressed in assessment and plan.    Safety Assessment  Throw rugs on floor.  Yes  Handrails on all stairs.  Yes  Good lighting in all hallways.  Yes  Difficulty hearing.  No  Patient counseled about all safety risks that were identified.    Functional Assessment ADLs  Are there any barriers preventing you from cooking for yourself or meeting nutritional needs?  No.    Are there any barriers preventing you from driving safely or obtaining transportation?  No.    Are there any barriers preventing you from using a telephone or calling for help?  No.    Are there any barriers preventing you from shopping?  No.    Are there any barriers preventing you from taking care of your own finances?  No.    Are there any barriers preventing you from managing your medications?  No.    Are there any barriers preventing you from showering, bathing or dressing yourself?  No.    Are you currently engaging in any exercise or physical activity?  Yes.  Walk everyday, stationary bike   What is your perception of your health?  Good.    Advance Care Planning  Do you have an Advance Directive, Living Will, Durable Power of , or POLST? Yes  Advance Directive Living Will Durable Power of  POLST      Health Maintenance Summary            Overdue - HEPATITIS C SCREENING (Once) Overdue - never done      No completion history exists for this topic.              Overdue - COVID-19 Vaccine (5 - Pfizer series) Overdue since 7/21/2022 05/26/2022  Imm Admin: PFIZER BLACK CAP SARS-COV-2  VACCINATION (12+)    09/27/2021  Imm Admin: PFIZER PURPLE CAP SARS-COV-2 VACCINATION (12+)    02/18/2021  Imm Admin: PFIZER PURPLE CAP SARS-COV-2 VACCINATION (12+)    01/26/2021  Imm Admin: PFIZER PURPLE CAP SARS-COV-2 VACCINATION (12+)              Postponed - IMM ZOSTER VACCINES (2 of 3) Postponed until 1/11/2037      06/10/2013  Imm Admin: Zoster Vaccine Live (ZVL) (Zostavax) - HISTORICAL DATA              IMM INFLUENZA (1) Next due on 9/1/2023      10/20/2022  Imm Admin: Influenza, Unspecified - HISTORICAL DATA    09/27/2021  Imm Admin: Influenza, Unspecified - HISTORICAL DATA    09/11/2020  Imm Admin: Influenza Vaccine Quad Inj (Pf)    11/11/2019  Imm Admin: Influenza, Unspecified - HISTORICAL DATA    11/05/2018  Imm Admin: Influenza, Unspecified - HISTORICAL DATA    Only the first 5 history entries have been loaded, but more history exists.              Annual Wellness Visit (Every 366 Days) Next due on 7/20/2024 07/20/2023  Level of Service: ANNUAL WELLNESS VISIT-INCLUDES PPPS SUBSEQUE*    07/20/2023  Visit Dx: Medicare annual wellness visit, subsequent    09/23/2020  Subsequent Annual Wellness Visit - Includes PPPS ()    09/23/2020  Visit Dx: Medicare annual wellness visit, subsequent    08/13/2019  Visit Dx: Medicare annual wellness visit, subsequent    Only the first 5 history entries have been loaded, but more history exists.              IMM DTaP/Tdap/Td Vaccine (3 - Td or Tdap) Next due on 9/29/2027 09/29/2017  Imm Admin: Tdap Vaccine    01/19/2013  Imm Admin: Tdap Vaccine    11/18/2005  Imm Admin: TD Vaccine              IMM PNEUMOCOCCAL VACCINE: 65+ Years (Series Information) Completed      07/10/2017  Imm Admin: Pneumococcal polysaccharide vaccine (PPSV-23)    07/08/2016  Imm Admin: Pneumococcal Conjugate Vaccine (Prevnar/PCV-13)              IMM HEP B VACCINE (Series Information) Aged Out      No completion history exists for this topic.              HPV Vaccines (Series Information)  Aged Out      No completion history exists for this topic.              IMM MENINGOCOCCAL ACWY VACCINE (Series Information) Aged Out      No completion history exists for this topic.              Discontinued - COLORECTAL CANCER SCREENING  Discontinued        Frequency changed to Never automatically (Topic No Longer Applies)    2019  REFERRAL TO GI FOR COLONOSCOPY    10/17/2014  REFERRAL TO GI FOR COLONOSCOPY    2014  COLONOSCOPY (Done)                    Patient Care Team:  Rodney Dan M.D. as PCP - General (Family Medicine)  Aldo Lemus M.D. as PCP - [facility] Paneled  Prosper Alcala M.D. as Consulting Physician (Gastroenterology)  Julianna Escobedo M.D. as Consulting Physician (Pulmonary Medicine)  Rodrigue Mcallister M.D. as Consulting Physician (Interventional Cardiology)  [facility] (DME Supplier)  Jeanette Mirza as      Social History     Tobacco Use    Smoking status: Former     Packs/day: 3.00     Years: 16.00     Pack years: 48.00     Types: Cigarettes     Quit date: 1980     Years since quittin.5    Smokeless tobacco: Never   Vaping Use    Vaping Use: Never used   Substance Use Topics    Alcohol use: Not Currently     Comment: 2 per day    Drug use: No     Family History   Problem Relation Age of Onset    Diabetes Mother     Heart Disease Mother     Kidney Disease Mother     Diabetes Sister      He  has a past medical history of Arrhythmia (2017), Blood clotting disorder (HCC) (2017), CAD (coronary artery disease) (2012), Dental disorder, Hemorrhagic disorder (HCC), High cholesterol, HTN (hypertension) (2012), Hyperglycemia (2012), Hyperlipemia (2012), Hypertension, Liver enzyme elevation, Myocardial infarct (HCC) (2017), Obesity, Sleep apnea, Tachycardia (2012), Tonsillitis, and Urinary incontinence.   Past Surgical History:   Procedure Laterality Date    TRIGGER FINGER RELEASE Left 2018     "Procedure: TRIGGER FINGER RELEASE - RING;  Surgeon: Jose Miguel Kay M.D.;  Location: SURGERY [facility];  Service: Orthopedics    MASS EXCISION ORTHO Left 7/17/2018    Procedure: MASS EXCISION ORTHO - DISTAL INTERPHALANGEAL JOINT NODULE EXCISION;  Surgeon: Jose Miguel Kay M.D.;  Location: SURGERY [facility];  Service: Orthopedics    CARDIAC CATH, LEFT HEART  09/03/2017    successful thrombectomy  and CHELLE to ostial proximal RCA     STENT PLACEMENT  09/03/2017    cardiac    CARPAL TUNNEL RELEASE Right 1980's    TRIGGER FINGER RELEASE Right 1980's       Exam:   /58 (BP Location: Left arm, Patient Position: Sitting, BP Cuff Size: Adult)   Pulse 70   Temp 36.9 °C (98.5 °F) (Temporal)   Ht 1.702 m (5' 7\")   Wt 97 kg (213 lb 12.8 oz)   SpO2 95%  Body mass index is 33.49 kg/m².    Hearing excellent.    Dentition upper and lower dentures  Alert, oriented in no acute distress  Eye contact is good, speech goal directed, affect calm    Assessment and Plan. The following treatment and monitoring plan is recommended:      1. Medicare annual wellness visit, subsequent  Completed.  2. Paroxysmal atrial fibrillation (HCC)  Chronic, stable condition.  Continue with Xarelto.  Continue to follow-up with cardiology.  3. Dyslipidemia  Chronic, stable condition.  Continue with statin  4. Essential hypertension  Chronic, Stable condition.  Continue with losartan and metoprolol.  5. Peripheral polyneuropathy  Chronic, stable condition.  Providing prescription for gabapentin.  Other orders  - gabapentin (NEURONTIN) 300 MG Cap; Take 1 Capsule by mouth 3 times a day.  Dispense: 90 Capsule; Refill: 0       Services suggested: No services needed at this time  Health Care Screening recommendations as per orders if indicated.  Referrals offered: PT/OT/Nutrition counseling/Behavioral Health/Smoking cessation as per orders if indicated.    Discussion today about general wellness and lifestyle habits:    Prevent falls and " reduce trip hazards; Cautioned about securing or removing rugs.  Have a working fire alarm and carbon monoxide detector;   Engage in regular physical activity and social activities.     Follow-up: No follow-ups on file.

## 2023-08-14 ENCOUNTER — DOCUMENTATION (OUTPATIENT)
Dept: HEALTH INFORMATION MANAGEMENT | Facility: OTHER | Age: 78
End: 2023-08-14
Payer: MEDICARE

## 2023-08-14 RX ORDER — GABAPENTIN 300 MG/1
300 CAPSULE ORAL 3 TIMES DAILY
Qty: 90 CAPSULE | Refills: 0 | Status: SHIPPED | OUTPATIENT
Start: 2023-08-14

## 2023-08-17 ENCOUNTER — OFFICE VISIT (OUTPATIENT)
Dept: MEDICAL GROUP | Facility: PHYSICIAN GROUP | Age: 78
End: 2023-08-17
Payer: MEDICARE

## 2023-08-17 VITALS
HEIGHT: 66 IN | BODY MASS INDEX: 35.36 KG/M2 | OXYGEN SATURATION: 94 % | DIASTOLIC BLOOD PRESSURE: 60 MMHG | WEIGHT: 220 LBS | TEMPERATURE: 97.8 F | HEART RATE: 56 BPM | SYSTOLIC BLOOD PRESSURE: 128 MMHG

## 2023-08-17 DIAGNOSIS — I48.0 PAROXYSMAL ATRIAL FIBRILLATION (HCC): ICD-10-CM

## 2023-08-17 DIAGNOSIS — Z79.01 ANTICOAGULATED: ICD-10-CM

## 2023-08-17 DIAGNOSIS — E78.5 DYSLIPIDEMIA: ICD-10-CM

## 2023-08-17 DIAGNOSIS — I10 ESSENTIAL HYPERTENSION: ICD-10-CM

## 2023-08-17 DIAGNOSIS — R73.03 PREDIABETES: ICD-10-CM

## 2023-08-17 PROCEDURE — 3074F SYST BP LT 130 MM HG: CPT | Performed by: STUDENT IN AN ORGANIZED HEALTH CARE EDUCATION/TRAINING PROGRAM

## 2023-08-17 PROCEDURE — 3078F DIAST BP <80 MM HG: CPT | Performed by: STUDENT IN AN ORGANIZED HEALTH CARE EDUCATION/TRAINING PROGRAM

## 2023-08-17 PROCEDURE — 99213 OFFICE O/P EST LOW 20 MIN: CPT | Performed by: STUDENT IN AN ORGANIZED HEALTH CARE EDUCATION/TRAINING PROGRAM

## 2023-08-17 ASSESSMENT — FIBROSIS 4 INDEX: FIB4 SCORE: 1.41

## 2023-08-18 PROBLEM — R05.2 SUBACUTE COUGH: Status: RESOLVED | Noted: 2023-04-20 | Resolved: 2023-08-18

## 2023-08-18 ASSESSMENT — ENCOUNTER SYMPTOMS
DIARRHEA: 0
ABDOMINAL PAIN: 0
NAUSEA: 0
DIZZINESS: 0
PALPITATIONS: 0
FEVER: 0
CHILLS: 0
BLURRED VISION: 0
VOMITING: 0
SHORTNESS OF BREATH: 0
HEADACHES: 0

## 2023-08-18 NOTE — PROGRESS NOTES
Subjective:     CC:    Chief Complaint   Patient presents with    Establish Care        HISTORY OF THE PRESENT ILLNESS: Patient is a 78 y.o. male. This pleasant patient is here today to establish care. Prior PCP was Dr. Dan.    Problem   Peripheral Polyneuropathy    Chronic problem.  Patient has intermittent pain and mild numbness of both feet.  Onset in 1994 about a month after a fall. He takes gabapentin 300 mg TID, which does help.     Paroxysmal atrial fibrillation (HCC)    Chronic condition, diagnosed 2017. Patient had ablation in 2021. Continues on Xarelto 10 mg daily. Follows with cardiology at least annually.          Prediabetes    Lab Results   Component Value Date/Time    HBA1C 6.2 (H) 04/26/2023 09:08 AM         Obesity, Class II, BMI 35-39.9   Coronary Artery Disease Involving Native Coronary Artery of Native Heart Without Angina Pectoris    Patient had MI in 2017 with stent placement x 1.  Follows with cardiology, Dr. Alba, annually.     Dyslipidemia    Lab Results   Component Value Date/Time    CHOLSTRLTOT 125 04/26/2023 0908    TRIGLYCERIDE 55 04/26/2023 0908    HDL 49 04/26/2023 0908    LDL 65 04/26/2023 0908     Patient takes atorvastatin 20 mg daily. No side effects.     Essential Hypertension    This is a chronic condition.  Current Meds: losartan 50 mg daily and metoprolol SR 25 mg daily  Side effects: None  Home BP Log: Checks several times per week and BP is at goal  Associated symptoms: Denies chest pain, shortness of breath, headaches, dizziness/lightheadedness        Subacute Cough (Resolved)         Past Medical History: Diagnoses of Essential hypertension, Dyslipidemia, Paroxysmal atrial fibrillation (HCC), Anticoagulated, Peripheral polyneuropathy, and Prediabetes were pertinent to this visit.    Current Outpatient Medications   Medication Sig    gabapentin (NEURONTIN) 300 MG Cap TAKE 1 CAPSULE BY MOUTH THREE TIMES DAILY    atorvastatin (LIPITOR) 20 MG Tab Take 1 tablet by mouth  once daily    XARELTO 10 MG Tab tablet TAKE 1 TABLET BY MOUTH DAILY WITH SUPPER    losartan (COZAAR) 50 MG Tab Take 1 tablet by mouth once daily    metoprolol SR (TOPROL XL) 25 MG TABLET SR 24 HR Take 0.5 Tablets by mouth every day.    vitamin D (CHOLECALCIFEROL) 1000 UNIT TABS Take 1 Tablet by mouth every day.        Social History     Socioeconomic History    Marital status:     Highest education level: 12th grade   Tobacco Use    Smoking status: Former     Packs/day: 3.00     Years: 16.00     Additional pack years: 0.00     Total pack years: 48.00     Types: Cigarettes     Quit date: 1980     Years since quittin.6    Smokeless tobacco: Never   Vaping Use    Vaping Use: Never used   Substance and Sexual Activity    Alcohol use: Not Currently     Comment: 2 per day    Drug use: No    Sexual activity: Yes     Partners: Female     Social Determinants of Health     Financial Resource Strain: Low Risk  (10/28/2020)    Overall Financial Resource Strain (CARDIA)     Difficulty of Paying Living Expenses: Not hard at all   Food Insecurity: No Food Insecurity (10/28/2020)    Hunger Vital Sign     Worried About Running Out of Food in the Last Year: Never true     Ran Out of Food in the Last Year: Never true   Transportation Needs: No Transportation Needs (10/28/2020)    PRAPARE - Transportation     Lack of Transportation (Medical): No     Lack of Transportation (Non-Medical): No   Physical Activity: Unknown (10/28/2020)    Exercise Vital Sign     Days of Exercise per Week: 0 days   Stress: Stress Concern Present (10/28/2020)    [employer] - Occupational Stress Questionnaire     Feeling of Stress : To some extent   Social Connections: Socially Isolated (10/28/2020)    Social Connection and Isolation Panel [NHANES]     Frequency of Communication with Friends and Family: Once a week     Frequency of Social Gatherings with Friends and Family: Once a week     Attends Scientologist  "Services: Never     Active Member of Clubs or Organizations: No     Attends Club or Organization Meetings: Never     Marital Status:    Housing Stability: Low Risk  (10/28/2020)    Housing Stability Vital Sign     Unable to Pay for Housing in the Last Year: No     Number of Places Lived in the Last Year: 1     Unstable Housing in the Last Year: No       Family History   Problem Relation Age of Onset    Diabetes Mother     Heart Disease Mother     Kidney Disease Mother     Diabetes Sister          Health Maintenance: Completed      ROS:   Review of Systems   Constitutional:  Negative for chills and fever.   Eyes:  Negative for blurred vision.   Respiratory:  Negative for shortness of breath.    Cardiovascular:  Negative for chest pain and palpitations.   Gastrointestinal:  Negative for abdominal pain, diarrhea, nausea and vomiting.   Genitourinary:  Negative for dysuria.   Neurological:  Negative for dizziness and headaches.       Objective:     Exam: /60 (BP Location: Right arm, Patient Position: Sitting, BP Cuff Size: Adult)   Pulse (!) 56   Temp 36.6 °C (97.8 °F) (Temporal)   Ht 1.676 m (5' 6\")   Wt 99.8 kg (220 lb)   SpO2 94%  Body mass index is 35.51 kg/m².    Physical Exam  Vitals reviewed.   Constitutional:       General: He is not in acute distress.     Appearance: He is not toxic-appearing.   HENT:      Right Ear: External ear normal.      Left Ear: External ear normal.      Mouth/Throat:      Mouth: Mucous membranes are moist.   Eyes:      Extraocular Movements: Extraocular movements intact.   Cardiovascular:      Rate and Rhythm: Normal rate and regular rhythm.      Heart sounds: No murmur heard.     No friction rub. No gallop.   Pulmonary:      Effort: Pulmonary effort is normal.      Breath sounds: No wheezing, rhonchi or rales.   Musculoskeletal:      Cervical back: Normal range of motion and neck supple.      Right lower leg: No edema.      Left lower leg: No edema.   Lymphadenopathy: "      Cervical: No cervical adenopathy.   Skin:     General: Skin is warm and dry.   Neurological:      Mental Status: He is alert.   Psychiatric:         Mood and Affect: Mood normal.         Labs:  Labs from 4/26/23 were reviewed.    Assessment & Plan:     78 y.o. male with the following -    1. Essential hypertension  Chronic, controlled.  Blood pressure is at goal today.  Continue current regimen.    2. Dyslipidemia  Chronic, controlled. He is on a statin tolerating it well. The last cholesterol panel in 04/2023 was within normal limits. Continue current dosing.    3. Prediabetes  Last A1C in 04/2023 was elevated at 6.2%. Discussed low glycemic diet and regular exercise. Patient walks his dog at least 2 miles every day. Will continue to monitor.    4. Paroxysmal atrial fibrillation (HCC)  5. Anticoagulated  Chronic, stable. Continue Xarelto per by cardiology, follow up as directed.        Return in about 6 months (around 2/17/2024) for med check.    Please note that this dictation was created using voice recognition software. I have made every reasonable attempt to correct obvious errors, but I expect that there are errors of grammar and possibly content that I did not discover before finalizing the note.

## 2023-09-13 DIAGNOSIS — I48.0 PAROXYSMAL ATRIAL FIBRILLATION (HCC): ICD-10-CM

## 2023-09-14 RX ORDER — METOPROLOL SUCCINATE 25 MG/1
12.5 TABLET, EXTENDED RELEASE ORAL DAILY
Qty: 50 TABLET | Refills: 0 | Status: SHIPPED | OUTPATIENT
Start: 2023-09-14 | End: 2023-12-19

## 2023-09-14 NOTE — TELEPHONE ENCOUNTER
Is the patient due for a refill? Yes    Was the patient seen the past year? Yes    Date of last office visit: 12/16/2022    Does the patient have an upcoming appointment?  Yes   If yes, When? 12/14/2023    Provider to refill:BE    Does the patients insurance require a 100 day supply?  Yes

## 2023-09-18 ENCOUNTER — TELEPHONE (OUTPATIENT)
Dept: CARDIOLOGY | Facility: MEDICAL CENTER | Age: 78
End: 2023-09-18
Payer: MEDICARE

## 2023-09-18 DIAGNOSIS — I48.0 PAF (PAROXYSMAL ATRIAL FIBRILLATION) (HCC): ICD-10-CM

## 2023-09-18 NOTE — TELEPHONE ENCOUNTER
BE    Caller: Shamar Morgan    Topic/issue: Patient states that he feels like his heart is going crazy. He says it has been around 200 and last night patient had a hard time sleep and he felt something in his chest. He did the at home EKG which show 198 bpm and dropped as low as 90 all the way to 160bpm. Patient states his machine tell him it could be: arrhythmia tachycardia. Advised patient of ER if anything becomes worse.    Callback Number: [phone]    Thank you,  -Julianna ASCENCIO

## 2023-09-18 NOTE — TELEPHONE ENCOUNTER
Phone Number Called: [phone]    Call outcome: Spoke to patient regarding message below.    Message: Called to inform patient about BE recommendations. Order placed for Zio patch. Patient scheduled for monitor to be placed at 1400 9/19/23.          Fabio Alba M.D.  You 6 minutes ago (12:50 PM)     Wear Zio. Can defer Xarelto for time being until Dx is made. Follow up in clinic after zio.     Thx   Be

## 2023-09-18 NOTE — TELEPHONE ENCOUNTER
"Phone Number Called: [phone]    Call outcome: Spoke to patient regarding message below.    Message: Patient describes issues with sleeping as well as a \"weird sensation\" in his chest the other night. Patient did home EKG and it showed HR in the 170's. Patient heart rate is now normal in the 80's. Patient has a history of afib s/p ablation. Patient denies chest pain at this time. Patient reports always being short of breathe but that it is baseline for him. Patient wondering if he should up his dose of Xarelto to 20 mg at this time. Notified patient that this RN would discuss with BE about recommendations. Patient also wondering about having a monitor placed to see if he is having frequent episodes of afib.   Advised patient that if his heart rate remains elevated and does not come down or if he experiences chest pain or worsening shortness of breathe to go to the ED. Advised to call back with any questions or concerns and that this RN would reach out with BE advice.     To BE: patient wondering if it is a good idea to up Xarelto dose and if wearing a monitor would be beneficial  "

## 2023-09-19 ENCOUNTER — NON-PROVIDER VISIT (OUTPATIENT)
Dept: CARDIOLOGY | Facility: MEDICAL CENTER | Age: 78
End: 2023-09-19
Attending: INTERNAL MEDICINE
Payer: MEDICARE

## 2023-09-19 DIAGNOSIS — I47.29 NSVT (NONSUSTAINED VENTRICULAR TACHYCARDIA) (HCC): ICD-10-CM

## 2023-09-19 DIAGNOSIS — I49.3 PVCS (PREMATURE VENTRICULAR CONTRACTIONS): ICD-10-CM

## 2023-09-19 DIAGNOSIS — I48.0 PAF (PAROXYSMAL ATRIAL FIBRILLATION) (HCC): ICD-10-CM

## 2023-09-19 PROCEDURE — 93246 EXT ECG>7D<15D RECORDING: CPT

## 2023-09-19 NOTE — PROGRESS NOTES
Patient enrolled in the 14 day [employer] XT Holter monitoring program per Fabio Alba MD..   >In-Clinic hook-up, serial # [id number]..   >Currently pending EOS.

## 2023-10-09 ENCOUNTER — TELEPHONE (OUTPATIENT)
Dept: CARDIOLOGY | Facility: MEDICAL CENTER | Age: 78
End: 2023-10-09
Payer: MEDICARE

## 2023-10-09 DIAGNOSIS — I48.0 PAROXYSMAL ATRIAL FIBRILLATION (HCC): ICD-10-CM

## 2023-10-09 DIAGNOSIS — I25.10 CORONARY ARTERY DISEASE INVOLVING NATIVE CORONARY ARTERY OF NATIVE HEART WITHOUT ANGINA PECTORIS: ICD-10-CM

## 2023-10-09 PROCEDURE — 93248 EXT ECG>7D<15D REV&INTERPJ: CPT | Performed by: INTERNAL MEDICINE

## 2023-10-09 NOTE — TELEPHONE ENCOUNTER
Phone Number Called: [phone]    Call outcome: Spoke to patient regarding message below.    Message: Called to inform patient about monitor results. Patient reports that he is not bothered by it and does not notice any symptoms at this time. Patient agreeable to complete echocardiogram prior to appointment and echocardiogram ordered at this time. Advised to call with any questions or concerns.

## 2023-10-09 NOTE — TELEPHONE ENCOUNTER
----- Message from Fabio Alba M.D. sent at 10/9/2023  1:10 PM PDT -----  The monitor shows PVCs as the source of symptoms.  If the symptoms are particularly bothersome then I recommend doubling the dose of metoprolol.  I suggest updating an echocardiogram before our appointment.

## 2023-10-11 ENCOUNTER — HOSPITAL ENCOUNTER (OUTPATIENT)
Dept: CARDIOLOGY | Facility: MEDICAL CENTER | Age: 78
End: 2023-10-11
Attending: INTERNAL MEDICINE
Payer: MEDICARE

## 2023-10-11 DIAGNOSIS — I48.0 PAROXYSMAL ATRIAL FIBRILLATION (HCC): ICD-10-CM

## 2023-10-11 LAB
LV EJECT FRACT  99904: 55
LV EJECT FRACT MOD 2C 99903: 55.5
LV EJECT FRACT MOD 4C 99902: 50.84
LV EJECT FRACT MOD BP 99901: 49.79

## 2023-10-11 PROCEDURE — 93306 TTE W/DOPPLER COMPLETE: CPT

## 2023-10-11 PROCEDURE — 93306 TTE W/DOPPLER COMPLETE: CPT | Mod: 26 | Performed by: INTERNAL MEDICINE

## 2023-11-16 RX ORDER — LOSARTAN POTASSIUM 50 MG/1
50 TABLET ORAL DAILY
Qty: 100 TABLET | Refills: 3 | Status: SHIPPED | OUTPATIENT
Start: 2023-11-16

## 2023-11-21 ENCOUNTER — APPOINTMENT (RX ONLY)
Dept: URBAN - METROPOLITAN AREA CLINIC 20 | Facility: CLINIC | Age: 78
Setting detail: DERMATOLOGY
End: 2023-11-21

## 2023-11-21 DIAGNOSIS — B37.2 CANDIDIASIS OF SKIN AND NAIL: ICD-10-CM

## 2023-11-21 DIAGNOSIS — L82.1 OTHER SEBORRHEIC KERATOSIS: ICD-10-CM

## 2023-11-21 DIAGNOSIS — D18.0 HEMANGIOMA: ICD-10-CM

## 2023-11-21 DIAGNOSIS — L57.0 ACTINIC KERATOSIS: ICD-10-CM

## 2023-11-21 DIAGNOSIS — Z86.006 PERSONAL HISTORY OF MELANOMA IN-SITU: ICD-10-CM

## 2023-11-21 DIAGNOSIS — D22 MELANOCYTIC NEVI: ICD-10-CM

## 2023-11-21 DIAGNOSIS — L81.4 OTHER MELANIN HYPERPIGMENTATION: ICD-10-CM

## 2023-11-21 DIAGNOSIS — Z71.89 OTHER SPECIFIED COUNSELING: ICD-10-CM

## 2023-11-21 PROBLEM — D18.01 HEMANGIOMA OF SKIN AND SUBCUTANEOUS TISSUE: Status: ACTIVE | Noted: 2023-11-21

## 2023-11-21 PROBLEM — D22.61 MELANOCYTIC NEVI OF RIGHT UPPER LIMB, INCLUDING SHOULDER: Status: ACTIVE | Noted: 2023-11-21

## 2023-11-21 PROBLEM — D22.5 MELANOCYTIC NEVI OF TRUNK: Status: ACTIVE | Noted: 2023-11-21

## 2023-11-21 PROBLEM — D22.62 MELANOCYTIC NEVI OF LEFT UPPER LIMB, INCLUDING SHOULDER: Status: ACTIVE | Noted: 2023-11-21

## 2023-11-21 PROCEDURE — 17003 DESTRUCT PREMALG LES 2-14: CPT

## 2023-11-21 PROCEDURE — 99213 OFFICE O/P EST LOW 20 MIN: CPT | Mod: 25

## 2023-11-21 PROCEDURE — 17000 DESTRUCT PREMALG LESION: CPT

## 2023-11-21 PROCEDURE — ? SUNSCREEN RECOMMENDATIONS

## 2023-11-21 PROCEDURE — ? LIQUID NITROGEN

## 2023-11-21 PROCEDURE — ? COUNSELING

## 2023-11-21 PROCEDURE — ? MEDICATION COUNSELING

## 2023-11-21 PROCEDURE — ? PRESCRIPTION

## 2023-11-21 RX ORDER — KETOCONAZOLE 20 MG/G
CREAM TOPICAL QD
Qty: 30 | Refills: 1 | Status: ERX | COMMUNITY
Start: 2023-11-21

## 2023-11-21 RX ADMIN — KETOCONAZOLE: 20 CREAM TOPICAL at 00:00

## 2023-11-21 ASSESSMENT — LOCATION DETAILED DESCRIPTION DERM
LOCATION DETAILED: RIGHT PROXIMAL DORSAL FOREARM
LOCATION DETAILED: RIGHT RADIAL DORSAL HAND
LOCATION DETAILED: LEFT LATERAL EYEBROW
LOCATION DETAILED: RIGHT VENTRAL DISTAL FOREARM
LOCATION DETAILED: LEFT RADIAL DORSAL HAND
LOCATION DETAILED: SUPERIOR THORACIC SPINE
LOCATION DETAILED: RIGHT CENTRAL FRONTAL SCALP
LOCATION DETAILED: RIGHT MEDIAL FRONTAL SCALP
LOCATION DETAILED: LEFT FOREHEAD
LOCATION DETAILED: RIGHT CENTRAL MALAR CHEEK
LOCATION DETAILED: LEFT MEDIAL TEMPLE
LOCATION DETAILED: LEFT SUPERIOR PARIETAL SCALP
LOCATION DETAILED: INFERIOR THORACIC SPINE
LOCATION DETAILED: RIGHT SUPERIOR PARIETAL SCALP
LOCATION DETAILED: RIGHT MEDIAL UPPER BACK
LOCATION DETAILED: LEFT SUPERIOR HELIX
LOCATION DETAILED: LEFT SUPERIOR FOREHEAD
LOCATION DETAILED: LEFT CENTRAL PARIETAL SCALP
LOCATION DETAILED: RIGHT INFERIOR UPPER BACK
LOCATION DETAILED: LEFT PROXIMAL DORSAL FOREARM
LOCATION DETAILED: LEFT AXILLARY VAULT
LOCATION DETAILED: RIGHT INFERIOR MEDIAL FOREHEAD
LOCATION DETAILED: LEFT VENTRAL PROXIMAL FOREARM
LOCATION DETAILED: LEFT CENTRAL MALAR CHEEK

## 2023-11-21 ASSESSMENT — LOCATION SIMPLE DESCRIPTION DERM
LOCATION SIMPLE: LEFT EYEBROW
LOCATION SIMPLE: LEFT TEMPLE
LOCATION SIMPLE: RIGHT FOREHEAD
LOCATION SIMPLE: RIGHT SCALP
LOCATION SIMPLE: RIGHT CHEEK
LOCATION SIMPLE: LEFT FOREHEAD
LOCATION SIMPLE: RIGHT HAND
LOCATION SIMPLE: SCALP
LOCATION SIMPLE: RIGHT UPPER BACK
LOCATION SIMPLE: LEFT EAR
LOCATION SIMPLE: RIGHT FOREARM
LOCATION SIMPLE: LEFT HAND
LOCATION SIMPLE: LEFT AXILLARY VAULT
LOCATION SIMPLE: UPPER BACK
LOCATION SIMPLE: LEFT CHEEK
LOCATION SIMPLE: LEFT FOREARM

## 2023-11-21 ASSESSMENT — LOCATION ZONE DERM
LOCATION ZONE: TRUNK
LOCATION ZONE: FACE
LOCATION ZONE: ARM
LOCATION ZONE: SCALP
LOCATION ZONE: AXILLAE
LOCATION ZONE: EAR
LOCATION ZONE: HAND

## 2023-11-21 NOTE — PROCEDURE: MEDICATION COUNSELING
Rhofade Counseling: Rhofade is a topical medication which can decrease superficial blood flow where applied. Side effects are uncommon and include stinging, redness and allergic reactions.
Vtama Pregnancy And Lactation Text: It is unknown if this medication can cause problems during pregnancy and breastfeeding.
Olanzapine Counseling- I discussed with the patient the common side effects of olanzapine including but are not limited to: lack of energy, dry mouth, increased appetite, sleepiness, tremor, constipation, dizziness, changes in behavior, or restlessness.  Explained that teenagers are more likely to experience headaches, abdominal pain, pain in the arms or legs, tiredness, and sleepiness.  Serious side effects include but are not limited: increased risk of death in elderly patients who are confused, have memory loss, or dementia-related psychosis; hyperglycemia; increased cholesterol and triglycerides; and weight gain.
Calcipotriene Counseling:  I discussed with the patient the risks of calcipotriene including but not limited to erythema, scaling, itching, and irritation.
Tazorac Pregnancy And Lactation Text: This medication is not safe during pregnancy. It is unknown if this medication is excreted in breast milk.
Cibinqo Counseling: I discussed with the patient the risks of Cibinqo therapy including but not limited to common cold, nausea, headache, cold sores, increased blood CPK levels, dizziness, UTIs, fatigue, acne, and vomitting. Live vaccines should be avoided.  This medication has been linked to serious infections; higher rate of mortality; malignancy and lymphoproliferative disorders; major adverse cardiovascular events; thrombosis; thrombocytopenia and lymphopenia; lipid elevations; and retinal detachment.
Include Pregnancy/Lactation Warning?: No
Arava Pregnancy And Lactation Text: This medication is Pregnancy Category X and is absolutely contraindicated during pregnancy. It is unknown if it is excreted in breast milk.
Skyrizi Pregnancy And Lactation Text: The risk during pregnancy and breastfeeding is uncertain with this medication.
Calcipotriene Pregnancy And Lactation Text: The use of this medication during pregnancy or lactation is not recommended as there is insufficient data.
Topical Steroids Counseling: I discussed with the patient that prolonged use of topical steroids can result in the increased appearance of superficial blood vessels (telangiectasias), lightening (hypopigmentation) and thinning of the skin (atrophy).  Patient understands to avoid using high potency steroids in skin folds, the groin or the face.  The patient verbalized understanding of the proper use and possible adverse effects of topical steroids.  All of the patient's questions and concerns were addressed.
Propranolol Pregnancy And Lactation Text: This medication is Pregnancy Category C and it isn't known if it is safe during pregnancy. It is excreted in breast milk.
Quinolones Counseling:  I discussed with the patient the risks of fluoroquinolones including but not limited to GI upset, allergic reaction, drug rash, diarrhea, dizziness, photosensitivity, yeast infections, liver function test abnormalities, tendonitis/tendon rupture.
Ivermectin Counseling:  Patient instructed to take medication on an empty stomach with a full glass of water.  Patient informed of potential adverse effects including but not limited to nausea, diarrhea, dizziness, itching, and swelling of the extremities or lymph nodes.  The patient verbalized understanding of the proper use and possible adverse effects of ivermectin.  All of the patient's questions and concerns were addressed.
Cellcept Pregnancy And Lactation Text: This medication is Pregnancy Category D and isn't considered safe during pregnancy. It is unknown if this medication is excreted in breast milk.
Clindamycin Pregnancy And Lactation Text: This medication can be used in pregnancy if certain situations. Clindamycin is also present in breast milk.
Finasteride Pregnancy And Lactation Text: This medication is absolutely contraindicated during pregnancy. It is unknown if it is excreted in breast milk.
Hydroquinone Counseling:  Patient advised that medication may result in skin irritation, lightening (hypopigmentation), dryness, and burning.  In the event of skin irritation, the patient was advised to reduce the amount of the drug applied or use it less frequently.  Rarely, spots that are treated with hydroquinone can become darker (pseudoochronosis).  Should this occur, patient instructed to stop medication and call the office. The patient verbalized understanding of the proper use and possible adverse effects of hydroquinone.  All of the patient's questions and concerns were addressed.
Clofazimine Counseling:  I discussed with the patient the risks of clofazimine including but not limited to skin and eye pigmentation, liver damage, nausea/vomiting, gastrointestinal bleeding and allergy.
Infliximab Counseling:  I discussed with the patient the risks of infliximab including but not limited to myelosuppression, immunosuppression, autoimmune hepatitis, demyelinating diseases, lymphoma, and serious infections.  The patient understands that monitoring is required including a PPD at baseline and must alert us or the primary physician if symptoms of infection or other concerning signs are noted.
Mirvaso Pregnancy And Lactation Text: This medication has not been assigned a Pregnancy Risk Category. It is unknown if the medication is excreted in breast milk.
Fluconazole Pregnancy And Lactation Text: This medication is Pregnancy Category C and it isn't know if it is safe during pregnancy. It is also excreted in breast milk.
Cantharidin Counseling:  I discussed with the patient the risks of Cantharidin including but not limited to pain, redness, burning, itching, and blistering.
Topical Clindamycin Counseling: Patient counseled that this medication may cause skin irritation or allergic reactions.  In the event of skin irritation, the patient was advised to reduce the amount of the drug applied or use it less frequently.   The patient verbalized understanding of the proper use and possible adverse effects of clindamycin.  All of the patient's questions and concerns were addressed.
Cimetidine Counseling:  I discussed with the patient the risks of Cimetidine including but not limited to gynecomastia, headache, diarrhea, nausea, drowsiness, arrhythmias, pancreatitis, skin rashes, psychosis, bone marrow suppression and kidney toxicity.
Glycopyrrolate Pregnancy And Lactation Text: This medication is Pregnancy Category B and is considered safe during pregnancy. It is unknown if it is excreted breast milk.
Erivedge Counseling- I discussed with the patient the risks of Erivedge including but not limited to nausea, vomiting, diarrhea, constipation, weight loss, changes in the sense of taste, decreased appetite, muscle spasms, and hair loss.  The patient verbalized understanding of the proper use and possible adverse effects of Erivedge.  All of the patient's questions and concerns were addressed.
Cosentyx Pregnancy And Lactation Text: This medication is Pregnancy Category B and is considered safe during pregnancy. It is unknown if this medication is excreted in breast milk.
Acitretin Counseling:  I discussed with the patient the risks of acitretin including but not limited to hair loss, dry lips/skin/eyes, liver damage, hyperlipidemia, depression/suicidal ideation, photosensitivity.  Serious rare side effects can include but are not limited to pancreatitis, pseudotumor cerebri, bony changes, clot formation/stroke/heart attack.  Patient understands that alcohol is contraindicated since it can result in liver toxicity and significantly prolong the elimination of the drug by many years.
Cibinqo Pregnancy And Lactation Text: It is unknown if this medication will adversely affect pregnancy or breast feeding.  You should not take this medication if you are currently pregnant or planning a pregnancy or while breastfeeding.
Topical Steroids Applications Pregnancy And Lactation Text: Most topical steroids are considered safe to use during pregnancy and lactation.  Any topical steroid applied to the breast or nipple should be washed off before breastfeeding.
Ivermectin Pregnancy And Lactation Text: This medication is Pregnancy Category C and it isn't known if it is safe during pregnancy. It is also excreted in breast milk.
Aklief counseling:  Patient advised to apply a pea-sized amount only at bedtime and wait 30 minutes after washing their face before applying.  If too drying, patient may add a non-comedogenic moisturizer.  The most commonly reported side effects including irritation, redness, scaling, dryness, stinging, burning, itching, and increased risk of sunburn.  The patient verbalized understanding of the proper use and possible adverse effects of retinoids.  All of the patient's questions and concerns were addressed.
Zoryve Counseling:  I discussed with the patient that Zoryve is not for use in the eyes, mouth or vagina. The most commonly reported side effects include diarrhea, headache, insomnia, application site pain, upper respiratory tract infections, and urinary tract infections.  All of the patient's questions and concerns were addressed.
SSKI Counseling:  I discussed with the patient the risks of SSKI including but not limited to thyroid abnormalities, metallic taste, GI upset, fever, headache, acne, arthralgias, paraesthesias, lymphadenopathy, easy bleeding, arrhythmias, and allergic reaction.
Cyclophosphamide Counseling:  I discussed with the patient the risks of cyclophosphamide including but not limited to hair loss, hormonal abnormalities, decreased fertility, abdominal pain, diarrhea, nausea and vomiting, bone marrow suppression and infection. The patient understands that monitoring is required while taking this medication.
Opzelura Counseling:  I discussed with the patient the risks of Opzelura including but not limited to nasopharngitis, bronchitis, ear infection, eosinophila, hives, diarrhea, folliculitis, tonsillitis, and rhinorrhea.  Taken orally, this medication has been linked to serious infections; higher rate of mortality; malignancy and lymphoproliferative disorders; major adverse cardiovascular events; thrombosis; thrombocytopenia, anemia, and neutropenia; and lipid elevations.
Olanzapine Pregnancy And Lactation Text: This medication is pregnancy category C.   There are no adequate and well controlled trials with olanzapine in pregnant females.  Olanzapine should be used during pregnancy only if the potential benefit justifies the potential risk to the fetus.   In a study in lactating healthy women, olanzapine was excreted in breast milk.  It is recommended that women taking olanzapine should not breast feed.
Stelara Counseling:  I discussed with the patient the risks of ustekinumab including but not limited to immunosuppression, malignancy, posterior leukoencephalopathy syndrome, and serious infections.  The patient understands that monitoring is required including a PPD at baseline and must alert us or the primary physician if symptoms of infection or other concerning signs are noted.
Cantharidin Pregnancy And Lactation Text: This medication has not been proven safe during pregnancy. It is unknown if this medication is excreted in breast milk.
5-Fu Counseling: 5-Fluorouracil Counseling:  I discussed with the patient the risks of 5-fluorouracil including but not limited to erythema, scaling, itching, weeping, crusting, and pain.
Birth Control Pills Counseling: Birth Control Pill Counseling: I discussed with the patient the potential side effects of OCPs including but not limited to increased risk of stroke, heart attack, thrombophlebitis, deep venous thrombosis, hepatic adenomas, breast changes, GI upset, headaches, and depression.  The patient verbalized understanding of the proper use and possible adverse effects of OCPs. All of the patient's questions and concerns were addressed.
Sski Pregnancy And Lactation Text: This medication is Pregnancy Category D and isn't considered safe during pregnancy. It is excreted in breast milk.
Griseofulvin Counseling:  I discussed with the patient the risks of griseofulvin including but not limited to photosensitivity, cytopenia, liver damage, nausea/vomiting and severe allergy.  The patient understands that this medication is best absorbed when taken with a fatty meal (e.g., ice cream or french fries).
Cyclophosphamide Pregnancy And Lactation Text: This medication is Pregnancy Category D and it isn't considered safe during pregnancy. This medication is excreted in breast milk.
Hydroquinone Pregnancy And Lactation Text: This medication has not been assigned a Pregnancy Risk Category but animal studies failed to show danger with the topical medication. It is unknown if the medication is excreted in breast milk.
Doxycycline Counseling:  Patient counseled regarding possible photosensitivity and increased risk for sunburn.  Patient instructed to avoid sunlight, if possible.  When exposed to sunlight, patients should wear protective clothing, sunglasses, and sunscreen.  The patient was instructed to call the office immediately if the following severe adverse effects occur:  hearing changes, easy bruising/bleeding, severe headache, or vision changes.  The patient verbalized understanding of the proper use and possible adverse effects of doxycycline.  All of the patient's questions and concerns were addressed.
Topical Sulfur Applications Counseling: Topical Sulfur Counseling: Patient counseled that this medication may cause skin irritation or allergic reactions.  In the event of skin irritation, the patient was advised to reduce the amount of the drug applied or use it less frequently.   The patient verbalized understanding of the proper use and possible adverse effects of topical sulfur application.  All of the patient's questions and concerns were addressed.
Hydroxychloroquine Counseling:  I discussed with the patient that a baseline ophthalmologic exam is needed at the start of therapy and every year thereafter while on therapy. A CBC may also be warranted for monitoring.  The side effects of this medication were discussed with the patient, including but not limited to agranulocytosis, aplastic anemia, seizures, rashes, retinopathy, and liver toxicity. Patient instructed to call the office should any adverse effect occur.  The patient verbalized understanding of the proper use and possible adverse effects of Plaquenil.  All the patient's questions and concerns were addressed.
Aklief Pregnancy And Lactation Text: It is unknown if this medication is safe to use during pregnancy.  It is unknown if this medication is excreted in breast milk.  Breastfeeding women should use the topical cream on the smallest area of the skin for the shortest time needed while breastfeeding.  Do not apply to nipple and areola.
Olumiant Counseling: I discussed with the patient the risks of Olumiant therapy including but not limited to upper respiratory tract infections, shingles, cold sores, and nausea. Live vaccines should be avoided.  This medication has been linked to serious infections; higher rate of mortality; malignancy and lymphoproliferative disorders; major adverse cardiovascular events; thrombosis; gastrointestinal perforations; neutropenia; lymphopenia; anemia; liver enzyme elevations; and lipid elevations.
Cimetidine Pregnancy And Lactation Text: This medication is Pregnancy Category B and is considered safe during pregnancy. It is also excreted in breast milk and breast feeding isn't recommended.
Clofazimine Pregnancy And Lactation Text: This medication is Pregnancy Category C and isn't considered safe during pregnancy. It is excreted in breast milk.
Dupixent Counseling: I discussed with the patient the risks of dupilumab including but not limited to eye infection and irritation, cold sores, injection site reactions, worsening of asthma, allergic reactions and increased risk of parasitic infection.  Live vaccines should be avoided while taking dupilumab. Dupilumab will also interact with certain medications such as warfarin and cyclosporine. The patient understands that monitoring is required and they must alert us or the primary physician if symptoms of infection or other concerning signs are noted.
Opzelura Pregnancy And Lactation Text: There is insufficient data to evaluate drug-associated risk for major birth defects, miscarriage, or other adverse maternal or fetal outcomes.  There is a pregnancy registry that monitors pregnancy outcomes in pregnant persons exposed to the medication during pregnancy.  It is unknown if this medication is excreted in breast milk.  Do not breastfeed during treatment and for about 4 weeks after the last dose.
Rifampin Counseling: I discussed with the patient the risks of rifampin including but not limited to liver damage, kidney damage, red-orange body fluids, nausea/vomiting and severe allergy.
Hydroxychloroquine Pregnancy And Lactation Text: This medication has been shown to cause fetal harm but it isn't assigned a Pregnancy Risk Category. There are small amounts excreted in breast milk.
Libtayo Counseling- I discussed with the patient the risks of Libtayo including but not limited to nausea, vomiting, diarrhea, and bone or muscle pain.  The patient verbalized understanding of the proper use and possible adverse effects of Libtayo.  All of the patient's questions and concerns were addressed.
Solaraze Counseling:  I discussed with the patient the risks of Solaraze including but not limited to erythema, scaling, itching, weeping, crusting, and pain.
Acitretin Pregnancy And Lactation Text: This medication is Pregnancy Category X and should not be given to women who are pregnant or may become pregnant in the future. This medication is excreted in breast milk.
Griseofulvin Pregnancy And Lactation Text: This medication is Pregnancy Category X and is known to cause serious birth defects. It is unknown if this medication is excreted in breast milk but breast feeding should be avoided.
Imiquimod Counseling:  I discussed with the patient the risks of imiquimod including but not limited to erythema, scaling, itching, weeping, crusting, and pain.  Patient understands that the inflammatory response to imiquimod is variable from person to person and was educated regarded proper titration schedule.  If flu-like symptoms develop, patient knows to discontinue the medication and contact us.
Litfulo Counseling: I discussed with the patient the risks of Litfulo therapy including but not limited to upper respiratory tract infections, shingles, cold sores, and nausea. Live vaccines should be avoided.  This medication has been linked to serious infections; higher rate of mortality; malignancy and lymphoproliferative disorders; major adverse cardiovascular events; thrombosis; gastrointestinal perforations; neutropenia; lymphopenia; anemia; liver enzyme elevations; and lipid elevations.
Doxycycline Pregnancy And Lactation Text: This medication is Pregnancy Category D and not consider safe during pregnancy. It is also excreted in breast milk but is considered safe for shorter treatment courses.
Oral Minoxidil Counseling- I discussed with the patient the risks of oral minoxidil including but not limited to shortness of breath, swelling of the feet or ankles, dizziness, lightheadedness, unwanted hair growth and allergic reaction.  The patient verbalized understanding of the proper use and possible adverse effects of oral minoxidil.  All of the patient's questions and concerns were addressed.
Rituxan Counseling:  I discussed with the patient the risks of Rituxan infusions. Side effects can include infusion reactions, severe drug rashes including mucocutaneous reactions, reactivation of latent hepatitis and other infections and rarely progressive multifocal leukoencephalopathy.  All of the patient's questions and concerns were addressed.
Azelaic Acid Counseling: Patient counseled that medicine may cause skin irritation and to avoid applying near the eyes.  In the event of skin irritation, the patient was advised to reduce the amount of the drug applied or use it less frequently.   The patient verbalized understanding of the proper use and possible adverse effects of azelaic acid.  All of the patient's questions and concerns were addressed.
Doxepin Counseling:  Patient advised that the medication is sedating and not to drive a car after taking this medication. Patient informed of potential adverse effects including but not limited to dry mouth, urinary retention, and blurry vision.  The patient verbalized understanding of the proper use and possible adverse effects of doxepin.  All of the patient's questions and concerns were addressed.
Zyclara Counseling:  I discussed with the patient the risks of imiquimod including but not limited to erythema, scaling, itching, weeping, crusting, and pain.  Patient understands that the inflammatory response to imiquimod is variable from person to person and was educated regarded proper titration schedule.  If flu-like symptoms develop, patient knows to discontinue the medication and contact us.
Thalidomide Counseling: I discussed with the patient the risks of thalidomide including but not limited to birth defects, anxiety, weakness, chest pain, dizziness, cough and severe allergy.
Dupixent Pregnancy And Lactation Text: This medication likely crosses the placenta but the risk for the fetus is uncertain. This medication is excreted in breast milk.
Cyclosporine Counseling:  I discussed with the patient the risks of cyclosporine including but not limited to hypertension, gingival hyperplasia,myelosuppression, immunosuppression, liver damage, kidney damage, neurotoxicity, lymphoma, and serious infections. The patient understands that monitoring is required including baseline blood pressure, CBC, CMP, lipid panel and uric acid, and then 1-2 times monthly CMP and blood pressure.
Azithromycin Counseling:  I discussed with the patient the risks of azithromycin including but not limited to GI upset, allergic reaction, drug rash, diarrhea, and yeast infections.
Taltz Counseling: I discussed with the patient the risks of ixekizumab including but not limited to immunosuppression, serious infections, worsening of inflammatory bowel disease and drug reactions.  The patient understands that monitoring is required including a PPD at baseline and must alert us or the primary physician if symptoms of infection or other concerning signs are noted.
Litfulo Pregnancy And Lactation Text: Based on animal studies, Lifulo may cause embryo-fetal harm when administered to pregnant women.  The medication should not be used in pregnancy.  Breastfeeding is not recommended during treatment.
5-Fu Pregnancy And Lactation Text: This medication is Pregnancy Category X and contraindicated in pregnancy and in women who may become pregnant. It is unknown if this medication is excreted in breast milk.
Colchicine Counseling:  Patient counseled regarding adverse effects including but not limited to stomach upset (nausea, vomiting, stomach pain, or diarrhea).  Patient instructed to limit alcohol consumption while taking this medication.  Colchicine may reduce blood counts especially with prolonged use.  The patient understands that monitoring of kidney function and blood counts may be required, especially at baseline. The patient verbalized understanding of the proper use and possible adverse effects of colchicine.  All of the patient's questions and concerns were addressed.
Solaraze Pregnancy And Lactation Text: This medication is Pregnancy Category B and is considered safe. There is some data to suggest avoiding during the third trimester. It is unknown if this medication is excreted in breast milk.
Birth Control Pills Pregnancy And Lactation Text: This medication should be avoided if pregnant and for the first 30 days post-partum.
Olumiant Pregnancy And Lactation Text: Based on animal studies, Olumiant may cause embryo-fetal harm when administered to pregnant women.  The medication should not be used in pregnancy.  Breastfeeding is not recommended during treatment.
Topical Sulfur Applications Pregnancy And Lactation Text: This medication is Pregnancy Category C and has an unknown safety profile during pregnancy. It is unknown if this topical medication is excreted in breast milk.
Erythromycin Counseling:  I discussed with the patient the risks of erythromycin including but not limited to GI upset, allergic reaction, drug rash, diarrhea, increase in liver enzymes, and yeast infections.
Picato Counseling:  I discussed with the patient the risks of Picato including but not limited to erythema, scaling, itching, weeping, crusting, and pain.
Rifampin Pregnancy And Lactation Text: This medication is Pregnancy Category C and it isn't know if it is safe during pregnancy. It is also excreted in breast milk and should not be used if you are breast feeding.
Spironolactone Counseling: Patient advised regarding risks of diarrhea, abdominal pain, hyperkalemia, birth defects (for female patients), liver toxicity and renal toxicity. The patient may need blood work to monitor liver and kidney function and potassium levels while on therapy. The patient verbalized understanding of the proper use and possible adverse effects of spironolactone.  All of the patient's questions and concerns were addressed.
Bexarotene Counseling:  I discussed with the patient the risks of bexarotene including but not limited to hair loss, dry lips/skin/eyes, liver abnormalities, hyperlipidemia, pancreatitis, depression/suicidal ideation, photosensitivity, drug rash/allergic reactions, hypothyroidism, anemia, leukopenia, infection, cataracts, and teratogenicity.  Patient understands that they will need regular blood tests to check lipid profile, liver function tests, white blood cell count, thyroid function tests and pregnancy test if applicable.
Oral Minoxidil Pregnancy And Lactation Text: This medication should only be used when clearly needed if you are pregnant, attempting to become pregnant or breast feeding.
Zyclara Pregnancy And Lactation Text: This medication is Pregnancy Category C. It is unknown if this medication is excreted in breast milk.
Enbrel Counseling:  I discussed with the patient the risks of etanercept including but not limited to myelosuppression, immunosuppression, autoimmune hepatitis, demyelinating diseases, lymphoma, and infections.  The patient understands that monitoring is required including a PPD at baseline and must alert us or the primary physician if symptoms of infection or other concerning signs are noted.
Low Dose Naltrexone Counseling- I discussed with the patient the potential risks and side effects of low dose naltrexone including but not limited to: more vivid dreams, headaches, nausea, vomiting, abdominal pain, fatigue, dizziness, and anxiety.
Libtayo Pregnancy And Lactation Text: This medication is contraindicated in pregnancy and when breast feeding.
Rituxan Pregnancy And Lactation Text: This medication is Pregnancy Category C and it isn't know if it is safe during pregnancy. It is unknown if this medication is excreted in breast milk but similar antibodies are known to be excreted.
Itraconazole Counseling:  I discussed with the patient the risks of itraconazole including but not limited to liver damage, nausea/vomiting, neuropathy, and severe allergy.  The patient understands that this medication is best absorbed when taken with acidic beverages such as non-diet cola or ginger ale.  The patient understands that monitoring is required including baseline LFTs and repeat LFTs at intervals.  The patient understands that they are to contact us or the primary physician if concerning signs are noted.
Soolantra Counseling: I discussed with the patients the risks of topial Soolantra. This is a medicine which decreases the number of mites and inflammation in the skin. You experience burning, stinging, eye irritation or allergic reactions.  Please call our office if you develop any problems from using this medication.
Azelaic Acid Pregnancy And Lactation Text: This medication is considered safe during pregnancy and breast feeding.
Rinvoq Counseling: I discussed with the patient the risks of Rinvoq therapy including but not limited to upper respiratory tract infections, shingles, cold sores, bronchitis, nausea, cough, fever, acne, and headache. Live vaccines should be avoided.  This medication has been linked to serious infections; higher rate of mortality; malignancy and lymphoproliferative disorders; major adverse cardiovascular events; thrombosis; thrombocytopenia, anemia, and neutropenia; lipid elevations; liver enzyme elevations; and gastrointestinal perforations.
Otezla Counseling: The side effects of Otezla were discussed with the patient, including but not limited to worsening or new depression, weight loss, diarrhea, nausea, upper respiratory tract infection, and headache. Patient instructed to call the office should any adverse effect occur.  The patient verbalized understanding of the proper use and possible adverse effects of Otezla.  All the patient's questions and concerns were addressed.
Drysol Counseling:  I discussed with the patient the risks of drysol/aluminum chloride including but not limited to skin rash, itching, irritation, burning.
Doxepin Pregnancy And Lactation Text: This medication is Pregnancy Category C and it isn't known if it is safe during pregnancy. It is also excreted in breast milk and breast feeding isn't recommended.
Azithromycin Pregnancy And Lactation Text: This medication is considered safe during pregnancy and is also secreted in breast milk.
Topical Clindamycin Pregnancy And Lactation Text: This medication is Pregnancy Category B and is considered safe during pregnancy. It is unknown if it is excreted in breast milk.
Bexarotene Pregnancy And Lactation Text: This medication is Pregnancy Category X and should not be given to women who are pregnant or may become pregnant. This medication should not be used if you are breast feeding.
Wartpeel Counseling:  I discussed with the patient the risks of Wartpeel including but not limited to erythema, scaling, itching, weeping, crusting, and pain.
Sarecycline Counseling: Patient advised regarding possible photosensitivity and discoloration of the teeth, skin, lips, tongue and gums.  Patient instructed to avoid sunlight, if possible.  When exposed to sunlight, patients should wear protective clothing, sunglasses, and sunscreen.  The patient was instructed to call the office immediately if the following severe adverse effects occur:  hearing changes, easy bruising/bleeding, severe headache, or vision changes.  The patient verbalized understanding of the proper use and possible adverse effects of sarecycline.  All of the patient's questions and concerns were addressed.
Cyclosporine Pregnancy And Lactation Text: This medication is Pregnancy Category C and it isn't know if it is safe during pregnancy. This medication is excreted in breast milk.
Erythromycin Pregnancy And Lactation Text: This medication is Pregnancy Category B and is considered safe during pregnancy. It is also excreted in breast milk.
Klisyri Counseling:  I discussed with the patient the risks of Klisyri including but not limited to erythema, scaling, itching, weeping, crusting, and pain.
Spironolactone Pregnancy And Lactation Text: This medication can cause feminization of the male fetus and should be avoided during pregnancy. The active metabolite is also found in breast milk.
Dapsone Counseling: I discussed with the patient the risks of dapsone including but not limited to hemolytic anemia, agranulocytosis, rashes, methemoglobinemia, kidney failure, peripheral neuropathy, headaches, GI upset, and liver toxicity.  Patients who start dapsone require monitoring including baseline LFTs and weekly CBCs for the first month, then every month thereafter.  The patient verbalized understanding of the proper use and possible adverse effects of dapsone.  All of the patient's questions and concerns were addressed.
Siliq Counseling:  I discussed with the patient the risks of Siliq including but not limited to new or worsening depression, suicidal thoughts and behavior, immunosuppression, malignancy, posterior leukoencephalopathy syndrome, and serious infections.  The patient understands that monitoring is required including a PPD at baseline and must alert us or the primary physician if symptoms of infection or other concerning signs are noted. There is also a special program designed to monitor depression which is required with Siliq.
Low Dose Naltrexone Pregnancy And Lactation Text: Naltrexone is pregnancy category C.  There have been no adequate and well-controlled studies in pregnant women.  It should be used in pregnancy only if the potential benefit justifies the potential risk to the fetus.   Limited data indicates that naltrexone is minimally excreted into breastmilk.
Odomzo Counseling- I discussed with the patient the risks of Odomzo including but not limited to nausea, vomiting, diarrhea, constipation, weight loss, changes in the sense of taste, decreased appetite, muscle spasms, and hair loss.  The patient verbalized understanding of the proper use and possible adverse effects of Odomzo.  All of the patient's questions and concerns were addressed.
Rinvoq Pregnancy And Lactation Text: Based on animal studies, Rinvoq may cause embryo-fetal harm when administered to pregnant women.  The medication should not be used in pregnancy.  Breastfeeding is not recommended during treatment and for 6 days after the last dose.
Hydroxyzine Counseling: Patient advised that the medication is sedating and not to drive a car after taking this medication.  Patient informed of potential adverse effects including but not limited to dry mouth, urinary retention, and blurry vision.  The patient verbalized understanding of the proper use and possible adverse effects of hydroxyzine.  All of the patient's questions and concerns were addressed.
Bactrim Counseling:  I discussed with the patient the risks of sulfa antibiotics including but not limited to GI upset, allergic reaction, drug rash, diarrhea, dizziness, photosensitivity, and yeast infections.  Rarely, more serious reactions can occur including but not limited to aplastic anemia, agranulocytosis, methemoglobinemia, blood dyscrasias, liver or kidney failure, lung infiltrates or desquamative/blistering drug rashes.
Isotretinoin Counseling: Patient should get monthly blood tests, not donate blood, not drive at night if vision affected, not share medication, and not undergo elective surgery for 6 months after tx completed. Side effects reviewed, pt to contact office should one occur.
Tremfya Counseling: I discussed with the patient the risks of guselkumab including but not limited to immunosuppression, serious infections, worsening of inflammatory bowel disease and drug reactions.  The patient understands that monitoring is required including a PPD at baseline and must alert us or the primary physician if symptoms of infection or other concerning signs are noted.
Niacinamide Counseling: I recommended taking niacin or niacinamide, also know as vitamin B3, twice daily. Recent evidence suggests that taking vitamin B3 (500 mg twice daily) can reduce the risk of actinic keratoses and non-melanoma skin cancers. Side effects of vitamin B3 include flushing and headache.
Sarecycline Pregnancy And Lactation Text: This medication is Pregnancy Category D and not consider safe during pregnancy. It is also excreted in breast milk.
Adbry Counseling: I discussed with the patient the risks of tralokinumab including but not limited to eye infection and irritation, cold sores, injection site reactions, worsening of asthma, allergic reactions and increased risk of parasitic infection.  Live vaccines should be avoided while taking tralokinumab. The patient understands that monitoring is required and they must alert us or the primary physician if symptoms of infection or other concerning signs are noted.
Soolantra Pregnancy And Lactation Text: This medication is Pregnancy Category C. This medication is considered safe during breast feeding.
Benzoyl Peroxide Counseling: Patient counseled that medicine may cause skin irritation and bleach clothing.  In the event of skin irritation, the patient was advised to reduce the amount of the drug applied or use it less frequently.   The patient verbalized understanding of the proper use and possible adverse effects of benzoyl peroxide.  All of the patient's questions and concerns were addressed.
Tranexamic Acid Counseling:  Patient advised of the small risk of bleeding problems with tranexamic acid. They were also instructed to call if they developed any nausea, vomiting or diarrhea. All of the patient's questions and concerns were addressed.
Ketoconazole Counseling:   Patient counseled regarding improving absorption with orange juice.  Adverse effects include but are not limited to breast enlargement, headache, diarrhea, nausea, upset stomach, liver function test abnormalities, taste disturbance, and stomach pain.  There is a rare possibility of liver failure that can occur when taking ketoconazole. The patient understands that monitoring of LFTs may be required, especially at baseline. The patient verbalized understanding of the proper use and possible adverse effects of ketoconazole.  All of the patient's questions and concerns were addressed.
Methotrexate Counseling:  Patient counseled regarding adverse effects of methotrexate including but not limited to nausea, vomiting, abnormalities in liver function tests. Patients may develop mouth sores, rash, diarrhea, and abnormalities in blood counts. The patient understands that monitoring is required including LFT's and blood counts.  There is a rare possibility of scarring of the liver and lung problems that can occur when taking methotrexate. Persistent nausea, loss of appetite, pale stools, dark urine, cough, and shortness of breath should be reported immediately. Patient advised to discontinue methotrexate treatment at least three months before attempting to become pregnant.  I discussed the need for folate supplements while taking methotrexate.  These supplements can decrease side effects during methotrexate treatment. The patient verbalized understanding of the proper use and possible adverse effects of methotrexate.  All of the patient's questions and concerns were addressed.
Protopic Counseling: Patient may experience a mild burning sensation during topical application. Protopic is not approved in children less than 2 years of age. There have been case reports of hematologic and skin malignancies in patients using topical calcineurin inhibitors although causality is questionable.
Opioid Counseling: I discussed with the patient the potential side effects of opioids including but not limited to addiction, altered mental status, and depression. I stressed avoiding alcohol, benzodiazepines, muscle relaxants and sleep aids unless specifically okayed by a physician. The patient verbalized understanding of the proper use and possible adverse effects of opioids. All of the patient's questions and concerns were addressed. They were instructed to flush the remaining pills down the toilet if they did not need them for pain.
Otezla Pregnancy And Lactation Text: This medication is Pregnancy Category C and it isn't known if it is safe during pregnancy. It is unknown if it is excreted in breast milk.
Topical Ketoconazole Counseling: Patient counseled that this medication may cause skin irritation or allergic reactions.  In the event of skin irritation, the patient was advised to reduce the amount of the drug applied or use it less frequently.   The patient verbalized understanding of the proper use and possible adverse effects of ketoconazole.  All of the patient's questions and concerns were addressed.
Bactrim Pregnancy And Lactation Text: This medication is Pregnancy Category D and is known to cause fetal risk.  It is also excreted in breast milk.
Hydroxyzine Pregnancy And Lactation Text: This medication is not safe during pregnancy and should not be taken. It is also excreted in breast milk and breast feeding isn't recommended.
Elidel Counseling: Patient may experience a mild burning sensation during topical application. Elidel is not approved in children less than 2 years of age. There have been case reports of hematologic and skin malignancies in patients using topical calcineurin inhibitors although causality is questionable.
Methotrexate Pregnancy And Lactation Text: This medication is Pregnancy Category X and is known to cause fetal harm. This medication is excreted in breast milk.
Tranexamic Acid Pregnancy And Lactation Text: It is unknown if this medication is safe during pregnancy or breast feeding.
Klisyri Pregnancy And Lactation Text: It is unknown if this medication can harm a developing fetus or if it is excreted in breast milk.
Humira Counseling:  I discussed with the patient the risks of adalimumab including but not limited to myelosuppression, immunosuppression, autoimmune hepatitis, demyelinating diseases, lymphoma, and serious infections.  The patient understands that monitoring is required including a PPD at baseline and must alert us or the primary physician if symptoms of infection or other concerning signs are noted.
Metronidazole Counseling:  I discussed with the patient the risks of metronidazole including but not limited to seizures, nausea/vomiting, a metallic taste in the mouth, nausea/vomiting and severe allergy.
Winlevi Counseling:  I discussed with the patient the risks of topical clascoterone including but not limited to erythema, scaling, itching, and stinging. Patient voiced their understanding.
Adbry Pregnancy And Lactation Text: It is unknown if this medication will adversely affect pregnancy or breast feeding.
Benzoyl Peroxide Pregnancy And Lactation Text: This medication is Pregnancy Category C. It is unknown if benzoyl peroxide is excreted in breast milk.
Sotyktu Counseling:  I discussed the most common side effects of Sotyktu including: common cold, sore throat, sinus infections, cold sores, canker sores, folliculitis, and acne.? I also discussed more serious side effects of Sotyktu including but not limited to: serious allergic reactions; increased risk for infections such as TB; cancers such as lymphomas; rhabdomyolysis and elevated CPK; and elevated triglycerides and liver enzymes.?
Dapsone Pregnancy And Lactation Text: This medication is Pregnancy Category C and is not considered safe during pregnancy or breast feeding.
Tetracycline Counseling: Patient counseled regarding possible photosensitivity and increased risk for sunburn.  Patient instructed to avoid sunlight, if possible.  When exposed to sunlight, patients should wear protective clothing, sunglasses, and sunscreen.  The patient was instructed to call the office immediately if the following severe adverse effects occur:  hearing changes, easy bruising/bleeding, severe headache, or vision changes.  The patient verbalized understanding of the proper use and possible adverse effects of tetracycline.  All of the patient's questions and concerns were addressed. Patient understands to avoid pregnancy while on therapy due to potential birth defects.
Protopic Pregnancy And Lactation Text: This medication is Pregnancy Category C. It is unknown if this medication is excreted in breast milk when applied topically.
Niacinamide Pregnancy And Lactation Text: These medications are considered safe during pregnancy.
Topical Retinoid counseling:  Patient advised to apply a pea-sized amount only at bedtime and wait 30 minutes after washing their face before applying.  If too drying, patient may add a non-comedogenic moisturizer. The patient verbalized understanding of the proper use and possible adverse effects of retinoids.  All of the patient's questions and concerns were addressed.
Isotretinoin Pregnancy And Lactation Text: This medication is Pregnancy Category X and is considered extremely dangerous during pregnancy. It is unknown if it is excreted in breast milk.
Azathioprine Counseling:  I discussed with the patient the risks of azathioprine including but not limited to myelosuppression, immunosuppression, hepatotoxicity, lymphoma, and infections.  The patient understands that monitoring is required including baseline LFTs, Creatinine, possible TPMP genotyping and weekly CBCs for the first month and then every 2 weeks thereafter.  The patient verbalized understanding of the proper use and possible adverse effects of azathioprine.  All of the patient's questions and concerns were addressed.
Oxybutynin Counseling:  I discussed with the patient the risks of oxybutynin including but not limited to skin rash, drowsiness, dry mouth, difficulty urinating, and blurred vision.
Ketoconazole Pregnancy And Lactation Text: This medication is Pregnancy Category C and it isn't know if it is safe during pregnancy. It is also excreted in breast milk and breast feeding isn't recommended.
Minoxidil Counseling: Minoxidil is a topical medication which can increase blood flow where it is applied. It is uncertain how this medication increases hair growth. Side effects are uncommon and include stinging and allergic reactions.
Metronidazole Pregnancy And Lactation Text: This medication is Pregnancy Category B and considered safe during pregnancy.  It is also excreted in breast milk.
Simponi Counseling:  I discussed with the patient the risks of golimumab including but not limited to myelosuppression, immunosuppression, autoimmune hepatitis, demyelinating diseases, lymphoma, and serious infections.  The patient understands that monitoring is required including a PPD at baseline and must alert us or the primary physician if symptoms of infection or other concerning signs are noted.
Opioid Pregnancy And Lactation Text: These medications can lead to premature delivery and should be avoided during pregnancy. These medications are also present in breast milk in small amounts.
Carac Counseling:  I discussed with the patient the risks of Carac including but not limited to erythema, scaling, itching, weeping, crusting, and pain.
Dutasteride Male Counseling: Dustasteride Counseling:  I discussed with the patient the risks of use of dutasteride including but not limited to decreased libido, decreased ejaculate volume, and gynecomastia. Women who can become pregnant should not handle medication.  All of the patient's questions and concerns were addressed.
Xolair Counseling:  Patient informed of potential adverse effects including but not limited to fever, muscle aches, rash and allergic reactions.  The patient verbalized understanding of the proper use and possible adverse effects of Xolair.  All of the patient's questions and concerns were addressed.
Cephalexin Counseling: I counseled the patient regarding use of cephalexin as an antibiotic for prophylactic and/or therapeutic purposes. Cephalexin (commonly prescribed under brand name Keflex) is a cephalosporin antibiotic which is active against numerous classes of bacteria, including most skin bacteria. Side effects may include nausea, diarrhea, gastrointestinal upset, rash, hives, yeast infections, and in rare cases, hepatitis, kidney disease, seizures, fever, confusion, neurologic symptoms, and others. Patients with severe allergies to penicillin medications are cautioned that there is about a 10% incidence of cross-reactivity with cephalosporins. When possible, patients with penicillin allergies should use alternatives to cephalosporins for antibiotic therapy.
Gabapentin Counseling: I discussed with the patient the risks of gabapentin including but not limited to dizziness, somnolence, fatigue and ataxia.
Topical Metronidazole Counseling: Metronidazole is a topical antibiotic medication. You may experience burning, stinging, redness, or allergic reactions.  Please call our office if you develop any problems from using this medication.
Valtrex Counseling: I discussed with the patient the risks of valacyclovir including but not limited to kidney damage, nausea, vomiting and severe allergy.  The patient understands that if the infection seems to be worsening or is not improving, they are to call.
Winlevi Pregnancy And Lactation Text: This medication is considered safe during pregnancy and breastfeeding.
High Dose Vitamin A Counseling: Side effects reviewed, pt to contact office should one occur.
Cimzia Counseling:  I discussed with the patient the risks of Cimzia including but not limited to immunosuppression, allergic reactions and infections.  The patient understands that monitoring is required including a PPD at baseline and must alert us or the primary physician if symptoms of infection or other concerning signs are noted.
Prednisone Counseling:  I discussed with the patient the risks of prolonged use of prednisone including but not limited to weight gain, insomnia, osteoporosis, mood changes, diabetes, susceptibility to infection, glaucoma and high blood pressure.  In cases where prednisone use is prolonged, patients should be monitored with blood pressure checks, serum glucose levels and an eye exam.  Additionally, the patient may need to be placed on GI prophylaxis, PCP prophylaxis, and calcium and vitamin D supplementation and/or a bisphosphonate.  The patient verbalized understanding of the proper use and the possible adverse effects of prednisone.  All of the patient's questions and concerns were addressed.
Sotyktu Pregnancy And Lactation Text: There is insufficient data to evaluate whether or not Sotyktu is safe to use during pregnancy.? ?It is not known if Sotyktu passes into breast milk and whether or not it is safe to use when breastfeeding.??
Minocycline Counseling: Patient advised regarding possible photosensitivity and discoloration of the teeth, skin, lips, tongue and gums.  Patient instructed to avoid sunlight, if possible.  When exposed to sunlight, patients should wear protective clothing, sunglasses, and sunscreen.  The patient was instructed to call the office immediately if the following severe adverse effects occur:  hearing changes, easy bruising/bleeding, severe headache, or vision changes.  The patient verbalized understanding of the proper use and possible adverse effects of minocycline.  All of the patient's questions and concerns were addressed.
Albendazole Counseling:  I discussed with the patient the risks of albendazole including but not limited to cytopenia, kidney damage, nausea/vomiting and severe allergy.  The patient understands that this medication is being used in an off-label manner.
Qbrexza Counseling:  I discussed with the patient the risks of Qbrexza including but not limited to headache, mydriasis, blurred vision, dry eyes, nasal dryness, dry mouth, dry throat, dry skin, urinary hesitation, and constipation.  Local skin reactions including erythema, burning, stinging, and itching can also occur.
Arava Counseling:  Patient counseled regarding adverse effects of Arava including but not limited to nausea, vomiting, abnormalities in liver function tests. Patients may develop mouth sores, rash, diarrhea, and abnormalities in blood counts. The patient understands that monitoring is required including LFTs and blood counts.  There is a rare possibility of scarring of the liver and lung problems that can occur when taking methotrexate. Persistent nausea, loss of appetite, pale stools, dark urine, cough, and shortness of breath should be reported immediately. Patient advised to discontinue Arava treatment and consult with a physician prior to attempting conception. The patient will have to undergo a treatment to eliminate Arava from the body prior to conception.
Ilumya Counseling: I discussed with the patient the risks of tildrakizumab including but not limited to immunosuppression, malignancy, posterior leukoencephalopathy syndrome, and serious infections.  The patient understands that monitoring is required including a PPD at baseline and must alert us or the primary physician if symptoms of infection or other concerning signs are noted.
Eucrisa Counseling: Patient may experience a mild burning sensation during topical application. Eucrisa is not approved in children less than 2 years of age.
Cephalexin Pregnancy And Lactation Text: This medication is Pregnancy Category B and considered safe during pregnancy.  It is also excreted in breast milk but can be used safely for shorter doses.
Nsaids Counseling: NSAID Counseling: I discussed with the patient that NSAIDs should be taken with food. Prolonged use of NSAIDs can result in the development of stomach ulcers.  Patient advised to stop taking NSAIDs if abdominal pain occurs.  The patient verbalized understanding of the proper use and possible adverse effects of NSAIDs.  All of the patient's questions and concerns were addressed.
Terbinafine Counseling: Patient counseling regarding adverse effects of terbinafine including but not limited to headache, diarrhea, rash, upset stomach, liver function test abnormalities, itching, taste/smell disturbance, nausea, abdominal pain, and flatulence.  There is a rare possibility of liver failure that can occur when taking terbinafine.  The patient understands that a baseline LFT and kidney function test may be required. The patient verbalized understanding of the proper use and possible adverse effects of terbinafine.  All of the patient's questions and concerns were addressed.
Tazorac Counseling:  Patient advised that medication is irritating and drying.  Patient may need to apply sparingly and wash off after an hour before eventually leaving it on overnight.  The patient verbalized understanding of the proper use and possible adverse effects of tazorac.  All of the patient's questions and concerns were addressed.
Xolair Pregnancy And Lactation Text: This medication is Pregnancy Category B and is considered safe during pregnancy. This medication is excreted in breast milk.
Xeljanz Counseling: I discussed with the patient the risks of Xeljanz therapy including increased risk of infection, liver issues, headache, diarrhea, or cold symptoms. Live vaccines should be avoided. They were instructed to call if they have any problems.
Propranolol Counseling:  I discussed with the patient the risks of propranolol including but not limited to low heart rate, low blood pressure, low blood sugar, restlessness and increased cold sensitivity. They should call the office if they experience any of these side effects.
Cellcept Counseling:  I discussed with the patient the risks of mycophenolate mofetil including but not limited to infection/immunosuppression, GI upset, hypokalemia, hypercholesterolemia, bone marrow suppression, lymphoproliferative disorders, malignancy, GI ulceration/bleed/perforation, colitis, interstitial lung disease, kidney failure, progressive multifocal leukoencephalopathy, and birth defects.  The patient understands that monitoring is required including a baseline creatinine and regular CBC testing. In addition, patient must alert us immediately if symptoms of infection or other concerning signs are noted.
Cimzia Pregnancy And Lactation Text: This medication crosses the placenta but can be considered safe in certain situations. Cimzia may be excreted in breast milk.
Detail Level: Zone
High Dose Vitamin A Pregnancy And Lactation Text: High dose vitamin A therapy is contraindicated during pregnancy and breast feeding.
Dutasteride Pregnancy And Lactation Text: This medication is absolutely contraindicated in women, especially during pregnancy and breast feeding. Feminization of male fetuses is possible if taking while pregnant.
Qbrexza Pregnancy And Lactation Text: There is no available data on Qbrexza use in pregnant women.  There is no available data on Qbrexza use in lactation.
VTAMA Counseling: I discussed with the patient that VTAMA is not for use in the eyes, mouth or mouth. They should call the office if they develop any signs of allergic reactions to VTAMA. The patient verbalized understanding of the proper use and possible adverse effects of VTAMA.  All of the patient's questions and concerns were addressed.
Topical Metronidazole Pregnancy And Lactation Text: This medication is Pregnancy Category B and considered safe during pregnancy.  It is also considered safe to use while breastfeeding.
Valtrex Pregnancy And Lactation Text: this medication is Pregnancy Category B and is considered safe during pregnancy. This medication is not directly found in breast milk but it's metabolite acyclovir is present.
Skyrizi Counseling: I discussed with the patient the risks of risankizumab-rzaa including but not limited to immunosuppression, and serious infections.  The patient understands that monitoring is required including a PPD at baseline and must alert us or the primary physician if symptoms of infection or other concerning signs are noted.
Finasteride Male Counseling: Finasteride Counseling:  I discussed with the patient the risks of use of finasteride including but not limited to decreased libido, decreased ejaculate volume, gynecomastia, and depression. Women should not handle medication.  All of the patient's questions and concerns were addressed.
Mirvaso Counseling: Mirvaso is a topical medication which can decrease superficial blood flow where applied. Side effects are uncommon and include stinging, redness and allergic reactions.
Nsaids Pregnancy And Lactation Text: These medications are considered safe up to 30 weeks gestation. It is excreted in breast milk.
Xeltomz Pregnancy And Lactation Text: This medication is Pregnancy Category D and is not considered safe during pregnancy.  The risk during breast feeding is also uncertain.
Cosentyx Counseling:  I discussed with the patient the risks of Cosentyx including but not limited to worsening of Crohn's disease, immunosuppression, allergic reactions and infections.  The patient understands that monitoring is required including a PPD at baseline and must alert us or the primary physician if symptoms of infection or other concerning signs are noted.
Glycopyrrolate Counseling:  I discussed with the patient the risks of glycopyrrolate including but not limited to skin rash, drowsiness, dry mouth, difficulty urinating, and blurred vision.
Clindamycin Counseling: I counseled the patient regarding use of clindamycin as an antibiotic for prophylactic and/or therapeutic purposes. Clindamycin is active against numerous classes of bacteria, including skin bacteria. Side effects may include nausea, diarrhea, gastrointestinal upset, rash, hives, yeast infections, and in rare cases, colitis.
Fluconazole Counseling:  Patient counseled regarding adverse effects of fluconazole including but not limited to headache, diarrhea, nausea, upset stomach, liver function test abnormalities, taste disturbance, and stomach pain.  There is a rare possibility of liver failure that can occur when taking fluconazole.  The patient understands that monitoring of LFTs and kidney function test may be required, especially at baseline. The patient verbalized understanding of the proper use and possible adverse effects of fluconazole.  All of the patient's questions and concerns were addressed.

## 2023-11-21 NOTE — PROCEDURE: LIQUID NITROGEN
Consent: The patient's consent was obtained including but not limited to risks of crusting, scabbing, blistering, scarring, darker or lighter pigmentary change, recurrence, incomplete removal and infection. RTC in 2 months if lesion(s) persistent.
Show Aperture Variable?: Yes
Number Of Freeze-Thaw Cycles: 2 freeze-thaw cycles
Duration Of Freeze Thaw-Cycle (Seconds): 10
Render Note In Bullet Format When Appropriate: No
Post-Care Instructions: I reviewed with the patient in detail post-care instructions. Patient is to wear sunprotection, and avoid picking at any of the treated lesions. Pt may apply Vaseline to crusted or scabbing areas.
Detail Level: Detailed

## 2023-12-14 ENCOUNTER — OFFICE VISIT (OUTPATIENT)
Dept: CARDIOLOGY | Facility: MEDICAL CENTER | Age: 78
End: 2023-12-14
Attending: INTERNAL MEDICINE
Payer: MEDICARE

## 2023-12-14 VITALS
HEIGHT: 66 IN | DIASTOLIC BLOOD PRESSURE: 80 MMHG | OXYGEN SATURATION: 96 % | SYSTOLIC BLOOD PRESSURE: 132 MMHG | RESPIRATION RATE: 16 BRPM | BODY MASS INDEX: 35.52 KG/M2 | HEART RATE: 62 BPM | WEIGHT: 221 LBS

## 2023-12-14 DIAGNOSIS — I25.5 ISCHEMIC CARDIOMYOPATHY: ICD-10-CM

## 2023-12-14 DIAGNOSIS — G47.33 OSA (OBSTRUCTIVE SLEEP APNEA): ICD-10-CM

## 2023-12-14 DIAGNOSIS — I25.10 CORONARY ARTERY DISEASE INVOLVING NATIVE CORONARY ARTERY OF NATIVE HEART WITHOUT ANGINA PECTORIS: ICD-10-CM

## 2023-12-14 DIAGNOSIS — I48.0 PAROXYSMAL ATRIAL FIBRILLATION (HCC): ICD-10-CM

## 2023-12-14 DIAGNOSIS — E66.9 OBESITY, CLASS II, BMI 35-39.9: ICD-10-CM

## 2023-12-14 PROCEDURE — 99214 OFFICE O/P EST MOD 30 MIN: CPT | Performed by: INTERNAL MEDICINE

## 2023-12-14 PROCEDURE — 3075F SYST BP GE 130 - 139MM HG: CPT | Performed by: INTERNAL MEDICINE

## 2023-12-14 PROCEDURE — 99212 OFFICE O/P EST SF 10 MIN: CPT | Performed by: INTERNAL MEDICINE

## 2023-12-14 PROCEDURE — 3079F DIAST BP 80-89 MM HG: CPT | Performed by: INTERNAL MEDICINE

## 2023-12-14 ASSESSMENT — FIBROSIS 4 INDEX: FIB4 SCORE: 1.41

## 2023-12-14 NOTE — PROGRESS NOTES
"CARDIOLOGY OUTPATIENT FOLLOWUP    PCP: Deisy Loja P.A.-C.    1. Coronary artery disease involving native coronary artery of native heart without angina pectoris    2. Ischemic cardiomyopathy    3. Paroxysmal atrial fibrillation (HCC)    4. Obesity, Class II, BMI 35-39.9    5. RUPAL (obstructive sleep apnea)        Shamar Morgan is doing well from a cardiovascular standpoint with well-controlled risk factors.  There is no signs of recurrence of atrial fibrillation by Zio patch though he does frequently get irregular heart notifications on his home blood pressure monitor-which I suspect are benign ectopy.  We will however continue with Xarelto as has historically been his preference.  I recommended no other changes to his medication regimen.  We did discuss weight loss as well as curtailing the evening alcohol as a means to achieve this.  He will update a lab panel in the spring    Follow up: 1 year    History: Shamar Morgan is a 78 y.o. 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  He recently completed an echocardiogram which shows preserved LV function, minor inferior wall motion abnormalities which are expected given the inferior Q waves.  He has no angina.  He does experience breathlessness But this has been stable for some time.  He has been bothered by neuropathy.      Physical Exam:  /80 (BP Location: Left arm, Patient Position: Sitting, BP Cuff Size: Adult)   Pulse 62   Resp 16   Ht 1.676 m (5' 6\")   Wt 100 kg (221 lb)   SpO2 96%   BMI 35.67 kg/m²   GEN: NAD  RESP: CTAB  CVS: RRR, No M/R/G  ABD: Soft, NT/ND  EXT: WWP, no edema    The ASCVD Risk score (Feliz DK, et al., 2019) failed to calculate.         Studies  Lab Results   Component Value Date/Time    CHOLSTRLTOT 125 04/26/2023 09:08 AM    LDL 65 04/26/2023 09:08 AM    HDL 49 04/26/2023 09:08 AM    TRIGLYCERIDE 55 04/26/2023 09:08 AM       Lab " Results   Component Value Date/Time    SODIUM 143 04/26/2023 09:08 AM    POTASSIUM 4.4 04/26/2023 09:08 AM    CHLORIDE 108 04/26/2023 09:08 AM    CO2 25 04/26/2023 09:08 AM    GLUCOSE 101 (H) 04/26/2023 09:08 AM    BUN 17 04/26/2023 09:08 AM    CREATININE 0.75 04/26/2023 09:08 AM     Lab Results   Component Value Date/Time    ALKPHOSPHAT 72 04/26/2023 09:08 AM    ASTSGOT 22 04/26/2023 09:08 AM    ALTSGPT 36 04/26/2023 09:08 AM    TBILIRUBIN 0.7 04/26/2023 09:08 AM        Past Medical History:   Diagnosis Date    Arrhythmia 09/02/2017    Atrial Fib    Blood clotting disorder (HCC) 09/2017    heart    CAD (coronary artery disease) 4/16/2012    Dental disorder     upper and lower dentures, full    Hemorrhagic disorder (HCC)     on Plavix and Xarelto    High cholesterol     HTN (hypertension) 4/16/2012    Hyperglycemia 4/16/2012    Hyperlipemia 4/16/2012    Hypertension     Liver enzyme elevation     history of    Myocardial infarct (HCC) 09/02/2017    Dr. Mcallister, cardiologist    Obesity     Sleep apnea     BIPAP    Tachycardia 4/16/2012    Tonsillitis     Urinary incontinence      Allergies   Allergen Reactions    Penicillins Swelling    Sulfa Drugs Swelling    Brilinta [Ticagrelor] Rash and Itching     .    Lipidil [Fenofibrate]      Myalgia     Outpatient Encounter Medications as of 12/14/2023   Medication Sig Dispense Refill    losartan (COZAAR) 50 MG Tab Take 1 Tablet by mouth every day. 100 Tablet 3    metoprolol SR (TOPROL XL) 25 MG TABLET SR 24 HR Take 1/2 (one-half) tablet by mouth once daily 50 Tablet 0    atorvastatin (LIPITOR) 20 MG Tab Take 1 tablet by mouth once daily 100 Tablet 2    gabapentin (NEURONTIN) 300 MG Cap TAKE 1 CAPSULE BY MOUTH THREE TIMES DAILY 90 Capsule 0    XARELTO 10 MG Tab tablet TAKE 1 TABLET BY MOUTH DAILY WITH SUPPER 90 Tablet 3    vitamin D (CHOLECALCIFEROL) 1000 UNIT TABS Take 1 Tablet by mouth every day.       No facility-administered encounter medications on file as of 12/14/2023.      Social History     Socioeconomic History    Marital status:      Spouse name: Not on file    Number of children: Not on file    Years of education: Not on file    Highest education level: 12th grade   Occupational History    Not on file   Tobacco Use    Smoking status: Former     Current packs/day: 0.00     Average packs/day: 3.0 packs/day for 16.0 years (48.0 ttl pk-yrs)     Types: Cigarettes     Start date: 1964     Quit date: 1980     Years since quittin.9    Smokeless tobacco: Never   Vaping Use    Vaping Use: Never used   Substance and Sexual Activity    Alcohol use: Not Currently     Comment: 2 per day    Drug use: No    Sexual activity: Yes     Partners: Female   Other Topics Concern    Not on file   Social History Narrative    Not on file     Social Determinants of Health     Financial Resource Strain: Low Risk  (10/28/2020)    Overall Financial Resource Strain (CARDIA)     Difficulty of Paying Living Expenses: Not hard at all   Food Insecurity: No Food Insecurity (10/28/2020)    Hunger Vital Sign     Worried About Running Out of Food in the Last Year: Never true     Ran Out of Food in the Last Year: Never true   Transportation Needs: No Transportation Needs (10/28/2020)    PRAPARE - Transportation     Lack of Transportation (Medical): No     Lack of Transportation (Non-Medical): No   Physical Activity: Unknown (10/28/2020)    Exercise Vital Sign     Days of Exercise per Week: 0 days     Minutes of Exercise per Session: Not on file   Stress: Stress Concern Present (10/28/2020)    [employer] - Occupational Stress Questionnaire     Feeling of Stress : To some extent   Social Connections: Socially Isolated (10/28/2020)    Social Connection and Isolation Panel [NHANES]     Frequency of Communication with Friends and Family: Once a week     Frequency of Social Gatherings with Friends and Family: Once a week     Attends Holiness Services: Never     Active  Member of Clubs or Organizations: No     Attends Club or Organization Meetings: Never     Marital Status:    Intimate Partner Violence: Not on file   Housing Stability: Low Risk  (10/28/2020)    Housing Stability Vital Sign     Unable to Pay for Housing in the Last Year: No     Number of Places Lived in the Last Year: 1     Unstable Housing in the Last Year: No         ROS:   10 point review systems is otherwise negative except as per the HPI    Chief Complaint   Patient presents with    Coronary Artery Disease    Atrial Fibrillation

## 2023-12-18 DIAGNOSIS — I48.0 PAROXYSMAL ATRIAL FIBRILLATION (HCC): ICD-10-CM

## 2023-12-19 RX ORDER — METOPROLOL SUCCINATE 25 MG/1
12.5 TABLET, EXTENDED RELEASE ORAL DAILY
Qty: 50 TABLET | Refills: 3 | Status: SHIPPED | OUTPATIENT
Start: 2023-12-19

## 2023-12-19 NOTE — TELEPHONE ENCOUNTER
Is the patient due for a refill? Yes    Was the patient seen the past year? Yes    Date of last office visit: 12/14/23    Does the patient have an upcoming appointment?  Yes   If yes, When? 12/18/24    Provider to refill:BE    Does the patients insurance require a 100 day supply?  Yes

## 2024-01-09 DIAGNOSIS — I48.0 PAF (PAROXYSMAL ATRIAL FIBRILLATION) (HCC): ICD-10-CM

## 2024-01-10 RX ORDER — RIVAROXABAN 10 MG/1
10 TABLET, FILM COATED ORAL
Qty: 90 TABLET | Refills: 3 | Status: SHIPPED | OUTPATIENT
Start: 2024-01-10

## 2024-01-15 NOTE — PROGRESS NOTES
Continue Buspar in the morning  Continue lorazepam which she takes about twice per week   Video watched:  Label reading part 1

## 2024-02-16 ENCOUNTER — HOSPITAL ENCOUNTER (OUTPATIENT)
Dept: LAB | Facility: MEDICAL CENTER | Age: 79
End: 2024-02-16
Attending: INTERNAL MEDICINE
Payer: MEDICARE

## 2024-02-16 ENCOUNTER — TELEPHONE (OUTPATIENT)
Dept: CARDIOLOGY | Facility: MEDICAL CENTER | Age: 79
End: 2024-02-16
Payer: MEDICARE

## 2024-02-16 DIAGNOSIS — I25.10 CORONARY ARTERY DISEASE INVOLVING NATIVE CORONARY ARTERY OF NATIVE HEART WITHOUT ANGINA PECTORIS: ICD-10-CM

## 2024-02-16 LAB
ALBUMIN SERPL BCP-MCNC: 4.2 G/DL (ref 3.2–4.9)
ALBUMIN/GLOB SERPL: 1.4 G/DL
ALP SERPL-CCNC: 72 U/L (ref 30–99)
ALT SERPL-CCNC: 44 U/L (ref 2–50)
ANION GAP SERPL CALC-SCNC: 10 MMOL/L (ref 7–16)
AST SERPL-CCNC: 23 U/L (ref 12–45)
BILIRUB SERPL-MCNC: 0.6 MG/DL (ref 0.1–1.5)
BUN SERPL-MCNC: 24 MG/DL (ref 8–22)
CALCIUM ALBUM COR SERPL-MCNC: 9.8 MG/DL (ref 8.5–10.5)
CALCIUM SERPL-MCNC: 10 MG/DL (ref 8.5–10.5)
CHLORIDE SERPL-SCNC: 104 MMOL/L (ref 96–112)
CHOLEST SERPL-MCNC: 110 MG/DL (ref 100–199)
CO2 SERPL-SCNC: 25 MMOL/L (ref 20–33)
CREAT SERPL-MCNC: 0.92 MG/DL (ref 0.5–1.4)
ERYTHROCYTE [DISTWIDTH] IN BLOOD BY AUTOMATED COUNT: 43 FL (ref 35.9–50)
EST. AVERAGE GLUCOSE BLD GHB EST-MCNC: 120 MG/DL
FASTING STATUS PATIENT QL REPORTED: NORMAL
GFR SERPLBLD CREATININE-BSD FMLA CKD-EPI: 84 ML/MIN/1.73 M 2
GLOBULIN SER CALC-MCNC: 3.1 G/DL (ref 1.9–3.5)
GLUCOSE SERPL-MCNC: 105 MG/DL (ref 65–99)
HBA1C MFR BLD: 5.8 % (ref 4–5.6)
HCT VFR BLD AUTO: 50.9 % (ref 42–52)
HDLC SERPL-MCNC: 39 MG/DL
HGB BLD-MCNC: 16.9 G/DL (ref 14–18)
LDLC SERPL CALC-MCNC: 59 MG/DL
MCH RBC QN AUTO: 30.6 PG (ref 27–33)
MCHC RBC AUTO-ENTMCNC: 33.2 G/DL (ref 32.3–36.5)
MCV RBC AUTO: 92.2 FL (ref 81.4–97.8)
PLATELET # BLD AUTO: 172 K/UL (ref 164–446)
PMV BLD AUTO: 9.9 FL (ref 9–12.9)
POTASSIUM SERPL-SCNC: 4.8 MMOL/L (ref 3.6–5.5)
PROT SERPL-MCNC: 7.3 G/DL (ref 6–8.2)
RBC # BLD AUTO: 5.52 M/UL (ref 4.7–6.1)
SODIUM SERPL-SCNC: 139 MMOL/L (ref 135–145)
TRIGL SERPL-MCNC: 61 MG/DL (ref 0–149)
WBC # BLD AUTO: 7 K/UL (ref 4.8–10.8)

## 2024-02-16 PROCEDURE — 80061 LIPID PANEL: CPT

## 2024-02-16 PROCEDURE — 80053 COMPREHEN METABOLIC PANEL: CPT

## 2024-02-16 PROCEDURE — 83036 HEMOGLOBIN GLYCOSYLATED A1C: CPT

## 2024-02-16 PROCEDURE — 85027 COMPLETE CBC AUTOMATED: CPT

## 2024-02-16 PROCEDURE — 36415 COLL VENOUS BLD VENIPUNCTURE: CPT

## 2024-02-16 NOTE — TELEPHONE ENCOUNTER
Phone Number Called: [phone]    Call outcome: Spoke to patient regarding message below.    Message: Called to inform patient that okay to have labs drawn today and if we need updated ones prior to appointment they will be ordered. All questions answered at this time. Advised to call back with any further questions or concerns.

## 2024-02-16 NOTE — TELEPHONE ENCOUNTER
BE  Caller: Shamar Morgan     Topic/issue: Patient is at the Lab on [address], having his blood drawn for the labs that BE ordered on his last visit with him. The lab will be taking his blood and wanted BE to be aware. If he needs more orders before his appointment 12/18/2024, BE would have to place orders again.    Callback Number: [phone]     Thank you,  Jovanna CHURCHILL

## 2024-03-14 ENCOUNTER — APPOINTMENT (OUTPATIENT)
Dept: MEDICAL GROUP | Facility: PHYSICIAN GROUP | Age: 79
End: 2024-03-14
Payer: MEDICARE

## 2024-03-14 ENCOUNTER — OFFICE VISIT (OUTPATIENT)
Dept: SLEEP MEDICINE | Facility: MEDICAL CENTER | Age: 79
End: 2024-03-14
Attending: NURSE PRACTITIONER
Payer: MEDICARE

## 2024-03-14 VITALS
RESPIRATION RATE: 14 BRPM | HEART RATE: 68 BPM | BODY MASS INDEX: 34.06 KG/M2 | HEIGHT: 67 IN | WEIGHT: 217 LBS | SYSTOLIC BLOOD PRESSURE: 116 MMHG | DIASTOLIC BLOOD PRESSURE: 80 MMHG | OXYGEN SATURATION: 93 %

## 2024-03-14 DIAGNOSIS — G47.33 OSA (OBSTRUCTIVE SLEEP APNEA): ICD-10-CM

## 2024-03-14 PROCEDURE — 3074F SYST BP LT 130 MM HG: CPT | Performed by: NURSE PRACTITIONER

## 2024-03-14 PROCEDURE — 99213 OFFICE O/P EST LOW 20 MIN: CPT | Performed by: NURSE PRACTITIONER

## 2024-03-14 PROCEDURE — 3079F DIAST BP 80-89 MM HG: CPT | Performed by: NURSE PRACTITIONER

## 2024-03-14 PROCEDURE — 99214 OFFICE O/P EST MOD 30 MIN: CPT | Performed by: NURSE PRACTITIONER

## 2024-03-14 ASSESSMENT — FIBROSIS 4 INDEX: FIB4 SCORE: 1.59

## 2024-03-14 ASSESSMENT — PATIENT HEALTH QUESTIONNAIRE - PHQ9: CLINICAL INTERPRETATION OF PHQ2 SCORE: 0

## 2024-03-14 NOTE — PROGRESS NOTES
Chief Complaint   Patient presents with    Follow-Up     LAST SEEN 03/24/2023 TESS BROWN  1 YR. F/V COMPLIANCE       HPI:  Shamar Morgan is a 78 y.o. year old male here today for follow-up on annual RUPAL.  Last seen 3/24/2023 by me. Polysomnography showed severe obstructive sleep apnea syndrome with AHI 73 events per hour, associated with desaturations to 67%.  Current therapy is BiPAP at 14/10 centimeters/H2O.     At last visit, patient received his replacement BiPAP through recall program.  He is currently using a medium ease onto nasal mask and denies any difficulty with mask fit or pressures.  He also uses a heated tubing.  He gets between 8 to 10 hours of sleep nightly and denies any difficulty falling or staying asleep.  Occasionally he will wake up at 4 AM and have racing thoughts in a busy mind and be unable to fall back asleep.  He will then start his day early.  He notes overall improvement in sleep quality using BiPAP.  He denies any excessive daytime sleepiness, morning headaches, palpitations, concentration or memory problems.    30-day compliance reviewed with patient shows 100% use with an average time of 8 hours and 40 minutes and resultant AHI of 3.7.  No evidence of persistent mask leak.  Current settings on BiPAP of 14/10/centimeter/H2O.      ROS: As per HPI and otherwise negative if not stated.    Past Medical History:   Diagnosis Date    Arrhythmia 09/02/2017    Atrial Fib    Blood clotting disorder (HCC) 09/2017    heart    CAD (coronary artery disease) 4/16/2012    Dental disorder     upper and lower dentures, full    Hemorrhagic disorder (HCC)     on Plavix and Xarelto    High cholesterol     HTN (hypertension) 4/16/2012    Hyperglycemia 4/16/2012    Hyperlipemia 4/16/2012    Hypertension     Liver enzyme elevation     history of    Myocardial infarct (HCC) 09/02/2017    Dr. Mcallister, cardiologist    Obesity     Sleep apnea     BIPAP    Tachycardia 4/16/2012    Tonsillitis     Urinary  "incontinence        Past Surgical History:   Procedure Laterality Date    PB WRIST ARTHROSCOP,RELEASE XVERS LIG Left 7/27/2023    Procedure: LEFT HAND ENDOSCOPIC CARPAL TUNNEL RELEASE;  Surgeon: Jose Miguel Kay M.D.;  Location: [facility];  Service: Orthopedics    PB INCISE FINGER TENDON SHEATH Left 7/27/2023    Procedure: LEFT INDEX AND MIDDLE FINGER TRIGGER RELEASE;  Surgeon: Jose Miguel Kay M.D.;  Location: [facility];  Service: Orthopedics    TRIGGER FINGER RELEASE Left 7/17/2018    Procedure: TRIGGER FINGER RELEASE - RING;  Surgeon: Jose Miguel Kay M.D.;  Location: [facility];  Service: Orthopedics    MASS EXCISION ORTHO Left 7/17/2018    Procedure: MASS EXCISION ORTHO - DISTAL INTERPHALANGEAL JOINT NODULE EXCISION;  Surgeon: Jose Miguel Kay M.D.;  Location: [facility];  Service: Orthopedics    CARDIAC CATH, LEFT HEART  09/03/2017    successful thrombectomy  and CHELLE to ostial proximal RCA     STENT PLACEMENT  09/03/2017    cardiac    CARPAL TUNNEL RELEASE Right 1980's    TRIGGER FINGER RELEASE Right 1980's       Family History   Problem Relation Age of Onset    Diabetes Mother     Heart Disease Mother     Kidney Disease Mother     Diabetes Sister        Allergies as of 03/14/2024 - Reviewed 03/14/2024   Allergen Reaction Noted    Penicillins Swelling 07/31/2008    Sulfa drugs Swelling 07/31/2008    Brilinta [ticagrelor] Rash and Itching 07/09/2018    Lipidil [fenofibrate]  07/29/2011        Vitals:  /80 (BP Location: Left arm, Patient Position: Sitting, BP Cuff Size: Adult)   Pulse 68   Resp 14   Ht 1.702 m (5' 7\")   Wt 98.4 kg (217 lb)   SpO2 93%     Current medications as of today   Current Outpatient Medications   Medication Sig Dispense Refill    XARELTO 10 MG Tab tablet TAKE 1 TABLET BY MOUTH ONCE DAILY WITH SUPPER 90 Tablet 3    metoprolol SR (TOPROL XL) 25 MG TABLET SR 24 HR Take 1/2 (one-half) tablet by mouth once daily 50 " Tablet 3    losartan (COZAAR) 50 MG Tab Take 1 Tablet by mouth every day. 100 Tablet 3    atorvastatin (LIPITOR) 20 MG Tab Take 1 tablet by mouth once daily 100 Tablet 2    vitamin D (CHOLECALCIFEROL) 1000 UNIT TABS Take 1 Tablet by mouth every day.      gabapentin (NEURONTIN) 300 MG Cap TAKE 1 CAPSULE BY MOUTH THREE TIMES DAILY (Patient not taking: Reported on 3/14/2024) 90 Capsule 0     No current facility-administered medications for this visit.         Physical Exam:   Gen:           Alert and oriented, No apparent distress. Mood and affect appropriate, normal interaction with examiner.  Eyes:          PERRL, EOM intact, sclere white, conjunctive moist.  Ears:          Not examined.   Hearing:     Grossly intact.  Nose:          Normal, no lesions or deformities.  Dentition:    Good dentition.  Oropharynx:   Tongue normal, posterior pharynx without erythema or exudate.  Neck:        Supple, trachea midline, no masses.  Respiratory Effort: No intercostal retractions or use of accessory muscles.   Lung Auscultation:      Clear to auscultation bilaterally; no rales, rhonchi or wheezing.  CV:            Regular rate and rhythm. No murmurs, rubs or gallops.  Abd:           Not examined.   Lymphadenopathy: Not examined.  Gait and Station: Normal.  Digits and Nails: No clubbing, cyanosis, petechiae, or nodes.   Cranial Nerves: II-XII grossly intact.  Skin:        No rashes, lesions or ulcers noted.               Ext:           No cyanosis or edema.      Assessment:  1. RUPAL (obstructive sleep apnea)  DME Mask and Supplies        Plan:  Patient is using and benefiting from BiPAP therapy.  Compliance shows adequate use and control of RUPAL.  Order placed for mask and supplies will be good for 1 year.  Advise annual follow-up, but can be seen sooner if needed.    Please note that this dictation was created using voice recognition software. I have made every reasonable attempt to correct obvious errors, but it is possible  there are errors of grammar and possibly content that I did not discover before finalizing the note.

## 2024-04-03 ENCOUNTER — APPOINTMENT (OUTPATIENT)
Dept: MEDICAL GROUP | Facility: PHYSICIAN GROUP | Age: 79
End: 2024-04-03
Payer: MEDICARE

## 2024-04-03 ENCOUNTER — PATIENT OUTREACH (OUTPATIENT)
Dept: HEALTH INFORMATION MANAGEMENT | Facility: OTHER | Age: 79
End: 2024-04-03

## 2024-04-03 VITALS
OXYGEN SATURATION: 95 % | TEMPERATURE: 98 F | HEART RATE: 64 BPM | SYSTOLIC BLOOD PRESSURE: 132 MMHG | DIASTOLIC BLOOD PRESSURE: 62 MMHG | HEIGHT: 67 IN | WEIGHT: 220 LBS | BODY MASS INDEX: 34.53 KG/M2

## 2024-04-03 DIAGNOSIS — D68.69 OTHER THROMBOPHILIA (HCC): ICD-10-CM

## 2024-04-03 DIAGNOSIS — E66.9 CLASS 1 OBESITY WITH SERIOUS COMORBIDITY AND BODY MASS INDEX (BMI) OF 34.0 TO 34.9 IN ADULT, UNSPECIFIED OBESITY TYPE: ICD-10-CM

## 2024-04-03 DIAGNOSIS — E78.5 DYSLIPIDEMIA: ICD-10-CM

## 2024-04-03 DIAGNOSIS — I10 ESSENTIAL HYPERTENSION: ICD-10-CM

## 2024-04-03 DIAGNOSIS — I48.0 PAROXYSMAL ATRIAL FIBRILLATION (HCC): ICD-10-CM

## 2024-04-03 DIAGNOSIS — Z79.01 ANTICOAGULATED: ICD-10-CM

## 2024-04-03 DIAGNOSIS — I25.10 CORONARY ARTERY DISEASE INVOLVING NATIVE CORONARY ARTERY OF NATIVE HEART WITHOUT ANGINA PECTORIS: ICD-10-CM

## 2024-04-03 PROCEDURE — 99214 OFFICE O/P EST MOD 30 MIN: CPT | Performed by: STUDENT IN AN ORGANIZED HEALTH CARE EDUCATION/TRAINING PROGRAM

## 2024-04-03 PROCEDURE — 3078F DIAST BP <80 MM HG: CPT | Performed by: STUDENT IN AN ORGANIZED HEALTH CARE EDUCATION/TRAINING PROGRAM

## 2024-04-03 PROCEDURE — 3075F SYST BP GE 130 - 139MM HG: CPT | Performed by: STUDENT IN AN ORGANIZED HEALTH CARE EDUCATION/TRAINING PROGRAM

## 2024-04-03 ASSESSMENT — ENCOUNTER SYMPTOMS
CHILLS: 0
SHORTNESS OF BREATH: 0
HEADACHES: 0
DIZZINESS: 0
FEVER: 0

## 2024-04-03 ASSESSMENT — FIBROSIS 4 INDEX: FIB4 SCORE: 1.59

## 2024-04-03 NOTE — PATIENT INSTRUCTIONS
Tips for healthy nutrition:  Follow the Mediterranean Diet or DASH Diet  A good rule of thumb for portions = size of the palm of your hand  Try to eat protein with every meal and snack.  Healthy choices include lean poultry, fish, beans, seeds, nuts, and soy products.  Consider keeping track of your calorie intake with a journal or a smart phone conor.    Physical Activity:  Aim for 150 minutes of moderate aerobic activity per week OR 75 minutes of vigorous aerobic activity per week PLUS 2 sessions of resistance training per week.  You want to be able to talk but not saying during moderate aerobic activity.

## 2024-04-03 NOTE — PROGRESS NOTES
I spoke with the patient today while he was in clinic for his PCP appointment. Patient stats that he doesn't generally have a lot of medical questions at this time. He was provided with our pamphlet and contact information should his situation change or he should determine that this support would be useful for him in the future.

## 2024-04-03 NOTE — PROGRESS NOTES
"Subjective:     CC:   Chief Complaint   Patient presents with    Follow-Up       HPI:   Shamar presents today with    Problem   Other Thrombophilia (Hcc)   Class 1 Obesity With Serious Comorbidity and Body Mass Index (Bmi) of 34.0 to 34.9 in Adult   Dyslipidemia    Chronic. Takes atorvastatin 20 mg daily.    Lab Results   Component Value Date/Time    CHOLSTRLTOT 110 02/16/2024 0814    TRIGLYCERIDE 61 02/16/2024 0814    HDL 39 (A) 02/16/2024 0814    LDL 59 02/16/2024 0814        Essential Hypertension    This is a chronic condition.  Current Meds: losartan 50 mg daily and metoprolol SR 12.5 mg daily  Side effects: None  Home BP Log: Checks several times per week and BP is at goal  Associated symptoms: Denies chest pain, shortness of breath, headaches, dizziness/lightheadedness        Body Mass Index (Bmi) 35.0-35.9, Adult (Resolved)         Past medical, family, and social history reviewed by me in Epic chart today.   Medications and allergies reviewed in Epic chart by me today.       Health Maintenance: Completed    ROS:  Review of Systems   Constitutional:  Negative for chills and fever.   Respiratory:  Negative for shortness of breath.    Cardiovascular:  Negative for chest pain.   Neurological:  Negative for dizziness and headaches.       Objective:     Exam:  /62 (BP Location: Left arm, Patient Position: Sitting, BP Cuff Size: Adult long)   Pulse 64   Temp 36.7 °C (98 °F) (Temporal)   Ht 1.702 m (5' 7\")   Wt 99.8 kg (220 lb)   SpO2 95%   BMI 34.46 kg/m²  Body mass index is 34.46 kg/m².    Physical Exam  Constitutional:       General: He is not in acute distress.  HENT:      Mouth/Throat:      Mouth: Mucous membranes are moist.   Eyes:      Extraocular Movements: Extraocular movements intact.   Cardiovascular:      Rate and Rhythm: Normal rate and regular rhythm.      Heart sounds: No murmur heard.     No friction rub. No gallop.   Pulmonary:      Effort: Pulmonary effort is normal.      Breath sounds: " No wheezing, rhonchi or rales.   Musculoskeletal:      Cervical back: Neck supple.      Right lower leg: No edema.      Left lower leg: No edema.   Skin:     General: Skin is warm and dry.   Neurological:      Mental Status: He is alert.   Psychiatric:         Mood and Affect: Mood normal.           Assessment & Plan:     78 y.o. male with the following -     1. Class 1 obesity with serious comorbidity and body mass index (BMI) of 34.0 to 34.9 in adult, unspecified obesity type  Chronic, weight is stable.  Discussed healthy nutrition and regular physical activity with a goal of weight loss.  I have placed a referral for dietitian evaluation.  - Referral to Nutrition Services    2. Essential hypertension  Chronic, controlled.   Continue current regimen.    3. Dyslipidemia  Chronic, controlled. He is on a statin for primary prevention and tolerating it well. The last cholesterol panel was within normal limits. Continue current dosing.          HCC Gap Form    Diagnosis to address: I48.0 - Paroxysmal atrial fibrillation (HCC)  Assessment and plan: Chronic, stable, as based on today's assessment and impact on other conditions evaluated today. Continue with current treatment plan: Continue Toprol XL 12.5 mg daily and Xarelto 10 mg daily. Follow-up with specialist as directed, but at least annually.  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 Continue with current treatment plan: Secondary to a fib, continue anticoagulant. Follow-up with specialist as directed, but at least annually.  Last edited 04/03/24 09:20 PDT by Deisy Loja P.A.-C.               Return in about 6 months (around 10/3/2024) for med check, weight management.    Please note that this dictation was created using voice recognition software. I have made every reasonable attempt to correct obvious errors, but I expect that there are  errors of grammar and possibly content that I did not discover before finalizing the note.

## 2024-05-28 ENCOUNTER — APPOINTMENT (RX ONLY)
Dept: URBAN - METROPOLITAN AREA CLINIC 20 | Facility: CLINIC | Age: 79
Setting detail: DERMATOLOGY
End: 2024-05-28

## 2024-05-28 DIAGNOSIS — L81.4 OTHER MELANIN HYPERPIGMENTATION: ICD-10-CM

## 2024-05-28 DIAGNOSIS — D22 MELANOCYTIC NEVI: ICD-10-CM

## 2024-05-28 DIAGNOSIS — D18.0 HEMANGIOMA: ICD-10-CM

## 2024-05-28 DIAGNOSIS — L82.1 OTHER SEBORRHEIC KERATOSIS: ICD-10-CM

## 2024-05-28 DIAGNOSIS — Z71.89 OTHER SPECIFIED COUNSELING: ICD-10-CM

## 2024-05-28 DIAGNOSIS — L57.0 ACTINIC KERATOSIS: ICD-10-CM

## 2024-05-28 DIAGNOSIS — D485 NEOPLASM OF UNCERTAIN BEHAVIOR OF SKIN: ICD-10-CM

## 2024-05-28 DIAGNOSIS — Z86.006 PERSONAL HISTORY OF MELANOMA IN-SITU: ICD-10-CM

## 2024-05-28 PROBLEM — D18.01 HEMANGIOMA OF SKIN AND SUBCUTANEOUS TISSUE: Status: ACTIVE | Noted: 2024-05-28

## 2024-05-28 PROBLEM — D48.5 NEOPLASM OF UNCERTAIN BEHAVIOR OF SKIN: Status: ACTIVE | Noted: 2024-05-28

## 2024-05-28 PROBLEM — D22.5 MELANOCYTIC NEVI OF TRUNK: Status: ACTIVE | Noted: 2024-05-28

## 2024-05-28 PROBLEM — D22.62 MELANOCYTIC NEVI OF LEFT UPPER LIMB, INCLUDING SHOULDER: Status: ACTIVE | Noted: 2024-05-28

## 2024-05-28 PROBLEM — D22.61 MELANOCYTIC NEVI OF RIGHT UPPER LIMB, INCLUDING SHOULDER: Status: ACTIVE | Noted: 2024-05-28

## 2024-05-28 PROCEDURE — ? BIOPSY BY SHAVE METHOD

## 2024-05-28 PROCEDURE — ? ADDITIONAL NOTES

## 2024-05-28 PROCEDURE — 11102 TANGNTL BX SKIN SINGLE LES: CPT | Mod: 59

## 2024-05-28 PROCEDURE — ? LIQUID NITROGEN

## 2024-05-28 PROCEDURE — ? SUNSCREEN RECOMMENDATIONS

## 2024-05-28 PROCEDURE — ? COUNSELING

## 2024-05-28 PROCEDURE — 17004 DESTROY PREMAL LESIONS 15/>: CPT

## 2024-05-28 PROCEDURE — 99213 OFFICE O/P EST LOW 20 MIN: CPT | Mod: 25

## 2024-05-28 ASSESSMENT — LOCATION DETAILED DESCRIPTION DERM
LOCATION DETAILED: RIGHT INFERIOR UPPER BACK
LOCATION DETAILED: RIGHT MEDIAL UPPER BACK
LOCATION DETAILED: RIGHT SUPERIOR CENTRAL BUCCAL CHEEK
LOCATION DETAILED: RIGHT ULNAR DORSAL HAND
LOCATION DETAILED: LEFT INFERIOR LATERAL MALAR CHEEK
LOCATION DETAILED: LEFT PROXIMAL DORSAL FOREARM
LOCATION DETAILED: LEFT PROXIMAL ULNAR DORSAL FOREARM
LOCATION DETAILED: RIGHT CENTRAL MALAR CHEEK
LOCATION DETAILED: RIGHT FOREHEAD
LOCATION DETAILED: LEFT DISTAL DORSAL FOREARM
LOCATION DETAILED: LEFT CENTRAL PARIETAL SCALP
LOCATION DETAILED: LEFT DISTAL POSTERIOR UPPER ARM
LOCATION DETAILED: NASAL SUPRATIP
LOCATION DETAILED: LEFT INFERIOR POSTERIOR NECK
LOCATION DETAILED: INFERIOR THORACIC SPINE
LOCATION DETAILED: LEFT MEDIAL MALAR CHEEK
LOCATION DETAILED: RIGHT RADIAL DORSAL HAND
LOCATION DETAILED: LEFT VENTRAL PROXIMAL FOREARM
LOCATION DETAILED: RIGHT INFERIOR MEDIAL FOREHEAD
LOCATION DETAILED: LEFT RADIAL DORSAL HAND
LOCATION DETAILED: SUPERIOR THORACIC SPINE
LOCATION DETAILED: RIGHT SUPERIOR CENTRAL MALAR CHEEK
LOCATION DETAILED: RIGHT DISTAL DORSAL FOREARM
LOCATION DETAILED: LEFT ELBOW
LOCATION DETAILED: RIGHT VENTRAL DISTAL FOREARM
LOCATION DETAILED: RIGHT PROXIMAL DORSAL FOREARM

## 2024-05-28 ASSESSMENT — LOCATION SIMPLE DESCRIPTION DERM
LOCATION SIMPLE: RIGHT HAND
LOCATION SIMPLE: RIGHT FOREARM
LOCATION SIMPLE: NOSE
LOCATION SIMPLE: LEFT HAND
LOCATION SIMPLE: LEFT UPPER ARM
LOCATION SIMPLE: LEFT FOREARM
LOCATION SIMPLE: LEFT ELBOW
LOCATION SIMPLE: RIGHT FOREHEAD
LOCATION SIMPLE: RIGHT UPPER BACK
LOCATION SIMPLE: SCALP
LOCATION SIMPLE: UPPER BACK
LOCATION SIMPLE: LEFT CHEEK
LOCATION SIMPLE: POSTERIOR NECK
LOCATION SIMPLE: RIGHT CHEEK

## 2024-05-28 ASSESSMENT — LOCATION ZONE DERM
LOCATION ZONE: NOSE
LOCATION ZONE: ARM
LOCATION ZONE: HAND
LOCATION ZONE: TRUNK
LOCATION ZONE: NECK
LOCATION ZONE: SCALP
LOCATION ZONE: FACE

## 2024-05-28 NOTE — PROCEDURE: ADDITIONAL NOTES
Detail Level: Detailed
Render Risk Assessment In Note?: no
Additional Notes: Pt will use 5FU on arms in the fall. Declined PDT

## 2024-05-28 NOTE — PROCEDURE: LIQUID NITROGEN
Post-Care Instructions: I reviewed with the patient in detail post-care instructions. Patient is to wear sunprotection, and avoid picking at any of the treated lesions. Pt may apply Vaseline to crusted or scabbing areas.
Duration Of Freeze Thaw-Cycle (Seconds): 10
Render Note In Bullet Format When Appropriate: No
Detail Level: Detailed
Show Aperture Variable?: Yes
Consent: The patient's consent was obtained including but not limited to risks of crusting, scabbing, blistering, scarring, darker or lighter pigmentary change, recurrence, incomplete removal and infection. RTC in 2 months if lesion(s) persistent.
Number Of Freeze-Thaw Cycles: 2 freeze-thaw cycles

## 2024-06-21 DIAGNOSIS — E78.5 DYSLIPIDEMIA: ICD-10-CM

## 2024-06-24 NOTE — TELEPHONE ENCOUNTER
Received request via: Pharmacy    Was the patient seen in the last year in this department? Yes    Does the patient have an active prescription (recently filled or refills available) for medication(s) requested? No    [facility]     Does the patient have [facility] Plus and need 100 day supply (blood pressure, diabetes and cholesterol meds only)? Yes, quantity updated to 100 days

## 2024-06-25 RX ORDER — ATORVASTATIN CALCIUM 20 MG/1
TABLET, FILM COATED ORAL
Qty: 100 TABLET | Refills: 3 | Status: SHIPPED | OUTPATIENT
Start: 2024-06-25

## 2024-08-01 ENCOUNTER — TELEPHONE (OUTPATIENT)
Dept: HEALTH INFORMATION MANAGEMENT | Facility: OTHER | Age: 79
End: 2024-08-01

## 2024-10-03 ENCOUNTER — OFFICE VISIT (OUTPATIENT)
Dept: MEDICAL GROUP | Facility: PHYSICIAN GROUP | Age: 79
End: 2024-10-03
Payer: MEDICARE

## 2024-10-03 VITALS
HEIGHT: 67 IN | DIASTOLIC BLOOD PRESSURE: 70 MMHG | OXYGEN SATURATION: 96 % | BODY MASS INDEX: 32.96 KG/M2 | SYSTOLIC BLOOD PRESSURE: 142 MMHG | HEART RATE: 59 BPM | WEIGHT: 210 LBS | TEMPERATURE: 97.4 F

## 2024-10-03 DIAGNOSIS — M54.50 CHRONIC BILATERAL LOW BACK PAIN WITHOUT SCIATICA: ICD-10-CM

## 2024-10-03 DIAGNOSIS — I10 ESSENTIAL HYPERTENSION: ICD-10-CM

## 2024-10-03 DIAGNOSIS — G89.29 CHRONIC BILATERAL LOW BACK PAIN WITHOUT SCIATICA: ICD-10-CM

## 2024-10-03 DIAGNOSIS — J32.8 OTHER CHRONIC SINUSITIS: ICD-10-CM

## 2024-10-03 PROCEDURE — 99214 OFFICE O/P EST MOD 30 MIN: CPT | Performed by: STUDENT IN AN ORGANIZED HEALTH CARE EDUCATION/TRAINING PROGRAM

## 2024-10-03 PROCEDURE — 3077F SYST BP >= 140 MM HG: CPT | Performed by: STUDENT IN AN ORGANIZED HEALTH CARE EDUCATION/TRAINING PROGRAM

## 2024-10-03 PROCEDURE — 3078F DIAST BP <80 MM HG: CPT | Performed by: STUDENT IN AN ORGANIZED HEALTH CARE EDUCATION/TRAINING PROGRAM

## 2024-10-03 RX ORDER — DOXYCYCLINE HYCLATE 100 MG
100 TABLET ORAL 2 TIMES DAILY
Qty: 28 TABLET | Refills: 0 | Status: SHIPPED | OUTPATIENT
Start: 2024-10-03 | End: 2024-10-17

## 2024-10-03 ASSESSMENT — ENCOUNTER SYMPTOMS
HEADACHES: 0
SHORTNESS OF BREATH: 0
FEVER: 0
CHILLS: 0
DIZZINESS: 0

## 2024-10-03 ASSESSMENT — FIBROSIS 4 INDEX: FIB4 SCORE: 1.61

## 2024-10-04 ENCOUNTER — TELEPHONE (OUTPATIENT)
Dept: CARDIOLOGY | Facility: MEDICAL CENTER | Age: 79
End: 2024-10-04
Payer: MEDICARE

## 2024-10-04 DIAGNOSIS — N52.9 ERECTILE DYSFUNCTION, UNSPECIFIED ERECTILE DYSFUNCTION TYPE: ICD-10-CM

## 2024-10-07 RX ORDER — TADALAFIL 20 MG/1
20 TABLET ORAL PRN
Qty: 30 TABLET | Refills: 0 | Status: SHIPPED | OUTPATIENT
Start: 2024-10-07

## 2024-10-16 ENCOUNTER — TELEPHONE (OUTPATIENT)
Dept: CARDIOLOGY | Facility: MEDICAL CENTER | Age: 79
End: 2024-10-16
Payer: MEDICARE

## 2024-11-15 ENCOUNTER — OFFICE VISIT (OUTPATIENT)
Dept: MEDICAL GROUP | Facility: PHYSICIAN GROUP | Age: 79
End: 2024-11-15
Payer: MEDICARE

## 2024-11-15 VITALS
BODY MASS INDEX: 34.21 KG/M2 | WEIGHT: 218 LBS | DIASTOLIC BLOOD PRESSURE: 72 MMHG | OXYGEN SATURATION: 98 % | HEART RATE: 58 BPM | SYSTOLIC BLOOD PRESSURE: 130 MMHG | TEMPERATURE: 97.6 F | HEIGHT: 67 IN

## 2024-11-15 DIAGNOSIS — I10 ESSENTIAL HYPERTENSION: ICD-10-CM

## 2024-11-15 PROCEDURE — 3075F SYST BP GE 130 - 139MM HG: CPT | Performed by: STUDENT IN AN ORGANIZED HEALTH CARE EDUCATION/TRAINING PROGRAM

## 2024-11-15 PROCEDURE — 3078F DIAST BP <80 MM HG: CPT | Performed by: STUDENT IN AN ORGANIZED HEALTH CARE EDUCATION/TRAINING PROGRAM

## 2024-11-15 PROCEDURE — 99214 OFFICE O/P EST MOD 30 MIN: CPT | Performed by: STUDENT IN AN ORGANIZED HEALTH CARE EDUCATION/TRAINING PROGRAM

## 2024-11-15 RX ORDER — LOSARTAN POTASSIUM 100 MG/1
100 TABLET ORAL DAILY
Qty: 100 TABLET | Refills: 2 | Status: SHIPPED | OUTPATIENT
Start: 2024-11-15

## 2024-11-15 ASSESSMENT — ENCOUNTER SYMPTOMS
SHORTNESS OF BREATH: 0
CHILLS: 0
DIZZINESS: 0
HEADACHES: 0
FEVER: 0

## 2024-11-15 ASSESSMENT — FIBROSIS 4 INDEX: FIB4 SCORE: 1.61

## 2024-11-15 NOTE — PROGRESS NOTES
Verbal consent was acquired by the patient to use [employer] ambient listening note generation during this visit.    Subjective:     HPI:   History of Present Illness  The patient is an 79-year-old male here for a follow-up visit.    He is following up on his blood pressure today. At his last visit, his blood pressure was mildly elevated in the 140s systolic. He has been monitoring it at home, and it now averages in the low 130s. His current blood pressure regimen includes losartan 50 mg daily and metoprolol 12.5 mg daily. His heart rate is in the mid to high 50s. He reports no chest discomfort, shortness of breath, or leg swelling.    He was treated for a suspected sinus infection about a month ago. He states that his sinus symptoms cleared up after taking antibiotics, and he feels well now.    He also experienced some back pain recently. He was treated with acupuncture, which he found very effective. His back is not bothering him now.      Past medical, surgical, family, and social history were reviewed and updated.     Medications:    Current Outpatient Medications:     losartan (COZAAR) 100 MG Tab, Take 1 Tablet by mouth every day., Disp: 100 Tablet, Rfl: 2    tadalafil 20 MG tablet, Take 1 Tablet by mouth as needed for Erectile Dysfunction., Disp: 30 Tablet, Rfl: 0    atorvastatin (LIPITOR) 20 MG Tab, Take 1 tablet by mouth once daily, Disp: 100 Tablet, Rfl: 3    XARELTO 10 MG Tab tablet, TAKE 1 TABLET BY MOUTH ONCE DAILY WITH SUPPER, Disp: 90 Tablet, Rfl: 3    metoprolol SR (TOPROL XL) 25 MG TABLET SR 24 HR, Take 1/2 (one-half) tablet by mouth once daily, Disp: 50 Tablet, Rfl: 3    vitamin D (CHOLECALCIFEROL) 1000 UNIT TABS, Take 1 Tablet by mouth every day., Disp: , Rfl:     Allergies:  Penicillins, Sulfa drugs, Brilinta [ticagrelor], and Lipidil [fenofibrate]      Health Maintenance: Completed    ROS:  Review of Systems   Constitutional:  Negative for chills and fever.   Respiratory:  Negative for shortness  "of breath.    Cardiovascular:  Negative for chest pain.   Neurological:  Negative for dizziness and headaches.       Objective:     Exam:  /72 (BP Location: Left arm, Patient Position: Sitting, BP Cuff Size: Adult)   Pulse (!) 58   Temp 36.4 °C (97.6 °F) (Temporal)   Ht 1.702 m (5' 7\")   Wt 98.9 kg (218 lb)   SpO2 98%   BMI 34.14 kg/m²  Body mass index is 34.14 kg/m².    Physical Exam  Constitutional:       General: He is not in acute distress.  HENT:      Mouth/Throat:      Mouth: Mucous membranes are moist.   Eyes:      Extraocular Movements: Extraocular movements intact.   Cardiovascular:      Rate and Rhythm: Normal rate and regular rhythm.      Heart sounds: No murmur heard.     No friction rub. No gallop.   Pulmonary:      Effort: Pulmonary effort is normal.      Breath sounds: No wheezing, rhonchi or rales.   Musculoskeletal:      Cervical back: Neck supple.   Skin:     General: Skin is warm and dry.   Neurological:      Mental Status: He is alert.   Psychiatric:         Mood and Affect: Mood normal.         Results      Assessment & Plan:     1. Essential hypertension  losartan (COZAAR) 100 MG Tab          Assessment & Plan  1. Hypertension.  Chronic condition. Blood pressure has been borderline elevated on his home blood pressure log. BP today is 130/72. The dose of losartan will be increased to 100 mg daily to maintain slightly better control of his blood pressure. He will continue metoprolol 12.5 mg daily. He will keep a home blood pressure log for the next several weeks to ensure that his blood pressure is not dropping too low.      Follow-up  Patient is scheduled for a follow-up visit in 3 months. He can follow up sooner if needed.          Please note that this dictation was created using voice recognition software. I have made every reasonable attempt to correct obvious errors, but I expect that there are errors of grammar and possibly content that I did not discover before finalizing the " note.

## 2024-11-25 ENCOUNTER — APPOINTMENT (RX ONLY)
Dept: URBAN - METROPOLITAN AREA CLINIC 20 | Facility: CLINIC | Age: 79
Setting detail: DERMATOLOGY
End: 2024-11-25

## 2024-11-25 DIAGNOSIS — Z71.89 OTHER SPECIFIED COUNSELING: ICD-10-CM

## 2024-11-25 DIAGNOSIS — D18.0 HEMANGIOMA: ICD-10-CM

## 2024-11-25 DIAGNOSIS — L82.1 OTHER SEBORRHEIC KERATOSIS: ICD-10-CM

## 2024-11-25 DIAGNOSIS — Z86.006 PERSONAL HISTORY OF MELANOMA IN-SITU: ICD-10-CM

## 2024-11-25 DIAGNOSIS — D22 MELANOCYTIC NEVI: ICD-10-CM

## 2024-11-25 DIAGNOSIS — L81.4 OTHER MELANIN HYPERPIGMENTATION: ICD-10-CM

## 2024-11-25 DIAGNOSIS — D485 NEOPLASM OF UNCERTAIN BEHAVIOR OF SKIN: ICD-10-CM

## 2024-11-25 DIAGNOSIS — L57.0 ACTINIC KERATOSIS: ICD-10-CM

## 2024-11-25 PROBLEM — D22.61 MELANOCYTIC NEVI OF RIGHT UPPER LIMB, INCLUDING SHOULDER: Status: ACTIVE | Noted: 2024-11-25

## 2024-11-25 PROBLEM — D22.5 MELANOCYTIC NEVI OF TRUNK: Status: ACTIVE | Noted: 2024-11-25

## 2024-11-25 PROBLEM — D48.5 NEOPLASM OF UNCERTAIN BEHAVIOR OF SKIN: Status: ACTIVE | Noted: 2024-11-25

## 2024-11-25 PROBLEM — D22.62 MELANOCYTIC NEVI OF LEFT UPPER LIMB, INCLUDING SHOULDER: Status: ACTIVE | Noted: 2024-11-25

## 2024-11-25 PROBLEM — D18.01 HEMANGIOMA OF SKIN AND SUBCUTANEOUS TISSUE: Status: ACTIVE | Noted: 2024-11-25

## 2024-11-25 PROCEDURE — ? ADDITIONAL NOTES

## 2024-11-25 PROCEDURE — 99214 OFFICE O/P EST MOD 30 MIN: CPT | Mod: 25

## 2024-11-25 PROCEDURE — ? PRESCRIPTION

## 2024-11-25 PROCEDURE — ? LIQUID NITROGEN

## 2024-11-25 PROCEDURE — ? COUNSELING

## 2024-11-25 PROCEDURE — 11102 TANGNTL BX SKIN SINGLE LES: CPT | Mod: 59

## 2024-11-25 PROCEDURE — ? BIOPSY BY SHAVE METHOD

## 2024-11-25 PROCEDURE — 11103 TANGNTL BX SKIN EA SEP/ADDL: CPT

## 2024-11-25 PROCEDURE — 17004 DESTROY PREMAL LESIONS 15/>: CPT

## 2024-11-25 PROCEDURE — ? MEDICATION COUNSELING

## 2024-11-25 PROCEDURE — ? SUNSCREEN RECOMMENDATIONS

## 2024-11-25 RX ORDER — FLUOROURACIL 5 MG/G
CREAM TOPICAL BID
Qty: 40 | Refills: 0 | Status: ERX | COMMUNITY
Start: 2024-11-25

## 2024-11-25 RX ADMIN — FLUOROURACIL: 5 CREAM TOPICAL at 00:00

## 2024-11-25 ASSESSMENT — LOCATION SIMPLE DESCRIPTION DERM
LOCATION SIMPLE: LEFT ZYGOMA
LOCATION SIMPLE: RIGHT FOREARM
LOCATION SIMPLE: RIGHT FOREHEAD
LOCATION SIMPLE: LEFT HAND
LOCATION SIMPLE: RIGHT UPPER BACK
LOCATION SIMPLE: RIGHT SCALP
LOCATION SIMPLE: RIGHT CHEEK
LOCATION SIMPLE: RIGHT MIDDLE FINGER
LOCATION SIMPLE: LEFT OCCIPITAL SCALP
LOCATION SIMPLE: RIGHT OCCIPITAL SCALP
LOCATION SIMPLE: POSTERIOR SCALP
LOCATION SIMPLE: RIGHT HAND
LOCATION SIMPLE: SCALP
LOCATION SIMPLE: RIGHT TEMPLE
LOCATION SIMPLE: LEFT FOREHEAD
LOCATION SIMPLE: NECK
LOCATION SIMPLE: LEFT CHEEK
LOCATION SIMPLE: LEFT FOREARM
LOCATION SIMPLE: UPPER BACK

## 2024-11-25 ASSESSMENT — LOCATION DETAILED DESCRIPTION DERM
LOCATION DETAILED: RIGHT SUPERIOR LATERAL NECK
LOCATION DETAILED: RIGHT PROXIMAL DORSAL MIDDLE FINGER
LOCATION DETAILED: RIGHT FOREHEAD
LOCATION DETAILED: LEFT SUPERIOR OCCIPITAL SCALP
LOCATION DETAILED: RIGHT SUPERIOR OCCIPITAL SCALP
LOCATION DETAILED: LEFT INFERIOR MEDIAL FOREHEAD
LOCATION DETAILED: LEFT INFERIOR FOREHEAD
LOCATION DETAILED: LEFT INFERIOR LATERAL MALAR CHEEK
LOCATION DETAILED: RIGHT MEDIAL FRONTAL SCALP
LOCATION DETAILED: LEFT MEDIAL ZYGOMA
LOCATION DETAILED: RIGHT MID PREAURICULAR CHEEK
LOCATION DETAILED: RIGHT VENTRAL PROXIMAL FOREARM
LOCATION DETAILED: SUPERIOR THORACIC SPINE
LOCATION DETAILED: RIGHT SUPERIOR PREAURICULAR CHEEK
LOCATION DETAILED: RIGHT MEDIAL MALAR CHEEK
LOCATION DETAILED: RIGHT RADIAL DORSAL HAND
LOCATION DETAILED: RIGHT MEDIAL TEMPLE
LOCATION DETAILED: LEFT RADIAL DORSAL HAND
LOCATION DETAILED: LEFT INFERIOR MEDIAL MALAR CHEEK
LOCATION DETAILED: LEFT CENTRAL PARIETAL SCALP
LOCATION DETAILED: LEFT VENTRAL PROXIMAL FOREARM
LOCATION DETAILED: RIGHT INFERIOR UPPER BACK
LOCATION DETAILED: INFERIOR THORACIC SPINE
LOCATION DETAILED: RIGHT PROXIMAL DORSAL FOREARM
LOCATION DETAILED: LEFT PROXIMAL DORSAL FOREARM
LOCATION DETAILED: MID-OCCIPITAL SCALP
LOCATION DETAILED: RIGHT MEDIAL UPPER BACK
LOCATION DETAILED: LEFT MEDIAL MALAR CHEEK
LOCATION DETAILED: RIGHT INFERIOR MEDIAL FOREHEAD
LOCATION DETAILED: LEFT LATERAL FOREHEAD
LOCATION DETAILED: RIGHT SUPERIOR FOREHEAD
LOCATION DETAILED: RIGHT VENTRAL DISTAL FOREARM

## 2024-11-25 ASSESSMENT — LOCATION ZONE DERM
LOCATION ZONE: ARM
LOCATION ZONE: TRUNK
LOCATION ZONE: SCALP
LOCATION ZONE: HAND
LOCATION ZONE: FINGER
LOCATION ZONE: NECK
LOCATION ZONE: FACE

## 2024-11-25 NOTE — PROCEDURE: MEDICATION COUNSELING
Dutasteride Pregnancy And Lactation Text: This medication is absolutely contraindicated in women, especially during pregnancy and breast feeding. Feminization of male fetuses is possible if taking while pregnant.
Picato Counseling:  I discussed with the patient the risks of Picato including but not limited to erythema, scaling, itching, weeping, crusting, and pain.
Simlandi Counseling:  I discussed with the patient the risks of adalimumab including but not limited to myelosuppression, immunosuppression, autoimmune hepatitis, demyelinating diseases, lymphoma, and serious infections.  The patient understands that monitoring is required including a PPD at baseline and must alert us or the primary physician if symptoms of infection or other concerning signs are noted.
Arava Pregnancy And Lactation Text: This medication is Pregnancy Category X and is absolutely contraindicated during pregnancy. It is unknown if it is excreted in breast milk.
Nsaids Pregnancy And Lactation Text: These medications are considered safe up to 30 weeks gestation. It is excreted in breast milk.
Ebglyss Counseling: I discussed with the patient the risks of lebrikizumab including but not limited to eye inflammation and irritation, cold sores, injection site reactions, allergic reactions and increased risk of parasitic infection. The patient understands that monitoring is required and they must alert us or the primary physician if symptoms of infection or other concerning signs are noted.
Doxepin Pregnancy And Lactation Text: This medication is Pregnancy Category C and it isn't known if it is safe during pregnancy. It is also excreted in breast milk and breast feeding isn't recommended.
Itraconazole Pregnancy And Lactation Text: This medication is Pregnancy Category C and it isn't know if it is safe during pregnancy. It is also excreted in breast milk.
Drysol Counseling:  I discussed with the patient the risks of drysol/aluminum chloride including but not limited to skin rash, itching, irritation, burning.
Erivedge Counseling- I discussed with the patient the risks of Erivedge including but not limited to nausea, vomiting, diarrhea, constipation, weight loss, changes in the sense of taste, decreased appetite, muscle spasms, and hair loss.  The patient verbalized understanding of the proper use and possible adverse effects of Erivedge.  All of the patient's questions and concerns were addressed.
Valtrex Pregnancy And Lactation Text: this medication is Pregnancy Category B and is considered safe during pregnancy. This medication is not directly found in breast milk but it's metabolite acyclovir is present.
Doxycycline Pregnancy And Lactation Text: This medication is Pregnancy Category D and not consider safe during pregnancy. It is also excreted in breast milk but is considered safe for shorter treatment courses.
Topical Ketoconazole Pregnancy And Lactation Text: This medication is Pregnancy Category B and is considered safe during pregnancy. It is unknown if it is excreted in breast milk.
Azathioprine Pregnancy And Lactation Text: This medication is Pregnancy Category D and isn't considered safe during pregnancy. It is unknown if this medication is excreted in breast milk.
Ebglyss Pregnancy And Lactation Text: This medication likely crosses the placenta but the risk for the fetus is uncertain. It is unknown if this medication is excreted in breast milk.
Olanzapine Counseling- I discussed with the patient the common side effects of olanzapine including but are not limited to: lack of energy, dry mouth, increased appetite, sleepiness, tremor, constipation, dizziness, changes in behavior, or restlessness.  Explained that teenagers are more likely to experience headaches, abdominal pain, pain in the arms or legs, tiredness, and sleepiness.  Serious side effects include but are not limited: increased risk of death in elderly patients who are confused, have memory loss, or dementia-related psychosis; hyperglycemia; increased cholesterol and triglycerides; and weight gain.
Erythromycin Counseling:  I discussed with the patient the risks of erythromycin including but not limited to GI upset, allergic reaction, drug rash, diarrhea, increase in liver enzymes, and yeast infections.
Use Enhanced Medication Counseling?: No
Drysol Pregnancy And Lactation Text: This medication is considered safe during pregnancy and breast feeding.
Finasteride Male Counseling: Finasteride Counseling:  I discussed with the patient the risks of use of finasteride including but not limited to decreased libido, decreased ejaculate volume, gynecomastia, and depression. Women should not handle medication.  All of the patient's questions and concerns were addressed.
Hydroxyzine Counseling: Patient advised that the medication is sedating and not to drive a car after taking this medication.  Patient informed of potential adverse effects including but not limited to dry mouth, urinary retention, and blurry vision.  The patient verbalized understanding of the proper use and possible adverse effects of hydroxyzine.  All of the patient's questions and concerns were addressed.
Picato Pregnancy And Lactation Text: This medication is Pregnancy Category C. It is unknown if this medication is excreted in breast milk.
Simlandi Pregnancy And Lactation Text: This medication is Pregnancy Category B and is considered safe during pregnancy. It is unknown if this medication is excreted in breast milk.
Clofazimine Counseling:  I discussed with the patient the risks of clofazimine including but not limited to skin and eye pigmentation, liver damage, nausea/vomiting, gastrointestinal bleeding and allergy.
Finasteride Female Counseling: Finasteride Counseling:  I discussed with the patient the risks of use of finasteride including but not limited to decreased libido and sexual dysfunction. Explained the teratogenic nature of the medication and stressed the importance of not getting pregnant during treatment. All of the patient's questions and concerns were addressed.
Ketoconazole Counseling:   Patient counseled regarding improving absorption with orange juice.  Adverse effects include but are not limited to breast enlargement, headache, diarrhea, nausea, upset stomach, liver function test abnormalities, taste disturbance, and stomach pain.  There is a rare possibility of liver failure that can occur when taking ketoconazole. The patient understands that monitoring of LFTs may be required, especially at baseline. The patient verbalized understanding of the proper use and possible adverse effects of ketoconazole.  All of the patient's questions and concerns were addressed.
Protopic Counseling: Patient may experience a mild burning sensation during topical application. Protopic is not approved in children less than 2 years of age. There have been case reports of hematologic and skin malignancies in patients using topical calcineurin inhibitors although causality is questionable.
Simponi Counseling:  I discussed with the patient the risks of golimumab including but not limited to myelosuppression, immunosuppression, autoimmune hepatitis, demyelinating diseases, lymphoma, and serious infections.  The patient understands that monitoring is required including a PPD at baseline and must alert us or the primary physician if symptoms of infection or other concerning signs are noted.
Cellcept Counseling:  I discussed with the patient the risks of mycophenolate mofetil including but not limited to infection/immunosuppression, GI upset, hypokalemia, hypercholesterolemia, bone marrow suppression, lymphoproliferative disorders, malignancy, GI ulceration/bleed/perforation, colitis, interstitial lung disease, kidney failure, progressive multifocal leukoencephalopathy, and birth defects.  The patient understands that monitoring is required including a baseline creatinine and regular CBC testing. In addition, patient must alert us immediately if symptoms of infection or other concerning signs are noted.
Topical Metronidazole Counseling: Metronidazole is a topical antibiotic medication. You may experience burning, stinging, redness, or allergic reactions.  Please call our office if you develop any problems from using this medication.
Hydroxyzine Pregnancy And Lactation Text: This medication is not safe during pregnancy and should not be taken. It is also excreted in breast milk and breast feeding isn't recommended.
Enbrel Counseling:  I discussed with the patient the risks of etanercept including but not limited to myelosuppression, immunosuppression, autoimmune hepatitis, demyelinating diseases, lymphoma, and infections.  The patient understands that monitoring is required including a PPD at baseline and must alert us or the primary physician if symptoms of infection or other concerning signs are noted.
Erythromycin Pregnancy And Lactation Text: This medication is Pregnancy Category B and is considered safe during pregnancy. It is also excreted in breast milk.
Clofazimine Pregnancy And Lactation Text: This medication is Pregnancy Category C and isn't considered safe during pregnancy. It is excreted in breast milk.
Olanzapine Pregnancy And Lactation Text: This medication is pregnancy category C.   There are no adequate and well controlled trials with olanzapine in pregnant females.  Olanzapine should be used during pregnancy only if the potential benefit justifies the potential risk to the fetus.   In a study in lactating healthy women, olanzapine was excreted in breast milk.  It is recommended that women taking olanzapine should not breast feed.
Elidel Counseling: Patient may experience a mild burning sensation during topical application. Elidel is not approved in children less than 2 years of age. There have been case reports of hematologic and skin malignancies in patients using topical calcineurin inhibitors although causality is questionable.
Protopic Pregnancy And Lactation Text: This medication is Pregnancy Category C. It is unknown if this medication is excreted in breast milk when applied topically.
Simponi Pregnancy And Lactation Text: The risk during pregnancy and breastfeeding is uncertain with this medication.
Ketoconazole Pregnancy And Lactation Text: This medication is Pregnancy Category C and it isn't know if it is safe during pregnancy. It is also excreted in breast milk and breast feeding isn't recommended.
Finasteride Pregnancy And Lactation Text: This medication is absolutely contraindicated during pregnancy. It is unknown if it is excreted in breast milk.
Topical Metronidazole Pregnancy And Lactation Text: This medication is Pregnancy Category B and considered safe during pregnancy.  It is also considered safe to use while breastfeeding.
Libtayo Counseling- I discussed with the patient the risks of Libtayo including but not limited to nausea, vomiting, diarrhea, and bone or muscle pain.  The patient verbalized understanding of the proper use and possible adverse effects of Libtayo.  All of the patient's questions and concerns were addressed.
Metronidazole Counseling:  I discussed with the patient the risks of metronidazole including but not limited to seizures, nausea/vomiting, a metallic taste in the mouth, nausea/vomiting and severe allergy.
Topical Steroids Counseling: I discussed with the patient that prolonged use of topical steroids can result in the increased appearance of superficial blood vessels (telangiectasias), lightening (hypopigmentation) and thinning of the skin (atrophy).  Patient understands to avoid using high potency steroids in skin folds, the groin or the face.  The patient verbalized understanding of the proper use and possible adverse effects of topical steroids.  All of the patient's questions and concerns were addressed.
Libtayo Pregnancy And Lactation Text: This medication is contraindicated in pregnancy and when breast feeding.
Cyclophosphamide Counseling:  I discussed with the patient the risks of cyclophosphamide including but not limited to hair loss, hormonal abnormalities, decreased fertility, abdominal pain, diarrhea, nausea and vomiting, bone marrow suppression and infection. The patient understands that monitoring is required while taking this medication.
Cibinqo Counseling: I discussed with the patient the risks of Cibinqo therapy including but not limited to common cold, nausea, headache, cold sores, increased blood CPK levels, dizziness, UTIs, fatigue, acne, and vomitting. Live vaccines should be avoided.  This medication has been linked to serious infections; higher rate of mortality; malignancy and lymphoproliferative disorders; major adverse cardiovascular events; thrombosis; thrombocytopenia and lymphopenia; lipid elevations; and retinal detachment.
Colchicine Counseling:  Patient counseled regarding adverse effects including but not limited to stomach upset (nausea, vomiting, stomach pain, or diarrhea).  Patient instructed to limit alcohol consumption while taking this medication.  Colchicine may reduce blood counts especially with prolonged use.  The patient understands that monitoring of kidney function and blood counts may be required, especially at baseline. The patient verbalized understanding of the proper use and possible adverse effects of colchicine.  All of the patient's questions and concerns were addressed.
Terbinafine Counseling: Patient counseling regarding adverse effects of terbinafine including but not limited to headache, diarrhea, rash, upset stomach, liver function test abnormalities, itching, taste/smell disturbance, nausea, abdominal pain, and flatulence.  There is a rare possibility of liver failure that can occur when taking terbinafine.  The patient understands that a baseline LFT and kidney function test may be required. The patient verbalized understanding of the proper use and possible adverse effects of terbinafine.  All of the patient's questions and concerns were addressed.
Oral Minoxidil Counseling- I discussed with the patient the risks of oral minoxidil including but not limited to shortness of breath, swelling of the feet or ankles, dizziness, lightheadedness, unwanted hair growth and allergic reaction.  The patient verbalized understanding of the proper use and possible adverse effects of oral minoxidil.  All of the patient's questions and concerns were addressed.
Birth Control Pills Counseling: Birth Control Pill Counseling: I discussed with the patient the potential side effects of OCPs including but not limited to increased risk of stroke, heart attack, thrombophlebitis, deep venous thrombosis, hepatic adenomas, breast changes, GI upset, headaches, and depression.  The patient verbalized understanding of the proper use and possible adverse effects of OCPs. All of the patient's questions and concerns were addressed.
Cibinqo Pregnancy And Lactation Text: It is unknown if this medication will adversely affect pregnancy or breast feeding.  You should not take this medication if you are currently pregnant or planning a pregnancy or while breastfeeding.
Skyrizi Counseling: I discussed with the patient the risks of risankizumab-rzaa including but not limited to immunosuppression, and serious infections.  The patient understands that monitoring is required including a PPD at baseline and must alert us or the primary physician if symptoms of infection or other concerning signs are noted.
Qbrexza Counseling:  I discussed with the patient the risks of Qbrexza including but not limited to headache, mydriasis, blurred vision, dry eyes, nasal dryness, dry mouth, dry throat, dry skin, urinary hesitation, and constipation.  Local skin reactions including erythema, burning, stinging, and itching can also occur.
Humira Counseling:  I discussed with the patient the risks of adalimumab including but not limited to myelosuppression, immunosuppression, autoimmune hepatitis, demyelinating diseases, lymphoma, and serious infections.  The patient understands that monitoring is required including a PPD at baseline and must alert us or the primary physician if symptoms of infection or other concerning signs are noted.
High Dose Vitamin A Pregnancy And Lactation Text: High dose vitamin A therapy is contraindicated during pregnancy and breast feeding.
Cephalexin Counseling: I counseled the patient regarding use of cephalexin as an antibiotic for prophylactic and/or therapeutic purposes. Cephalexin (commonly prescribed under brand name Keflex) is a cephalosporin antibiotic which is active against numerous classes of bacteria, including most skin bacteria. Side effects may include nausea, diarrhea, gastrointestinal upset, rash, hives, yeast infections, and in rare cases, hepatitis, kidney disease, seizures, fever, confusion, neurologic symptoms, and others. Patients with severe allergies to penicillin medications are cautioned that there is about a 10% incidence of cross-reactivity with cephalosporins. When possible, patients with penicillin allergies should use alternatives to cephalosporins for antibiotic therapy.
Fluconazole Counseling:  Patient counseled regarding adverse effects of fluconazole including but not limited to headache, diarrhea, nausea, upset stomach, liver function test abnormalities, taste disturbance, and stomach pain.  There is a rare possibility of liver failure that can occur when taking fluconazole.  The patient understands that monitoring of LFTs and kidney function test may be required, especially at baseline. The patient verbalized understanding of the proper use and possible adverse effects of fluconazole.  All of the patient's questions and concerns were addressed.
Tazorac Counseling:  Patient advised that medication is irritating and drying.  Patient may need to apply sparingly and wash off after an hour before eventually leaving it on overnight.  The patient verbalized understanding of the proper use and possible adverse effects of tazorac.  All of the patient's questions and concerns were addressed.
Cimzia Pregnancy And Lactation Text: This medication crosses the placenta but can be considered safe in certain situations. Cimzia may be excreted in breast milk.
Albendazole Pregnancy And Lactation Text: This medication is Pregnancy Category C and it isn't known if it is safe during pregnancy. It is also excreted in breast milk.
Zoryve Counseling:  I discussed with the patient that Zoryve is not for use in the eyes, mouth or vagina. The most commonly reported side effects include diarrhea, headache, insomnia, application site pain, upper respiratory tract infections, and urinary tract infections.  All of the patient's questions and concerns were addressed.
Thalidomide Counseling: I discussed with the patient the risks of thalidomide including but not limited to birth defects, anxiety, weakness, chest pain, dizziness, cough and severe allergy.
Calcipotriene Counseling:  I discussed with the patient the risks of calcipotriene including but not limited to erythema, scaling, itching, and irritation.
Low Dose Naltrexone Pregnancy And Lactation Text: Naltrexone is pregnancy category C.  There have been no adequate and well-controlled studies in pregnant women.  It should be used in pregnancy only if the potential benefit justifies the potential risk to the fetus.   Limited data indicates that naltrexone is minimally excreted into breastmilk.
Rituxan Counseling:  I discussed with the patient the risks of Rituxan infusions. Side effects can include infusion reactions, severe drug rashes including mucocutaneous reactions, reactivation of latent hepatitis and other infections and rarely progressive multifocal leukoencephalopathy.  All of the patient's questions and concerns were addressed.
Zoryve Pregnancy And Lactation Text: It is unknown if this medication can cause problems during pregnancy and breastfeeding.
Tetracycline Pregnancy And Lactation Text: This medication is Pregnancy Category D and not consider safe during pregnancy. It is also excreted in breast milk.
Xolair Counseling:  Patient informed of potential adverse effects including but not limited to fever, muscle aches, rash and allergic reactions.  The patient verbalized understanding of the proper use and possible adverse effects of Xolair.  All of the patient's questions and concerns were addressed.
Cosentyx Counseling:  I discussed with the patient the risks of Cosentyx including but not limited to worsening of Crohn's disease, immunosuppression, allergic reactions and infections.  The patient understands that monitoring is required including a PPD at baseline and must alert us or the primary physician if symptoms of infection or other concerning signs are noted.
Zepbound Counseling: I reviewed the possible side effects including: thyroid tumors, kidney disease, gallbladder disease, abdominal pain, constipation, diarrhea, nausea, vomiting and pancreatitis. Do not take this medication if you have a history or family history of multiple endocrine neoplasia syndrome type 2. Side effects reviewed, pt to contact office should one occur.
Mirvaso Counseling: Mirvaso is a topical medication which can decrease superficial blood flow where applied. Side effects are uncommon and include stinging, redness and allergic reactions.
Xeltomz Pregnancy And Lactation Text: This medication is Pregnancy Category D and is not considered safe during pregnancy.  The risk during breast feeding is also uncertain.
Tazorac Pregnancy And Lactation Text: This medication is not safe during pregnancy. It is unknown if this medication is excreted in breast milk.
Calcipotriene Pregnancy And Lactation Text: The use of this medication during pregnancy or lactation is not recommended as there is insufficient data.
Cephalexin Pregnancy And Lactation Text: This medication is Pregnancy Category B and considered safe during pregnancy.  It is also excreted in breast milk but can be used safely for shorter doses.
Cimetidine Counseling:  I discussed with the patient the risks of Cimetidine including but not limited to gynecomastia, headache, diarrhea, nausea, drowsiness, arrhythmias, pancreatitis, skin rashes, psychosis, bone marrow suppression and kidney toxicity.
Mirvaso Pregnancy And Lactation Text: This medication has not been assigned a Pregnancy Risk Category. It is unknown if the medication is excreted in breast milk.
Niacinamide Counseling: I recommended taking niacin or niacinamide, also know as vitamin B3, twice daily. Recent evidence suggests that taking vitamin B3 (500 mg twice daily) can reduce the risk of actinic keratoses and non-melanoma skin cancers. Side effects of vitamin B3 include flushing and headache.
Tranexamic Acid Counseling:  Patient advised of the small risk of bleeding problems with tranexamic acid. They were also instructed to call if they developed any nausea, vomiting or diarrhea. All of the patient's questions and concerns were addressed.
Ivermectin Counseling:  Patient instructed to take medication on an empty stomach with a full glass of water.  Patient informed of potential adverse effects including but not limited to nausea, diarrhea, dizziness, itching, and swelling of the extremities or lymph nodes.  The patient verbalized understanding of the proper use and possible adverse effects of ivermectin.  All of the patient's questions and concerns were addressed.
Cantharidin Counseling:  I discussed with the patient the risks of Cantharidin including but not limited to pain, redness, burning, itching, and blistering.
Rituxan Pregnancy And Lactation Text: This medication is Pregnancy Category C and it isn't know if it is safe during pregnancy. It is unknown if this medication is excreted in breast milk but similar antibodies are known to be excreted.
Zepbound Pregnancy And Lactation Text: The fetal risk of this medication is unknown and taking while pregnant is not recommended. It is unknown if this medication is present in breast milk.
Dutasteride Male Counseling: Dustasteride Counseling:  I discussed with the patient the risks of use of dutasteride including but not limited to decreased libido, decreased ejaculate volume, and gynecomastia. Women who can become pregnant should not handle medication.  All of the patient's questions and concerns were addressed.
Griseofulvin Counseling:  I discussed with the patient the risks of griseofulvin including but not limited to photosensitivity, cytopenia, liver damage, nausea/vomiting and severe allergy.  The patient understands that this medication is best absorbed when taken with a fatty meal (e.g., ice cream or french fries).
Siliq Counseling:  I discussed with the patient the risks of Siliq including but not limited to new or worsening depression, suicidal thoughts and behavior, immunosuppression, malignancy, posterior leukoencephalopathy syndrome, and serious infections.  The patient understands that monitoring is required including a PPD at baseline and must alert us or the primary physician if symptoms of infection or other concerning signs are noted. There is also a special program designed to monitor depression which is required with Siliq.
Opzelura Counseling:  I discussed with the patient the risks of Opzelura including but not limited to nasopharngitis, bronchitis, ear infection, eosinophila, hives, diarrhea, folliculitis, tonsillitis, and rhinorrhea.  Taken orally, this medication has been linked to serious infections; higher rate of mortality; malignancy and lymphoproliferative disorders; major adverse cardiovascular events; thrombosis; thrombocytopenia, anemia, and neutropenia; and lipid elevations.
Clindamycin Counseling: I counseled the patient regarding use of clindamycin as an antibiotic for prophylactic and/or therapeutic purposes. Clindamycin is active against numerous classes of bacteria, including skin bacteria. Side effects may include nausea, diarrhea, gastrointestinal upset, rash, hives, yeast infections, and in rare cases, colitis.
Topical Clindamycin Counseling: Patient counseled that this medication may cause skin irritation or allergic reactions.  In the event of skin irritation, the patient was advised to reduce the amount of the drug applied or use it less frequently.   The patient verbalized understanding of the proper use and possible adverse effects of clindamycin.  All of the patient's questions and concerns were addressed.
Cimetidine Pregnancy And Lactation Text: This medication is Pregnancy Category B and is considered safe during pregnancy. It is also excreted in breast milk and breast feeding isn't recommended.
Dupixent Counseling: I discussed with the patient the risks of dupilumab including but not limited to eye infection and irritation, cold sores, injection site reactions, worsening of asthma, allergic reactions and increased risk of parasitic infection.  Live vaccines should be avoided while taking dupilumab. Dupilumab will also interact with certain medications such as warfarin and cyclosporine. The patient understands that monitoring is required and they must alert us or the primary physician if symptoms of infection or other concerning signs are noted.
Clindamycin Pregnancy And Lactation Text: This medication can be used in pregnancy if certain situations. Clindamycin is also present in breast milk.
5-Fu Counseling: 5-Fluorouracil Counseling:  I discussed with the patient the risks of 5-fluorouracil including but not limited to erythema, scaling, itching, weeping, crusting, and pain.
Griseofulvin Pregnancy And Lactation Text: This medication is Pregnancy Category X and is known to cause serious birth defects. It is unknown if this medication is excreted in breast milk but breast feeding should be avoided.
Niacinamide Pregnancy And Lactation Text: These medications are considered safe during pregnancy.
Tranexamic Acid Pregnancy And Lactation Text: It is unknown if this medication is safe during pregnancy or breast feeding.
Dutasteride Female Counseling: Dutasteride Counseling:  I discussed with the patient the risks of use of dutasteride including but not limited to decreased libido and sexual dysfunction. Explained the teratogenic nature of the medication and stressed the importance of not getting pregnant during treatment. All of the patient's questions and concerns were addressed.
Aklief counseling:  Patient advised to apply a pea-sized amount only at bedtime and wait 30 minutes after washing their face before applying.  If too drying, patient may add a non-comedogenic moisturizer.  The most commonly reported side effects including irritation, redness, scaling, dryness, stinging, burning, itching, and increased risk of sunburn.  The patient verbalized understanding of the proper use and possible adverse effects of retinoids.  All of the patient's questions and concerns were addressed.
Nsaids Counseling: NSAID Counseling: I discussed with the patient that NSAIDs should be taken with food. Prolonged use of NSAIDs can result in the development of stomach ulcers.  Patient advised to stop taking NSAIDs if abdominal pain occurs.  The patient verbalized understanding of the proper use and possible adverse effects of NSAIDs.  All of the patient's questions and concerns were addressed.
Itraconazole Counseling:  I discussed with the patient the risks of itraconazole including but not limited to liver damage, nausea/vomiting, neuropathy, and severe allergy.  The patient understands that this medication is best absorbed when taken with acidic beverages such as non-diet cola or ginger ale.  The patient understands that monitoring is required including baseline LFTs and repeat LFTs at intervals.  The patient understands that they are to contact us or the primary physician if concerning signs are noted.
Doxycycline Counseling:  Patient counseled regarding possible photosensitivity and increased risk for sunburn.  Patient instructed to avoid sunlight, if possible.  When exposed to sunlight, patients should wear protective clothing, sunglasses, and sunscreen.  The patient was instructed to call the office immediately if the following severe adverse effects occur:  hearing changes, easy bruising/bleeding, severe headache, or vision changes.  The patient verbalized understanding of the proper use and possible adverse effects of doxycycline.  All of the patient's questions and concerns were addressed.
Topical Ketoconazole Counseling: Patient counseled that this medication may cause skin irritation or allergic reactions.  In the event of skin irritation, the patient was advised to reduce the amount of the drug applied or use it less frequently.   The patient verbalized understanding of the proper use and possible adverse effects of ketoconazole.  All of the patient's questions and concerns were addressed.
Dupixent Pregnancy And Lactation Text: This medication likely crosses the placenta but the risk for the fetus is uncertain. This medication is excreted in breast milk.
Azathioprine Counseling:  I discussed with the patient the risks of azathioprine including but not limited to myelosuppression, immunosuppression, hepatotoxicity, lymphoma, and infections.  The patient understands that monitoring is required including baseline LFTs, Creatinine, possible TPMP genotyping and weekly CBCs for the first month and then every 2 weeks thereafter.  The patient verbalized understanding of the proper use and possible adverse effects of azathioprine.  All of the patient's questions and concerns were addressed.
Doxepin Counseling:  Patient advised that the medication is sedating and not to drive a car after taking this medication. Patient informed of potential adverse effects including but not limited to dry mouth, urinary retention, and blurry vision.  The patient verbalized understanding of the proper use and possible adverse effects of doxepin.  All of the patient's questions and concerns were addressed.
Opzelura Pregnancy And Lactation Text: There is insufficient data to evaluate drug-associated risk for major birth defects, miscarriage, or other adverse maternal or fetal outcomes.  There is a pregnancy registry that monitors pregnancy outcomes in pregnant persons exposed to the medication during pregnancy.  It is unknown if this medication is excreted in breast milk.  Do not breastfeed during treatment and for about 4 weeks after the last dose.
Arava Counseling:  Patient counseled regarding adverse effects of Arava including but not limited to nausea, vomiting, abnormalities in liver function tests. Patients may develop mouth sores, rash, diarrhea, and abnormalities in blood counts. The patient understands that monitoring is required including LFTs and blood counts.  There is a rare possibility of scarring of the liver and lung problems that can occur when taking methotrexate. Persistent nausea, loss of appetite, pale stools, dark urine, cough, and shortness of breath should be reported immediately. Patient advised to discontinue Arava treatment and consult with a physician prior to attempting conception. The patient will have to undergo a treatment to eliminate Arava from the body prior to conception.
5-Fu Pregnancy And Lactation Text: This medication is Pregnancy Category X and contraindicated in pregnancy and in women who may become pregnant. It is unknown if this medication is excreted in breast milk.
Valtrex Counseling: I discussed with the patient the risks of valacyclovir including but not limited to kidney damage, nausea, vomiting and severe allergy.  The patient understands that if the infection seems to be worsening or is not improving, they are to call.
Glycopyrrolate Counseling:  I discussed with the patient the risks of glycopyrrolate including but not limited to skin rash, drowsiness, dry mouth, difficulty urinating, and blurred vision.
Rifampin Counseling: I discussed with the patient the risks of rifampin including but not limited to liver damage, kidney damage, red-orange body fluids, nausea/vomiting and severe allergy.
Winlevi Counseling:  I discussed with the patient the risks of topical clascoterone including but not limited to erythema, scaling, itching, and stinging. Patient voiced their understanding.
Solaraze Pregnancy And Lactation Text: This medication is Pregnancy Category B and is considered safe. There is some data to suggest avoiding during the third trimester. It is unknown if this medication is excreted in breast milk.
Rinvoq Counseling: I discussed with the patient the risks of Rinvoq therapy including but not limited to upper respiratory tract infections, shingles, cold sores, bronchitis, nausea, cough, fever, acne, and headache. Live vaccines should be avoided.  This medication has been linked to serious infections; higher rate of mortality; malignancy and lymphoproliferative disorders; major adverse cardiovascular events; thrombosis; thrombocytopenia, anemia, and neutropenia; lipid elevations; liver enzyme elevations; and gastrointestinal perforations.
Prednisone Counseling:  I discussed with the patient the risks of prolonged use of prednisone including but not limited to weight gain, insomnia, osteoporosis, mood changes, diabetes, susceptibility to infection, glaucoma and high blood pressure.  In cases where prednisone use is prolonged, patients should be monitored with blood pressure checks, serum glucose levels and an eye exam.  Additionally, the patient may need to be placed on GI prophylaxis, PCP prophylaxis, and calcium and vitamin D supplementation and/or a bisphosphonate.  The patient verbalized understanding of the proper use and the possible adverse effects of prednisone.  All of the patient's questions and concerns were addressed.
Azithromycin Counseling:  I discussed with the patient the risks of azithromycin including but not limited to GI upset, allergic reaction, drug rash, diarrhea, and yeast infections.
Bexarotene Pregnancy And Lactation Text: This medication is Pregnancy Category X and should not be given to women who are pregnant or may become pregnant. This medication should not be used if you are breast feeding.
Adbry Pregnancy And Lactation Text: It is unknown if this medication will adversely affect pregnancy or breast feeding.
Propranolol Counseling:  I discussed with the patient the risks of propranolol including but not limited to low heart rate, low blood pressure, low blood sugar, restlessness and increased cold sensitivity. They should call the office if they experience any of these side effects.
Benzoyl Peroxide Counseling: Patient counseled that medicine may cause skin irritation and bleach clothing.  In the event of skin irritation, the patient was advised to reduce the amount of the drug applied or use it less frequently.   The patient verbalized understanding of the proper use and possible adverse effects of benzoyl peroxide.  All of the patient's questions and concerns were addressed.
Glycopyrrolate Pregnancy And Lactation Text: This medication is Pregnancy Category B and is considered safe during pregnancy. It is unknown if it is excreted breast milk.
Infliximab Counseling:  I discussed with the patient the risks of infliximab including but not limited to myelosuppression, immunosuppression, autoimmune hepatitis, demyelinating diseases, lymphoma, and serious infections.  The patient understands that monitoring is required including a PPD at baseline and must alert us or the primary physician if symptoms of infection or other concerning signs are noted.
Rifampin Pregnancy And Lactation Text: This medication is Pregnancy Category C and it isn't know if it is safe during pregnancy. It is also excreted in breast milk and should not be used if you are breast feeding.
Semaglutide Counseling: I reviewed the possible side effects including: thyroid tumors, kidney disease, gallbladder disease, abdominal pain, constipation, diarrhea, nausea, vomiting and pancreatitis. Do not take this medication if you have a history or family history of multiple endocrine neoplasia syndrome type 2. Side effects reviewed, pt to contact office should one occur.
Soolantra Counseling: I discussed with the patients the risks of topial Soolantra. This is a medicine which decreases the number of mites and inflammation in the skin. You experience burning, stinging, eye irritation or allergic reactions.  Please call our office if you develop any problems from using this medication.
Taltz Counseling: I discussed with the patient the risks of ixekizumab including but not limited to immunosuppression, serious infections, worsening of inflammatory bowel disease and drug reactions.  The patient understands that monitoring is required including a PPD at baseline and must alert us or the primary physician if symptoms of infection or other concerning signs are noted.
Bimzelx Counseling:  I discussed with the patient the risks of Bimzelx including but not limited to depression, immunosuppression, allergic reactions and infections.  The patient understands that monitoring is required including a PPD at baseline and must alert us or the primary physician if symptoms of infection or other concerning signs are noted.
Klisyri Counseling:  I discussed with the patient the risks of Klisyri including but not limited to erythema, scaling, itching, weeping, crusting, and pain.
Rinvoq Pregnancy And Lactation Text: Based on animal studies, Rinvoq may cause embryo-fetal harm when administered to pregnant women.  The medication should not be used in pregnancy.  Breastfeeding is not recommended during treatment and for 6 days after the last dose.
Soolantra Pregnancy And Lactation Text: This medication is Pregnancy Category C. This medication is considered safe during breast feeding.
Benzoyl Peroxide Pregnancy And Lactation Text: This medication is Pregnancy Category C. It is unknown if benzoyl peroxide is excreted in breast milk.
Winlevi Pregnancy And Lactation Text: This medication is considered safe during pregnancy and breastfeeding.
Prednisone Pregnancy And Lactation Text: This medication is Pregnancy Category C and it isn't know if it is safe during pregnancy. This medication is excreted in breast milk.
Azithromycin Pregnancy And Lactation Text: This medication is considered safe during pregnancy and is also secreted in breast milk.
Propranolol Pregnancy And Lactation Text: This medication is Pregnancy Category C and it isn't known if it is safe during pregnancy. It is excreted in breast milk.
Isotretinoin Counseling: Patient should get monthly blood tests, not donate blood, not drive at night if vision affected, not share medication, and not undergo elective surgery for 6 months after tx completed. Side effects reviewed, pt to contact office should one occur.
Sarecycline Counseling: Patient advised regarding possible photosensitivity and discoloration of the teeth, skin, lips, tongue and gums.  Patient instructed to avoid sunlight, if possible.  When exposed to sunlight, patients should wear protective clothing, sunglasses, and sunscreen.  The patient was instructed to call the office immediately if the following severe adverse effects occur:  hearing changes, easy bruising/bleeding, severe headache, or vision changes.  The patient verbalized understanding of the proper use and possible adverse effects of sarecycline.  All of the patient's questions and concerns were addressed.
Klisyri Pregnancy And Lactation Text: It is unknown if this medication can harm a developing fetus or if it is excreted in breast milk.
VTAMA Counseling: I discussed with the patient that VTAMA is not for use in the eyes, mouth or mouth. They should call the office if they develop any signs of allergic reactions to VTAMA. The patient verbalized understanding of the proper use and possible adverse effects of VTAMA.  All of the patient's questions and concerns were addressed.
SSKI Counseling:  I discussed with the patient the risks of SSKI including but not limited to thyroid abnormalities, metallic taste, GI upset, fever, headache, acne, arthralgias, paraesthesias, lymphadenopathy, easy bleeding, arrhythmias, and allergic reaction.
Sotyktu Counseling:  I discussed the most common side effects of Sotyktu including: common cold, sore throat, sinus infections, cold sores, canker sores, folliculitis, and acne.? I also discussed more serious side effects of Sotyktu including but not limited to: serious allergic reactions; increased risk for infections such as TB; cancers such as lymphomas; rhabdomyolysis and elevated CPK; and elevated triglycerides and liver enzymes.?
Carac Counseling:  I discussed with the patient the risks of Carac including but not limited to erythema, scaling, itching, weeping, crusting, and pain.
Hydroxychloroquine Counseling:  I discussed with the patient that a baseline ophthalmologic exam is needed at the start of therapy and every year thereafter while on therapy. A CBC may also be warranted for monitoring.  The side effects of this medication were discussed with the patient, including but not limited to agranulocytosis, aplastic anemia, seizures, rashes, retinopathy, and liver toxicity. Patient instructed to call the office should any adverse effect occur.  The patient verbalized understanding of the proper use and possible adverse effects of Plaquenil.  All the patient's questions and concerns were addressed.
Bimzelx Pregnancy And Lactation Text: This medication crosses the placenta and the safety is uncertain during pregnancy. It is unknown if this medication is present in breast milk.
Wegovy Counseling: I reviewed the possible side effects including: thyroid tumors, kidney disease, gallbladder disease, abdominal pain, constipation, diarrhea, nausea, vomiting and pancreatitis. Do not take this medication if you have a history or family history of multiple endocrine neoplasia syndrome type 2. Side effects reviewed, pt to contact office should one occur.
Sotyktu Pregnancy And Lactation Text: There is insufficient data to evaluate whether or not Sotyktu is safe to use during pregnancy.? ?It is not known if Sotyktu passes into breast milk and whether or not it is safe to use when breastfeeding.??
Nemluvio Counseling: I discussed with the patient the risks of nemolizumab including but not limited to headache, gastrointestinal complaints, nasopharyngitis, musculoskeletal complaints, injection site reactions, and allergic reactions. The patient understands that monitoring is required and they must alert us or the primary physician if any side effects are noted.
Minoxidil Counseling: Minoxidil is a topical medication which can increase blood flow where it is applied. It is uncertain how this medication increases hair growth. Side effects are uncommon and include stinging and allergic reactions.
Bactrim Counseling:  I discussed with the patient the risks of sulfa antibiotics including but not limited to GI upset, allergic reaction, drug rash, diarrhea, dizziness, photosensitivity, and yeast infections.  Rarely, more serious reactions can occur including but not limited to aplastic anemia, agranulocytosis, methemoglobinemia, blood dyscrasias, liver or kidney failure, lung infiltrates or desquamative/blistering drug rashes.
Topical Retinoid counseling:  Patient advised to apply a pea-sized amount only at bedtime and wait 30 minutes after washing their face before applying.  If too drying, patient may add a non-comedogenic moisturizer. The patient verbalized understanding of the proper use and possible adverse effects of retinoids.  All of the patient's questions and concerns were addressed.
Tremfya Counseling: I discussed with the patient the risks of guselkumab including but not limited to immunosuppression, serious infections, worsening of inflammatory bowel disease and drug reactions.  The patient understands that monitoring is required including a PPD at baseline and must alert us or the primary physician if symptoms of infection or other concerning signs are noted.
Isotretinoin Pregnancy And Lactation Text: This medication is Pregnancy Category X and is considered extremely dangerous during pregnancy. It is unknown if it is excreted in breast milk.
Zyclara Counseling:  I discussed with the patient the risks of imiquimod including but not limited to erythema, scaling, itching, weeping, crusting, and pain.  Patient understands that the inflammatory response to imiquimod is variable from person to person and was educated regarded proper titration schedule.  If flu-like symptoms develop, patient knows to discontinue the medication and contact us.
Bactrim Pregnancy And Lactation Text: This medication is Pregnancy Category D and is known to cause fetal risk.  It is also excreted in breast milk.
Cimzia Counseling:  I discussed with the patient the risks of Cimzia including but not limited to immunosuppression, allergic reactions and infections.  The patient understands that monitoring is required including a PPD at baseline and must alert us or the primary physician if symptoms of infection or other concerning signs are noted.
Sski Pregnancy And Lactation Text: This medication is Pregnancy Category D and isn't considered safe during pregnancy. It is excreted in breast milk.
Hydroxychloroquine Pregnancy And Lactation Text: This medication has been shown to cause fetal harm but it isn't assigned a Pregnancy Risk Category. There are small amounts excreted in breast milk.
Albendazole Counseling:  I discussed with the patient the risks of albendazole including but not limited to cytopenia, kidney damage, nausea/vomiting and severe allergy.  The patient understands that this medication is being used in an off-label manner.
Tetracycline Counseling: Patient counseled regarding possible photosensitivity and increased risk for sunburn.  Patient instructed to avoid sunlight, if possible.  When exposed to sunlight, patients should wear protective clothing, sunglasses, and sunscreen.  The patient was instructed to call the office immediately if the following severe adverse effects occur:  hearing changes, easy bruising/bleeding, severe headache, or vision changes.  The patient verbalized understanding of the proper use and possible adverse effects of tetracycline.  All of the patient's questions and concerns were addressed. Patient understands to avoid pregnancy while on therapy due to potential birth defects.
Xeljanz Counseling: I discussed with the patient the risks of Xeljanz therapy including increased risk of infection, liver issues, headache, diarrhea, or cold symptoms. Live vaccines should be avoided. They were instructed to call if they have any problems.
Low Dose Naltrexone Counseling- I discussed with the patient the potential risks and side effects of low dose naltrexone including but not limited to: more vivid dreams, headaches, nausea, vomiting, abdominal pain, fatigue, dizziness, and anxiety.
Nemluvio Pregnancy And Lactation Text: It is not known if Nemluvio causes fetal harm or is present in breast milk. Please proceed with caution if patients who are pregnant or breastfeeding.
Minoxidil Pregnancy And Lactation Text: This medication has not been assigned a Pregnancy Risk Category but animal studies failed to show danger with the topical medication. It is unknown if the medication is excreted in breast milk.
High Dose Vitamin A Counseling: Side effects reviewed, pt to contact office should one occur.
Eucrisa Counseling: Patient may experience a mild burning sensation during topical application. Eucrisa is not approved in children less than 2 years of age.
Odomzo Counseling- I discussed with the patient the risks of Odomzo including but not limited to nausea, vomiting, diarrhea, constipation, weight loss, changes in the sense of taste, decreased appetite, muscle spasms, and hair loss.  The patient verbalized understanding of the proper use and possible adverse effects of Odomzo.  All of the patient's questions and concerns were addressed.
Metronidazole Pregnancy And Lactation Text: This medication is Pregnancy Category B and considered safe during pregnancy.  It is also excreted in breast milk.
Oral Minoxidil Pregnancy And Lactation Text: This medication should only be used when clearly needed if you are pregnant, attempting to become pregnant or breast feeding.
Topical Steroids Applications Pregnancy And Lactation Text: Most topical steroids are considered safe to use during pregnancy and lactation.  Any topical steroid applied to the breast or nipple should be washed off before breastfeeding.
Cyclophosphamide Pregnancy And Lactation Text: This medication is Pregnancy Category D and it isn't considered safe during pregnancy. This medication is excreted in breast milk.
Dapsone Counseling: I discussed with the patient the risks of dapsone including but not limited to hemolytic anemia, agranulocytosis, rashes, methemoglobinemia, kidney failure, peripheral neuropathy, headaches, GI upset, and liver toxicity.  Patients who start dapsone require monitoring including baseline LFTs and weekly CBCs for the first month, then every month thereafter.  The patient verbalized understanding of the proper use and possible adverse effects of dapsone.  All of the patient's questions and concerns were addressed.
Minocycline Counseling: Patient advised regarding possible photosensitivity and discoloration of the teeth, skin, lips, tongue and gums.  Patient instructed to avoid sunlight, if possible.  When exposed to sunlight, patients should wear protective clothing, sunglasses, and sunscreen.  The patient was instructed to call the office immediately if the following severe adverse effects occur:  hearing changes, easy bruising/bleeding, severe headache, or vision changes.  The patient verbalized understanding of the proper use and possible adverse effects of minocycline.  All of the patient's questions and concerns were addressed.
Birth Control Pills Pregnancy And Lactation Text: This medication should be avoided if pregnant and for the first 30 days post-partum.
Qbrexza Pregnancy And Lactation Text: There is no available data on Qbrexza use in pregnant women.  There is no available data on Qbrexza use in lactation.
Litfulo Counseling: I discussed with the patient the risks of Litfulo therapy including but not limited to upper respiratory tract infections, shingles, cold sores, and nausea. Live vaccines should be avoided.  This medication has been linked to serious infections; higher rate of mortality; malignancy and lymphoproliferative disorders; major adverse cardiovascular events; thrombosis; gastrointestinal perforations; neutropenia; lymphopenia; anemia; liver enzyme elevations; and lipid elevations.
Opioid Counseling: I discussed with the patient the potential side effects of opioids including but not limited to addiction, altered mental status, and depression. I stressed avoiding alcohol, benzodiazepines, muscle relaxants and sleep aids unless specifically okayed by a physician. The patient verbalized understanding of the proper use and possible adverse effects of opioids. All of the patient's questions and concerns were addressed. They were instructed to flush the remaining pills down the toilet if they did not need them for pain.
Spevigo Counseling: I discussed with the patient the risks of Spevigo including but not limited to fatigue, nasuea, vomiting, headache, pruritus, urinary tract infection, an infusion related reactions.  The patient understands that monitoring is required including screening for tuberculosis at baseline and yearly screening thereafter while continuing Spevigo therapy. They should contact us if symptoms of infection or other concerning signs are noted.
Rhofade Counseling: Rhofade is a topical medication which can decrease superficial blood flow where applied. Side effects are uncommon and include stinging, redness and allergic reactions.
Otezla Counseling: The side effects of Otezla were discussed with the patient, including but not limited to worsening or new depression, weight loss, diarrhea, nausea, upper respiratory tract infection, and headache. Patient instructed to call the office should any adverse effect occur.  The patient verbalized understanding of the proper use and possible adverse effects of Otezla.  All the patient's questions and concerns were addressed.
Cyclosporine Counseling:  I discussed with the patient the risks of cyclosporine including but not limited to hypertension, gingival hyperplasia,myelosuppression, immunosuppression, liver damage, kidney damage, neurotoxicity, lymphoma, and serious infections. The patient understands that monitoring is required including baseline blood pressure, CBC, CMP, lipid panel and uric acid, and then 1-2 times monthly CMP and blood pressure.
Topical Sulfur Applications Counseling: Topical Sulfur Counseling: Patient counseled that this medication may cause skin irritation or allergic reactions.  In the event of skin irritation, the patient was advised to reduce the amount of the drug applied or use it less frequently.   The patient verbalized understanding of the proper use and possible adverse effects of topical sulfur application.  All of the patient's questions and concerns were addressed.
Detail Level: Zone
Hyrimoz Counseling:  I discussed with the patient the risks of adalimumab including but not limited to myelosuppression, immunosuppression, autoimmune hepatitis, demyelinating diseases, lymphoma, and serious infections.  The patient understands that monitoring is required including a PPD at baseline and must alert us or the primary physician if symptoms of infection or other concerning signs are noted.
Otezla Pregnancy And Lactation Text: This medication is Pregnancy Category C and it isn't known if it is safe during pregnancy. It is unknown if it is excreted in breast milk.
Ozempic Counseling: I reviewed the possible side effects including: thyroid tumors, kidney disease, gallbladder disease, abdominal pain, constipation, diarrhea, nausea, vomiting and pancreatitis. Do not take this medication if you have a history or family history of multiple endocrine neoplasia syndrome type 2. Side effects reviewed, pt to contact office should one occur.
Dapsone Pregnancy And Lactation Text: This medication is Pregnancy Category C and is not considered safe during pregnancy or breast feeding.
Spironolactone Counseling: Patient advised regarding risks of diarrhea, abdominal pain, hyperkalemia, birth defects (for female patients), liver toxicity and renal toxicity. The patient may need blood work to monitor liver and kidney function and potassium levels while on therapy. The patient verbalized understanding of the proper use and possible adverse effects of spironolactone.  All of the patient's questions and concerns were addressed.
Hydroquinone Counseling:  Patient advised that medication may result in skin irritation, lightening (hypopigmentation), dryness, and burning.  In the event of skin irritation, the patient was advised to reduce the amount of the drug applied or use it less frequently.  Rarely, spots that are treated with hydroquinone can become darker (pseudoochronosis).  Should this occur, patient instructed to stop medication and call the office. The patient verbalized understanding of the proper use and possible adverse effects of hydroquinone.  All of the patient's questions and concerns were addressed.
Litfulo Pregnancy And Lactation Text: Based on animal studies, Lifulo may cause embryo-fetal harm when administered to pregnant women.  The medication should not be used in pregnancy.  Breastfeeding is not recommended during treatment.
Spevigo Pregnancy And Lactation Text: The risk during pregnancy and breastfeeding is uncertain with this medication. This medication does cross the placenta. It is unknown if this medication is found in breast milk.
Aklief Pregnancy And Lactation Text: It is unknown if this medication is safe to use during pregnancy.  It is unknown if this medication is excreted in breast milk.  Breastfeeding women should use the topical cream on the smallest area of the skin for the shortest time needed while breastfeeding.  Do not apply to nipple and areola.
Spironolactone Pregnancy And Lactation Text: This medication can cause feminization of the male fetus and should be avoided during pregnancy. The active metabolite is also found in breast milk.
Topical Sulfur Applications Pregnancy And Lactation Text: This medication is Pregnancy Category C and has an unknown safety profile during pregnancy. It is unknown if this topical medication is excreted in breast milk.
Cantharidin Pregnancy And Lactation Text: This medication has not been proven safe during pregnancy. It is unknown if this medication is excreted in breast milk.
Acitretin Counseling:  I discussed with the patient the risks of acitretin including but not limited to hair loss, dry lips/skin/eyes, liver damage, hyperlipidemia, depression/suicidal ideation, photosensitivity.  Serious rare side effects can include but are not limited to pancreatitis, pseudotumor cerebri, bony changes, clot formation/stroke/heart attack.  Patient understands that alcohol is contraindicated since it can result in liver toxicity and significantly prolong the elimination of the drug by many years.
Opioid Pregnancy And Lactation Text: These medications can lead to premature delivery and should be avoided during pregnancy. These medications are also present in breast milk in small amounts.
Wartpeel Counseling:  I discussed with the patient the risks of Wartpeel including but not limited to erythema, scaling, itching, weeping, crusting, and pain.
Quinolones Counseling:  I discussed with the patient the risks of fluoroquinolones including but not limited to GI upset, allergic reaction, drug rash, diarrhea, dizziness, photosensitivity, yeast infections, liver function test abnormalities, tendonitis/tendon rupture.
Methotrexate Counseling:  Patient counseled regarding adverse effects of methotrexate including but not limited to nausea, vomiting, abnormalities in liver function tests. Patients may develop mouth sores, rash, diarrhea, and abnormalities in blood counts. The patient understands that monitoring is required including LFT's and blood counts.  There is a rare possibility of scarring of the liver and lung problems that can occur when taking methotrexate. Persistent nausea, loss of appetite, pale stools, dark urine, cough, and shortness of breath should be reported immediately. Patient advised to discontinue methotrexate treatment at least three months before attempting to become pregnant.  I discussed the need for folate supplements while taking methotrexate.  These supplements can decrease side effects during methotrexate treatment. The patient verbalized understanding of the proper use and possible adverse effects of methotrexate.  All of the patient's questions and concerns were addressed.
Acitretin Pregnancy And Lactation Text: This medication is Pregnancy Category X and should not be given to women who are pregnant or may become pregnant in the future. This medication is excreted in breast milk.
Olumiant Counseling: I discussed with the patient the risks of Olumiant therapy including but not limited to upper respiratory tract infections, shingles, cold sores, and nausea. Live vaccines should be avoided.  This medication has been linked to serious infections; higher rate of mortality; malignancy and lymphoproliferative disorders; major adverse cardiovascular events; thrombosis; gastrointestinal perforations; neutropenia; lymphopenia; anemia; liver enzyme elevations; and lipid elevations.
Gabapentin Counseling: I discussed with the patient the risks of gabapentin including but not limited to dizziness, somnolence, fatigue and ataxia.
Oxybutynin Counseling:  I discussed with the patient the risks of oxybutynin including but not limited to skin rash, drowsiness, dry mouth, difficulty urinating, and blurred vision.
Olumiant Pregnancy And Lactation Text: Based on animal studies, Olumiant may cause embryo-fetal harm when administered to pregnant women.  The medication should not be used in pregnancy.  Breastfeeding is not recommended during treatment.
Azelaic Acid Counseling: Patient counseled that medicine may cause skin irritation and to avoid applying near the eyes.  In the event of skin irritation, the patient was advised to reduce the amount of the drug applied or use it less frequently.   The patient verbalized understanding of the proper use and possible adverse effects of azelaic acid.  All of the patient's questions and concerns were addressed.
Saxenda Counseling: I reviewed the possible side effects including: thyroid tumors, kidney disease, gallbladder disease, abdominal pain, constipation, diarrhea, nausea, vomiting and pancreatitis. Do not take this medication if you have a history or family history of multiple endocrine neoplasia syndrome type 2. Side effects reviewed, pt to contact office should one occur.
Xolair Pregnancy And Lactation Text: This medication is Pregnancy Category B and is considered safe during pregnancy. This medication is excreted in breast milk.
Ilumya Counseling: I discussed with the patient the risks of tildrakizumab including but not limited to immunosuppression, malignancy, posterior leukoencephalopathy syndrome, and serious infections.  The patient understands that monitoring is required including a PPD at baseline and must alert us or the primary physician if symptoms of infection or other concerning signs are noted.
Stelara Counseling:  I discussed with the patient the risks of ustekinumab including but not limited to immunosuppression, malignancy, posterior leukoencephalopathy syndrome, and serious infections.  The patient understands that monitoring is required including a PPD at baseline and must alert us or the primary physician if symptoms of infection or other concerning signs are noted.
Solaraze Counseling:  I discussed with the patient the risks of Solaraze including but not limited to erythema, scaling, itching, weeping, crusting, and pain.
Imiquimod Counseling:  I discussed with the patient the risks of imiquimod including but not limited to erythema, scaling, itching, weeping, crusting, and pain.  Patient understands that the inflammatory response to imiquimod is variable from person to person and was educated regarded proper titration schedule.  If flu-like symptoms develop, patient knows to discontinue the medication and contact us.
Bexarotene Counseling:  I discussed with the patient the risks of bexarotene including but not limited to hair loss, dry lips/skin/eyes, liver abnormalities, hyperlipidemia, pancreatitis, depression/suicidal ideation, photosensitivity, drug rash/allergic reactions, hypothyroidism, anemia, leukopenia, infection, cataracts, and teratogenicity.  Patient understands that they will need regular blood tests to check lipid profile, liver function tests, white blood cell count, thyroid function tests and pregnancy test if applicable.
Adbry Counseling: I discussed with the patient the risks of tralokinumab including but not limited to eye infection and irritation, cold sores, injection site reactions, worsening of asthma, allergic reactions and increased risk of parasitic infection.  Live vaccines should be avoided while taking tralokinumab. The patient understands that monitoring is required and they must alert us or the primary physician if symptoms of infection or other concerning signs are noted.
Methotrexate Pregnancy And Lactation Text: This medication is Pregnancy Category X and is known to cause fetal harm. This medication is excreted in breast milk.

## 2024-11-25 NOTE — PROCEDURE: ADDITIONAL NOTES
Additional Notes: Provided written instructions, advised to start in January
Detail Level: Detailed
Render Risk Assessment In Note?: no

## 2024-11-25 NOTE — PROCEDURE: LIQUID NITROGEN
Show Applicator Variable?: Yes
Number Of Freeze-Thaw Cycles: 2 freeze-thaw cycles
Render Note In Bullet Format When Appropriate: No
Consent: The patient's consent was obtained including but not limited to risks of crusting, scabbing, blistering, scarring, darker or lighter pigmentary change, recurrence, incomplete removal and infection. RTC in 2 months if lesion(s) persistent.
Detail Level: Detailed
Duration Of Freeze Thaw-Cycle (Seconds): 10
Post-Care Instructions: I reviewed with the patient in detail post-care instructions. Patient is to wear sunprotection, and avoid picking at any of the treated lesions. Pt may apply Vaseline to crusted or scabbing areas.

## 2024-12-05 ENCOUNTER — APPOINTMENT (OUTPATIENT)
Dept: URBAN - METROPOLITAN AREA CLINIC 36 | Facility: CLINIC | Age: 79
Setting detail: DERMATOLOGY
End: 2024-12-05

## 2024-12-05 PROBLEM — D03.39 MELANOMA IN SITU OF OTHER PARTS OF FACE: Status: ACTIVE | Noted: 2024-12-05

## 2024-12-05 PROCEDURE — ? EXCISION

## 2024-12-05 PROCEDURE — 11644 EXC F/E/E/N/L MAL+MRG 3.1-4: CPT | Mod: 79

## 2024-12-05 PROCEDURE — ? COUNSELING

## 2024-12-05 NOTE — PROCEDURE: EXCISION
Show Pathology Comment Variable: Yes
Double M-Plasty Complex Repair Preamble Text (Leave Blank If You Do Not Want): Extensive wide undermining was performed.
Epidermal Closure: running
Complex Repair And Burow's Graft Text: The defect edges were debeveled with a #15 scalpel blade.  The primary defect was closed partially with a complex linear closure.  Given the location of the defect, shape of the defect, the proximity to free margins and the presence of a standing cone deformity a Burow's graft was deemed most appropriate to repair the remaining defect.  The graft was trimmed to fit the size of the remaining defect.  The graft was then placed in the primary defect, oriented appropriately, and sutured into place.
Hemigard Intro: Due to skin fragility and wound tension, it was decided to use HEMIGARD adhesive retention suture devices to permit a linear closure. The skin was cleaned and dried for a 6cm distance away from the wound. Excessive hair, if present, was removed to allow for adhesion.
Hemigard Retention Suture: 0-0 Nylon
Curvilinear Excision Additional Text (Leave Blank If You Do Not Want): The margin was drawn around the clinically apparent lesion.  A curvilinear shape was then drawn on the skin incorporating the lesion and margins.  Incisions were then made along these lines to the appropriate tissue plane and the lesion was extirpated.
Interpolation Flap Text: A decision was made to reconstruct the defect utilizing an interpolation axial flap and a staged reconstruction.  A telfa template was made of the defect.  This telfa template was then used to outline the interpolation flap.  The donor area for the pedicle flap was then injected with anesthesia.  The flap was excised through the skin and subcutaneous tissue down to the layer of the underlying musculature.  The interpolation flap was carefully excised within this deep plane to maintain its blood supply.  The edges of the donor site were undermined.   The donor site was closed in a primary fashion.  The pedicle was then rotated into position and sutured.  Once the tube was sutured into place, adequate blood supply was confirmed with blanching and refill.  The pedicle was then wrapped with xeroform gauze and dressed appropriately with a telfa and gauze bandage to ensure continued blood supply and protect the attached pedicle.
Billing Type: Third-Party Bill
Island Pedicle Flap-Requiring Vessel Identification Text: The defect edges were debeveled with a #15 scalpel blade.  Given the location of the defect, shape of the defect and the proximity to free margins an island pedicle advancement flap was deemed most appropriate.  Using a sterile surgical marker, an appropriate advancement flap was drawn, based on the axial vessel mentioned above, incorporating the defect, outlining the appropriate donor tissue and placing the expected incisions within the relaxed skin tension lines where possible.    The area thus outlined was incised deep to adipose tissue with a #15 scalpel blade.  The skin margins were undermined to an appropriate distance in all directions around the primary defect and laterally outward around the island pedicle utilizing iris scissors.  There was minimal undermining beneath the pedicle flap.
Abbe Flap (Upper To Lower Lip) Text: The defect of the lower lip was assessed and measured.  Given the location and size of the defect, an Abbe flap was deemed most appropriate.  Using a sterile surgical marker, an appropriate Abbe flap was measured and drawn on the upper lip. Local anesthesia was then infiltrated.  A scalpel was then used to incise the upper lip through and through the skin, vermilion, muscle and mucosa, leaving the flap pedicled on the opposite side.  The flap was then rotated and transferred to the lower lip defect.  The flap was then sutured into place with a three layer technique, closing the orbicularis oris muscle layer with subcutaneous buried sutures, followed by a mucosal layer and an epidermal layer.
No Repair - Repaired With Adjacent Surgical Defect Text (Leave Blank If You Do Not Want): After the excision the defect was repaired concurrently with another surgical defect which was in close approximation.
Bilobed Flap Text: The defect edges were debeveled with a #15 scalpel blade.  Given the location of the defect and the proximity to free margins a bilobe flap was deemed most appropriate.  Using a sterile surgical marker, an appropriate bilobe flap drawn around the defect.    The area thus outlined was incised deep to adipose tissue with a #15 scalpel blade.  The skin margins were undermined to an appropriate distance in all directions utilizing iris scissors.
Complex Repair And Transposition Flap Text: The defect edges were debeveled with a #15 scalpel blade.  The primary defect was closed partially with a complex linear closure.  Given the location of the remaining defect, shape of the defect and the proximity to free margins a transposition flap was deemed most appropriate for complete closure of the defect.  Using a sterile surgical marker, an appropriate advancement flap was drawn incorporating the defect and placing the expected incisions within the relaxed skin tension lines where possible.    The area thus outlined was incised deep to adipose tissue with a #15 scalpel blade.  The skin margins were undermined to an appropriate distance in all directions utilizing iris scissors.
Suturegard Body: The suture ends were repeatedly re-tightened and re-clamped to achieve the desired tissue expansion.
Patient Will Remove Sutures At Home?: No
Primary Defect Length (In Cm): 0
Purse String (Simple) Text: Given the location of the defect and the characteristics of the surrounding skin a purse string simple closure was deemed most appropriate.  Undermining was performed circumferentially around the surgical defect.  A purse string suture was then placed and tightened.
Complex Repair And Skin Substitute Graft Text: The defect edges were debeveled with a #15 scalpel blade.  The primary defect was closed partially with a complex linear closure.  Given the location of the remaining defect, shape of the defect and the proximity to free margins a skin substitute graft was deemed most appropriate to repair the remaining defect.  The graft was trimmed to fit the size of the remaining defect.  The graft was then placed in the primary defect, oriented appropriately, and sutured into place.
Cheek Interpolation Flap Text: A decision was made to reconstruct the defect utilizing an interpolation axial flap and a staged reconstruction.  A telfa template was made of the defect.  This telfa template was then used to outline the Cheek Interpolation flap.  The donor area for the pedicle flap was then injected with anesthesia.  The flap was excised through the skin and subcutaneous tissue down to the layer of the underlying musculature.  The interpolation flap was carefully excised within this deep plane to maintain its blood supply.  The edges of the donor site were undermined.   The donor site was closed in a primary fashion.  The pedicle was then rotated into position and sutured.  Once the tube was sutured into place, adequate blood supply was confirmed with blanching and refill.  The pedicle was then wrapped with xeroform gauze and dressed appropriately with a telfa and gauze bandage to ensure continued blood supply and protect the attached pedicle.
Alar Island Pedicle Flap Text: The defect edges were debeveled with a #15 scalpel blade.  Given the location of the defect, shape of the defect and the proximity to the alar rim an island pedicle advancement flap was deemed most appropriate.  Using a sterile surgical marker, an appropriate advancement flap was drawn incorporating the defect, outlining the appropriate donor tissue and placing the expected incisions within the nasal ala running parallel to the alar rim. The area thus outlined was incised with a #15 scalpel blade.  The skin margins were undermined minimally to an appropriate distance in all directions around the primary defect and laterally outward around the island pedicle utilizing iris scissors.  There was minimal undermining beneath the pedicle flap.
Estimated Blood Loss (Cc): minimal
Peng Advancement Flap Text: The defect edges were debeveled with a #15 scalpel blade.  Given the location of the defect, shape of the defect and the proximity to free margins a Peng advancement flap was deemed most appropriate.  Using a sterile surgical marker, an appropriate advancement flap was drawn incorporating the defect and placing the expected incisions within the relaxed skin tension lines where possible. The area thus outlined was incised deep to adipose tissue with a #15 scalpel blade.  The skin margins were undermined to an appropriate distance in all directions utilizing iris scissors.
Vermilion Border Text: The closure involved the vermilion border.
Lazy S Intermediate Repair Preamble Text (Leave Blank If You Do Not Want): Undermining was performed with blunt dissection.
Lab Facility: 
Spiral Flap Text: The defect edges were debeveled with a #15 scalpel blade.  Given the location of the defect, shape of the defect and the proximity to free margins a spiral flap was deemed most appropriate.  Using a sterile surgical marker, an appropriate rotation flap was drawn incorporating the defect and placing the expected incisions within the relaxed skin tension lines where possible. The area thus outlined was incised deep to adipose tissue with a #15 scalpel blade.  The skin margins were undermined to an appropriate distance in all directions utilizing iris scissors.
Muscle Hinge Flap Text: The defect edges were debeveled with a #15 scalpel blade.  Given the size, depth and location of the defect and the proximity to free margins a muscle hinge flap was deemed most appropriate.  Using a sterile surgical marker, an appropriate hinge flap was drawn incorporating the defect. The area thus outlined was incised with a #15 scalpel blade.  The skin margins were undermined to an appropriate distance in all directions utilizing iris scissors.
Split-Thickness Skin Graft Text: The defect edges were debeveled with a #15 scalpel blade.  Given the location of the defect, shape of the defect and the proximity to free margins a split thickness skin graft was deemed most appropriate.  Using a sterile surgical marker, the primary defect shape was transferred to the donor site. The split thickness graft was then harvested.  The skin graft was then placed in the primary defect and oriented appropriately.
Rhombic Flap Text: The defect edges were debeveled with a #15 scalpel blade.  Given the location of the defect and the proximity to free margins a rhombic flap was deemed most appropriate.  Using a sterile surgical marker, an appropriate rhombic flap was drawn incorporating the defect.    The area thus outlined was incised deep to adipose tissue with a #15 scalpel blade.  The skin margins were undermined to an appropriate distance in all directions utilizing iris scissors.
Complex Repair And M Plasty Text: The defect edges were debeveled with a #15 scalpel blade.  The primary defect was closed partially with a complex linear closure.  Given the location of the remaining defect, shape of the defect and the proximity to free margins an M plasty was deemed most appropriate for complete closure of the defect.  Using a sterile surgical marker, an appropriate advancement flap was drawn incorporating the defect and placing the expected incisions within the relaxed skin tension lines where possible.    The area thus outlined was incised deep to adipose tissue with a #15 scalpel blade.  The skin margins were undermined to an appropriate distance in all directions utilizing iris scissors.
Excision Method: Perilesional
Complex Repair And Ftsg Text: The defect edges were debeveled with a #15 scalpel blade.  The primary defect was closed partially with a complex linear closure.  Given the location of the defect, shape of the defect and the proximity to free margins a full thickness skin graft was deemed most appropriate to repair the remaining defect.  The graft was trimmed to fit the size of the remaining defect.  The graft was then placed in the primary defect, oriented appropriately, and sutured into place.
Epidermal Closure Graft Donor Site (Optional): simple interrupted
Complex Repair And W Plasty Text: The defect edges were debeveled with a #15 scalpel blade.  The primary defect was closed partially with a complex linear closure.  Given the location of the remaining defect, shape of the defect and the proximity to free margins a W plasty was deemed most appropriate for complete closure of the defect.  Using a sterile surgical marker, an appropriate advancement flap was drawn incorporating the defect and placing the expected incisions within the relaxed skin tension lines where possible.    The area thus outlined was incised deep to adipose tissue with a #15 scalpel blade.  The skin margins were undermined to an appropriate distance in all directions utilizing iris scissors.
Wound Care: Vaseline
Melolabial Transposition Flap Text: The defect edges were debeveled with a #15 scalpel blade.  Given the location of the defect and the proximity to free margins a melolabial flap was deemed most appropriate.  Using a sterile surgical marker, an appropriate melolabial transposition flap was drawn incorporating the defect.    The area thus outlined was incised deep to adipose tissue with a #15 scalpel blade.  The skin margins were undermined to an appropriate distance in all directions utilizing iris scissors.
Where Do You Want The Question To Include Opioid Counseling Located?: Case Summary Tab
Scalpel Size: 15 blade
Fusiform Excision Additional Text (Leave Blank If You Do Not Want): The margin was drawn around the clinically apparent lesion.  A fusiform shape was then drawn on the skin incorporating the lesion and margins.  Incisions were then made along these lines to the appropriate tissue plane and the lesion was extirpated.
Star Wedge Flap Text: The defect edges were debeveled with a #15 scalpel blade.  Given the location of the defect, shape of the defect and the proximity to free margins a star wedge flap was deemed most appropriate.  Using a sterile surgical marker, an appropriate rotation flap was drawn incorporating the defect and placing the expected incisions within the relaxed skin tension lines where possible. The area thus outlined was incised deep to adipose tissue with a #15 scalpel blade.  The skin margins were undermined to an appropriate distance in all directions utilizing iris scissors.
Mastoid Interpolation Flap Text: A decision was made to reconstruct the defect utilizing an interpolation axial flap and a staged reconstruction.  A telfa template was made of the defect.  This telfa template was then used to outline the mastoid interpolation flap.  The donor area for the pedicle flap was then injected with anesthesia.  The flap was excised through the skin and subcutaneous tissue down to the layer of the underlying musculature.  The pedicle flap was carefully excised within this deep plane to maintain its blood supply.  The edges of the donor site were undermined.   The donor site was closed in a primary fashion.  The pedicle was then rotated into position and sutured.  Once the tube was sutured into place, adequate blood supply was confirmed with blanching and refill.  The pedicle was then wrapped with xeroform gauze and dressed appropriately with a telfa and gauze bandage to ensure continued blood supply and protect the attached pedicle.
Keystone Flap Text: The defect edges were debeveled with a #15 scalpel blade.  Given the location of the defect, shape of the defect a keystone flap was deemed most appropriate.  Using a sterile surgical marker, an appropriate keystone flap was drawn incorporating the defect, outlining the appropriate donor tissue and placing the expected incisions within the relaxed skin tension lines where possible. The area thus outlined was incised deep to adipose tissue with a #15 scalpel blade.  The skin margins were undermined to an appropriate distance in all directions around the primary defect and laterally outward around the flap utilizing iris scissors.
Complex Repair And A-T Advancement Flap Text: The defect edges were debeveled with a #15 scalpel blade.  The primary defect was closed partially with a complex linear closure.  Given the location of the remaining defect, shape of the defect and the proximity to free margins an A-T advancement flap was deemed most appropriate for complete closure of the defect.  Using a sterile surgical marker, an appropriate advancement flap was drawn incorporating the defect and placing the expected incisions within the relaxed skin tension lines where possible.    The area thus outlined was incised deep to adipose tissue with a #15 scalpel blade.  The skin margins were undermined to an appropriate distance in all directions utilizing iris scissors.
Referring Physician (Optional): JEMIMA Hawthorne
Excision Depth: deep subcutaneous fat
Debridement Text: The wound edges were debrided prior to proceeding with the closure to facilitate wound healing.
Pinch Graft Text: The defect edges were debeveled with a #15 scalpel blade. Given the location of the defect, shape of the defect and the proximity to free margins a pinch graft was deemed most appropriate. Using a sterile surgical marker, the primary defect shape was transferred to the donor site. The area thus outlined was incised deep to adipose tissue with a #15 scalpel blade.  The harvested graft was then trimmed of adipose tissue until only dermis and epidermis was left. The skin margins of the secondary defect were undermined to an appropriate distance in all directions utilizing iris scissors.  The secondary defect was closed with interrupted buried subcutaneous sutures.  The skin edges were then re-apposed with running  sutures.  The skin graft was then placed in the primary defect and oriented appropriately.
Home Suture Removal Text: Patient was provided a home suture removal kit and will remove their sutures at home.  If they have any questions or difficulties they will call the office.
W Plasty Text: The lesion was extirpated to the level of the fat with a #15 scalpel blade.  Given the location of the defect, shape of the defect and the proximity to free margins a W-plasty was deemed most appropriate for repair.  Using a sterile surgical marker, the appropriate transposition arms of the W-plasty were drawn incorporating the defect and placing the expected incisions within the relaxed skin tension lines where possible.    The area thus outlined was incised deep to adipose tissue with a #15 scalpel blade.  The skin margins were undermined to an appropriate distance in all directions utilizing iris scissors.  The opposing transposition arms were then transposed into place in opposite direction and anchored with interrupted buried subcutaneous sutures.
Excisional Biopsy Additional Text (Leave Blank If You Do Not Want): The margin was drawn around the clinically apparent lesion. An elliptical shape was then drawn on the skin incorporating the lesion and margins.  Incisions were then made along these lines to the appropriate tissue plane and the lesion was extirpated.
Complex Repair And Z Plasty Text: The defect edges were debeveled with a #15 scalpel blade.  The primary defect was closed partially with a complex linear closure.  Given the location of the remaining defect, shape of the defect and the proximity to free margins a Z plasty was deemed most appropriate for complete closure of the defect.  Using a sterile surgical marker, an appropriate advancement flap was drawn incorporating the defect and placing the expected incisions within the relaxed skin tension lines where possible.    The area thus outlined was incised deep to adipose tissue with a #15 scalpel blade.  The skin margins were undermined to an appropriate distance in all directions utilizing iris scissors.
Estlander Flap (Upper To Lower Lip) Text: The defect of the lower lip was assessed and measured.  Given the location and size of the defect, an Estlander flap was deemed most appropriate.  Using a sterile surgical marker, an appropriate Estlander flap was measured and drawn on the upper lip. Local anesthesia was then infiltrated. A scalpel was then used to incise the lateral aspect of the flap, through skin, muscle and mucosa, leaving the flap pedicled medially.  The flap was then rotated and positioned to fill the lower lip defect.  The flap was then sutured into place with a three layer technique, closing the orbicularis oris muscle layer with subcutaneous buried sutures, followed by a mucosal layer and an epidermal layer.
Rectangular Flap Text: The defect edges were debeveled with a #15 scalpel blade. Given the location of the defect and the proximity to free margins a rectangular flap was deemed most appropriate. Using a sterile surgical marker, an appropriate rectangular flap was drawn incorporating the defect. The area thus outlined was incised deep to adipose tissue with a #15 scalpel blade. The skin margins were undermined to an appropriate distance in all directions utilizing iris scissors. Following this, the designed flap was carried over into the primary defect and sutured into place.
Complex Repair And Xenograft Text: The defect edges were debeveled with a #15 scalpel blade.  The primary defect was closed partially with a complex linear closure.  Given the location of the defect, shape of the defect and the proximity to free margins a xenograft was deemed most appropriate to repair the remaining defect.  The graft was trimmed to fit the size of the remaining defect.  The graft was then placed in the primary defect, oriented appropriately, and sutured into place.
Advancement Flap (Double) Text: The defect edges were debeveled with a #15 scalpel blade.  Given the location of the defect and the proximity to free margins a double advancement flap was deemed most appropriate.  Using a sterile surgical marker, the appropriate advancement flaps were drawn incorporating the defect and placing the expected incisions within the relaxed skin tension lines where possible.    The area thus outlined was incised deep to adipose tissue with a #15 scalpel blade.  The skin margins were undermined to an appropriate distance in all directions utilizing iris scissors.
Dressing: pressure dressing with telfa
Path Notes (To The Dermatopathologist): Slow mohs . Suture at 12 o'clock - anatomic superior.
Bilateral Helical Rim Advancement Flap Text: The defect edges were debeveled with a #15 blade scalpel.  Given the location of the defect and the proximity to free margins (helical rim) a bilateral helical rim advancement flap was deemed most appropriate.  Using a sterile surgical marker, the appropriate advancement flaps were drawn incorporating the defect and placing the expected incisions between the helical rim and antihelix where possible.  The area thus outlined was incised through and through with a #15 scalpel blade.  With a skin hook and iris scissors, the flaps were gently and sharply undermined and freed up.
Intermediate / Complex Repair - Final Wound Length In Cm: 1.7
Banner Transposition Flap Text: The defect edges were debeveled with a #15 scalpel blade.  Given the location of the defect and the proximity to free margins a Banner transposition flap was deemed most appropriate.  Using a sterile surgical marker, an appropriate flap drawn around the defect. The area thus outlined was incised deep to adipose tissue with a #15 scalpel blade.  The skin margins were undermined to an appropriate distance in all directions utilizing iris scissors.
Complex Repair And V-Y Plasty Text: The defect edges were debeveled with a #15 scalpel blade.  The primary defect was closed partially with a complex linear closure.  Given the location of the remaining defect, shape of the defect and the proximity to free margins a V-Y plasty was deemed most appropriate for complete closure of the defect.  Using a sterile surgical marker, an appropriate advancement flap was drawn incorporating the defect and placing the expected incisions within the relaxed skin tension lines where possible.    The area thus outlined was incised deep to adipose tissue with a #15 scalpel blade.  The skin margins were undermined to an appropriate distance in all directions utilizing iris scissors.
Staged Advancement Flap Text: The defect edges were debeveled with a #15 scalpel blade.  Given the location of the defect, shape of the defect and the proximity to free margins a staged advancement flap was deemed most appropriate.  Using a sterile surgical marker, an appropriate advancement flap was drawn incorporating the defect and placing the expected incisions within the relaxed skin tension lines where possible. The area thus outlined was incised deep to adipose tissue with a #15 scalpel blade.  The skin margins were undermined to an appropriate distance in all directions utilizing iris scissors.
Complex Repair And Split-Thickness Skin Graft Text: The defect edges were debeveled with a #15 scalpel blade.  The primary defect was closed partially with a complex linear closure.  Given the location of the defect, shape of the defect and the proximity to free margins a split thickness skin graft was deemed most appropriate to repair the remaining defect.  The graft was trimmed to fit the size of the remaining defect.  The graft was then placed in the primary defect, oriented appropriately, and sutured into place.
Zygomaticofacial Flap Text: Given the location of the defect, shape of the defect and the proximity to free margins a zygomaticofacial flap was deemed most appropriate for repair.  Using a sterile surgical marker, the appropriate flap was drawn incorporating the defect and placing the expected incisions within the relaxed skin tension lines where possible. The area thus outlined was incised deep to adipose tissue with a #15 scalpel blade with preservation of a vascular pedicle.  The skin margins were undermined to an appropriate distance in all directions utilizing iris scissors.  The flap was then placed into the defect and anchored with interrupted buried subcutaneous sutures.
Composite Graft Text: The defect edges were debeveled with a #15 scalpel blade.  Given the location of the defect, shape of the defect, the proximity to free margins and the fact the defect was full thickness a composite graft was deemed most appropriate.  The defect was outline and then transferred to the donor site.  A full thickness graft was then excised from the donor site. The graft was then placed in the primary defect, oriented appropriately and then sutured into place.  The secondary defect was then repaired using a primary closure.
H Plasty Text: Given the location of the defect, shape of the defect and the proximity to free margins a H-plasty was deemed most appropriate for repair.  Using a sterile surgical marker, the appropriate advancement arms of the H-plasty were drawn incorporating the defect and placing the expected incisions within the relaxed skin tension lines where possible. The area thus outlined was incised deep to adipose tissue with a #15 scalpel blade. The skin margins were undermined to an appropriate distance in all directions utilizing iris scissors.  The opposing advancement arms were then advanced into place in opposite direction and anchored with interrupted buried subcutaneous sutures.
Hatchet Flap Text: The defect edges were debeveled with a #15 scalpel blade.  Given the location of the defect, shape of the defect and the proximity to free margins a hatchet flap was deemed most appropriate.  Using a sterile surgical marker, an appropriate hatchet flap was drawn incorporating the defect and placing the expected incisions within the relaxed skin tension lines where possible.    The area thus outlined was incised deep to adipose tissue with a #15 scalpel blade.  The skin margins were undermined to an appropriate distance in all directions utilizing iris scissors.
Nostril Rim Text: The closure involved the nostril rim.
Dermal Autograft Text: The defect edges were debeveled with a #15 scalpel blade.  Given the location of the defect, shape of the defect and the proximity to free margins a dermal autograft was deemed most appropriate.  Using a sterile surgical marker, the primary defect shape was transferred to the donor site. The area thus outlined was incised deep to adipose tissue with a #15 scalpel blade.  The harvested graft was then trimmed of adipose and epidermal tissue until only dermis was left.  The skin graft was then placed in the primary defect and oriented appropriately.
Abbe Flap (Lower To Upper Lip) Text: The defect of the upper lip was assessed and measured.  Given the location and size of the defect, an Abbe flap was deemed most appropriate.  Using a sterile surgical marker, an appropriate Abbe flap was measured and drawn on the lower lip. Local anesthesia was then infiltrated. A scalpel was then used to incise the upper lip through and through the skin, vermilion, muscle and mucosa, leaving the flap pedicled on the opposite side.  The flap was then rotated and transferred to the lower lip defect.  The flap was then sutured into place with a three layer technique, closing the orbicularis oris muscle layer with subcutaneous buried sutures, followed by a mucosal layer and an epidermal layer.
Undermining Location (Optional): in the superficial subcutaneous fat
Nasal Turnover Hinge Flap Text: The defect edges were debeveled with a #15 scalpel blade.  Given the size, depth, location of the defect and the defect being full thickness a nasal turnover hinge flap was deemed most appropriate.  Using a sterile surgical marker, an appropriate hinge flap was drawn incorporating the defect. The area thus outlined was incised with a #15 scalpel blade. The flap was designed to recreate the nasal mucosal lining and the alar rim. The skin margins were undermined to an appropriate distance in all directions utilizing iris scissors.
Ear Star Wedge Flap Text: The defect edges were debeveled with a #15 blade scalpel.  Given the location of the defect and the proximity to free margins (helical rim) an ear star wedge flap was deemed most appropriate.  Using a sterile surgical marker, the appropriate flap was drawn incorporating the defect and placing the expected incisions between the helical rim and antihelix where possible.  The area thus outlined was incised through and through with a #15 scalpel blade.
Double Z Plasty Text: The lesion was extirpated to the level of the fat with a #15 scalpel blade. Given the location of the defect, shape of the defect and the proximity to free margins a double Z-plasty was deemed most appropriate for repair. Using a sterile surgical marker, the appropriate transposition arms of the double Z-plasty were drawn incorporating the defect and placing the expected incisions within the relaxed skin tension lines where possible. The area thus outlined was incised deep to adipose tissue with a #15 scalpel blade. The skin margins were undermined to an appropriate distance in all directions utilizing iris scissors. The opposing transposition arms were then transposed and carried over into place in opposite direction and anchored with interrupted buried subcutaneous sutures.
O-Z Plasty Text: The defect edges were debeveled with a #15 scalpel blade.  Given the location of the defect, shape of the defect and the proximity to free margins an O-Z plasty (double transposition flap) was deemed most appropriate.  Using a sterile surgical marker, the appropriate transposition flaps were drawn incorporating the defect and placing the expected incisions within the relaxed skin tension lines where possible.    The area thus outlined was incised deep to adipose tissue with a #15 scalpel blade.  The skin margins were undermined to an appropriate distance in all directions utilizing iris scissors.  Hemostasis was achieved with electrocautery.  The flaps were then transposed into place, one clockwise and the other counterclockwise, and anchored with interrupted buried subcutaneous sutures.
X Size Of Lesion In Cm (Optional): 2.3
Detail Level: Detailed
Complex Repair And Modified Advancement Flap Text: The defect edges were debeveled with a #15 scalpel blade.  The primary defect was closed partially with a complex linear closure.  Given the location of the remaining defect, shape of the defect and the proximity to free margins a modified advancement flap was deemed most appropriate for complete closure of the defect.  Using a sterile surgical marker, an appropriate advancement flap was drawn incorporating the defect and placing the expected incisions within the relaxed skin tension lines where possible.    The area thus outlined was incised deep to adipose tissue with a #15 scalpel blade.  The skin margins were undermined to an appropriate distance in all directions utilizing iris scissors.
Bilobed Transposition Flap Text: The defect edges were debeveled with a #15 scalpel blade.  Given the location of the defect and the proximity to free margins a bilobed transposition flap was deemed most appropriate.  Using a sterile surgical marker, an appropriate bilobe flap drawn around the defect.    The area thus outlined was incised deep to adipose tissue with a #15 scalpel blade.  The skin margins were undermined to an appropriate distance in all directions utilizing iris scissors.
Adjacent Tissue Transfer Text: The defect edges were debeveled with a #15 scalpel blade.  Given the location of the defect and the proximity to free margins an adjacent tissue transfer was deemed most appropriate.  Using a sterile surgical marker, an appropriate flap was drawn incorporating the defect and placing the expected incisions within the relaxed skin tension lines where possible.    The area thus outlined was incised deep to adipose tissue with a #15 scalpel blade.  The skin margins were undermined to an appropriate distance in all directions utilizing iris scissors.
Island Pedicle Flap Text: The defect edges were debeveled with a #15 scalpel blade.  Given the location of the defect, shape of the defect and the proximity to free margins an island pedicle advancement flap was deemed most appropriate.  Using a sterile surgical marker, an appropriate advancement flap was drawn incorporating the defect, outlining the appropriate donor tissue and placing the expected incisions within the relaxed skin tension lines where possible.    The area thus outlined was incised deep to adipose tissue with a #15 scalpel blade.  The skin margins were undermined to an appropriate distance in all directions around the primary defect and laterally outward around the island pedicle utilizing iris scissors.  There was minimal undermining beneath the pedicle flap.
Complex Repair And O-L Flap Text: The defect edges were debeveled with a #15 scalpel blade.  The primary defect was closed partially with a complex linear closure.  Given the location of the remaining defect, shape of the defect and the proximity to free margins an O-L flap was deemed most appropriate for complete closure of the defect.  Using a sterile surgical marker, an appropriate flap was drawn incorporating the defect and placing the expected incisions within the relaxed skin tension lines where possible.    The area thus outlined was incised deep to adipose tissue with a #15 scalpel blade.  The skin margins were undermined to an appropriate distance in all directions utilizing iris scissors.
Rhomboid Transposition Flap Text: The defect edges were debeveled with a #15 scalpel blade.  Given the location of the defect and the proximity to free margins a rhomboid transposition flap was deemed most appropriate.  Using a sterile surgical marker, an appropriate rhomboid flap was drawn incorporating the defect.    The area thus outlined was incised deep to adipose tissue with a #15 scalpel blade.  The skin margins were undermined to an appropriate distance in all directions utilizing iris scissors.
Double O-Z Flap Text: The defect edges were debeveled with a #15 scalpel blade.  Given the location of the defect, shape of the defect and the proximity to free margins a Double O-Z flap was deemed most appropriate.  Using a sterile surgical marker, an appropriate transposition flap was drawn incorporating the defect and placing the expected incisions within the relaxed skin tension lines where possible. The area thus outlined was incised deep to adipose tissue with a #15 scalpel blade.  The skin margins were undermined to an appropriate distance in all directions utilizing iris scissors.
Epidermal Sutures: 4-0 Prolene
Lab: [id number]
Island Pedicle Flap With Canthal Suspension Text: The defect edges were debeveled with a #15 scalpel blade.  Given the location of the defect, shape of the defect and the proximity to free margins an island pedicle advancement flap was deemed most appropriate.  Using a sterile surgical marker, an appropriate advancement flap was drawn incorporating the defect, outlining the appropriate donor tissue and placing the expected incisions within the relaxed skin tension lines where possible. The area thus outlined was incised deep to adipose tissue with a #15 scalpel blade.  The skin margins were undermined to an appropriate distance in all directions around the primary defect and laterally outward around the island pedicle utilizing iris scissors.  There was minimal undermining beneath the pedicle flap. A suspension suture was placed in the canthal tendon to prevent tension and prevent ectropion.
Bi-Rhombic Flap Text: The defect edges were debeveled with a #15 scalpel blade.  Given the location of the defect and the proximity to free margins a bi-rhombic flap was deemed most appropriate.  Using a sterile surgical marker, an appropriate rhombic flap was drawn incorporating the defect. The area thus outlined was incised deep to adipose tissue with a #15 scalpel blade.  The skin margins were undermined to an appropriate distance in all directions utilizing iris scissors.
Skin Substitute Text: The defect edges were debeveled with a #15 scalpel blade.  Given the location of the defect, shape of the defect and the proximity to free margins a skin substitute graft was deemed most appropriate.  The graft material was trimmed to fit the size of the defect. The graft was then placed in the primary defect and oriented appropriately.
Complex Repair And Dermal Autograft Text: The defect edges were debeveled with a #15 scalpel blade.  The primary defect was closed partially with a complex linear closure.  Given the location of the defect, shape of the defect and the proximity to free margins an dermal autograft was deemed most appropriate to repair the remaining defect.  The graft was trimmed to fit the size of the remaining defect.  The graft was then placed in the primary defect, oriented appropriately, and sutured into place.
Number Of Hemigard Strips Per Side: 1
Tissue Cultured Epidermal Autograft Text: The defect edges were debeveled with a #15 scalpel blade.  Given the location of the defect, shape of the defect and the proximity to free margins a tissue cultured epidermal autograft was deemed most appropriate.  The graft was then trimmed to fit the size of the defect.  The graft was then placed in the primary defect and oriented appropriately.
A-T Advancement Flap Text: The defect edges were debeveled with a #15 scalpel blade.  Given the location of the defect, shape of the defect and the proximity to free margins an A-T advancement flap was deemed most appropriate.  Using a sterile surgical marker, an appropriate advancement flap was drawn incorporating the defect and placing the expected incisions within the relaxed skin tension lines where possible.    The area thus outlined was incised deep to adipose tissue with a #15 scalpel blade.  The skin margins were undermined to an appropriate distance in all directions utilizing iris scissors.
Complex Repair And Double M Plasty Text: The defect edges were debeveled with a #15 scalpel blade.  The primary defect was closed partially with a complex linear closure.  Given the location of the remaining defect, shape of the defect and the proximity to free margins a double M plasty was deemed most appropriate for complete closure of the defect.  Using a sterile surgical marker, an appropriate advancement flap was drawn incorporating the defect and placing the expected incisions within the relaxed skin tension lines where possible.    The area thus outlined was incised deep to adipose tissue with a #15 scalpel blade.  The skin margins were undermined to an appropriate distance in all directions utilizing iris scissors.
Transposition Flap Text: The defect edges were debeveled with a #15 scalpel blade.  Given the location of the defect and the proximity to free margins a transposition flap was deemed most appropriate.  Using a sterile surgical marker, an appropriate transposition flap was drawn incorporating the defect.    The area thus outlined was incised deep to adipose tissue with a #15 scalpel blade.  The skin margins were undermined to an appropriate distance in all directions utilizing iris scissors.
Anesthesia Volume In Cc: 6
Partial Purse String (Simple) Text: Given the location of the defect and the characteristics of the surrounding skin a simple purse string closure was deemed most appropriate.  Undermining was performed circumferentially around the surgical defect.  A purse string suture was then placed and tightened. Wound tension of the circular defect prevented complete closure of the wound.
Size Of Lesion In Cm: 2.4
Paramedian Forehead Flap Text: A decision was made to reconstruct the defect utilizing an interpolation axial flap and a staged reconstruction.  A telfa template was made of the defect.  This telfa template was then used to outline the paramedian forehead pedicle flap.  The donor area for the pedicle flap was then injected with anesthesia.  The flap was excised through the skin and subcutaneous tissue down to the layer of the underlying musculature.  The pedicle flap was carefully excised within this deep plane to maintain its blood supply.  The edges of the donor site were undermined.   The donor site was closed in a primary fashion.  The pedicle was then rotated into position and sutured.  Once the tube was sutured into place, adequate blood supply was confirmed with blanching and refill.  The pedicle was then wrapped with xeroform gauze and dressed appropriately with a telfa and gauze bandage to ensure continued blood supply and protect the attached pedicle.
Anesthesia Type: 1% lidocaine with epinephrine and a 1:10 solution of 8.4% sodium bicarbonate
Nasalis-Muscle-Based Myocutaneous Island Pedicle Flap Text: Using a #15 blade, an incision was made around the donor flap to the level of the nasalis muscle. Wide lateral undermining was then performed in both the subcutaneous plane above the nasalis muscle, and in a submuscular plane just above periosteum. This allowed the formation of a free nasalis muscle axial pedicle (based on the angular artery) which was still attached to the actual cutaneous flap, increasing its mobility and vascular viability. Hemostasis was obtained with pinpoint electrocoagulation. The flap was mobilized into position and the pivotal anchor points positioned and stabilized with buried interrupted sutures. Subcutaneous and dermal tissues were closed in a multilayered fashion with sutures. Tissue redundancies were excised, and the epidermal edges were apposed without significant tension and sutured with sutures.
Nasolabial Transposition Flap Text: The defect edges were debeveled with a #15 scalpel blade.  Given the size, depth and location of the defect and the proximity to free margins a nasolabial transposition flap was deemed most appropriate. Using a sterile surgical marker, an appropriate flap was drawn incorporating the defect. The area thus outlined was incised with a #15 scalpel blade. The skin margins were undermined to an appropriate distance in all directions utilizing iris scissors. Following this, the designed flap was carried into the primary defect and sutured into place.
Elliptical Excision Additional Text (Leave Blank If You Do Not Want): The margin was drawn around the clinically apparent lesion.  An elliptical shape was then drawn on the skin incorporating the lesion and margins.  Incisions were then made along these lines to the appropriate tissue plane and the lesion was extirpated.
Double Island Pedicle Flap Text: The defect edges were debeveled with a #15 scalpel blade.  Given the location of the defect, shape of the defect and the proximity to free margins a double island pedicle advancement flap was deemed most appropriate.  Using a sterile surgical marker, an appropriate advancement flap was drawn incorporating the defect, outlining the appropriate donor tissue and placing the expected incisions within the relaxed skin tension lines where possible.    The area thus outlined was incised deep to adipose tissue with a #15 scalpel blade.  The skin margins were undermined to an appropriate distance in all directions around the primary defect and laterally outward around the island pedicle utilizing iris scissors.  There was minimal undermining beneath the pedicle flap.
Perilesional Excision Additional Text (Leave Blank If You Do Not Want): The margin was drawn around the clinically apparent lesion. Incisions were then made along these lines to the appropriate tissue plane and the lesion was extirpated.
Complex Repair And Rhombic Flap Text: The defect edges were debeveled with a #15 scalpel blade.  The primary defect was closed partially with a complex linear closure.  Given the location of the remaining defect, shape of the defect and the proximity to free margins a rhombic flap was deemed most appropriate for complete closure of the defect.  Using a sterile surgical marker, an appropriate advancement flap was drawn incorporating the defect and placing the expected incisions within the relaxed skin tension lines where possible.    The area thus outlined was incised deep to adipose tissue with a #15 scalpel blade.  The skin margins were undermined to an appropriate distance in all directions utilizing iris scissors.
Rotation Flap Text: The defect edges were debeveled with a #15 scalpel blade.  Given the location of the defect, shape of the defect and the proximity to free margins a rotation flap was deemed most appropriate.  Using a sterile surgical marker, an appropriate rotation flap was drawn incorporating the defect and placing the expected incisions within the relaxed skin tension lines where possible.    The area thus outlined was incised deep to adipose tissue with a #15 scalpel blade.  The skin margins were undermined to an appropriate distance in all directions utilizing iris scissors.
Burow's Advancement Flap Text: The defect edges were debeveled with a #15 scalpel blade.  Given the location of the defect and the proximity to free margins a Burow's advancement flap was deemed most appropriate.  Using a sterile surgical marker, the appropriate advancement flap was drawn incorporating the defect and placing the expected incisions within the relaxed skin tension lines where possible.    The area thus outlined was incised deep to adipose tissue with a #15 scalpel blade.  The skin margins were undermined to an appropriate distance in all directions utilizing iris scissors.
Repair Type: None (Simple)
V-Y Plasty Text: The defect edges were debeveled with a #15 scalpel blade.  Given the location of the defect, shape of the defect and the proximity to free margins an V-Y advancement flap was deemed most appropriate.  Using a sterile surgical marker, an appropriate advancement flap was drawn incorporating the defect and placing the expected incisions within the relaxed skin tension lines where possible.    The area thus outlined was incised deep to adipose tissue with a #15 scalpel blade.  The skin margins were undermined to an appropriate distance in all directions utilizing iris scissors.
Advancement-Rotation Flap Text: The defect edges were debeveled with a #15 scalpel blade.  Given the location of the defect, shape of the defect and the proximity to free margins an advancement-rotation flap was deemed most appropriate.  Using a sterile surgical marker, an appropriate flap was drawn incorporating the defect and placing the expected incisions within the relaxed skin tension lines where possible. The area thus outlined was incised deep to adipose tissue with a #15 scalpel blade.  The skin margins were undermined to an appropriate distance in all directions utilizing iris scissors.
O-T Plasty Text: The defect edges were debeveled with a #15 scalpel blade.  Given the location of the defect, shape of the defect and the proximity to free margins an O-T plasty was deemed most appropriate.  Using a sterile surgical marker, an appropriate O-T plasty was drawn incorporating the defect and placing the expected incisions within the relaxed skin tension lines where possible.    The area thus outlined was incised deep to adipose tissue with a #15 scalpel blade.  The skin margins were undermined to an appropriate distance in all directions utilizing iris scissors.
Complex Repair And Bilobe Flap Text: The defect edges were debeveled with a #15 scalpel blade.  The primary defect was closed partially with a complex linear closure.  Given the location of the remaining defect, shape of the defect and the proximity to free margins a bilobe flap was deemed most appropriate for complete closure of the defect.  Using a sterile surgical marker, an appropriate advancement flap was drawn incorporating the defect and placing the expected incisions within the relaxed skin tension lines where possible.    The area thus outlined was incised deep to adipose tissue with a #15 scalpel blade.  The skin margins were undermined to an appropriate distance in all directions utilizing iris scissors.
Distance Of Undermining In Cm (Required): 3.5
Helical Rim Advancement Flap Text: The defect edges were debeveled with a #15 blade scalpel.  Given the location of the defect and the proximity to free margins (helical rim) a double helical rim advancement flap was deemed most appropriate.  Using a sterile surgical marker, the appropriate advancement flaps were drawn incorporating the defect and placing the expected incisions between the helical rim and antihelix where possible.  The area thus outlined was incised through and through with a #15 scalpel blade.  With a skin hook and iris scissors, the flaps were gently and sharply undermined and freed up.
Repair Performed By Another Provider Text (Leave Blank If You Do Not Want): After the tissue was excised the defect was repaired by another provider.
Saucerization Excision Additional Text (Leave Blank If You Do Not Want): The margin was drawn around the clinically apparent lesion.  Incisions were then made along these lines, in a tangential fashion, to the appropriate tissue plane and the lesion was extirpated.
Complex Repair And Tissue Cultured Epidermal Autograft Text: The defect edges were debeveled with a #15 scalpel blade.  The primary defect was closed partially with a complex linear closure.  Given the location of the defect, shape of the defect and the proximity to free margins an tissue cultured epidermal autograft was deemed most appropriate to repair the remaining defect.  The graft was trimmed to fit the size of the remaining defect.  The graft was then placed in the primary defect, oriented appropriately, and sutured into place.
Helical Rim Text: The closure involved the helical rim.
Slit Excision Additional Text (Leave Blank If You Do Not Want): A linear line was drawn on the skin overlying the lesion. An incision was made slowly until the lesion was visualized.  Once visualized, the lesion was removed with blunt dissection.
Complex Repair And Single Advancement Flap Text: The defect edges were debeveled with a #15 scalpel blade.  The primary defect was closed partially with a complex linear closure.  Given the location of the remaining defect, shape of the defect and the proximity to free margins a single advancement flap was deemed most appropriate for complete closure of the defect.  Using a sterile surgical marker, an appropriate advancement flap was drawn incorporating the defect and placing the expected incisions within the relaxed skin tension lines where possible.    The area thus outlined was incised deep to adipose tissue with a #15 scalpel blade.  The skin margins were undermined to an appropriate distance in all directions utilizing iris scissors.
Crescentic Advancement Flap Text: The defect edges were debeveled with a #15 scalpel blade.  Given the location of the defect and the proximity to free margins a crescentic advancement flap was deemed most appropriate.  Using a sterile surgical marker, the appropriate advancement flap was drawn incorporating the defect and placing the expected incisions within the relaxed skin tension lines where possible.    The area thus outlined was incised deep to adipose tissue with a #15 scalpel blade.  The skin margins were undermined to an appropriate distance in all directions utilizing iris scissors.
Xenograft Text: The defect edges were debeveled with a #15 scalpel blade.  Given the location of the defect, shape of the defect and the proximity to free margins a xenograft was deemed most appropriate.  The graft was then trimmed to fit the size of the defect.  The graft was then placed in the primary defect and oriented appropriately.
Post-Care Instructions: I reviewed with the patient in detail post-care instructions. Patient is not to engage in any heavy lifting, exercise, or swimming for the next 14 days. Should the patient develop any fevers, chills, bleeding, severe pain patient will contact the office immediately.
Consent was obtained from the patient. The risks and benefits to therapy were discussed in detail. Specifically, the risks of infection, scarring, bleeding, prolonged wound healing, incomplete removal, allergy to anesthesia, nerve injury and recurrence were addressed. Prior to the procedure, the treatment site was clearly identified and confirmed by the patient. All components of Universal Protocol/PAUSE Rule completed.
Lip Wedge Excision Repair Text: Given the location of the defect and the proximity to free margins a full thickness wedge repair was deemed most appropriate.  Using a sterile surgical marker, the appropriate repair was drawn incorporating the defect and placing the expected incisions perpendicular to the vermilion border.  The vermilion border was also meticulously outlined to ensure appropriate reapproximation during the repair.  The area thus outlined was incised through and through with a #15 scalpel blade.  The muscularis and dermis were reaproximated with deep sutures following hemostasis. Care was taken to realign the vermilion border before proceeding with the superficial closure.  Once the vermilion was realigned the superfical and mucosal closure was finished.
O-Z Flap Text: The defect edges were debeveled with a #15 scalpel blade.  Given the location of the defect, shape of the defect and the proximity to free margins an O-Z flap was deemed most appropriate.  Using a sterile surgical marker, an appropriate transposition flap was drawn incorporating the defect and placing the expected incisions within the relaxed skin tension lines where possible. The area thus outlined was incised deep to adipose tissue with a #15 scalpel blade.  The skin margins were undermined to an appropriate distance in all directions utilizing iris scissors.
Complex Repair And Melolabial Flap Text: The defect edges were debeveled with a #15 scalpel blade.  The primary defect was closed partially with a complex linear closure.  Given the location of the remaining defect, shape of the defect and the proximity to free margins a melolabial flap was deemed most appropriate for complete closure of the defect.  Using a sterile surgical marker, an appropriate advancement flap was drawn incorporating the defect and placing the expected incisions within the relaxed skin tension lines where possible.    The area thus outlined was incised deep to adipose tissue with a #15 scalpel blade.  The skin margins were undermined to an appropriate distance in all directions utilizing iris scissors.
Mustarde Flap Text: The defect edges were debeveled with a #15 scalpel blade.  Given the size, depth and location of the defect and the proximity to free margins a Mustarde flap was deemed most appropriate.  Using a sterile surgical marker, an appropriate flap was drawn incorporating the defect. The area thus outlined was incised with a #15 scalpel blade.  The skin margins were undermined to an appropriate distance in all directions utilizing iris scissors.
Positioning (Leave Blank If You Do Not Want): The patient was placed in a comfortable position exposing the surgical site.
Chonodrocutaneous Helical Advancement Flap Text: The defect edges were debeveled with a #15 scalpel blade.  Given the location of the defect and the proximity to free margins a chondrocutaneous helical advancement flap was deemed most appropriate.  Using a sterile surgical marker, the appropriate advancement flap was drawn incorporating the defect and placing the expected incisions within the relaxed skin tension lines where possible.    The area thus outlined was incised deep to adipose tissue with a #15 scalpel blade.  The skin margins were undermined to an appropriate distance in all directions utilizing iris scissors.
Trilobed Flap Text: The defect edges were debeveled with a #15 scalpel blade.  Given the location of the defect and the proximity to free margins a trilobed flap was deemed most appropriate.  Using a sterile surgical marker, an appropriate trilobed flap drawn around the defect.    The area thus outlined was incised deep to adipose tissue with a #15 scalpel blade.  The skin margins were undermined to an appropriate distance in all directions utilizing iris scissors.
O-T Advancement Flap Text: The defect edges were debeveled with a #15 scalpel blade.  Given the location of the defect, shape of the defect and the proximity to free margins an O-T advancement flap was deemed most appropriate.  Using a sterile surgical marker, an appropriate advancement flap was drawn incorporating the defect and placing the expected incisions within the relaxed skin tension lines where possible.    The area thus outlined was incised deep to adipose tissue with a #15 scalpel blade.  The skin margins were undermined to an appropriate distance in all directions utilizing iris scissors.
Retention Suture Text: Retention sutures were placed to support the closure and prevent dehiscence.
Bilateral Rotation Flap Text: The defect edges were debeveled with a #15 scalpel blade. Given the location of the defect, shape of the defect and the proximity to free margins a bilateral rotation flap was deemed most appropriate. Using a sterile surgical marker, an appropriate rotation flap was drawn incorporating the defect and placing the expected incisions within the relaxed skin tension lines where possible. The area thus outlined was incised deep to adipose tissue with a #15 scalpel blade. The skin margins were undermined to an appropriate distance in all directions utilizing iris scissors. Following this, the designed flap was carried over into the primary defect and sutured into place.
Complex Repair And Dorsal Nasal Flap Text: The defect edges were debeveled with a #15 scalpel blade.  The primary defect was closed partially with a complex linear closure.  Given the location of the remaining defect, shape of the defect and the proximity to free margins a dorsal nasal flap was deemed most appropriate for complete closure of the defect.  Using a sterile surgical marker, an appropriate flap was drawn incorporating the defect and placing the expected incisions within the relaxed skin tension lines where possible.    The area thus outlined was incised deep to adipose tissue with a #15 scalpel blade.  The skin margins were undermined to an appropriate distance in all directions utilizing iris scissors.
Surgeon Performing The Repair (Optional): Main Acuña MD
Suturegard Intro: Intraoperative tissue expansion was performed, utilizing the SUTUREGARD device, in order to reduce wound tension.
Burow's Graft Text: The defect edges were debeveled with a #15 scalpel blade.  Given the location of the defect, shape of the defect, the proximity to free margins and the presence of a standing cone deformity a Burow's skin graft was deemed most appropriate. The standing cone was removed and this tissue was then trimmed to the shape of the primary defect. The adipose tissue was also removed until only dermis and epidermis were left.  The skin margins of the secondary defect were undermined to an appropriate distance in all directions utilizing iris scissors.  The secondary defect was closed with interrupted buried subcutaneous sutures.  The skin edges were then re-apposed with running  sutures.  The skin graft was then placed in the primary defect and oriented appropriately.
Advancement Flap (Single) Text: The defect edges were debeveled with a #15 scalpel blade.  Given the location of the defect and the proximity to free margins a single advancement flap was deemed most appropriate.  Using a sterile surgical marker, an appropriate advancement flap was drawn incorporating the defect and placing the expected incisions within the relaxed skin tension lines where possible.    The area thus outlined was incised deep to adipose tissue with a #15 scalpel blade.  The skin margins were undermined to an appropriate distance in all directions utilizing iris scissors.
Cheek-To-Nose Interpolation Flap Text: A decision was made to reconstruct the defect utilizing an interpolation axial flap and a staged reconstruction.  A telfa template was made of the defect.  This telfa template was then used to outline the Cheek-To-Nose Interpolation flap.  The donor area for the pedicle flap was then injected with anesthesia.  The flap was excised through the skin and subcutaneous tissue down to the layer of the underlying musculature.  The interpolation flap was carefully excised within this deep plane to maintain its blood supply.  The edges of the donor site were undermined.   The donor site was closed in a primary fashion.  The pedicle was then rotated into position and sutured.  Once the tube was sutured into place, adequate blood supply was confirmed with blanching and refill.  The pedicle was then wrapped with xeroform gauze and dressed appropriately with a telfa and gauze bandage to ensure continued blood supply and protect the attached pedicle.
Mercedes Flap Text: The defect edges were debeveled with a #15 scalpel blade.  Given the location of the defect, shape of the defect and the proximity to free margins a Mercedes flap was deemed most appropriate.  Using a sterile surgical marker, an appropriate advancement flap was drawn incorporating the defect and placing the expected incisions within the relaxed skin tension lines where possible. The area thus outlined was incised deep to adipose tissue with a #15 scalpel blade.  The skin margins were undermined to an appropriate distance in all directions utilizing iris scissors.
Previous Accession (Optional): [id number]
Ftsg Text: The defect edges were debeveled with a #15 scalpel blade.  Given the location of the defect, shape of the defect and the proximity to free margins a full thickness skin graft was deemed most appropriate.  Using a sterile surgical marker, the primary defect shape was transferred to the donor site. The area thus outlined was incised deep to adipose tissue with a #15 scalpel blade.  The harvested graft was then trimmed of adipose tissue until only dermis and epidermis was left.  The skin margins of the secondary defect were undermined to an appropriate distance in all directions utilizing iris scissors.  The secondary defect was closed with interrupted buried subcutaneous sutures.  The skin edges were then re-apposed with running  sutures.  The skin graft was then placed in the primary defect and oriented appropriately.
Wound Check: 14 days
Modified Advancement Flap Text: The defect edges were debeveled with a #15 scalpel blade.  Given the location of the defect, shape of the defect and the proximity to free margins a modified advancement flap was deemed most appropriate.  Using a sterile surgical marker, an appropriate advancement flap was drawn incorporating the defect and placing the expected incisions within the relaxed skin tension lines where possible.    The area thus outlined was incised deep to adipose tissue with a #15 scalpel blade.  The skin margins were undermined to an appropriate distance in all directions utilizing iris scissors.
Z Plasty Text: The lesion was extirpated to the level of the fat with a #15 scalpel blade.  Given the location of the defect, shape of the defect and the proximity to free margins a Z-plasty was deemed most appropriate for repair.  Using a sterile surgical marker, the appropriate transposition arms of the Z-plasty were drawn incorporating the defect and placing the expected incisions within the relaxed skin tension lines where possible.    The area thus outlined was incised deep to adipose tissue with a #15 scalpel blade.  The skin margins were undermined to an appropriate distance in all directions utilizing iris scissors.  The opposing transposition arms were then transposed into place in opposite direction and anchored with interrupted buried subcutaneous sutures.
Eye Clamp Note Details: An eye clamp was used during the procedure.
Width Of Defect Perpendicular To Closure In Cm (Required): 3.3
Nasalis Myocutaneous Flap Text: Using a #15 blade, an incision was made around the donor flap to the level of the nasalis muscle. Wide lateral undermining was then performed in both the subcutaneous plane above the nasalis muscle, and in a submuscular plane just above periosteum. This allowed the formation of a free nasalis muscle axial pedicle which was still attached to the actual cutaneous flap, increasing its mobility and vascular viability. Hemostasis was obtained with pinpoint electrocoagulation. The flap was mobilized into position and the pivotal anchor points positioned and stabilized with buried interrupted sutures. Subcutaneous and dermal tissues were closed in a multilayered fashion with sutures. Tissue redundancies were excised, and the epidermal edges were apposed without significant tension and sutured with sutures.
Undermining Type: One Wound Edge
Dorsal Nasal Flap Text: The defect edges were debeveled with a #15 scalpel blade.  Given the location of the defect and the proximity to free margins a dorsal nasal flap was deemed most appropriate.  Using a sterile surgical marker, an appropriate dorsal nasal flap was drawn around the defect.    The area thus outlined was incised deep to adipose tissue with a #15 scalpel blade.  The skin margins were undermined to an appropriate distance in all directions utilizing iris scissors.
Orbicularis Oris Muscle Flap Text: The defect edges were debeveled with a #15 scalpel blade.  Given that the defect affected the competency of the oral sphincter an orbicularis oris muscle flap was deemed most appropriate to restore this competency and normal muscle function.  Using a sterile surgical marker, an appropriate flap was drawn incorporating the defect. The area thus outlined was incised with a #15 scalpel blade.
Cartilage Graft Text: The defect edges were debeveled with a #15 scalpel blade.  Given the location of the defect, shape of the defect, the fact the defect involved a full thickness cartilage defect a cartilage graft was deemed most appropriate.  An appropriate donor site was identified, cleansed, and anesthetized. The cartilage graft was then harvested and transferred to the recipient site, oriented appropriately and then sutured into place.  The secondary defect was then repaired using a primary closure.
Saucerization Depth: dermis
Purse String (Intermediate) Text: Given the location of the defect and the characteristics of the surrounding skin a purse string intermediate closure was deemed most appropriate.  Undermining was performed circumfirentially around the surgical defect.  A purse string suture was then placed and tightened.
Complex Repair And Double Advancement Flap Text: The defect edges were debeveled with a #15 scalpel blade.  The primary defect was closed partially with a complex linear closure.  Given the location of the remaining defect, shape of the defect and the proximity to free margins a double advancement flap was deemed most appropriate for complete closure of the defect.  Using a sterile surgical marker, an appropriate advancement flap was drawn incorporating the defect and placing the expected incisions within the relaxed skin tension lines where possible.    The area thus outlined was incised deep to adipose tissue with a #15 scalpel blade.  The skin margins were undermined to an appropriate distance in all directions utilizing iris scissors.
O-L Flap Text: The defect edges were debeveled with a #15 scalpel blade.  Given the location of the defect, shape of the defect and the proximity to free margins an O-L flap was deemed most appropriate.  Using a sterile surgical marker, an appropriate advancement flap was drawn incorporating the defect and placing the expected incisions within the relaxed skin tension lines where possible.    The area thus outlined was incised deep to adipose tissue with a #15 scalpel blade.  The skin margins were undermined to an appropriate distance in all directions utilizing iris scissors.
Epidermal Autograft Text: The defect edges were debeveled with a #15 scalpel blade.  Given the location of the defect, shape of the defect and the proximity to free margins an epidermal autograft was deemed most appropriate.  Using a sterile surgical marker, the primary defect shape was transferred to the donor site. The epidermal graft was then harvested.  The skin graft was then placed in the primary defect and oriented appropriately.
Retention Suture Bite Size: 1 mm
Suture Removal: 7 days
Size Of Margin In Cm: 0.5
Double O-Z Plasty Text: The defect edges were debeveled with a #15 scalpel blade.  Given the location of the defect, shape of the defect and the proximity to free margins a Double O-Z plasty (double transposition flap) was deemed most appropriate.  Using a sterile surgical marker, the appropriate transposition flaps were drawn incorporating the defect and placing the expected incisions within the relaxed skin tension lines where possible. The area thus outlined was incised deep to adipose tissue with a #15 scalpel blade.  The skin margins were undermined to an appropriate distance in all directions utilizing iris scissors.  Hemostasis was achieved with electrocautery.  The flaps were then transposed into place, one clockwise and the other counterclockwise, and anchored with interrupted buried subcutaneous sutures.
Hemigard Postcare Instructions: The HEMIGARD strips are to remain completely dry for at least 5-7 days.
Pre-Excision Curettage Text (Leave Blank If You Do Not Want): Prior to drawing the surgical margin the visible lesion was removed with electrodesiccation and curettage to clearly define the lesion size.
Information: Selecting Yes will display possible errors in your note based on the variables you have selected. This validation is only offered as a suggestion for you. PLEASE NOTE THAT THE VALIDATION TEXT WILL BE REMOVED WHEN YOU FINALIZE YOUR NOTE. IF YOU WANT TO FAX A PRELIMINARY NOTE YOU WILL NEED TO TOGGLE THIS TO 'NO' IF YOU DO NOT WANT IT IN YOUR FAXED NOTE.
Graft Donor Site Bandage (Optional-Leave Blank If You Don't Want In Note): Steri-strips and a pressure bandage were applied to the donor site.
Complex Repair And O-T Advancement Flap Text: The defect edges were debeveled with a #15 scalpel blade.  The primary defect was closed partially with a complex linear closure.  Given the location of the remaining defect, shape of the defect and the proximity to free margins an O-T advancement flap was deemed most appropriate for complete closure of the defect.  Using a sterile surgical marker, an appropriate advancement flap was drawn incorporating the defect and placing the expected incisions within the relaxed skin tension lines where possible.    The area thus outlined was incised deep to adipose tissue with a #15 scalpel blade.  The skin margins were undermined to an appropriate distance in all directions utilizing iris scissors.
Melolabial Interpolation Flap Text: A decision was made to reconstruct the defect utilizing an interpolation axial flap and a staged reconstruction.  A telfa template was made of the defect.  This telfa template was then used to outline the melolabial interpolation flap.  The donor area for the pedicle flap was then injected with anesthesia.  The flap was excised through the skin and subcutaneous tissue down to the layer of the underlying musculature.  The pedicle flap was carefully excised within this deep plane to maintain its blood supply.  The edges of the donor site were undermined.   The donor site was closed in a primary fashion.  The pedicle was then rotated into position and sutured.  Once the tube was sutured into place, adequate blood supply was confirmed with blanching and refill.  The pedicle was then wrapped with xeroform gauze and dressed appropriately with a telfa and gauze bandage to ensure continued blood supply and protect the attached pedicle.
Complex Repair And Rotation Flap Text: The defect edges were debeveled with a #15 scalpel blade.  The primary defect was closed partially with a complex linear closure.  Given the location of the remaining defect, shape of the defect and the proximity to free margins a rotation flap was deemed most appropriate for complete closure of the defect.  Using a sterile surgical marker, an appropriate advancement flap was drawn incorporating the defect and placing the expected incisions within the relaxed skin tension lines where possible.    The area thus outlined was incised deep to adipose tissue with a #15 scalpel blade.  The skin margins were undermined to an appropriate distance in all directions utilizing iris scissors.
Partial Purse String (Intermediate) Text: Given the location of the defect and the characteristics of the surrounding skin an intermediate purse string closure was deemed most appropriate.  Undermining was performed circumferentially around the surgical defect.  A purse string suture was then placed and tightened. Wound tension of the circular defect prevented complete closure of the wound.
V-Y Flap Text: The defect edges were debeveled with a #15 scalpel blade.  Given the location of the defect, shape of the defect and the proximity to free margins a V-Y flap was deemed most appropriate.  Using a sterile surgical marker, an appropriate advancement flap was drawn incorporating the defect and placing the expected incisions within the relaxed skin tension lines where possible.    The area thus outlined was incised deep to adipose tissue with a #15 scalpel blade.  The skin margins were undermined to an appropriate distance in all directions utilizing iris scissors.
Repair Depth: use same depth as excision depth
Complex Repair And Epidermal Autograft Text: The defect edges were debeveled with a #15 scalpel blade.  The primary defect was closed partially with a complex linear closure.  Given the location of the defect, shape of the defect and the proximity to free margins an epidermal autograft was deemed most appropriate to repair the remaining defect.  The graft was trimmed to fit the size of the remaining defect.  The graft was then placed in the primary defect, oriented appropriately, and sutured into place.
Mucosal Advancement Flap Text: Given the location of the defect, shape of the defect and the proximity to free margins a mucosal advancement flap was deemed most appropriate. Incisions were made with a 15 blade scalpel in the appropriate fashion along the cutaneous vermilion border and the mucosal lip. The remaining actinically damaged mucosal tissue was excised.  The mucosal advancement flap was then elevated to the gingival sulcus with care taken to preserve the neurovascular structures and advanced into the primary defect. Care was taken to ensure that precise realignment of the vermilion border was achieved.
Hemostasis: Electrocautery
Posterior Auricular Interpolation Flap Text: A decision was made to reconstruct the defect utilizing an interpolation axial flap and a staged reconstruction.  A telfa template was made of the defect.  This telfa template was then used to outline the posterior auricular interpolation flap.  The donor area for the pedicle flap was then injected with anesthesia.  The flap was excised through the skin and subcutaneous tissue down to the layer of the underlying musculature.  The pedicle flap was carefully excised within this deep plane to maintain its blood supply.  The edges of the donor site were undermined.   The donor site was closed in a primary fashion.  The pedicle was then rotated into position and sutured.  Once the tube was sutured into place, adequate blood supply was confirmed with blanching and refill.  The pedicle was then wrapped with xeroform gauze and dressed appropriately with a telfa and gauze bandage to ensure continued blood supply and protect the attached pedicle.
Flip-Flop Flap Text: The defect edges were debeveled with a #15 blade scalpel.  Given the location of the defect and the proximity to free margins a flip-flop flap was deemed most appropriate. Using a sterile surgical marker, the appropriate flap was drawn incorporating the defect and placing the expected incisions between the helical rim and antihelix where possible.  The area thus outlined was incised through and through with a #15 scalpel blade. Following this, the designed flap was carried over into the primary defect and sutured into place.
Why Was The Change Made?: Please Select the Appropriate Response

## 2024-12-12 ENCOUNTER — APPOINTMENT (OUTPATIENT)
Dept: URBAN - METROPOLITAN AREA CLINIC 36 | Facility: CLINIC | Age: 79
Setting detail: DERMATOLOGY
End: 2024-12-12

## 2024-12-12 DIAGNOSIS — Z48.817 ENCOUNTER FOR SURGICAL AFTERCARE FOLLOWING SURGERY ON THE SKIN AND SUBCUTANEOUS TISSUE: ICD-10-CM

## 2024-12-12 PROCEDURE — ? POST-OP WOUND CHECK

## 2024-12-12 ASSESSMENT — LOCATION ZONE DERM: LOCATION ZONE: FACE

## 2024-12-12 ASSESSMENT — LOCATION SIMPLE DESCRIPTION DERM: LOCATION SIMPLE: LEFT FOREHEAD

## 2024-12-12 ASSESSMENT — LOCATION DETAILED DESCRIPTION DERM: LOCATION DETAILED: LEFT LATERAL FOREHEAD

## 2024-12-12 NOTE — PROCEDURE: MIPS QUALITY
Detail Level: Detailed
Quality 130: Documentation Of Current Medications In The Medical Record: Current Medications Documented
Quality 47: Advance Care Plan: Advance Care Planning discussed and documented; advance care plan or surrogate decision maker documented in the medical record.
Quality 226: Preventive Care And Screening: Tobacco Use: Screening And Cessation Intervention: Patient screened for tobacco use and is an ex/non-smoker
Quality 137: Melanoma: Continuity Of Care - Recall System: Patient information entered into a recall system that includes: target date for the next exam specified AND a process to follow up with patients regarding missed or unscheduled appointments

## 2024-12-12 NOTE — PROCEDURE: POST-OP WOUND CHECK
Detail Level: Simple
Add 16940 Cpt? (Important Note: In 2017 The Use Of 36319 Is Being Tracked By Cms To Determine Future Global Period Reimbursement For Global Periods): no
Wound Evaluated By: Domenico MATTA
Additional Comments: Discussed delayed FTSG; pt declines and would like to granulate, which I agree is a very reasonable option.\\nHealing well with good granulation tissue at base of central portion of wound\\nSutured ends healing well; will remove sutures next week\\nPuraCol placed today & pt will start daily dressing changes with Vaseline/bandage daily starting in 2 days\\nFollow up in 1 week, sooner PRN

## 2024-12-18 ENCOUNTER — OFFICE VISIT (OUTPATIENT)
Dept: CARDIOLOGY | Facility: MEDICAL CENTER | Age: 79
End: 2024-12-18
Attending: INTERNAL MEDICINE
Payer: MEDICARE

## 2024-12-18 VITALS
BODY MASS INDEX: 34.06 KG/M2 | RESPIRATION RATE: 16 BRPM | SYSTOLIC BLOOD PRESSURE: 132 MMHG | WEIGHT: 217 LBS | HEIGHT: 67 IN | OXYGEN SATURATION: 96 % | DIASTOLIC BLOOD PRESSURE: 68 MMHG | HEART RATE: 66 BPM

## 2024-12-18 DIAGNOSIS — I25.5 ISCHEMIC CARDIOMYOPATHY: ICD-10-CM

## 2024-12-18 DIAGNOSIS — I25.10 CORONARY ARTERY DISEASE INVOLVING NATIVE CORONARY ARTERY OF NATIVE HEART WITHOUT ANGINA PECTORIS: ICD-10-CM

## 2024-12-18 DIAGNOSIS — I10 ESSENTIAL HYPERTENSION, BENIGN: ICD-10-CM

## 2024-12-18 DIAGNOSIS — Z95.5 STENTED CORONARY ARTERY: ICD-10-CM

## 2024-12-18 DIAGNOSIS — I48.0 PAROXYSMAL ATRIAL FIBRILLATION (HCC): ICD-10-CM

## 2024-12-18 DIAGNOSIS — G47.33 OSA (OBSTRUCTIVE SLEEP APNEA): ICD-10-CM

## 2024-12-18 PROCEDURE — 3075F SYST BP GE 130 - 139MM HG: CPT | Performed by: INTERNAL MEDICINE

## 2024-12-18 PROCEDURE — 99214 OFFICE O/P EST MOD 30 MIN: CPT | Performed by: INTERNAL MEDICINE

## 2024-12-18 PROCEDURE — 99212 OFFICE O/P EST SF 10 MIN: CPT | Performed by: INTERNAL MEDICINE

## 2024-12-18 PROCEDURE — 3078F DIAST BP <80 MM HG: CPT | Performed by: INTERNAL MEDICINE

## 2024-12-18 RX ORDER — LOSARTAN POTASSIUM AND HYDROCHLOROTHIAZIDE 25; 100 MG/1; MG/1
1 TABLET ORAL DAILY
Qty: 100 TABLET | Refills: 3 | Status: SHIPPED | OUTPATIENT
Start: 2024-12-18

## 2024-12-18 ASSESSMENT — FIBROSIS 4 INDEX: FIB4 SCORE: 1.61

## 2024-12-18 NOTE — PROGRESS NOTES
"CARDIOLOGY OUTPATIENT FOLLOWUP    PCP: Deisy Loja P.A.-C.    1. Essential hypertension, benign    2. RUPAL (obstructive sleep apnea)    3. Stented coronary artery    4. Paroxysmal atrial fibrillation (HCC)    5. Ischemic cardiomyopathy    6. Coronary artery disease involving native coronary artery of native heart without angina pectoris        Shamar Morgan is newly uncontrolled blood pressure which may be attributable to excess salt consumption excess weight and resumption of alcohol intake.  We discussed the potential utility of lifestyle measures though he believes pharmacologic therapy may be more realistic at this time.  I replace losartan with Hyzaar and will have him continue on the other medications.  We again discussed long-term anticoagulation following successful A-fib ablation.  I encouraged him to remain with low-dose Xarelto.    Follow up: as needed -he will be following up within the [facility].    History: Shamar Morgan is a 79 y.o. male with inferior MI in 2017, PCI of the RCA, paroxysmal A-fib with ablation presenting for follow-up.  LVEF is preserved though there are inferior wall motion abnormalities and a Q wave on the ECG.    He feels well.  He has noticed increasing blood pressure into the 130s.  Has been consuming more alcohol lately and putting lots of salt on his food.      Physical Exam:  /68 (BP Location: Left arm, Patient Position: Sitting, BP Cuff Size: Adult)   Pulse 66   Resp 16   Ht 1.702 m (5' 7\")   Wt 98.4 kg (217 lb)   SpO2 96%   BMI 33.99 kg/m²   GEN: NAD  RESP: CTAB  CVS: RRR, No M/R/G  ABD: Soft, NT/ND  EXT: WWP, no edema         The ASCVD Risk score (Wadsworth DK, et al., 2019) failed to calculate.    Studies  Lab Results   Component Value Date/Time    CHOLSTRLTOT 110 02/16/2024 08:14 AM    LDL 59 02/16/2024 08:14 AM    HDL 39 (A) 02/16/2024 08:14 AM    TRIGLYCERIDE 61 02/16/2024 08:14 AM       Lab Results   Component Value Date/Time    SODIUM " 139 02/16/2024 08:14 AM    POTASSIUM 4.8 02/16/2024 08:14 AM    CHLORIDE 104 02/16/2024 08:14 AM    CO2 25 02/16/2024 08:14 AM    GLUCOSE 105 (H) 02/16/2024 08:14 AM    BUN 24 (H) 02/16/2024 08:14 AM    CREATININE 0.92 02/16/2024 08:14 AM      Lab Results   Component Value Date/Time    PROTHROMBTM 15.1 (H) 11/12/2020 01:39 PM    INR 1.15 (H) 11/12/2020 01:39 PM      Lab Results   Component Value Date/Time    WBC 7.0 02/16/2024 08:14 AM    RBC 5.52 02/16/2024 08:14 AM    HEMOGLOBIN 16.9 02/16/2024 08:14 AM    HEMATOCRIT 50.9 02/16/2024 08:14 AM    MCV 92.2 02/16/2024 08:14 AM    MCH 30.6 02/16/2024 08:14 AM    MCHC 33.2 02/16/2024 08:14 AM    MPV 9.9 02/16/2024 08:14 AM    NEUTSPOLYS 53.60 11/12/2020 01:39 PM    LYMPHOCYTES 33.90 11/12/2020 01:39 PM    MONOCYTES 10.50 11/12/2020 01:39 PM    EOSINOPHILS 1.40 11/12/2020 01:39 PM    BASOPHILS 0.30 11/12/2020 01:39 PM        Past Medical History:   Diagnosis Date    Arrhythmia 09/02/2017    Atrial Fib    Blood clotting disorder (HCC) 09/2017    heart    CAD (coronary artery disease) 4/16/2012    Dental disorder     upper and lower dentures, full    Hemorrhagic disorder (HCC)     on Plavix and Xarelto    High cholesterol     HTN (hypertension) 4/16/2012    Hyperglycemia 4/16/2012    Hyperlipemia 4/16/2012    Hypertension     Liver enzyme elevation     history of    Myocardial infarct (HCC) 09/02/2017    Dr. Mcallister, cardiologist    Obesity     Sleep apnea     BIPAP    Tachycardia 4/16/2012    Tonsillitis     Urinary incontinence      Allergies   Allergen Reactions    Penicillins Swelling    Sulfa Drugs Swelling    Brilinta [Ticagrelor] Rash and Itching     .    Lipidil [Fenofibrate]      Myalgia     Outpatient Encounter Medications as of 12/18/2024   Medication Sig Dispense Refill    losartan-hydrochlorothiazide (HYZAAR) 100-25 MG per tablet Take 1 Tablet by mouth every day. 100 Tablet 3    tadalafil 20 MG tablet Take 1 Tablet by mouth as needed for Erectile Dysfunction. 30  Tablet 0    atorvastatin (LIPITOR) 20 MG Tab Take 1 tablet by mouth once daily 100 Tablet 3    XARELTO 10 MG Tab tablet TAKE 1 TABLET BY MOUTH ONCE DAILY WITH SUPPER 90 Tablet 3    metoprolol SR (TOPROL XL) 25 MG TABLET SR 24 HR Take 1/2 (one-half) tablet by mouth once daily 50 Tablet 3    vitamin D (CHOLECALCIFEROL) 1000 UNIT TABS Take 1 Tablet by mouth every day.      [DISCONTINUED] losartan (COZAAR) 100 MG Tab Take 1 Tablet by mouth every day. 100 Tablet 2     No facility-administered encounter medications on file as of 2024.     Social History     Socioeconomic History    Marital status:      Spouse name: Not on file    Number of children: Not on file    Years of education: Not on file    Highest education level: 12th grade   Occupational History    Not on file   Tobacco Use    Smoking status: Former     Current packs/day: 0.00     Average packs/day: 3.0 packs/day for 16.0 years (48.0 ttl pk-yrs)     Types: Cigarettes     Start date: 1964     Quit date: 1980     Years since quittin.9    Smokeless tobacco: Never   Vaping Use    Vaping status: Never Used   Substance and Sexual Activity    Alcohol use: Not Currently     Comment: 2 per day    Drug use: No    Sexual activity: Yes     Partners: Female   Other Topics Concern    Not on file   Social History Narrative    Not on file     Social Drivers of Health     Financial Resource Strain: Low Risk  (10/28/2020)    Overall Financial Resource Strain (CARDIA)     Difficulty of Paying Living Expenses: Not hard at all   Food Insecurity: No Food Insecurity (10/28/2020)    Hunger Vital Sign     Worried About Running Out of Food in the Last Year: Never true     Ran Out of Food in the Last Year: Never true   Transportation Needs: No Transportation Needs (10/28/2020)    PRAPARE - Transportation     Lack of Transportation (Medical): No     Lack of Transportation (Non-Medical): No   Physical Activity: Unknown (10/28/2020)    Exercise Vital Sign     Days  of Exercise per Week: 0 days     Minutes of Exercise per Session: Not on file   Stress: Stress Concern Present (10/28/2020)    [employer] - Occupational Stress Questionnaire     Feeling of Stress : To some extent   Social Connections: Socially Isolated (10/28/2020)    Social Connection and Isolation Panel [NHANES]     Frequency of Communication with Friends and Family: Once a week     Frequency of Social Gatherings with Friends and Family: Once a week     Attends Bahai Services: Never     Active Member of Clubs or Organizations: No     Attends Club or Organization Meetings: Never     Marital Status:    Intimate Partner Violence: Not on file   Housing Stability: Low Risk  (10/28/2020)    Housing Stability Vital Sign     Unable to Pay for Housing in the Last Year: No     Number of Places Lived in the Last Year: 1     Unstable Housing in the Last Year: No         ROS:   10 point review systems is otherwise negative except as per the HPI    Chief Complaint   Patient presents with    Coronary Artery Disease     Coronary artery disease involving native coronary artery of native heart without angina pectoris    Follow-Up     1 year follow up

## 2024-12-20 ENCOUNTER — APPOINTMENT (OUTPATIENT)
Dept: URBAN - METROPOLITAN AREA CLINIC 36 | Facility: CLINIC | Age: 79
Setting detail: DERMATOLOGY
End: 2024-12-20

## 2024-12-20 DIAGNOSIS — Z48.817 ENCOUNTER FOR SURGICAL AFTERCARE FOLLOWING SURGERY ON THE SKIN AND SUBCUTANEOUS TISSUE: ICD-10-CM

## 2024-12-20 PROCEDURE — ? POST-OP WOUND CHECK (NO GLOBAL PERIOD)

## 2024-12-20 PROCEDURE — 99212 OFFICE O/P EST SF 10 MIN: CPT

## 2024-12-20 ASSESSMENT — LOCATION SIMPLE DESCRIPTION DERM: LOCATION SIMPLE: LEFT FOREHEAD

## 2024-12-20 ASSESSMENT — LOCATION DETAILED DESCRIPTION DERM: LOCATION DETAILED: LEFT SUPERIOR FOREHEAD

## 2024-12-20 ASSESSMENT — LOCATION ZONE DERM: LOCATION ZONE: FACE

## 2024-12-20 NOTE — PROCEDURE: POST-OP WOUND CHECK (NO GLOBAL PERIOD)
Additional Comments: Sutures removed\\nWound cleaned\\nContinue vaseline/telfa\\nWound check in 2 weeks - will continue granulation vs delayed graft pending healing

## 2024-12-23 ENCOUNTER — HOSPITAL ENCOUNTER (OUTPATIENT)
Facility: MEDICAL CENTER | Age: 79
End: 2024-12-23
Attending: INTERNAL MEDICINE
Payer: MEDICARE

## 2024-12-23 ENCOUNTER — NON-PROVIDER VISIT (OUTPATIENT)
Dept: CARDIOLOGY | Facility: MEDICAL CENTER | Age: 79
End: 2024-12-23
Attending: INTERNAL MEDICINE
Payer: MEDICARE

## 2024-12-23 ENCOUNTER — TELEPHONE (OUTPATIENT)
Dept: CARDIOLOGY | Facility: MEDICAL CENTER | Age: 79
End: 2024-12-23
Payer: MEDICARE

## 2024-12-23 DIAGNOSIS — I10 ESSENTIAL HYPERTENSION, BENIGN: ICD-10-CM

## 2024-12-23 DIAGNOSIS — I48.0 PAF (PAROXYSMAL ATRIAL FIBRILLATION) (HCC): ICD-10-CM

## 2024-12-23 LAB
ALBUMIN SERPL BCP-MCNC: 4 G/DL (ref 3.2–4.9)
ALBUMIN/GLOB SERPL: 1.4 G/DL
ALP SERPL-CCNC: 78 U/L (ref 30–99)
ALT SERPL-CCNC: 45 U/L (ref 2–50)
ANION GAP SERPL CALC-SCNC: 9 MMOL/L (ref 7–16)
AST SERPL-CCNC: 30 U/L (ref 12–45)
BILIRUB SERPL-MCNC: 0.6 MG/DL (ref 0.1–1.5)
BUN SERPL-MCNC: 15 MG/DL (ref 8–22)
CALCIUM ALBUM COR SERPL-MCNC: 10.3 MG/DL (ref 8.5–10.5)
CALCIUM SERPL-MCNC: 10.3 MG/DL (ref 8.5–10.5)
CHLORIDE SERPL-SCNC: 105 MMOL/L (ref 96–112)
CHOLEST SERPL-MCNC: 127 MG/DL (ref 100–199)
CO2 SERPL-SCNC: 27 MMOL/L (ref 20–33)
CREAT SERPL-MCNC: 0.98 MG/DL (ref 0.5–1.4)
ERYTHROCYTE [DISTWIDTH] IN BLOOD BY AUTOMATED COUNT: 44.9 FL (ref 35.9–50)
GFR SERPLBLD CREATININE-BSD FMLA CKD-EPI: 78 ML/MIN/1.73 M 2
GLOBULIN SER CALC-MCNC: 2.9 G/DL (ref 1.9–3.5)
GLUCOSE SERPL-MCNC: 106 MG/DL (ref 65–99)
HCT VFR BLD AUTO: 47.5 % (ref 42–52)
HDLC SERPL-MCNC: 53 MG/DL
HGB BLD-MCNC: 16 G/DL (ref 14–18)
LDLC SERPL CALC-MCNC: 64 MG/DL
MCH RBC QN AUTO: 30.9 PG (ref 27–33)
MCHC RBC AUTO-ENTMCNC: 33.7 G/DL (ref 32.3–36.5)
MCV RBC AUTO: 91.9 FL (ref 81.4–97.8)
PLATELET # BLD AUTO: 224 K/UL (ref 164–446)
PMV BLD AUTO: 9.7 FL (ref 9–12.9)
POTASSIUM SERPL-SCNC: 4.6 MMOL/L (ref 3.6–5.5)
PROT SERPL-MCNC: 6.9 G/DL (ref 6–8.2)
RBC # BLD AUTO: 5.17 M/UL (ref 4.7–6.1)
SODIUM SERPL-SCNC: 141 MMOL/L (ref 135–145)
TRIGL SERPL-MCNC: 50 MG/DL (ref 0–149)
WBC # BLD AUTO: 6.9 K/UL (ref 4.8–10.8)

## 2024-12-23 PROCEDURE — 80053 COMPREHEN METABOLIC PANEL: CPT

## 2024-12-23 PROCEDURE — 80061 LIPID PANEL: CPT

## 2024-12-23 PROCEDURE — 85027 COMPLETE CBC AUTOMATED: CPT

## 2024-12-23 PROCEDURE — 36415 COLL VENOUS BLD VENIPUNCTURE: CPT

## 2024-12-23 NOTE — PROGRESS NOTES
Home enrollment completed in the 14 day Zio Holter monitor per ISELA Hawkins.   Monitor will be shipped to patient via [employer].  Pending EOS.

## 2024-12-23 NOTE — TELEPHONE ENCOUNTER
Phone Number Called: [phone]    Call outcome: Spoke to patient regarding message below.    Message: Called to inform patient of 's recommendations. Pt reports he has not had an increase in bleeding when he had increased his xarelto before. Pt reports he did have increased bruising and that his skin is very fragile which makes him bleed easier. Pt denies epistaxis or hematuria when xarelto was increased.     Scheduled pt for today for mail out monitor today. Ok'd by Fernanda.     Xarelto updated. 90 day supply given.     Pt reports he will be changing insurances the beginning of the year and will have to establish with a new cardiologist.

## 2024-12-23 NOTE — TELEPHONE ENCOUNTER
BE    Caller: Shamar Morgan     Topic/issue: Pt called in regards to wanting to go over his afib and would like a call back, please advise.     Callback Number: [phone] (home)       Thank you,     Rodolfo MORENO

## 2024-12-23 NOTE — TELEPHONE ENCOUNTER
Phone Number Called: [phone]    Call outcome: Spoke to patient regarding message below.    Message: Called pt back to discuss afib. Pt reports he went to check his BP on Saturday and his HR was in the 180's. Pt asymptomatic. Pt reports yesterday his HR was in the 140's and still asymptomatic. He reports today his HR is normal. He would like to know if he should increase his xarelto.     Offered pt to come in for non provider EKG. Pt reports he has a kardia device at home but is not sure how to send in the readings.     Advised ER precautions if HR remains above 140 for over an hour. Pt verbalized understanding.     To LH as care team (Be OOO), pt would like to know if he should increase his xarelto? Currently on low dose. ~thank you

## 2024-12-23 NOTE — TELEPHONE ENCOUNTER
Agree with ER precautions. Can increase xarelto to 20 if he is concerned he is in afib again. It doesn't look like he ever had bleeding issues with the higher dose but please confirm with him prior to increasing xarelto to 20 mg daily. Also recommend that we do a 2 week zio to confirm that he is back in afib.

## 2024-12-24 ENCOUNTER — TELEPHONE (OUTPATIENT)
Dept: MEDICAL GROUP | Facility: PHYSICIAN GROUP | Age: 79
End: 2024-12-24
Payer: MEDICARE

## 2024-12-24 DIAGNOSIS — I25.10 CORONARY ARTERY DISEASE INVOLVING NATIVE CORONARY ARTERY OF NATIVE HEART WITHOUT ANGINA PECTORIS: ICD-10-CM

## 2024-12-24 DIAGNOSIS — I48.0 PAROXYSMAL ATRIAL FIBRILLATION (HCC): ICD-10-CM

## 2024-12-24 NOTE — TELEPHONE ENCOUNTER
Caller Name: Shamar Morgan    Call Back Number: [phone] (home)       How would the patient prefer to be contacted with a response: Phone call do NOT leave a detailed message    Needs a new Referral for Dr. Rodarte with [facility]  his insurance changed.

## 2024-12-30 RX ORDER — FLUOROURACIL 5 MG/G
CREAM TOPICAL BID
Qty: 40 | Refills: 0 | Status: ACTIVE

## 2025-01-14 ENCOUNTER — TELEPHONE (OUTPATIENT)
Dept: CARDIOLOGY | Facility: MEDICAL CENTER | Age: 80
End: 2025-01-14

## 2025-01-14 ENCOUNTER — APPOINTMENT (OUTPATIENT)
Dept: URBAN - METROPOLITAN AREA CLINIC 15 | Facility: CLINIC | Age: 80
Setting detail: DERMATOLOGY
End: 2025-01-14

## 2025-01-14 DIAGNOSIS — Z48.817 ENCOUNTER FOR SURGICAL AFTERCARE FOLLOWING SURGERY ON THE SKIN AND SUBCUTANEOUS TISSUE: ICD-10-CM

## 2025-01-14 DIAGNOSIS — L24.4 IRRITANT CONTACT DERMATITIS DUE TO DRUGS IN CONTACT WITH SKIN: ICD-10-CM

## 2025-01-14 DIAGNOSIS — I48.0 PAF (PAROXYSMAL ATRIAL FIBRILLATION) (HCC): ICD-10-CM

## 2025-01-14 PROCEDURE — ? COUNSELING

## 2025-01-14 PROCEDURE — 99213 OFFICE O/P EST LOW 20 MIN: CPT

## 2025-01-14 PROCEDURE — ? DIAGNOSIS COMMENT

## 2025-01-14 PROCEDURE — ? POST-OP WOUND CHECK (NO GLOBAL PERIOD)

## 2025-01-14 ASSESSMENT — LOCATION DETAILED DESCRIPTION DERM: LOCATION DETAILED: LEFT FOREHEAD

## 2025-01-14 ASSESSMENT — SEVERITY ASSESSMENT 2020: SEVERITY 2020: MODERATE

## 2025-01-14 ASSESSMENT — LOCATION ZONE DERM: LOCATION ZONE: FACE

## 2025-01-14 ASSESSMENT — BSA RASH: BSA RASH: 4

## 2025-01-14 ASSESSMENT — LOCATION SIMPLE DESCRIPTION DERM: LOCATION SIMPLE: LEFT FOREHEAD

## 2025-01-14 NOTE — TELEPHONE ENCOUNTER
----- Message from Nurse Practitioner SHEBLIE Hawkins sent at 1/14/2025  3:04 PM PST -----  No A-fib noted on cardiac event monitor, symptoms were with PACs (benign extra beats)

## 2025-01-14 NOTE — TELEPHONE ENCOUNTER
CHUYITA EOS to 's nurse, Annabella, on 1/14/2025    Preliminary findings:    783 episodes of SVT  with an avg rate of 152 bpm    Sinus Rhythm  with an avg rate of 75 bpm    Supraventricular ectopics were rare    Ventricular ectopics were rare with no noted triplets    Ventricular bigeminy and trigeminy were noted    2 patient events were recorded, corresponding to:  SR 63-97  SVE(s)

## 2025-01-14 NOTE — PROCEDURE: DIAGNOSIS COMMENT
Render Risk Assessment In Note?: no
Comment: Having strong reaction to 5FU; he held for 3 days and is starting to heal. \\nOriay to hold today and resume treatment to complete full course as prescribed by his general dermatologist\\nEducated on expected rash with 5FU and provided patient handout\\nHas follow up booked with general derm next month
Detail Level: Zone
Comment: Slow Mohs site healed beautifully; no skin graft needed

## 2025-01-14 NOTE — TELEPHONE ENCOUNTER
EOS noted in media dated today.      To BE: Please see EOS and read for final results. Thank you!

## 2025-02-21 ENCOUNTER — APPOINTMENT (OUTPATIENT)
Dept: URBAN - METROPOLITAN AREA CLINIC 20 | Facility: CLINIC | Age: 80
Setting detail: DERMATOLOGY
End: 2025-02-21

## 2025-02-21 DIAGNOSIS — D485 NEOPLASM OF UNCERTAIN BEHAVIOR OF SKIN: ICD-10-CM

## 2025-02-21 DIAGNOSIS — D18.0 HEMANGIOMA: ICD-10-CM

## 2025-02-21 DIAGNOSIS — L81.4 OTHER MELANIN HYPERPIGMENTATION: ICD-10-CM

## 2025-02-21 DIAGNOSIS — Z86.006 PERSONAL HISTORY OF MELANOMA IN-SITU: ICD-10-CM

## 2025-02-21 DIAGNOSIS — Z71.89 OTHER SPECIFIED COUNSELING: ICD-10-CM

## 2025-02-21 DIAGNOSIS — L57.0 ACTINIC KERATOSIS: ICD-10-CM

## 2025-02-21 DIAGNOSIS — L82.1 OTHER SEBORRHEIC KERATOSIS: ICD-10-CM

## 2025-02-21 DIAGNOSIS — D22 MELANOCYTIC NEVI: ICD-10-CM

## 2025-02-21 PROBLEM — D22.5 MELANOCYTIC NEVI OF TRUNK: Status: ACTIVE | Noted: 2025-02-21

## 2025-02-21 PROBLEM — D22.62 MELANOCYTIC NEVI OF LEFT UPPER LIMB, INCLUDING SHOULDER: Status: ACTIVE | Noted: 2025-02-21

## 2025-02-21 PROBLEM — D18.01 HEMANGIOMA OF SKIN AND SUBCUTANEOUS TISSUE: Status: ACTIVE | Noted: 2025-02-21

## 2025-02-21 PROBLEM — D22.61 MELANOCYTIC NEVI OF RIGHT UPPER LIMB, INCLUDING SHOULDER: Status: ACTIVE | Noted: 2025-02-21

## 2025-02-21 PROBLEM — D48.5 NEOPLASM OF UNCERTAIN BEHAVIOR OF SKIN: Status: ACTIVE | Noted: 2025-02-21

## 2025-02-21 PROCEDURE — 99213 OFFICE O/P EST LOW 20 MIN: CPT | Mod: 25

## 2025-02-21 PROCEDURE — ? LIQUID NITROGEN

## 2025-02-21 PROCEDURE — ? COUNSELING

## 2025-02-21 PROCEDURE — 11102 TANGNTL BX SKIN SINGLE LES: CPT

## 2025-02-21 PROCEDURE — 17000 DESTRUCT PREMALG LESION: CPT | Mod: 59

## 2025-02-21 PROCEDURE — 17003 DESTRUCT PREMALG LES 2-14: CPT

## 2025-02-21 PROCEDURE — ? SUNSCREEN RECOMMENDATIONS

## 2025-02-21 PROCEDURE — ? BIOPSY BY SHAVE METHOD

## 2025-02-21 ASSESSMENT — LOCATION ZONE DERM
LOCATION ZONE: SCALP
LOCATION ZONE: FACE
LOCATION ZONE: HAND
LOCATION ZONE: TRUNK
LOCATION ZONE: ARM

## 2025-02-21 ASSESSMENT — LOCATION DETAILED DESCRIPTION DERM
LOCATION DETAILED: RIGHT VENTRAL DISTAL FOREARM
LOCATION DETAILED: RIGHT ANTERIOR DISTAL UPPER ARM
LOCATION DETAILED: LEFT RADIAL DORSAL HAND
LOCATION DETAILED: LEFT CENTRAL MALAR CHEEK
LOCATION DETAILED: RIGHT PROXIMAL DORSAL FOREARM
LOCATION DETAILED: LEFT SUPERIOR PREAURICULAR CHEEK
LOCATION DETAILED: LEFT FOREHEAD
LOCATION DETAILED: RIGHT RADIAL DORSAL HAND
LOCATION DETAILED: RIGHT MEDIAL UPPER BACK
LOCATION DETAILED: SUPERIOR THORACIC SPINE
LOCATION DETAILED: INFERIOR THORACIC SPINE
LOCATION DETAILED: LEFT CENTRAL PARIETAL SCALP
LOCATION DETAILED: RIGHT INFERIOR MEDIAL FOREHEAD
LOCATION DETAILED: RIGHT INFERIOR UPPER BACK
LOCATION DETAILED: LEFT PROXIMAL DORSAL FOREARM
LOCATION DETAILED: LEFT VENTRAL PROXIMAL FOREARM

## 2025-02-21 ASSESSMENT — LOCATION SIMPLE DESCRIPTION DERM
LOCATION SIMPLE: LEFT CHEEK
LOCATION SIMPLE: UPPER BACK
LOCATION SIMPLE: SCALP
LOCATION SIMPLE: LEFT FOREARM
LOCATION SIMPLE: RIGHT HAND
LOCATION SIMPLE: LEFT FOREHEAD
LOCATION SIMPLE: RIGHT UPPER ARM
LOCATION SIMPLE: LEFT HAND
LOCATION SIMPLE: RIGHT FOREHEAD
LOCATION SIMPLE: RIGHT UPPER BACK
LOCATION SIMPLE: RIGHT FOREARM

## 2025-02-21 NOTE — PROCEDURE: LIQUID NITROGEN
Consent: The patient's consent was obtained including but not limited to risks of crusting, scabbing, blistering, scarring, darker or lighter pigmentary change, recurrence, incomplete removal and infection. RTC in 2 months if lesion(s) persistent.
Show Aperture Variable?: Yes
Number Of Freeze-Thaw Cycles: 2 freeze-thaw cycles
Duration Of Freeze Thaw-Cycle (Seconds): 10
Post-Care Instructions: I reviewed with the patient in detail post-care instructions. Patient is to wear sunprotection, and avoid picking at any of the treated lesions. Pt may apply Vaseline to crusted or scabbing areas.
Detail Level: Detailed
Render Note In Bullet Format When Appropriate: No

## 2025-02-21 NOTE — PROCEDURE: COUNSELING
Quality 137: Melanoma: Continuity Of Care - Recall System: Patient information entered into a recall system that includes: target date for the next exam specified AND a process to follow up with patients regarding missed or unscheduled appointments
When Should The Patient Follow-Up For Their Next Full-Body Skin Exam?: 3 Months
Detail Level: Detailed
Detail Level: Zone
No

## 2025-05-30 ENCOUNTER — APPOINTMENT (OUTPATIENT)
Dept: URBAN - METROPOLITAN AREA CLINIC 4 | Facility: CLINIC | Age: 80
Setting detail: DERMATOLOGY
End: 2025-05-30

## 2025-05-30 DIAGNOSIS — Z86.006 PERSONAL HISTORY OF MELANOMA IN-SITU: ICD-10-CM

## 2025-05-30 DIAGNOSIS — D18.0 HEMANGIOMA: ICD-10-CM

## 2025-05-30 DIAGNOSIS — L57.0 ACTINIC KERATOSIS: ICD-10-CM

## 2025-05-30 DIAGNOSIS — D22 MELANOCYTIC NEVI: ICD-10-CM

## 2025-05-30 DIAGNOSIS — Z71.89 OTHER SPECIFIED COUNSELING: ICD-10-CM

## 2025-05-30 DIAGNOSIS — L82.1 OTHER SEBORRHEIC KERATOSIS: ICD-10-CM

## 2025-05-30 DIAGNOSIS — L81.4 OTHER MELANIN HYPERPIGMENTATION: ICD-10-CM

## 2025-05-30 PROBLEM — D18.01 HEMANGIOMA OF SKIN AND SUBCUTANEOUS TISSUE: Status: ACTIVE | Noted: 2025-05-30

## 2025-05-30 PROBLEM — D22.62 MELANOCYTIC NEVI OF LEFT UPPER LIMB, INCLUDING SHOULDER: Status: ACTIVE | Noted: 2025-05-30

## 2025-05-30 PROBLEM — D22.61 MELANOCYTIC NEVI OF RIGHT UPPER LIMB, INCLUDING SHOULDER: Status: ACTIVE | Noted: 2025-05-30

## 2025-05-30 PROBLEM — D22.5 MELANOCYTIC NEVI OF TRUNK: Status: ACTIVE | Noted: 2025-05-30

## 2025-05-30 PROCEDURE — ? SUNSCREEN RECOMMENDATIONS

## 2025-05-30 PROCEDURE — ? OBSERVATION

## 2025-05-30 PROCEDURE — ? COUNSELING

## 2025-05-30 PROCEDURE — ? LIQUID NITROGEN

## 2025-05-30 ASSESSMENT — LOCATION SIMPLE DESCRIPTION DERM
LOCATION SIMPLE: RIGHT HAND
LOCATION SIMPLE: RIGHT OCCIPITAL SCALP
LOCATION SIMPLE: LEFT FOREARM
LOCATION SIMPLE: LEFT EAR
LOCATION SIMPLE: RIGHT FOREARM
LOCATION SIMPLE: RIGHT ZYGOMA
LOCATION SIMPLE: LEFT HAND
LOCATION SIMPLE: UPPER BACK
LOCATION SIMPLE: RIGHT FOREHEAD
LOCATION SIMPLE: SCALP
LOCATION SIMPLE: RIGHT UPPER BACK
LOCATION SIMPLE: POSTERIOR NECK
LOCATION SIMPLE: LEFT CHEEK
LOCATION SIMPLE: LEFT FOREHEAD
LOCATION SIMPLE: POSTERIOR SCALP

## 2025-05-30 ASSESSMENT — LOCATION DETAILED DESCRIPTION DERM
LOCATION DETAILED: SUPERIOR THORACIC SPINE
LOCATION DETAILED: LEFT LATERAL FOREHEAD
LOCATION DETAILED: RIGHT SUPERIOR OCCIPITAL SCALP
LOCATION DETAILED: LEFT RADIAL DORSAL HAND
LOCATION DETAILED: RIGHT PROXIMAL DORSAL FOREARM
LOCATION DETAILED: RIGHT SUPERIOR POSTERIOR NECK
LOCATION DETAILED: LEFT PROXIMAL DORSAL FOREARM
LOCATION DETAILED: RIGHT INFERIOR UPPER BACK
LOCATION DETAILED: RIGHT CENTRAL ZYGOMA
LOCATION DETAILED: RIGHT MEDIAL UPPER BACK
LOCATION DETAILED: LEFT INFERIOR CRUS OF ANTIHELIX
LOCATION DETAILED: RIGHT VENTRAL LATERAL PROXIMAL FOREARM
LOCATION DETAILED: LEFT DISTAL DORSAL FOREARM
LOCATION DETAILED: LEFT SCAPHA
LOCATION DETAILED: LEFT FOREHEAD
LOCATION DETAILED: RIGHT RADIAL DORSAL HAND
LOCATION DETAILED: LEFT VENTRAL PROXIMAL FOREARM
LOCATION DETAILED: INFERIOR THORACIC SPINE
LOCATION DETAILED: LEFT CENTRAL MANDIBULAR CHEEK
LOCATION DETAILED: RIGHT INFERIOR MEDIAL FOREHEAD
LOCATION DETAILED: RIGHT VENTRAL DISTAL FOREARM
LOCATION DETAILED: LEFT CENTRAL PARIETAL SCALP
LOCATION DETAILED: MID-OCCIPITAL SCALP

## 2025-05-30 ASSESSMENT — LOCATION ZONE DERM
LOCATION ZONE: ARM
LOCATION ZONE: SCALP
LOCATION ZONE: EAR
LOCATION ZONE: NECK
LOCATION ZONE: FACE
LOCATION ZONE: HAND
LOCATION ZONE: TRUNK

## 2025-05-30 NOTE — PROCEDURE: LIQUID NITROGEN
Detail Level: Detailed
Render Note In Bullet Format When Appropriate: No
Render Post-Care Instructions In Note?: yes
Post-Care Instructions: I reviewed with the patient in detail post-care instructions. Patient is to wear sunprotection, and avoid picking at any of the treated lesions. Pt may apply Vaseline to crusted or scabbing areas.
Consent: The patient's consent was obtained including but not limited to risks of crusting, scabbing, blistering, scarring, darker or lighter pigmentary change, recurrence, incomplete removal and infection. RTC in 2 months if lesion(s) persistent.
Number Of Freeze-Thaw Cycles: 2 freeze-thaw cycles
Duration Of Freeze Thaw-Cycle (Seconds): 10

## 2025-08-07 DIAGNOSIS — E78.5 DYSLIPIDEMIA: ICD-10-CM

## 2025-08-07 RX ORDER — ATORVASTATIN CALCIUM 20 MG/1
TABLET, FILM COATED ORAL
Qty: 100 TABLET | Refills: 0 | Status: SHIPPED | OUTPATIENT
Start: 2025-08-07

## 2025-08-28 ENCOUNTER — APPOINTMENT (OUTPATIENT)
Dept: URBAN - METROPOLITAN AREA CLINIC 4 | Facility: CLINIC | Age: 80
Setting detail: DERMATOLOGY
End: 2025-08-28

## 2025-08-28 DIAGNOSIS — D18.0 HEMANGIOMA: ICD-10-CM

## 2025-08-28 DIAGNOSIS — Z71.89 OTHER SPECIFIED COUNSELING: ICD-10-CM

## 2025-08-28 DIAGNOSIS — D22 MELANOCYTIC NEVI: ICD-10-CM

## 2025-08-28 DIAGNOSIS — Z86.006 PERSONAL HISTORY OF MELANOMA IN-SITU: ICD-10-CM

## 2025-08-28 DIAGNOSIS — L82.1 OTHER SEBORRHEIC KERATOSIS: ICD-10-CM

## 2025-08-28 DIAGNOSIS — L57.0 ACTINIC KERATOSIS: ICD-10-CM

## 2025-08-28 DIAGNOSIS — L81.4 OTHER MELANIN HYPERPIGMENTATION: ICD-10-CM | Status: STABLE

## 2025-08-28 DIAGNOSIS — L259 CONTACT DERMATITIS AND OTHER ECZEMA, UNSPECIFIED CAUSE: ICD-10-CM

## 2025-08-28 PROBLEM — D22.61 MELANOCYTIC NEVI OF RIGHT UPPER LIMB, INCLUDING SHOULDER: Status: ACTIVE | Noted: 2025-08-28

## 2025-08-28 PROBLEM — D18.01 HEMANGIOMA OF SKIN AND SUBCUTANEOUS TISSUE: Status: ACTIVE | Noted: 2025-08-28

## 2025-08-28 PROBLEM — L30.8 OTHER SPECIFIED DERMATITIS: Status: ACTIVE | Noted: 2025-08-28

## 2025-08-28 PROBLEM — D22.62 MELANOCYTIC NEVI OF LEFT UPPER LIMB, INCLUDING SHOULDER: Status: ACTIVE | Noted: 2025-08-28

## 2025-08-28 PROBLEM — D22.5 MELANOCYTIC NEVI OF TRUNK: Status: ACTIVE | Noted: 2025-08-28

## 2025-08-28 PROCEDURE — ? OBSERVATION

## 2025-08-28 PROCEDURE — ? PRESCRIPTION

## 2025-08-28 PROCEDURE — ? LIQUID NITROGEN

## 2025-08-28 PROCEDURE — ? SUNSCREEN RECOMMENDATIONS

## 2025-08-28 PROCEDURE — ? MEDICATION COUNSELING

## 2025-08-28 PROCEDURE — ? COUNSELING

## 2025-08-28 RX ORDER — TRIAMCINOLONE ACETONIDE 1 MG/G
CREAM TOPICAL BID
Qty: 30 | Refills: 0 | Status: ERX | COMMUNITY
Start: 2025-08-28

## 2025-08-28 RX ADMIN — TRIAMCINOLONE ACETONIDE: 1 CREAM TOPICAL at 00:00

## 2025-08-28 ASSESSMENT — LOCATION DETAILED DESCRIPTION DERM
LOCATION DETAILED: RIGHT DISTAL DORSAL FOREARM
LOCATION DETAILED: LEFT CENTRAL FRONTAL SCALP
LOCATION DETAILED: LEFT INFERIOR CENTRAL MALAR CHEEK
LOCATION DETAILED: RIGHT CENTRAL EYEBROW
LOCATION DETAILED: RIGHT MEDIAL UPPER BACK
LOCATION DETAILED: INFERIOR THORACIC SPINE
LOCATION DETAILED: RIGHT PROXIMAL PRETIBIAL REGION
LOCATION DETAILED: RIGHT PROXIMAL DORSAL FOREARM
LOCATION DETAILED: RIGHT VENTRAL DISTAL FOREARM
LOCATION DETAILED: RIGHT INFERIOR MEDIAL FOREHEAD
LOCATION DETAILED: LEFT VENTRAL PROXIMAL FOREARM
LOCATION DETAILED: LEFT SUPERIOR CENTRAL MALAR CHEEK
LOCATION DETAILED: LEFT PROXIMAL DORSAL FOREARM
LOCATION DETAILED: LEFT CLAVICULAR NECK
LOCATION DETAILED: LEFT FOREHEAD
LOCATION DETAILED: RIGHT DISTAL PRETIBIAL REGION
LOCATION DETAILED: RIGHT ANTECUBITAL SKIN
LOCATION DETAILED: RIGHT INFERIOR UPPER BACK
LOCATION DETAILED: LEFT CENTRAL MANDIBULAR CHEEK
LOCATION DETAILED: LEFT RADIAL DORSAL HAND
LOCATION DETAILED: SUPERIOR THORACIC SPINE
LOCATION DETAILED: RIGHT RADIAL DORSAL HAND
LOCATION DETAILED: LEFT CENTRAL PARIETAL SCALP

## 2025-08-28 ASSESSMENT — LOCATION SIMPLE DESCRIPTION DERM
LOCATION SIMPLE: RIGHT EYEBROW
LOCATION SIMPLE: RIGHT FOREARM
LOCATION SIMPLE: RIGHT HAND
LOCATION SIMPLE: LEFT CHEEK
LOCATION SIMPLE: LEFT FOREHEAD
LOCATION SIMPLE: LEFT ANTERIOR NECK
LOCATION SIMPLE: UPPER BACK
LOCATION SIMPLE: RIGHT FOREHEAD
LOCATION SIMPLE: RIGHT ELBOW
LOCATION SIMPLE: RIGHT UPPER BACK
LOCATION SIMPLE: RIGHT PRETIBIAL REGION
LOCATION SIMPLE: LEFT SCALP
LOCATION SIMPLE: LEFT HAND
LOCATION SIMPLE: LEFT FOREARM
LOCATION SIMPLE: SCALP

## 2025-08-28 ASSESSMENT — LOCATION ZONE DERM
LOCATION ZONE: LEG
LOCATION ZONE: NECK
LOCATION ZONE: ARM
LOCATION ZONE: HAND
LOCATION ZONE: SCALP
LOCATION ZONE: TRUNK
LOCATION ZONE: FACE

## (undated) DEVICE — GLOVE BIOGEL PI ORTHO SZ 7.5 PF LF (40PR/BX)

## (undated) DEVICE — STOCKINET BIAS 4 IN STERILE - (20/CA)

## (undated) DEVICE — KIT ROOM DECONTAMINATION

## (undated) DEVICE — KIT ANESTHESIA W/CIRCUIT & 3/LT BAG W/FILTER (20EA/CA)

## (undated) DEVICE — GLOVE BIOGEL INDICATOR SZ 8 SURGICAL PF LTX - (50/BX 4BX/CA)

## (undated) DEVICE — SODIUM CHL IRRIGATION 0.9% 1000ML (12EA/CA)

## (undated) DEVICE — SUTURE GENERAL

## (undated) DEVICE — PACK UPPER EXTREMITY SM OR - (3/CA)

## (undated) DEVICE — WRAP CO-FLEX 4IN X 5YD STERIL - SELF-ADHERENT (18/CA)

## (undated) DEVICE — SUTURE 4-0 ETHILON PS-2 18 (12PK/BX)"

## (undated) DEVICE — MASK ANESTHESIA ADULT  - (100/CA)

## (undated) DEVICE — SENSOR SPO2 NEO LNCS ADHESIVE (20/BX) SEE USER NOTES

## (undated) DEVICE — DRESSING XEROFORM 1X8 - (50/BX 4BX/CA)

## (undated) DEVICE — CHLORAPREP 26 ML APPLICATOR - ORANGE TINT(25/CA)

## (undated) DEVICE — LACTATED RINGERS INJ 1000 ML - (14EA/CA 60CA/PF)

## (undated) DEVICE — HEAD HOLDER JUNIOR/ADULT

## (undated) DEVICE — SPONGE GAUZESTER 4 X 4 4PLY - (128PK/CA)

## (undated) DEVICE — ELECTRODE 850 FOAM ADHESIVE - HYDROGEL RADIOTRNSPRNT (50/PK)

## (undated) DEVICE — GLOVE BIOGEL SZ 8 SURGICAL PF LTX - (50PR/BX 4BX/CA)

## (undated) DEVICE — BAG, SPONGE COUNT 50600

## (undated) DEVICE — SUCTION INSTRUMENT YANKAUER BULBOUS TIP W/O VENT (50EA/CA)

## (undated) DEVICE — CANISTER SUCTION RIGID RED 1500CC (40EA/CA)

## (undated) DEVICE — TUBE CONNECTING SUCTION - CLEAR PLASTIC STERILE 72 IN (50EA/CA)

## (undated) DEVICE — HUMID-VENT HEAT AND MOISTURE EXCHANGE- (50/BX)

## (undated) DEVICE — GLOVE BIOGEL PI INDICATOR SZ 6.5 SURGICAL PF LF - (50/BX 4BX/CA)

## (undated) DEVICE — TOURNIQUET CUFF 18 X 3 ONE PORT DISP - STERILE (10/BX)

## (undated) DEVICE — PROTECTOR ULNA NERVE - (36PR/CA)

## (undated) DEVICE — PADDING CAST 4 IN STERILE - 4 X 4 YDS (24/CA)

## (undated) DEVICE — GLOVE BIOGEL SZ 6.5 SURGICAL PF LTX (50PR/BX 4BX/CA)

## (undated) DEVICE — SLING ORTH UNV TIETX VLFM ARM

## (undated) DEVICE — NEPTUNE 4 PORT MANIFOLD - (20/PK)

## (undated) DEVICE — GOWN WARMING STANDARD FLEX - (30/CA)

## (undated) DEVICE — GLOVE, LITE (PAIR)

## (undated) DEVICE — WATER IRRIGATION STERILE 1000ML (12EA/CA)

## (undated) DEVICE — ELECTRODE DUAL RETURN W/ CORD - (50/PK)